# Patient Record
Sex: MALE | Race: WHITE | NOT HISPANIC OR LATINO | Employment: OTHER | URBAN - METROPOLITAN AREA
[De-identification: names, ages, dates, MRNs, and addresses within clinical notes are randomized per-mention and may not be internally consistent; named-entity substitution may affect disease eponyms.]

---

## 2017-01-01 ENCOUNTER — HOSPITAL ENCOUNTER (INPATIENT)
Facility: HOSPITAL | Age: 82
LOS: 6 days | Discharge: NURSING, REHAB OR LONG-TERM CARE | End: 2017-01-19
Attending: EMERGENCY MEDICINE | Admitting: INTERNAL MEDICINE

## 2017-01-01 ENCOUNTER — APPOINTMENT (OUTPATIENT)
Dept: MRI IMAGING | Facility: HOSPITAL | Age: 82
End: 2017-01-01

## 2017-01-01 ENCOUNTER — APPOINTMENT (OUTPATIENT)
Dept: GENERAL RADIOLOGY | Facility: HOSPITAL | Age: 82
End: 2017-01-01

## 2017-01-01 ENCOUNTER — APPOINTMENT (OUTPATIENT)
Dept: CARDIOLOGY | Facility: HOSPITAL | Age: 82
End: 2017-01-01
Attending: INTERNAL MEDICINE

## 2017-01-01 ENCOUNTER — APPOINTMENT (OUTPATIENT)
Dept: CT IMAGING | Facility: HOSPITAL | Age: 82
End: 2017-01-01

## 2017-01-01 ENCOUNTER — APPOINTMENT (OUTPATIENT)
Dept: GENERAL RADIOLOGY | Facility: HOSPITAL | Age: 82
End: 2017-01-01
Attending: INTERNAL MEDICINE

## 2017-01-01 ENCOUNTER — HOSPITAL ENCOUNTER (INPATIENT)
Facility: HOSPITAL | Age: 82
LOS: 13 days | End: 2017-02-10
Attending: EMERGENCY MEDICINE | Admitting: INTERNAL MEDICINE

## 2017-01-01 VITALS
RESPIRATION RATE: 18 BRPM | HEART RATE: 101 BPM | HEIGHT: 70 IN | OXYGEN SATURATION: 95 % | DIASTOLIC BLOOD PRESSURE: 74 MMHG | WEIGHT: 188.5 LBS | SYSTOLIC BLOOD PRESSURE: 149 MMHG | TEMPERATURE: 98.4 F | BODY MASS INDEX: 26.99 KG/M2

## 2017-01-01 VITALS
WEIGHT: 200.4 LBS | RESPIRATION RATE: 36 BRPM | DIASTOLIC BLOOD PRESSURE: 73 MMHG | HEIGHT: 69 IN | HEART RATE: 96 BPM | TEMPERATURE: 99.2 F | OXYGEN SATURATION: 46 % | SYSTOLIC BLOOD PRESSURE: 146 MMHG | BODY MASS INDEX: 29.68 KG/M2

## 2017-01-01 DIAGNOSIS — S00.81XA FACIAL ABRASION, INITIAL ENCOUNTER: ICD-10-CM

## 2017-01-01 DIAGNOSIS — S02.92XA FACIAL FRACTURE DUE TO FALL, CLOSED, INITIAL ENCOUNTER (HCC): ICD-10-CM

## 2017-01-01 DIAGNOSIS — J18.9 PNEUMONIA OF BOTH LUNGS DUE TO INFECTIOUS ORGANISM, UNSPECIFIED PART OF LUNG: Primary | ICD-10-CM

## 2017-01-01 DIAGNOSIS — R53.1 WEAKNESS GENERALIZED: ICD-10-CM

## 2017-01-01 DIAGNOSIS — M54.2 NECK PAIN: Primary | ICD-10-CM

## 2017-01-01 DIAGNOSIS — S12.100A ODONTOID FRACTURE, CLOSED, INITIAL ENCOUNTER (HCC): Primary | ICD-10-CM
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
## 2017-01-01 LAB
ABO + RH BLD: NORMAL
ABO GROUP BLD: NORMAL
ALBUMIN SERPL-MCNC: 2.3 G/DL (ref 3.5–5.2)
ALBUMIN SERPL-MCNC: 2.4 G/DL (ref 3.5–5.2)
ALBUMIN SERPL-MCNC: 2.4 G/DL (ref 3.5–5.2)
ALBUMIN SERPL-MCNC: 2.8 G/DL (ref 3.5–5.2)
ALBUMIN SERPL-MCNC: 3.3 G/DL (ref 3.5–5.2)
ALBUMIN/GLOB SERPL: 0.7 G/DL
ALBUMIN/GLOB SERPL: 0.7 G/DL
ALBUMIN/GLOB SERPL: 0.8 G/DL
ALBUMIN/GLOB SERPL: 1 G/DL
ALBUMIN/GLOB SERPL: 1.1 G/DL
ALP SERPL-CCNC: 101 U/L (ref 39–117)
ALP SERPL-CCNC: 127 U/L (ref 39–117)
ALP SERPL-CCNC: 148 U/L (ref 39–117)
ALP SERPL-CCNC: 82 U/L (ref 39–117)
ALP SERPL-CCNC: 95 U/L (ref 39–117)
ALT SERPL W P-5'-P-CCNC: 14 U/L (ref 1–41)
ALT SERPL W P-5'-P-CCNC: 17 U/L (ref 1–41)
ALT SERPL W P-5'-P-CCNC: 18 U/L (ref 1–41)
ALT SERPL W P-5'-P-CCNC: 21 U/L (ref 1–41)
ALT SERPL W P-5'-P-CCNC: 22 U/L (ref 1–41)
AMYLASE SERPL-CCNC: 19 U/L (ref 28–100)
ANION GAP SERPL CALCULATED.3IONS-SCNC: 10.6 MMOL/L
ANION GAP SERPL CALCULATED.3IONS-SCNC: 10.7 MMOL/L
ANION GAP SERPL CALCULATED.3IONS-SCNC: 10.7 MMOL/L
ANION GAP SERPL CALCULATED.3IONS-SCNC: 11.6 MMOL/L
ANION GAP SERPL CALCULATED.3IONS-SCNC: 11.9 MMOL/L
ANION GAP SERPL CALCULATED.3IONS-SCNC: 12.3 MMOL/L
ANION GAP SERPL CALCULATED.3IONS-SCNC: 12.7 MMOL/L
ANION GAP SERPL CALCULATED.3IONS-SCNC: 13 MMOL/L
ANION GAP SERPL CALCULATED.3IONS-SCNC: 13.8 MMOL/L
ANION GAP SERPL CALCULATED.3IONS-SCNC: 13.9 MMOL/L
ANION GAP SERPL CALCULATED.3IONS-SCNC: 14.2 MMOL/L
ANION GAP SERPL CALCULATED.3IONS-SCNC: 15 MMOL/L
ANION GAP SERPL CALCULATED.3IONS-SCNC: 15.2 MMOL/L
ANION GAP SERPL CALCULATED.3IONS-SCNC: 15.4 MMOL/L
ANION GAP SERPL CALCULATED.3IONS-SCNC: 16.2 MMOL/L
ANION GAP SERPL CALCULATED.3IONS-SCNC: 16.7 MMOL/L
ANION GAP SERPL CALCULATED.3IONS-SCNC: 17.7 MMOL/L
ANION GAP SERPL CALCULATED.3IONS-SCNC: 9.3 MMOL/L
APPEARANCE FLD: ABNORMAL
ARTERIAL PATENCY WRIST A: ABNORMAL
ARTERIAL PATENCY WRIST A: POSITIVE
AST SERPL-CCNC: 17 U/L (ref 1–40)
AST SERPL-CCNC: 18 U/L (ref 1–40)
AST SERPL-CCNC: 31 U/L (ref 1–40)
AST SERPL-CCNC: 33 U/L (ref 1–40)
AST SERPL-CCNC: 34 U/L (ref 1–40)
ATMOSPHERIC PRESS: 742 MMHG
ATMOSPHERIC PRESS: 744.6 MMHG
ATMOSPHERIC PRESS: 745 MMHG
ATMOSPHERIC PRESS: 749.4 MMHG
ATMOSPHERIC PRESS: 751.3 MMHG
ATMOSPHERIC PRESS: 751.4 MMHG
ATMOSPHERIC PRESS: 751.8 MMHG
ATMOSPHERIC PRESS: 754.5 MMHG
ATMOSPHERIC PRESS: 758.1 MMHG
ATMOSPHERIC PRESS: 758.4 MMHG
ATMOSPHERIC PRESS: 758.6 MMHG
B PERT DNA SPEC QL NAA+PROBE: NOT DETECTED
B PERT DNA SPEC QL NAA+PROBE: NOT DETECTED
BACTERIA SPEC AEROBE CULT: NO GROWTH
BACTERIA SPEC AEROBE CULT: NORMAL
BACTERIA SPEC RESP CULT: NO GROWTH
BACTERIA SPEC RESP CULT: NORMAL
BACTERIA UR QL AUTO: ABNORMAL /HPF
BACTERIA UR QL AUTO: NORMAL /HPF
BASE EXCESS BLDA CALC-SCNC: -1.9 MMOL/L (ref 0–2)
BASE EXCESS BLDA CALC-SCNC: -2.1 MMOL/L (ref 0–2)
BASE EXCESS BLDA CALC-SCNC: -2.6 MMOL/L (ref 0–2)
BASE EXCESS BLDA CALC-SCNC: -3.2 MMOL/L (ref 0–2)
BASE EXCESS BLDA CALC-SCNC: -3.7 MMOL/L (ref 0–2)
BASE EXCESS BLDA CALC-SCNC: -6 MMOL/L (ref 0–2)
BASE EXCESS BLDA CALC-SCNC: 0.3 MMOL/L (ref 0–2)
BASE EXCESS BLDA CALC-SCNC: 1.3 MMOL/L (ref 0–2)
BASE EXCESS BLDA CALC-SCNC: 1.6 MMOL/L (ref 0–2)
BASE EXCESS BLDA CALC-SCNC: 1.9 MMOL/L (ref 0–2)
BASE EXCESS BLDA CALC-SCNC: 7.4 MMOL/L (ref 0–2)
BASOPHILS # BLD AUTO: 0.01 10*3/MM3 (ref 0–0.2)
BASOPHILS # BLD AUTO: 0.02 10*3/MM3 (ref 0–0.2)
BASOPHILS # BLD AUTO: 0.03 10*3/MM3 (ref 0–0.2)
BASOPHILS # BLD AUTO: 0.03 10*3/MM3 (ref 0–0.2)
BASOPHILS # BLD AUTO: 0.04 10*3/MM3 (ref 0–0.2)
BASOPHILS # BLD AUTO: 0.05 10*3/MM3 (ref 0–0.2)
BASOPHILS NFR BLD AUTO: 0.1 % (ref 0–1.5)
BASOPHILS NFR BLD AUTO: 0.2 % (ref 0–1.5)
BASOPHILS NFR BLD AUTO: 0.3 % (ref 0–1.5)
BASOPHILS NFR BLD AUTO: 0.4 % (ref 0–1.5)
BDY SITE: ABNORMAL
BDY SITE: NORMAL
BH BB BLOOD EXPIRATION DATE: NORMAL
BH BB BLOOD TYPE BARCODE: 6200
BH BB DISPENSE STATUS: NORMAL
BH BB PRODUCT CODE: NORMAL
BH BB UNIT NUMBER: NORMAL
BH CV ECHO MEAS - ACS: 1.7 CM
BH CV ECHO MEAS - AO MAX PG (FULL): 9.2 MMHG
BH CV ECHO MEAS - AO MAX PG: 16.2 MMHG
BH CV ECHO MEAS - AO MEAN PG (FULL): 6 MMHG
BH CV ECHO MEAS - AO MEAN PG: 10 MMHG
BH CV ECHO MEAS - AO ROOT AREA (BSA CORRECTED): 1.5
BH CV ECHO MEAS - AO ROOT AREA: 7.5 CM^2
BH CV ECHO MEAS - AO ROOT DIAM: 3.1 CM
BH CV ECHO MEAS - AO V2 MAX: 201 CM/SEC
BH CV ECHO MEAS - AO V2 MEAN: 147 CM/SEC
BH CV ECHO MEAS - AO V2 VTI: 36.1 CM
BH CV ECHO MEAS - AVA(I,A): 1.7 CM^2
BH CV ECHO MEAS - AVA(I,D): 1.7 CM^2
BH CV ECHO MEAS - AVA(V,A): 1.9 CM^2
BH CV ECHO MEAS - AVA(V,D): 1.9 CM^2
BH CV ECHO MEAS - BSA(HAYCOCK): 2.1 M^2
BH CV ECHO MEAS - BSA: 2 M^2
BH CV ECHO MEAS - BZI_BMI: 28.5 KILOGRAMS/M^2
BH CV ECHO MEAS - BZI_METRIC_HEIGHT: 175.3 CM
BH CV ECHO MEAS - BZI_METRIC_WEIGHT: 87.5 KG
BH CV ECHO MEAS - CONTRAST EF (2CH): 67.7 ML/M^2
BH CV ECHO MEAS - CONTRAST EF 4CH: 63.4 ML/M^2
BH CV ECHO MEAS - EDV(CUBED): 125 ML
BH CV ECHO MEAS - EDV(MOD-SP2): 124 ML
BH CV ECHO MEAS - EDV(MOD-SP4): 131 ML
BH CV ECHO MEAS - EDV(TEICH): 118.2 ML
BH CV ECHO MEAS - EF(CUBED): 68.6 %
BH CV ECHO MEAS - EF(MOD-SP2): 67.7 %
BH CV ECHO MEAS - EF(MOD-SP4): 63.4 %
BH CV ECHO MEAS - EF(TEICH): 59.9 %
BH CV ECHO MEAS - ESV(CUBED): 39.3 ML
BH CV ECHO MEAS - ESV(MOD-SP2): 40 ML
BH CV ECHO MEAS - ESV(MOD-SP4): 48 ML
BH CV ECHO MEAS - ESV(TEICH): 47.4 ML
BH CV ECHO MEAS - FS: 32 %
BH CV ECHO MEAS - IVS/LVPW: 1.2
BH CV ECHO MEAS - IVSD: 1.2 CM
BH CV ECHO MEAS - LAT PEAK E' VEL: 9 CM/SEC
BH CV ECHO MEAS - LV DIASTOLIC VOL/BSA (35-75): 64.4 ML/M^2
BH CV ECHO MEAS - LV MASS(C)D: 207.1 GRAMS
BH CV ECHO MEAS - LV MASS(C)DI: 101.8 GRAMS/M^2
BH CV ECHO MEAS - LV MAX PG: 7 MMHG
BH CV ECHO MEAS - LV MEAN PG: 4 MMHG
BH CV ECHO MEAS - LV SYSTOLIC VOL/BSA (12-30): 23.6 ML/M^2
BH CV ECHO MEAS - LV V1 MAX: 132 CM/SEC
BH CV ECHO MEAS - LV V1 MEAN: 94 CM/SEC
BH CV ECHO MEAS - LV V1 VTI: 21.2 CM
BH CV ECHO MEAS - LVIDD: 5 CM
BH CV ECHO MEAS - LVIDS: 3.4 CM
BH CV ECHO MEAS - LVLD AP2: 8.8 CM
BH CV ECHO MEAS - LVLD AP4: 9.5 CM
BH CV ECHO MEAS - LVLS AP2: 6.9 CM
BH CV ECHO MEAS - LVLS AP4: 7.8 CM
BH CV ECHO MEAS - LVOT AREA (M): 2.8 CM^2
BH CV ECHO MEAS - LVOT AREA: 2.8 CM^2
BH CV ECHO MEAS - LVOT DIAM: 1.9 CM
BH CV ECHO MEAS - LVPWD: 1 CM
BH CV ECHO MEAS - MED PEAK E' VEL: 7 CM/SEC
BH CV ECHO MEAS - MR MAX PG: 28.9 MMHG
BH CV ECHO MEAS - MR MAX VEL: 269 CM/SEC
BH CV ECHO MEAS - MV A DUR: 0.12 SEC
BH CV ECHO MEAS - MV A MAX VEL: 112 CM/SEC
BH CV ECHO MEAS - MV DEC SLOPE: 441 CM/SEC^2
BH CV ECHO MEAS - MV DEC TIME: 0.34 SEC
BH CV ECHO MEAS - MV E MAX VEL: 96 CM/SEC
BH CV ECHO MEAS - MV E/A: 0.86
BH CV ECHO MEAS - MV MAX PG: 6.6 MMHG
BH CV ECHO MEAS - MV MEAN PG: 3 MMHG
BH CV ECHO MEAS - MV P1/2T MAX VEL: 97.7 CM/SEC
BH CV ECHO MEAS - MV P1/2T: 64.9 MSEC
BH CV ECHO MEAS - MV V2 MAX: 128 CM/SEC
BH CV ECHO MEAS - MV V2 MEAN: 84.4 CM/SEC
BH CV ECHO MEAS - MV V2 VTI: 24.3 CM
BH CV ECHO MEAS - MVA P1/2T LCG: 2.3 CM^2
BH CV ECHO MEAS - MVA(P1/2T): 3.4 CM^2
BH CV ECHO MEAS - MVA(VTI): 2.5 CM^2
BH CV ECHO MEAS - PA ACC TIME: 0.06 SEC
BH CV ECHO MEAS - PA MAX PG (FULL): 4.5 MMHG
BH CV ECHO MEAS - PA MAX PG: 6 MMHG
BH CV ECHO MEAS - PA PR(ACCEL): 53.8 MMHG
BH CV ECHO MEAS - PA V2 MAX: 122 CM/SEC
BH CV ECHO MEAS - PULM DIAS VEL: 23.5 CM/SEC
BH CV ECHO MEAS - PULM S/D: 1.5
BH CV ECHO MEAS - PULM SYS VEL: 34.6 CM/SEC
BH CV ECHO MEAS - PVA(V,A): 1 CM^2
BH CV ECHO MEAS - PVA(V,D): 1 CM^2
BH CV ECHO MEAS - QP/QS: 0.33
BH CV ECHO MEAS - RAP SYSTOLE: 15 MMHG
BH CV ECHO MEAS - RV MAX PG: 1.5 MMHG
BH CV ECHO MEAS - RV MEAN PG: 1 MMHG
BH CV ECHO MEAS - RV V1 MAX: 61.3 CM/SEC
BH CV ECHO MEAS - RV V1 MEAN: 40.6 CM/SEC
BH CV ECHO MEAS - RV V1 VTI: 9.8 CM
BH CV ECHO MEAS - RVOT AREA: 2 CM^2
BH CV ECHO MEAS - RVOT DIAM: 1.6 CM
BH CV ECHO MEAS - RVSP: 54.9 MMHG
BH CV ECHO MEAS - SI(AO): 133.9 ML/M^2
BH CV ECHO MEAS - SI(CUBED): 42.1 ML/M^2
BH CV ECHO MEAS - SI(LVOT): 29.5 ML/M^2
BH CV ECHO MEAS - SI(MOD-SP2): 41.3 ML/M^2
BH CV ECHO MEAS - SI(MOD-SP4): 40.8 ML/M^2
BH CV ECHO MEAS - SI(TEICH): 34.8 ML/M^2
BH CV ECHO MEAS - SV(AO): 272.5 ML
BH CV ECHO MEAS - SV(CUBED): 85.7 ML
BH CV ECHO MEAS - SV(LVOT): 60.1 ML
BH CV ECHO MEAS - SV(MOD-SP2): 84 ML
BH CV ECHO MEAS - SV(MOD-SP4): 83 ML
BH CV ECHO MEAS - SV(RVOT): 19.8 ML
BH CV ECHO MEAS - SV(TEICH): 70.8 ML
BH CV ECHO MEAS - TAPSE (>1.6): 1.4 CM2
BH CV ECHO MEAS - TR MAX VEL: 316 CM/SEC
BH CV XLRA - RV BASE: 2.9 CM
BH CV XLRA - TDI S': 8 CM/SEC
BILIRUB SERPL-MCNC: 0.3 MG/DL (ref 0.1–1.2)
BILIRUB SERPL-MCNC: 0.4 MG/DL (ref 0.1–1.2)
BILIRUB UR QL STRIP: NEGATIVE
BILIRUB UR QL STRIP: NEGATIVE
BLD GP AB SCN SERPL QL: NEGATIVE
BUN BLD-MCNC: 13 MG/DL (ref 8–23)
BUN BLD-MCNC: 14 MG/DL (ref 8–23)
BUN BLD-MCNC: 15 MG/DL (ref 8–23)
BUN BLD-MCNC: 20 MG/DL (ref 8–23)
BUN BLD-MCNC: 24 MG/DL (ref 8–23)
BUN BLD-MCNC: 26 MG/DL (ref 8–23)
BUN BLD-MCNC: 27 MG/DL (ref 8–23)
BUN BLD-MCNC: 28 MG/DL (ref 8–23)
BUN BLD-MCNC: 28 MG/DL (ref 8–23)
BUN BLD-MCNC: 31 MG/DL (ref 8–23)
BUN BLD-MCNC: 34 MG/DL (ref 8–23)
BUN BLD-MCNC: 39 MG/DL (ref 8–23)
BUN BLD-MCNC: 40 MG/DL (ref 8–23)
BUN BLD-MCNC: 40 MG/DL (ref 8–23)
BUN BLD-MCNC: 41 MG/DL (ref 8–23)
BUN BLD-MCNC: 44 MG/DL (ref 8–23)
BUN BLD-MCNC: 45 MG/DL (ref 8–23)
BUN BLD-MCNC: 46 MG/DL (ref 8–23)
BUN BLD-MCNC: 48 MG/DL (ref 8–23)
BUN BLD-MCNC: 51 MG/DL (ref 8–23)
BUN/CREAT SERPL: 13.7 (ref 7–25)
BUN/CREAT SERPL: 15.6 (ref 7–25)
BUN/CREAT SERPL: 15.8 (ref 7–25)
BUN/CREAT SERPL: 18.1 (ref 7–25)
BUN/CREAT SERPL: 18.5 (ref 7–25)
BUN/CREAT SERPL: 18.5 (ref 7–25)
BUN/CREAT SERPL: 19.9 (ref 7–25)
BUN/CREAT SERPL: 21.2 (ref 7–25)
BUN/CREAT SERPL: 21.6 (ref 7–25)
BUN/CREAT SERPL: 21.8 (ref 7–25)
BUN/CREAT SERPL: 22.8 (ref 7–25)
BUN/CREAT SERPL: 22.8 (ref 7–25)
BUN/CREAT SERPL: 28.4 (ref 7–25)
BUN/CREAT SERPL: 29.1 (ref 7–25)
BUN/CREAT SERPL: 29.7 (ref 7–25)
BUN/CREAT SERPL: 30.8 (ref 7–25)
BUN/CREAT SERPL: 32 (ref 7–25)
BUN/CREAT SERPL: 32.2 (ref 7–25)
BUN/CREAT SERPL: 33.1 (ref 7–25)
BUN/CREAT SERPL: 35.8 (ref 7–25)
C PNEUM DNA NPH QL NAA+NON-PROBE: NOT DETECTED
C PNEUM DNA NPH QL NAA+NON-PROBE: NOT DETECTED
CALCIUM SPEC-SCNC: 7.8 MG/DL (ref 8.6–10.5)
CALCIUM SPEC-SCNC: 7.9 MG/DL (ref 8.6–10.5)
CALCIUM SPEC-SCNC: 8.1 MG/DL (ref 8.6–10.5)
CALCIUM SPEC-SCNC: 8.1 MG/DL (ref 8.6–10.5)
CALCIUM SPEC-SCNC: 8.2 MG/DL (ref 8.6–10.5)
CALCIUM SPEC-SCNC: 8.3 MG/DL (ref 8.6–10.5)
CALCIUM SPEC-SCNC: 8.5 MG/DL (ref 8.6–10.5)
CALCIUM SPEC-SCNC: 8.6 MG/DL (ref 8.6–10.5)
CALCIUM SPEC-SCNC: 8.6 MG/DL (ref 8.6–10.5)
CALCIUM SPEC-SCNC: 8.8 MG/DL (ref 8.6–10.5)
CALCIUM SPEC-SCNC: 8.8 MG/DL (ref 8.6–10.5)
CALCIUM SPEC-SCNC: 8.9 MG/DL (ref 8.6–10.5)
CALCIUM SPEC-SCNC: 9 MG/DL (ref 8.6–10.5)
CHLORIDE SERPL-SCNC: 100 MMOL/L (ref 98–107)
CHLORIDE SERPL-SCNC: 101 MMOL/L (ref 98–107)
CHLORIDE SERPL-SCNC: 101 MMOL/L (ref 98–107)
CHLORIDE SERPL-SCNC: 102 MMOL/L (ref 98–107)
CHLORIDE SERPL-SCNC: 103 MMOL/L (ref 98–107)
CHLORIDE SERPL-SCNC: 105 MMOL/L (ref 98–107)
CHLORIDE SERPL-SCNC: 107 MMOL/L (ref 98–107)
CHLORIDE SERPL-SCNC: 109 MMOL/L (ref 98–107)
CHLORIDE SERPL-SCNC: 110 MMOL/L (ref 98–107)
CHLORIDE SERPL-SCNC: 112 MMOL/L (ref 98–107)
CHLORIDE SERPL-SCNC: 113 MMOL/L (ref 98–107)
CHLORIDE SERPL-SCNC: 115 MMOL/L (ref 98–107)
CHLORIDE SERPL-SCNC: 92 MMOL/L (ref 98–107)
CHLORIDE SERPL-SCNC: 94 MMOL/L (ref 98–107)
CHLORIDE SERPL-SCNC: 94 MMOL/L (ref 98–107)
CHLORIDE SERPL-SCNC: 96 MMOL/L (ref 98–107)
CLARITY UR: CLEAR
CLARITY UR: CLEAR
CO2 SERPL-SCNC: 18.3 MMOL/L (ref 22–29)
CO2 SERPL-SCNC: 19.6 MMOL/L (ref 22–29)
CO2 SERPL-SCNC: 19.8 MMOL/L (ref 22–29)
CO2 SERPL-SCNC: 20.8 MMOL/L (ref 22–29)
CO2 SERPL-SCNC: 20.8 MMOL/L (ref 22–29)
CO2 SERPL-SCNC: 21 MMOL/L (ref 22–29)
CO2 SERPL-SCNC: 21.4 MMOL/L (ref 22–29)
CO2 SERPL-SCNC: 22.3 MMOL/L (ref 22–29)
CO2 SERPL-SCNC: 22.3 MMOL/L (ref 22–29)
CO2 SERPL-SCNC: 23 MMOL/L (ref 22–29)
CO2 SERPL-SCNC: 23.4 MMOL/L (ref 22–29)
CO2 SERPL-SCNC: 24.3 MMOL/L (ref 22–29)
CO2 SERPL-SCNC: 24.7 MMOL/L (ref 22–29)
CO2 SERPL-SCNC: 25.1 MMOL/L (ref 22–29)
CO2 SERPL-SCNC: 25.1 MMOL/L (ref 22–29)
CO2 SERPL-SCNC: 25.7 MMOL/L (ref 22–29)
CO2 SERPL-SCNC: 25.7 MMOL/L (ref 22–29)
CO2 SERPL-SCNC: 28.7 MMOL/L (ref 22–29)
CO2 SERPL-SCNC: 29.2 MMOL/L (ref 22–29)
CO2 SERPL-SCNC: 31.3 MMOL/L (ref 22–29)
COLOR FLD: YELLOW
COLOR UR: YELLOW
COLOR UR: YELLOW
CREAT BLD-MCNC: 0.9 MG/DL (ref 0.76–1.27)
CREAT BLD-MCNC: 0.95 MG/DL (ref 0.76–1.27)
CREAT BLD-MCNC: 0.95 MG/DL (ref 0.76–1.27)
CREAT BLD-MCNC: 1.08 MG/DL (ref 0.76–1.27)
CREAT BLD-MCNC: 1.18 MG/DL (ref 0.76–1.27)
CREAT BLD-MCNC: 1.23 MG/DL (ref 0.76–1.27)
CREAT BLD-MCNC: 1.25 MG/DL (ref 0.76–1.27)
CREAT BLD-MCNC: 1.3 MG/DL (ref 0.76–1.27)
CREAT BLD-MCNC: 1.3 MG/DL (ref 0.76–1.27)
CREAT BLD-MCNC: 1.32 MG/DL (ref 0.76–1.27)
CREAT BLD-MCNC: 1.36 MG/DL (ref 0.76–1.27)
CREAT BLD-MCNC: 1.41 MG/DL (ref 0.76–1.27)
CREAT BLD-MCNC: 1.43 MG/DL (ref 0.76–1.27)
CREAT BLD-MCNC: 1.44 MG/DL (ref 0.76–1.27)
CREAT BLD-MCNC: 1.5 MG/DL (ref 0.76–1.27)
CREAT BLD-MCNC: 1.56 MG/DL (ref 0.76–1.27)
CREAT BLD-MCNC: 1.72 MG/DL (ref 0.76–1.27)
CREAT BLD-MCNC: 1.97 MG/DL (ref 0.76–1.27)
CYTO UR: NORMAL
D-LACTATE SERPL-SCNC: 1 MMOL/L (ref 0.5–2)
D-LACTATE SERPL-SCNC: 1.3 MMOL/L (ref 0.5–2)
D-LACTATE SERPL-SCNC: 1.7 MMOL/L (ref 0.5–2)
D-LACTATE SERPL-SCNC: 1.7 MMOL/L (ref 0.5–2)
D-LACTATE SERPL-SCNC: 1.8 MMOL/L (ref 0.5–2)
D-LACTATE SERPL-SCNC: 2.7 MMOL/L (ref 0.5–2)
DEPRECATED RDW RBC AUTO: 43.1 FL (ref 37–54)
DEPRECATED RDW RBC AUTO: 46.1 FL (ref 37–54)
DEPRECATED RDW RBC AUTO: 46.9 FL (ref 37–54)
DEPRECATED RDW RBC AUTO: 47.4 FL (ref 37–54)
DEPRECATED RDW RBC AUTO: 47.5 FL (ref 37–54)
DEPRECATED RDW RBC AUTO: 47.8 FL (ref 37–54)
DEPRECATED RDW RBC AUTO: 47.9 FL (ref 37–54)
DEPRECATED RDW RBC AUTO: 48 FL (ref 37–54)
DEPRECATED RDW RBC AUTO: 48.2 FL (ref 37–54)
DEPRECATED RDW RBC AUTO: 49.7 FL (ref 37–54)
DEPRECATED RDW RBC AUTO: 49.8 FL (ref 37–54)
DEPRECATED RDW RBC AUTO: 50.7 FL (ref 37–54)
DEPRECATED RDW RBC AUTO: 50.8 FL (ref 37–54)
DEPRECATED RDW RBC AUTO: 51.2 FL (ref 37–54)
DEPRECATED RDW RBC AUTO: 51.5 FL (ref 37–54)
DEPRECATED RDW RBC AUTO: 52 FL (ref 37–54)
DEPRECATED RDW RBC AUTO: 52 FL (ref 37–54)
DEPRECATED RDW RBC AUTO: 54.8 FL (ref 37–54)
E/E' RATIO: 13
EOSINOPHIL # BLD AUTO: 0.02 10*3/MM3 (ref 0–0.7)
EOSINOPHIL # BLD AUTO: 0.05 10*3/MM3 (ref 0–0.7)
EOSINOPHIL # BLD AUTO: 0.05 10*3/MM3 (ref 0–0.7)
EOSINOPHIL # BLD AUTO: 0.08 10*3/MM3 (ref 0–0.7)
EOSINOPHIL # BLD AUTO: 0.1 10*3/MM3 (ref 0–0.7)
EOSINOPHIL # BLD AUTO: 0.19 10*3/MM3 (ref 0–0.7)
EOSINOPHIL # BLD AUTO: 0.28 10*3/MM3 (ref 0–0.7)
EOSINOPHIL # BLD AUTO: 0.3 10*3/MM3 (ref 0–0.7)
EOSINOPHIL # BLD AUTO: 0.36 10*3/MM3 (ref 0–0.7)
EOSINOPHIL # BLD AUTO: 0.36 10*3/MM3 (ref 0–0.7)
EOSINOPHIL # BLD AUTO: 0.4 10*3/MM3 (ref 0–0.7)
EOSINOPHIL # BLD AUTO: 0.52 10*3/MM3 (ref 0–0.7)
EOSINOPHIL # BLD AUTO: 0.55 10*3/MM3 (ref 0–0.7)
EOSINOPHIL # BLD AUTO: 0.67 10*3/MM3 (ref 0–0.7)
EOSINOPHIL # BLD AUTO: 0.69 10*3/MM3 (ref 0–0.7)
EOSINOPHIL NFR BLD AUTO: 0.1 % (ref 0.3–6.2)
EOSINOPHIL NFR BLD AUTO: 0.4 % (ref 0.3–6.2)
EOSINOPHIL NFR BLD AUTO: 0.7 % (ref 0.3–6.2)
EOSINOPHIL NFR BLD AUTO: 0.8 % (ref 0.3–6.2)
EOSINOPHIL NFR BLD AUTO: 1.1 % (ref 0.3–6.2)
EOSINOPHIL NFR BLD AUTO: 1.5 % (ref 0.3–6.2)
EOSINOPHIL NFR BLD AUTO: 2.6 % (ref 0.3–6.2)
EOSINOPHIL NFR BLD AUTO: 3.7 % (ref 0.3–6.2)
EOSINOPHIL NFR BLD AUTO: 4 % (ref 0.3–6.2)
EOSINOPHIL NFR BLD AUTO: 4.2 % (ref 0.3–6.2)
EOSINOPHIL NFR BLD AUTO: 4.4 % (ref 0.3–6.2)
EOSINOPHIL NFR BLD AUTO: 4.7 % (ref 0.3–6.2)
EOSINOPHIL NFR BLD AUTO: 4.9 % (ref 0.3–6.2)
EOSINOPHIL NFR BLD AUTO: 5.9 % (ref 0.3–6.2)
EOSINOPHIL NFR BLD AUTO: 6.9 % (ref 0.3–6.2)
EOSINOPHIL NFR FLD MANUAL: 2 %
ERYTHROCYTE [DISTWIDTH] IN BLOOD BY AUTOMATED COUNT: 12.4 % (ref 11.5–14.5)
ERYTHROCYTE [DISTWIDTH] IN BLOOD BY AUTOMATED COUNT: 13 % (ref 11.5–14.5)
ERYTHROCYTE [DISTWIDTH] IN BLOOD BY AUTOMATED COUNT: 13 % (ref 11.5–14.5)
ERYTHROCYTE [DISTWIDTH] IN BLOOD BY AUTOMATED COUNT: 13.1 % (ref 11.5–14.5)
ERYTHROCYTE [DISTWIDTH] IN BLOOD BY AUTOMATED COUNT: 13.1 % (ref 11.5–14.5)
ERYTHROCYTE [DISTWIDTH] IN BLOOD BY AUTOMATED COUNT: 13.2 % (ref 11.5–14.5)
ERYTHROCYTE [DISTWIDTH] IN BLOOD BY AUTOMATED COUNT: 13.3 % (ref 11.5–14.5)
ERYTHROCYTE [DISTWIDTH] IN BLOOD BY AUTOMATED COUNT: 13.4 % (ref 11.5–14.5)
ERYTHROCYTE [DISTWIDTH] IN BLOOD BY AUTOMATED COUNT: 13.4 % (ref 11.5–14.5)
ERYTHROCYTE [DISTWIDTH] IN BLOOD BY AUTOMATED COUNT: 13.5 % (ref 11.5–14.5)
ERYTHROCYTE [DISTWIDTH] IN BLOOD BY AUTOMATED COUNT: 13.5 % (ref 11.5–14.5)
ERYTHROCYTE [DISTWIDTH] IN BLOOD BY AUTOMATED COUNT: 13.9 % (ref 11.5–14.5)
ERYTHROCYTE [DISTWIDTH] IN BLOOD BY AUTOMATED COUNT: 14 % (ref 11.5–14.5)
ERYTHROCYTE [DISTWIDTH] IN BLOOD BY AUTOMATED COUNT: 14.1 % (ref 11.5–14.5)
ERYTHROCYTE [DISTWIDTH] IN BLOOD BY AUTOMATED COUNT: 14.5 % (ref 11.5–14.5)
ERYTHROCYTE [DISTWIDTH] IN BLOOD BY AUTOMATED COUNT: 14.5 % (ref 11.5–14.5)
ERYTHROCYTE [DISTWIDTH] IN BLOOD BY AUTOMATED COUNT: 15.2 % (ref 11.5–14.5)
FLUAV H1 2009 PAND RNA NPH QL NAA+PROBE: NOT DETECTED
FLUAV H1 2009 PAND RNA NPH QL NAA+PROBE: NOT DETECTED
FLUAV H1 HA GENE NPH QL NAA+PROBE: NOT DETECTED
FLUAV H1 HA GENE NPH QL NAA+PROBE: NOT DETECTED
FLUAV H3 RNA NPH QL NAA+PROBE: NOT DETECTED
FLUAV H3 RNA NPH QL NAA+PROBE: NOT DETECTED
FLUAV SUBTYP SPEC NAA+PROBE: NOT DETECTED
FLUAV SUBTYP SPEC NAA+PROBE: NOT DETECTED
FLUBV RNA ISLT QL NAA+PROBE: NOT DETECTED
FLUBV RNA ISLT QL NAA+PROBE: NOT DETECTED
GAS FLOW AIRWAY: 15 LPM
GASTROCULT GAST QL: NEGATIVE
GFR SERPL CREATININE-BSD FRML MDRD: 33 ML/MIN/1.73
GFR SERPL CREATININE-BSD FRML MDRD: 38 ML/MIN/1.73
GFR SERPL CREATININE-BSD FRML MDRD: 43 ML/MIN/1.73
GFR SERPL CREATININE-BSD FRML MDRD: 45 ML/MIN/1.73
GFR SERPL CREATININE-BSD FRML MDRD: 47 ML/MIN/1.73
GFR SERPL CREATININE-BSD FRML MDRD: 48 ML/MIN/1.73
GFR SERPL CREATININE-BSD FRML MDRD: 50 ML/MIN/1.73
GFR SERPL CREATININE-BSD FRML MDRD: 52 ML/MIN/1.73
GFR SERPL CREATININE-BSD FRML MDRD: 53 ML/MIN/1.73
GFR SERPL CREATININE-BSD FRML MDRD: 53 ML/MIN/1.73
GFR SERPL CREATININE-BSD FRML MDRD: 56 ML/MIN/1.73
GFR SERPL CREATININE-BSD FRML MDRD: 57 ML/MIN/1.73
GFR SERPL CREATININE-BSD FRML MDRD: 59 ML/MIN/1.73
GFR SERPL CREATININE-BSD FRML MDRD: 66 ML/MIN/1.73
GFR SERPL CREATININE-BSD FRML MDRD: 76 ML/MIN/1.73
GFR SERPL CREATININE-BSD FRML MDRD: 76 ML/MIN/1.73
GFR SERPL CREATININE-BSD FRML MDRD: 81 ML/MIN/1.73
GIE STN SPEC: NORMAL
GLOBULIN UR ELPH-MCNC: 2.6 GM/DL
GLOBULIN UR ELPH-MCNC: 3 GM/DL
GLOBULIN UR ELPH-MCNC: 3.2 GM/DL
GLOBULIN UR ELPH-MCNC: 3.3 GM/DL
GLOBULIN UR ELPH-MCNC: 3.5 GM/DL
GLUCOSE BLD-MCNC: 101 MG/DL (ref 65–99)
GLUCOSE BLD-MCNC: 103 MG/DL (ref 65–99)
GLUCOSE BLD-MCNC: 106 MG/DL (ref 65–99)
GLUCOSE BLD-MCNC: 108 MG/DL (ref 65–99)
GLUCOSE BLD-MCNC: 117 MG/DL (ref 65–99)
GLUCOSE BLD-MCNC: 120 MG/DL (ref 65–99)
GLUCOSE BLD-MCNC: 120 MG/DL (ref 65–99)
GLUCOSE BLD-MCNC: 123 MG/DL (ref 65–99)
GLUCOSE BLD-MCNC: 133 MG/DL (ref 65–99)
GLUCOSE BLD-MCNC: 136 MG/DL (ref 65–99)
GLUCOSE BLD-MCNC: 140 MG/DL (ref 65–99)
GLUCOSE BLD-MCNC: 142 MG/DL (ref 65–99)
GLUCOSE BLD-MCNC: 149 MG/DL (ref 65–99)
GLUCOSE BLD-MCNC: 149 MG/DL (ref 65–99)
GLUCOSE BLD-MCNC: 159 MG/DL (ref 65–99)
GLUCOSE BLD-MCNC: 168 MG/DL (ref 65–99)
GLUCOSE BLD-MCNC: 96 MG/DL (ref 65–99)
GLUCOSE BLD-MCNC: 96 MG/DL (ref 65–99)
GLUCOSE BLD-MCNC: 98 MG/DL (ref 65–99)
GLUCOSE BLD-MCNC: 99 MG/DL (ref 65–99)
GLUCOSE BLDC GLUCOMTR-MCNC: 101 MG/DL (ref 70–130)
GLUCOSE BLDC GLUCOMTR-MCNC: 103 MG/DL (ref 70–130)
GLUCOSE BLDC GLUCOMTR-MCNC: 103 MG/DL (ref 70–130)
GLUCOSE BLDC GLUCOMTR-MCNC: 105 MG/DL (ref 70–130)
GLUCOSE BLDC GLUCOMTR-MCNC: 105 MG/DL (ref 70–130)
GLUCOSE BLDC GLUCOMTR-MCNC: 107 MG/DL (ref 70–130)
GLUCOSE BLDC GLUCOMTR-MCNC: 108 MG/DL (ref 70–130)
GLUCOSE BLDC GLUCOMTR-MCNC: 109 MG/DL (ref 70–130)
GLUCOSE BLDC GLUCOMTR-MCNC: 111 MG/DL (ref 70–130)
GLUCOSE BLDC GLUCOMTR-MCNC: 112 MG/DL (ref 70–130)
GLUCOSE BLDC GLUCOMTR-MCNC: 117 MG/DL (ref 70–130)
GLUCOSE BLDC GLUCOMTR-MCNC: 118 MG/DL (ref 70–130)
GLUCOSE BLDC GLUCOMTR-MCNC: 119 MG/DL (ref 70–130)
GLUCOSE BLDC GLUCOMTR-MCNC: 120 MG/DL (ref 70–130)
GLUCOSE BLDC GLUCOMTR-MCNC: 120 MG/DL (ref 70–130)
GLUCOSE BLDC GLUCOMTR-MCNC: 121 MG/DL (ref 70–130)
GLUCOSE BLDC GLUCOMTR-MCNC: 124 MG/DL (ref 70–130)
GLUCOSE BLDC GLUCOMTR-MCNC: 126 MG/DL (ref 70–130)
GLUCOSE BLDC GLUCOMTR-MCNC: 127 MG/DL (ref 70–130)
GLUCOSE BLDC GLUCOMTR-MCNC: 128 MG/DL (ref 70–130)
GLUCOSE BLDC GLUCOMTR-MCNC: 129 MG/DL (ref 70–130)
GLUCOSE BLDC GLUCOMTR-MCNC: 130 MG/DL (ref 70–130)
GLUCOSE BLDC GLUCOMTR-MCNC: 131 MG/DL (ref 70–130)
GLUCOSE BLDC GLUCOMTR-MCNC: 131 MG/DL (ref 70–130)
GLUCOSE BLDC GLUCOMTR-MCNC: 133 MG/DL (ref 70–130)
GLUCOSE BLDC GLUCOMTR-MCNC: 134 MG/DL (ref 70–130)
GLUCOSE BLDC GLUCOMTR-MCNC: 135 MG/DL (ref 70–130)
GLUCOSE BLDC GLUCOMTR-MCNC: 136 MG/DL (ref 70–130)
GLUCOSE BLDC GLUCOMTR-MCNC: 136 MG/DL (ref 70–130)
GLUCOSE BLDC GLUCOMTR-MCNC: 137 MG/DL (ref 70–130)
GLUCOSE BLDC GLUCOMTR-MCNC: 138 MG/DL (ref 70–130)
GLUCOSE BLDC GLUCOMTR-MCNC: 141 MG/DL (ref 70–130)
GLUCOSE BLDC GLUCOMTR-MCNC: 142 MG/DL (ref 70–130)
GLUCOSE BLDC GLUCOMTR-MCNC: 142 MG/DL (ref 70–130)
GLUCOSE BLDC GLUCOMTR-MCNC: 143 MG/DL (ref 70–130)
GLUCOSE BLDC GLUCOMTR-MCNC: 144 MG/DL (ref 70–130)
GLUCOSE BLDC GLUCOMTR-MCNC: 147 MG/DL (ref 70–130)
GLUCOSE BLDC GLUCOMTR-MCNC: 150 MG/DL (ref 70–130)
GLUCOSE BLDC GLUCOMTR-MCNC: 167 MG/DL (ref 70–130)
GLUCOSE BLDC GLUCOMTR-MCNC: 169 MG/DL (ref 70–130)
GLUCOSE BLDC GLUCOMTR-MCNC: 59 MG/DL (ref 70–130)
GLUCOSE BLDC GLUCOMTR-MCNC: 68 MG/DL (ref 70–130)
GLUCOSE BLDC GLUCOMTR-MCNC: 83 MG/DL (ref 70–130)
GLUCOSE BLDC GLUCOMTR-MCNC: 84 MG/DL (ref 70–130)
GLUCOSE BLDC GLUCOMTR-MCNC: 85 MG/DL (ref 70–130)
GLUCOSE BLDC GLUCOMTR-MCNC: 86 MG/DL (ref 70–130)
GLUCOSE BLDC GLUCOMTR-MCNC: 91 MG/DL (ref 70–130)
GLUCOSE BLDC GLUCOMTR-MCNC: 93 MG/DL (ref 70–130)
GLUCOSE BLDC GLUCOMTR-MCNC: 94 MG/DL (ref 70–130)
GLUCOSE BLDC GLUCOMTR-MCNC: 98 MG/DL (ref 70–130)
GLUCOSE BLDC GLUCOMTR-MCNC: 99 MG/DL (ref 70–130)
GLUCOSE UR STRIP-MCNC: NEGATIVE MG/DL
GLUCOSE UR STRIP-MCNC: NEGATIVE MG/DL
GRAM STN SPEC: NORMAL
HADV DNA SPEC NAA+PROBE: NOT DETECTED
HADV DNA SPEC NAA+PROBE: NOT DETECTED
HCO3 BLDA-SCNC: 19.3 MMOL/L (ref 22–28)
HCO3 BLDA-SCNC: 22.3 MMOL/L (ref 22–28)
HCO3 BLDA-SCNC: 23.6 MMOL/L (ref 22–28)
HCO3 BLDA-SCNC: 23.7 MMOL/L (ref 22–28)
HCO3 BLDA-SCNC: 23.8 MMOL/L (ref 22–28)
HCO3 BLDA-SCNC: 23.8 MMOL/L (ref 22–28)
HCO3 BLDA-SCNC: 24.3 MMOL/L (ref 22–28)
HCO3 BLDA-SCNC: 26.8 MMOL/L (ref 22–28)
HCO3 BLDA-SCNC: 27 MMOL/L (ref 22–28)
HCO3 BLDA-SCNC: 27.2 MMOL/L (ref 22–28)
HCO3 BLDA-SCNC: 33.6 MMOL/L (ref 22–28)
HCOV 229E RNA SPEC QL NAA+PROBE: NOT DETECTED
HCOV 229E RNA SPEC QL NAA+PROBE: NOT DETECTED
HCOV HKU1 RNA SPEC QL NAA+PROBE: NOT DETECTED
HCOV HKU1 RNA SPEC QL NAA+PROBE: NOT DETECTED
HCOV NL63 RNA SPEC QL NAA+PROBE: NOT DETECTED
HCOV NL63 RNA SPEC QL NAA+PROBE: NOT DETECTED
HCOV OC43 RNA SPEC QL NAA+PROBE: NOT DETECTED
HCOV OC43 RNA SPEC QL NAA+PROBE: NOT DETECTED
HCT VFR BLD AUTO: 22.9 % (ref 40.4–52.2)
HCT VFR BLD AUTO: 23 % (ref 40.4–52.2)
HCT VFR BLD AUTO: 23.2 % (ref 40.4–52.2)
HCT VFR BLD AUTO: 24.1 % (ref 40.4–52.2)
HCT VFR BLD AUTO: 24.8 % (ref 40.4–52.2)
HCT VFR BLD AUTO: 24.9 % (ref 40.4–52.2)
HCT VFR BLD AUTO: 25.6 % (ref 40.4–52.2)
HCT VFR BLD AUTO: 26.1 % (ref 40.4–52.2)
HCT VFR BLD AUTO: 26.6 % (ref 40.4–52.2)
HCT VFR BLD AUTO: 26.7 % (ref 40.4–52.2)
HCT VFR BLD AUTO: 27.3 % (ref 40.4–52.2)
HCT VFR BLD AUTO: 27.7 % (ref 40.4–52.2)
HCT VFR BLD AUTO: 27.7 % (ref 40.4–52.2)
HCT VFR BLD AUTO: 27.9 % (ref 40.4–52.2)
HCT VFR BLD AUTO: 28.2 % (ref 40.4–52.2)
HCT VFR BLD AUTO: 28.3 % (ref 40.4–52.2)
HCT VFR BLD AUTO: 28.6 % (ref 40.4–52.2)
HCT VFR BLD AUTO: 29 % (ref 40.4–52.2)
HCT VFR BLD AUTO: 30.7 % (ref 40.4–52.2)
HCT VFR BLD AUTO: 31.5 % (ref 40.4–52.2)
HCT VFR BLD AUTO: 32 % (ref 40.4–52.2)
HGB BLD-MCNC: 10.4 G/DL (ref 13.7–17.6)
HGB BLD-MCNC: 7.2 G/DL (ref 13.7–17.6)
HGB BLD-MCNC: 7.2 G/DL (ref 13.7–17.6)
HGB BLD-MCNC: 7.4 G/DL (ref 13.7–17.6)
HGB BLD-MCNC: 7.6 G/DL (ref 13.7–17.6)
HGB BLD-MCNC: 7.8 G/DL (ref 13.7–17.6)
HGB BLD-MCNC: 8 G/DL (ref 13.7–17.6)
HGB BLD-MCNC: 8.2 G/DL (ref 13.7–17.6)
HGB BLD-MCNC: 8.3 G/DL (ref 13.7–17.6)
HGB BLD-MCNC: 8.4 G/DL (ref 13.7–17.6)
HGB BLD-MCNC: 8.5 G/DL (ref 13.7–17.6)
HGB BLD-MCNC: 8.6 G/DL (ref 13.7–17.6)
HGB BLD-MCNC: 8.7 G/DL (ref 13.7–17.6)
HGB BLD-MCNC: 8.9 G/DL (ref 13.7–17.6)
HGB BLD-MCNC: 8.9 G/DL (ref 13.7–17.6)
HGB BLD-MCNC: 9 G/DL (ref 13.7–17.6)
HGB BLD-MCNC: 9.2 G/DL (ref 13.7–17.6)
HGB BLD-MCNC: 9.4 G/DL (ref 13.7–17.6)
HGB BLD-MCNC: 9.4 G/DL (ref 13.7–17.6)
HGB BLD-MCNC: 9.8 G/DL (ref 13.7–17.6)
HGB BLD-MCNC: 9.9 G/DL (ref 13.7–17.6)
HGB UR QL STRIP.AUTO: ABNORMAL
HGB UR QL STRIP.AUTO: NEGATIVE
HMPV RNA NPH QL NAA+NON-PROBE: NOT DETECTED
HMPV RNA NPH QL NAA+NON-PROBE: NOT DETECTED
HOLD SPECIMEN: NORMAL
HOROWITZ INDEX BLD+IHG-RTO: 100 %
HOROWITZ INDEX BLD+IHG-RTO: 45 %
HOROWITZ INDEX BLD+IHG-RTO: 50 %
HOROWITZ INDEX BLD+IHG-RTO: 60 %
HOROWITZ INDEX BLD+IHG-RTO: 70 %
HOROWITZ INDEX BLD+IHG-RTO: 80 %
HOROWITZ INDEX BLD+IHG-RTO: 80 %
HOROWITZ INDEX BLD+IHG-RTO: 98 %
HPIV1 RNA SPEC QL NAA+PROBE: NOT DETECTED
HPIV1 RNA SPEC QL NAA+PROBE: NOT DETECTED
HPIV2 RNA SPEC QL NAA+PROBE: NOT DETECTED
HPIV2 RNA SPEC QL NAA+PROBE: NOT DETECTED
HPIV3 RNA NPH QL NAA+PROBE: NOT DETECTED
HPIV3 RNA NPH QL NAA+PROBE: NOT DETECTED
HPIV4 P GENE NPH QL NAA+PROBE: NOT DETECTED
HPIV4 P GENE NPH QL NAA+PROBE: NOT DETECTED
HYALINE CASTS UR QL AUTO: ABNORMAL /LPF
HYALINE CASTS UR QL AUTO: NORMAL /LPF
IMM GRANULOCYTES # BLD: 0 10*3/MM3 (ref 0–0.03)
IMM GRANULOCYTES # BLD: 0.03 10*3/MM3 (ref 0–0.03)
IMM GRANULOCYTES # BLD: 0.05 10*3/MM3 (ref 0–0.03)
IMM GRANULOCYTES # BLD: 0.06 10*3/MM3 (ref 0–0.03)
IMM GRANULOCYTES # BLD: 0.09 10*3/MM3 (ref 0–0.03)
IMM GRANULOCYTES # BLD: 0.09 10*3/MM3 (ref 0–0.03)
IMM GRANULOCYTES # BLD: 0.21 10*3/MM3 (ref 0–0.03)
IMM GRANULOCYTES # BLD: 0.24 10*3/MM3 (ref 0–0.03)
IMM GRANULOCYTES # BLD: 0.29 10*3/MM3 (ref 0–0.03)
IMM GRANULOCYTES NFR BLD: 0 % (ref 0–0.5)
IMM GRANULOCYTES NFR BLD: 0.2 % (ref 0–0.5)
IMM GRANULOCYTES NFR BLD: 0.3 % (ref 0–0.5)
IMM GRANULOCYTES NFR BLD: 0.4 % (ref 0–0.5)
IMM GRANULOCYTES NFR BLD: 0.5 % (ref 0–0.5)
IMM GRANULOCYTES NFR BLD: 0.5 % (ref 0–0.5)
IMM GRANULOCYTES NFR BLD: 0.6 % (ref 0–0.5)
IMM GRANULOCYTES NFR BLD: 1 % (ref 0–0.5)
IMM GRANULOCYTES NFR BLD: 1.8 % (ref 0–0.5)
IMM GRANULOCYTES NFR BLD: 2 % (ref 0–0.5)
IMM GRANULOCYTES NFR BLD: 3 % (ref 0–0.5)
INR PPP: 1.3 (ref 0.9–1.1)
KETONES UR QL STRIP: NEGATIVE
KETONES UR QL STRIP: NEGATIVE
LAB AP CASE REPORT: NORMAL
LEFT ATRIUM VOLUME INDEX: 30 ML/M2
LEUKOCYTE ESTERASE UR QL STRIP.AUTO: NEGATIVE
LEUKOCYTE ESTERASE UR QL STRIP.AUTO: NEGATIVE
LIPASE SERPL-CCNC: 17 U/L (ref 13–60)
LYMPHOCYTES # BLD AUTO: 0.35 10*3/MM3 (ref 0.9–4.8)
LYMPHOCYTES # BLD AUTO: 0.47 10*3/MM3 (ref 0.9–4.8)
LYMPHOCYTES # BLD AUTO: 0.51 10*3/MM3 (ref 0.9–4.8)
LYMPHOCYTES # BLD AUTO: 0.54 10*3/MM3 (ref 0.9–4.8)
LYMPHOCYTES # BLD AUTO: 0.55 10*3/MM3 (ref 0.9–4.8)
LYMPHOCYTES # BLD AUTO: 0.63 10*3/MM3 (ref 0.9–4.8)
LYMPHOCYTES # BLD AUTO: 0.65 10*3/MM3 (ref 0.9–4.8)
LYMPHOCYTES # BLD AUTO: 0.7 10*3/MM3 (ref 0.9–4.8)
LYMPHOCYTES # BLD AUTO: 0.71 10*3/MM3 (ref 0.9–4.8)
LYMPHOCYTES # BLD AUTO: 0.75 10*3/MM3 (ref 0.9–4.8)
LYMPHOCYTES # BLD AUTO: 0.88 10*3/MM3 (ref 0.9–4.8)
LYMPHOCYTES # BLD AUTO: 0.93 10*3/MM3 (ref 0.9–4.8)
LYMPHOCYTES # BLD AUTO: 1 10*3/MM3 (ref 0.9–4.8)
LYMPHOCYTES # BLD AUTO: 1.21 10*3/MM3 (ref 0.9–4.8)
LYMPHOCYTES # BLD AUTO: 2.3 10*3/MM3 (ref 0.9–4.8)
LYMPHOCYTES NFR BLD AUTO: 13.3 % (ref 19.6–45.3)
LYMPHOCYTES NFR BLD AUTO: 15.2 % (ref 19.6–45.3)
LYMPHOCYTES NFR BLD AUTO: 18.3 % (ref 19.6–45.3)
LYMPHOCYTES NFR BLD AUTO: 3.7 % (ref 19.6–45.3)
LYMPHOCYTES NFR BLD AUTO: 3.9 % (ref 19.6–45.3)
LYMPHOCYTES NFR BLD AUTO: 5.4 % (ref 19.6–45.3)
LYMPHOCYTES NFR BLD AUTO: 5.7 % (ref 19.6–45.3)
LYMPHOCYTES NFR BLD AUTO: 5.8 % (ref 19.6–45.3)
LYMPHOCYTES NFR BLD AUTO: 6 % (ref 19.6–45.3)
LYMPHOCYTES NFR BLD AUTO: 6.3 % (ref 19.6–45.3)
LYMPHOCYTES NFR BLD AUTO: 6.4 % (ref 19.6–45.3)
LYMPHOCYTES NFR BLD AUTO: 6.4 % (ref 19.6–45.3)
LYMPHOCYTES NFR BLD AUTO: 6.5 % (ref 19.6–45.3)
LYMPHOCYTES NFR BLD AUTO: 7.2 % (ref 19.6–45.3)
LYMPHOCYTES NFR BLD AUTO: 8.6 % (ref 19.6–45.3)
LYMPHOCYTES NFR FLD MANUAL: 8 %
Lab: NORMAL
M PNEUMO IGG SER IA-ACNC: NOT DETECTED
M PNEUMO IGG SER IA-ACNC: NOT DETECTED
MAGNESIUM SERPL-MCNC: 1.6 MG/DL (ref 1.6–2.4)
MAGNESIUM SERPL-MCNC: 1.8 MG/DL (ref 1.6–2.4)
MAGNESIUM SERPL-MCNC: 2.1 MG/DL (ref 1.6–2.4)
MAGNESIUM SERPL-MCNC: 2.4 MG/DL (ref 1.6–2.4)
MCH RBC QN AUTO: 30.2 PG (ref 27–32.7)
MCH RBC QN AUTO: 31 PG (ref 27–32.7)
MCH RBC QN AUTO: 31 PG (ref 27–32.7)
MCH RBC QN AUTO: 31.1 PG (ref 27–32.7)
MCH RBC QN AUTO: 31.2 PG (ref 27–32.7)
MCH RBC QN AUTO: 31.2 PG (ref 27–32.7)
MCH RBC QN AUTO: 31.3 PG (ref 27–32.7)
MCH RBC QN AUTO: 31.3 PG (ref 27–32.7)
MCH RBC QN AUTO: 31.4 PG (ref 27–32.7)
MCH RBC QN AUTO: 31.4 PG (ref 27–32.7)
MCH RBC QN AUTO: 31.5 PG (ref 27–32.7)
MCH RBC QN AUTO: 31.5 PG (ref 27–32.7)
MCH RBC QN AUTO: 31.8 PG (ref 27–32.7)
MCH RBC QN AUTO: 31.9 PG (ref 27–32.7)
MCH RBC QN AUTO: 32 PG (ref 27–32.7)
MCH RBC QN AUTO: 32 PG (ref 27–32.7)
MCH RBC QN AUTO: 32.2 PG (ref 27–32.7)
MCH RBC QN AUTO: 32.3 PG (ref 27–32.7)
MCHC RBC AUTO-ENTMCNC: 30.8 G/DL (ref 32.6–36.4)
MCHC RBC AUTO-ENTMCNC: 31 G/DL (ref 32.6–36.4)
MCHC RBC AUTO-ENTMCNC: 31.1 G/DL (ref 32.6–36.4)
MCHC RBC AUTO-ENTMCNC: 31.3 G/DL (ref 32.6–36.4)
MCHC RBC AUTO-ENTMCNC: 31.4 G/DL (ref 32.6–36.4)
MCHC RBC AUTO-ENTMCNC: 31.4 G/DL (ref 32.6–36.4)
MCHC RBC AUTO-ENTMCNC: 31.5 G/DL (ref 32.6–36.4)
MCHC RBC AUTO-ENTMCNC: 31.5 G/DL (ref 32.6–36.4)
MCHC RBC AUTO-ENTMCNC: 31.8 G/DL (ref 32.6–36.4)
MCHC RBC AUTO-ENTMCNC: 31.8 G/DL (ref 32.6–36.4)
MCHC RBC AUTO-ENTMCNC: 31.9 G/DL (ref 32.6–36.4)
MCHC RBC AUTO-ENTMCNC: 32.1 G/DL (ref 32.6–36.4)
MCHC RBC AUTO-ENTMCNC: 32.1 G/DL (ref 32.6–36.4)
MCHC RBC AUTO-ENTMCNC: 32.4 G/DL (ref 32.6–36.4)
MCHC RBC AUTO-ENTMCNC: 32.5 G/DL (ref 32.6–36.4)
MCHC RBC AUTO-ENTMCNC: 32.7 G/DL (ref 32.6–36.4)
MCHC RBC AUTO-ENTMCNC: 33 G/DL (ref 32.6–36.4)
MCHC RBC AUTO-ENTMCNC: 33.2 G/DL (ref 32.6–36.4)
MCV RBC AUTO: 100 FL (ref 79.8–96.2)
MCV RBC AUTO: 100 FL (ref 79.8–96.2)
MCV RBC AUTO: 100.3 FL (ref 79.8–96.2)
MCV RBC AUTO: 100.7 FL (ref 79.8–96.2)
MCV RBC AUTO: 95.6 FL (ref 79.8–96.2)
MCV RBC AUTO: 96.5 FL (ref 79.8–96.2)
MCV RBC AUTO: 97.6 FL (ref 79.8–96.2)
MCV RBC AUTO: 97.8 FL (ref 79.8–96.2)
MCV RBC AUTO: 97.8 FL (ref 79.8–96.2)
MCV RBC AUTO: 97.9 FL (ref 79.8–96.2)
MCV RBC AUTO: 97.9 FL (ref 79.8–96.2)
MCV RBC AUTO: 98 FL (ref 79.8–96.2)
MCV RBC AUTO: 98.2 FL (ref 79.8–96.2)
MCV RBC AUTO: 98.5 FL (ref 79.8–96.2)
MCV RBC AUTO: 98.5 FL (ref 79.8–96.2)
MCV RBC AUTO: 98.7 FL (ref 79.8–96.2)
MCV RBC AUTO: 99.3 FL (ref 79.8–96.2)
MCV RBC AUTO: 99.6 FL (ref 79.8–96.2)
METHOD: ABNORMAL
MODALITY: ABNORMAL
MODALITY: NORMAL
MONOCYTES # BLD AUTO: 0.29 10*3/MM3 (ref 0.2–1.2)
MONOCYTES # BLD AUTO: 0.3 10*3/MM3 (ref 0.2–1.2)
MONOCYTES # BLD AUTO: 0.38 10*3/MM3 (ref 0.2–1.2)
MONOCYTES # BLD AUTO: 0.38 10*3/MM3 (ref 0.2–1.2)
MONOCYTES # BLD AUTO: 0.5 10*3/MM3 (ref 0.2–1.2)
MONOCYTES # BLD AUTO: 0.53 10*3/MM3 (ref 0.2–1.2)
MONOCYTES # BLD AUTO: 0.55 10*3/MM3 (ref 0.2–1.2)
MONOCYTES # BLD AUTO: 0.56 10*3/MM3 (ref 0.2–1.2)
MONOCYTES # BLD AUTO: 0.59 10*3/MM3 (ref 0.2–1.2)
MONOCYTES # BLD AUTO: 0.59 10*3/MM3 (ref 0.2–1.2)
MONOCYTES # BLD AUTO: 0.71 10*3/MM3 (ref 0.2–1.2)
MONOCYTES # BLD AUTO: 0.79 10*3/MM3 (ref 0.2–1.2)
MONOCYTES # BLD AUTO: 1.06 10*3/MM3 (ref 0.2–1.2)
MONOCYTES # BLD AUTO: 1.18 10*3/MM3 (ref 0.2–1.2)
MONOCYTES # BLD AUTO: 1.22 10*3/MM3 (ref 0.2–1.2)
MONOCYTES NFR BLD AUTO: 10.1 % (ref 5–12)
MONOCYTES NFR BLD AUTO: 11.6 % (ref 5–12)
MONOCYTES NFR BLD AUTO: 3.4 % (ref 5–12)
MONOCYTES NFR BLD AUTO: 3.7 % (ref 5–12)
MONOCYTES NFR BLD AUTO: 4.3 % (ref 5–12)
MONOCYTES NFR BLD AUTO: 4.4 % (ref 5–12)
MONOCYTES NFR BLD AUTO: 4.6 % (ref 5–12)
MONOCYTES NFR BLD AUTO: 4.7 % (ref 5–12)
MONOCYTES NFR BLD AUTO: 4.8 % (ref 5–12)
MONOCYTES NFR BLD AUTO: 5 % (ref 5–12)
MONOCYTES NFR BLD AUTO: 5.7 % (ref 5–12)
MONOCYTES NFR BLD AUTO: 6 % (ref 5–12)
MONOCYTES NFR BLD AUTO: 6.8 % (ref 5–12)
MONOCYTES NFR BLD AUTO: 7.5 % (ref 5–12)
MONOCYTES NFR BLD AUTO: 8.3 % (ref 5–12)
MONOCYTES NFR FLD: 16 %
MONOS+MACROS NFR FLD: 29 %
NEUTROPHILS # BLD AUTO: 11.67 10*3/MM3 (ref 1.9–8.1)
NEUTROPHILS # BLD AUTO: 12.4 10*3/MM3 (ref 1.9–8.1)
NEUTROPHILS # BLD AUTO: 12.45 10*3/MM3 (ref 1.9–8.1)
NEUTROPHILS # BLD AUTO: 13.51 10*3/MM3 (ref 1.9–8.1)
NEUTROPHILS # BLD AUTO: 3.81 10*3/MM3 (ref 1.9–8.1)
NEUTROPHILS # BLD AUTO: 4.36 10*3/MM3 (ref 1.9–8.1)
NEUTROPHILS # BLD AUTO: 7.04 10*3/MM3 (ref 1.9–8.1)
NEUTROPHILS # BLD AUTO: 7.22 10*3/MM3 (ref 1.9–8.1)
NEUTROPHILS # BLD AUTO: 7.41 10*3/MM3 (ref 1.9–8.1)
NEUTROPHILS # BLD AUTO: 7.5 10*3/MM3 (ref 1.9–8.1)
NEUTROPHILS # BLD AUTO: 7.74 10*3/MM3 (ref 1.9–8.1)
NEUTROPHILS # BLD AUTO: 9.38 10*3/MM3 (ref 1.9–8.1)
NEUTROPHILS # BLD AUTO: 9.39 10*3/MM3 (ref 1.9–8.1)
NEUTROPHILS # BLD AUTO: 9.48 10*3/MM3 (ref 1.9–8.1)
NEUTROPHILS # BLD AUTO: 9.8 10*3/MM3 (ref 1.9–8.1)
NEUTROPHILS NFR BLD AUTO: 69.7 % (ref 42.7–76)
NEUTROPHILS NFR BLD AUTO: 71.6 % (ref 42.7–76)
NEUTROPHILS NFR BLD AUTO: 76.7 % (ref 42.7–76)
NEUTROPHILS NFR BLD AUTO: 78 % (ref 42.7–76)
NEUTROPHILS NFR BLD AUTO: 80.6 % (ref 42.7–76)
NEUTROPHILS NFR BLD AUTO: 82.5 % (ref 42.7–76)
NEUTROPHILS NFR BLD AUTO: 83.2 % (ref 42.7–76)
NEUTROPHILS NFR BLD AUTO: 84.1 % (ref 42.7–76)
NEUTROPHILS NFR BLD AUTO: 84.1 % (ref 42.7–76)
NEUTROPHILS NFR BLD AUTO: 85.2 % (ref 42.7–76)
NEUTROPHILS NFR BLD AUTO: 85.7 % (ref 42.7–76)
NEUTROPHILS NFR BLD AUTO: 85.8 % (ref 42.7–76)
NEUTROPHILS NFR BLD AUTO: 86.1 % (ref 42.7–76)
NEUTROPHILS NFR BLD AUTO: 86.7 % (ref 42.7–76)
NEUTROPHILS NFR BLD AUTO: 89.7 % (ref 42.7–76)
NEUTROPHILS NFR FLD MANUAL: 45 %
NITRITE UR QL STRIP: NEGATIVE
NITRITE UR QL STRIP: NEGATIVE
NRBC BLD MANUAL-RTO: 0 /100 WBC (ref 0–0)
NRBC BLD MANUAL-RTO: 0 /100 WBC (ref 0–0)
NT-PROBNP SERPL-MCNC: 5084 PG/ML (ref 0–1800)
NT-PROBNP SERPL-MCNC: ABNORMAL PG/ML (ref 0–1800)
NUC CELL # FLD: 81 /MM3
O2 A-A PPRESDIFF RESPIRATORY: 0.1 MMHG
O2 A-A PPRESDIFF RESPIRATORY: 0.2 MMHG
O2 A-A PPRESDIFF RESPIRATORY: 0.3 MMHG
PATH REPORT.ADDENDUM SPEC: NORMAL
PATH REPORT.FINAL DX SPEC: NORMAL
PATH REPORT.GROSS SPEC: NORMAL
PCO2 BLDA: 35.7 MM HG (ref 35–45)
PCO2 BLDA: 36.5 MM HG (ref 35–45)
PCO2 BLDA: 41.2 MM HG (ref 35–45)
PCO2 BLDA: 42.9 MM HG (ref 35–45)
PCO2 BLDA: 43.1 MM HG (ref 35–45)
PCO2 BLDA: 45.3 MM HG (ref 35–45)
PCO2 BLDA: 45.3 MM HG (ref 35–45)
PCO2 BLDA: 46.1 MM HG (ref 35–45)
PCO2 BLDA: 47.3 MM HG (ref 35–45)
PCO2 BLDA: 53.5 MM HG (ref 35–45)
PCO2 BLDA: 55 MM HG (ref 35–45)
PEEP RESPIRATORY: 10 CM[H2O]
PEEP RESPIRATORY: 12 CM[H2O]
PEEP RESPIRATORY: 5 CM[H2O]
PH BLDA: 7.26 PH UNITS (ref 7.35–7.45)
PH BLDA: 7.31 PH UNITS (ref 7.35–7.45)
PH BLDA: 7.33 PH UNITS (ref 7.35–7.45)
PH BLDA: 7.34 PH UNITS (ref 7.35–7.45)
PH BLDA: 7.35 PH UNITS (ref 7.35–7.45)
PH BLDA: 7.35 PH UNITS (ref 7.35–7.45)
PH BLDA: 7.37 PH UNITS (ref 7.35–7.45)
PH BLDA: 7.38 PH UNITS (ref 7.35–7.45)
PH BLDA: 7.39 PH UNITS (ref 7.35–7.45)
PH BLDA: 7.39 PH UNITS (ref 7.35–7.45)
PH BLDA: 7.44 PH UNITS (ref 7.35–7.45)
PH UR STRIP.AUTO: 5.5 [PH] (ref 5–8)
PH UR STRIP.AUTO: 5.5 [PH] (ref 5–8)
PLATELET # BLD AUTO: 117 10*3/MM3 (ref 140–500)
PLATELET # BLD AUTO: 142 10*3/MM3 (ref 140–500)
PLATELET # BLD AUTO: 144 10*3/MM3 (ref 140–500)
PLATELET # BLD AUTO: 150 10*3/MM3 (ref 140–500)
PLATELET # BLD AUTO: 152 10*3/MM3 (ref 140–500)
PLATELET # BLD AUTO: 152 10*3/MM3 (ref 140–500)
PLATELET # BLD AUTO: 153 10*3/MM3 (ref 140–500)
PLATELET # BLD AUTO: 156 10*3/MM3 (ref 140–500)
PLATELET # BLD AUTO: 177 10*3/MM3 (ref 140–500)
PLATELET # BLD AUTO: 185 10*3/MM3 (ref 140–500)
PLATELET # BLD AUTO: 196 10*3/MM3 (ref 140–500)
PLATELET # BLD AUTO: 202 10*3/MM3 (ref 140–500)
PLATELET # BLD AUTO: 206 10*3/MM3 (ref 140–500)
PLATELET # BLD AUTO: 211 10*3/MM3 (ref 140–500)
PLATELET # BLD AUTO: 230 10*3/MM3 (ref 140–500)
PLATELET # BLD AUTO: 230 10*3/MM3 (ref 140–500)
PLATELET # BLD AUTO: 233 10*3/MM3 (ref 140–500)
PLATELET # BLD AUTO: 254 10*3/MM3 (ref 140–500)
PLATELET # BLD AUTO: 255 10*3/MM3 (ref 140–500)
PLATELET # BLD AUTO: 305 10*3/MM3 (ref 140–500)
PMV BLD AUTO: 10.1 FL (ref 6–12)
PMV BLD AUTO: 10.3 FL (ref 6–12)
PMV BLD AUTO: 10.4 FL (ref 6–12)
PMV BLD AUTO: 10.6 FL (ref 6–12)
PMV BLD AUTO: 9 FL (ref 6–12)
PMV BLD AUTO: 9.3 FL (ref 6–12)
PMV BLD AUTO: 9.4 FL (ref 6–12)
PMV BLD AUTO: 9.4 FL (ref 6–12)
PMV BLD AUTO: 9.5 FL (ref 6–12)
PMV BLD AUTO: 9.6 FL (ref 6–12)
PMV BLD AUTO: 9.7 FL (ref 6–12)
PMV BLD AUTO: 9.8 FL (ref 6–12)
PO2 BLDA: 115.6 MM HG (ref 80–100)
PO2 BLDA: 147.2 MM HG (ref 80–100)
PO2 BLDA: 54.1 MM HG (ref 80–100)
PO2 BLDA: 58.3 MM HG (ref 80–100)
PO2 BLDA: 60.1 MM HG (ref 80–100)
PO2 BLDA: 67.6 MM HG (ref 80–100)
PO2 BLDA: 67.9 MM HG (ref 80–100)
PO2 BLDA: 79.3 MM HG (ref 80–100)
PO2 BLDA: 83.4 MM HG (ref 80–100)
PO2 BLDA: 86.6 MM HG (ref 80–100)
PO2 BLDA: 89.1 MM HG (ref 80–100)
POTASSIUM BLD-SCNC: 3.4 MMOL/L (ref 3.5–5.2)
POTASSIUM BLD-SCNC: 3.5 MMOL/L (ref 3.5–5.2)
POTASSIUM BLD-SCNC: 3.6 MMOL/L (ref 3.5–5.2)
POTASSIUM BLD-SCNC: 3.7 MMOL/L (ref 3.5–5.2)
POTASSIUM BLD-SCNC: 3.7 MMOL/L (ref 3.5–5.2)
POTASSIUM BLD-SCNC: 3.8 MMOL/L (ref 3.5–5.2)
POTASSIUM BLD-SCNC: 3.9 MMOL/L (ref 3.5–5.2)
POTASSIUM BLD-SCNC: 4 MMOL/L (ref 3.5–5.2)
POTASSIUM BLD-SCNC: 4 MMOL/L (ref 3.5–5.2)
POTASSIUM BLD-SCNC: 4.1 MMOL/L (ref 3.5–5.2)
POTASSIUM BLD-SCNC: 4.2 MMOL/L (ref 3.5–5.2)
POTASSIUM BLD-SCNC: 4.2 MMOL/L (ref 3.5–5.2)
POTASSIUM BLD-SCNC: 4.3 MMOL/L (ref 3.5–5.2)
POTASSIUM BLD-SCNC: 4.9 MMOL/L (ref 3.5–5.2)
PROCALCITONIN SERPL-MCNC: 0.29 NG/ML (ref 0.1–0.25)
PROCALCITONIN SERPL-MCNC: 0.42 NG/ML (ref 0.1–0.25)
PROCALCITONIN SERPL-MCNC: 0.5 NG/ML (ref 0.1–0.25)
PROCALCITONIN SERPL-MCNC: 0.78 NG/ML (ref 0.1–0.25)
PROT SERPL-MCNC: 5.4 G/DL (ref 6–8.5)
PROT SERPL-MCNC: 5.4 G/DL (ref 6–8.5)
PROT SERPL-MCNC: 5.6 G/DL (ref 6–8.5)
PROT SERPL-MCNC: 5.9 G/DL (ref 6–8.5)
PROT SERPL-MCNC: 6.5 G/DL (ref 6–8.5)
PROT UR QL STRIP: ABNORMAL
PROT UR QL STRIP: ABNORMAL
PROTHROMBIN TIME: 15.7 SECONDS (ref 11.7–14.2)
PSV: 8 CMH2O
RBC # BLD AUTO: 2.31 10*6/MM3 (ref 4.6–6)
RBC # BLD AUTO: 2.32 10*6/MM3 (ref 4.6–6)
RBC # BLD AUTO: 2.37 10*6/MM3 (ref 4.6–6)
RBC # BLD AUTO: 2.42 10*6/MM3 (ref 4.6–6)
RBC # BLD AUTO: 2.49 10*6/MM3 (ref 4.6–6)
RBC # BLD AUTO: 2.54 10*6/MM3 (ref 4.6–6)
RBC # BLD AUTO: 2.67 10*6/MM3 (ref 4.6–6)
RBC # BLD AUTO: 2.71 10*6/MM3 (ref 4.6–6)
RBC # BLD AUTO: 2.72 10*6/MM3 (ref 4.6–6)
RBC # BLD AUTO: 2.75 10*6/MM3 (ref 4.6–6)
RBC # BLD AUTO: 2.78 10*6/MM3 (ref 4.6–6)
RBC # BLD AUTO: 2.85 10*6/MM3 (ref 4.6–6)
RBC # BLD AUTO: 2.86 10*6/MM3 (ref 4.6–6)
RBC # BLD AUTO: 2.87 10*6/MM3 (ref 4.6–6)
RBC # BLD AUTO: 2.89 10*6/MM3 (ref 4.6–6)
RBC # BLD AUTO: 2.92 10*6/MM3 (ref 4.6–6)
RBC # BLD AUTO: 2.96 10*6/MM3 (ref 4.6–6)
RBC # BLD AUTO: 3.07 10*6/MM3 (ref 4.6–6)
RBC # BLD AUTO: 3.14 10*6/MM3 (ref 4.6–6)
RBC # BLD AUTO: 3.25 10*6/MM3 (ref 4.6–6)
RBC # FLD AUTO: 1300 /MM3
RBC # UR: ABNORMAL /HPF
RBC # UR: NORMAL /HPF
REF LAB TEST METHOD: ABNORMAL
REF LAB TEST METHOD: NORMAL
RH BLD: POSITIVE
RHINOVIRUS RNA SPEC NAA+PROBE: NOT DETECTED
RHINOVIRUS RNA SPEC NAA+PROBE: NOT DETECTED
RSV RNA NPH QL NAA+NON-PROBE: NOT DETECTED
RSV RNA NPH QL NAA+NON-PROBE: NOT DETECTED
SAO2 % BLDCOA: 85.7 % (ref 92–99)
SAO2 % BLDCOA: 88.8 % (ref 92–99)
SAO2 % BLDCOA: 89.1 % (ref 92–99)
SAO2 % BLDCOA: 92.5 % (ref 92–99)
SAO2 % BLDCOA: 92.8 % (ref 92–99)
SAO2 % BLDCOA: 94.9 % (ref 92–99)
SAO2 % BLDCOA: 95.1 % (ref 92–99)
SAO2 % BLDCOA: 95.9 % (ref 92–99)
SAO2 % BLDCOA: 97 % (ref 92–99)
SAO2 % BLDCOA: 98 % (ref 92–99)
SAO2 % BLDCOA: 99.1 % (ref 92–99)
SET MECH RESP RATE: 18
SET MECH RESP RATE: 26
SET MECH RESP RATE: 30
SET MECH RESP RATE: 38
SODIUM BLD-SCNC: 129 MMOL/L (ref 136–145)
SODIUM BLD-SCNC: 130 MMOL/L (ref 136–145)
SODIUM BLD-SCNC: 131 MMOL/L (ref 136–145)
SODIUM BLD-SCNC: 132 MMOL/L (ref 136–145)
SODIUM BLD-SCNC: 134 MMOL/L (ref 136–145)
SODIUM BLD-SCNC: 136 MMOL/L (ref 136–145)
SODIUM BLD-SCNC: 137 MMOL/L (ref 136–145)
SODIUM BLD-SCNC: 139 MMOL/L (ref 136–145)
SODIUM BLD-SCNC: 141 MMOL/L (ref 136–145)
SODIUM BLD-SCNC: 143 MMOL/L (ref 136–145)
SODIUM BLD-SCNC: 143 MMOL/L (ref 136–145)
SODIUM BLD-SCNC: 144 MMOL/L (ref 136–145)
SODIUM BLD-SCNC: 145 MMOL/L (ref 136–145)
SODIUM BLD-SCNC: 146 MMOL/L (ref 136–145)
SODIUM BLD-SCNC: 146 MMOL/L (ref 136–145)
SODIUM BLD-SCNC: 148 MMOL/L (ref 136–145)
SODIUM BLD-SCNC: 148 MMOL/L (ref 136–145)
SODIUM BLD-SCNC: 150 MMOL/L (ref 136–145)
SP GR UR STRIP: 1.02 (ref 1–1.03)
SP GR UR STRIP: 1.02 (ref 1–1.03)
SQUAMOUS #/AREA URNS HPF: ABNORMAL /HPF
SQUAMOUS #/AREA URNS HPF: NORMAL /HPF
TOTAL RATE: 23 BREATHS/MINUTE
TOTAL RATE: 24 BREATHS/MINUTE
TOTAL RATE: 26 BREATHS/MINUTE
TOTAL RATE: 28 BREATHS/MINUTE
TOTAL RATE: 30 BREATHS/MINUTE
TOTAL RATE: 32 BREATHS/MINUTE
TOTAL RATE: 32 BREATHS/MINUTE
TOTAL RATE: 41 BREATHS/MINUTE
TROPONIN T SERPL-MCNC: 0.02 NG/ML (ref 0–0.03)
TROPONIN T SERPL-MCNC: 0.09 NG/ML (ref 0–0.03)
TROPONIN T SERPL-MCNC: <0.01 NG/ML (ref 0–0.03)
UNIT  ABO: NORMAL
UNIT  RH: NORMAL
UROBILINOGEN UR QL STRIP: ABNORMAL
UROBILINOGEN UR QL STRIP: ABNORMAL
VANCOMYCIN TROUGH SERPL-MCNC: 14.9 MCG/ML (ref 5–20)
VENTILATOR MODE: ABNORMAL
VENTILATOR MODE: AC
VENTILATOR MODE: AC
VENTILATOR MODE: NORMAL
VT ON VENT VENT: 450 ML
VT ON VENT VENT: 510 ML
VT ON VENT VENT: 530 ML
VT ON VENT VENT: 550 ML
VT ON VENT VENT: 567 ML
VT ON VENT VENT: 600 ML
VT ON VENT VENT: 601 ML
VT ON VENT VENT: 655 ML
WBC NRBC COR # BLD: 10.93 10*3/MM3 (ref 4.5–10.7)
WBC NRBC COR # BLD: 11.17 10*3/MM3 (ref 4.5–10.7)
WBC NRBC COR # BLD: 11.75 10*3/MM3 (ref 4.5–10.7)
WBC NRBC COR # BLD: 11.77 10*3/MM3 (ref 4.5–10.7)
WBC NRBC COR # BLD: 13.83 10*3/MM3 (ref 4.5–10.7)
WBC NRBC COR # BLD: 13.93 10*3/MM3 (ref 4.5–10.7)
WBC NRBC COR # BLD: 14.13 10*3/MM3 (ref 4.5–10.7)
WBC NRBC COR # BLD: 14.63 10*3/MM3 (ref 4.5–10.7)
WBC NRBC COR # BLD: 17.32 10*3/MM3 (ref 4.5–10.7)
WBC NRBC COR # BLD: 5.46 10*3/MM3 (ref 4.5–10.7)
WBC NRBC COR # BLD: 5.68 10*3/MM3 (ref 4.5–10.7)
WBC NRBC COR # BLD: 6.1 10*3/MM3 (ref 4.5–10.7)
WBC NRBC COR # BLD: 7.25 10*3/MM3 (ref 4.5–10.7)
WBC NRBC COR # BLD: 8.08 10*3/MM3 (ref 4.5–10.7)
WBC NRBC COR # BLD: 8.18 10*3/MM3 (ref 4.5–10.7)
WBC NRBC COR # BLD: 8.59 10*3/MM3 (ref 4.5–10.7)
WBC NRBC COR # BLD: 8.64 10*3/MM3 (ref 4.5–10.7)
WBC NRBC COR # BLD: 8.93 10*3/MM3 (ref 4.5–10.7)
WBC NRBC COR # BLD: 9.77 10*3/MM3 (ref 4.5–10.7)
WBC NRBC COR # BLD: 9.82 10*3/MM3 (ref 4.5–10.7)
WBC UR QL AUTO: ABNORMAL /HPF
WBC UR QL AUTO: NORMAL /HPF
WHOLE BLOOD HOLD SPECIMEN: NORMAL

## 2017-01-01 PROCEDURE — 94640 AIRWAY INHALATION TREATMENT: CPT

## 2017-01-01 PROCEDURE — 85025 COMPLETE CBC W/AUTO DIFF WBC: CPT | Performed by: NURSE PRACTITIONER

## 2017-01-01 PROCEDURE — 25010000002 FENTANYL CITRATE (PF) 100 MCG/2ML SOLUTION: Performed by: INTERNAL MEDICINE

## 2017-01-01 PROCEDURE — 36600 WITHDRAWAL OF ARTERIAL BLOOD: CPT

## 2017-01-01 PROCEDURE — 87581 M.PNEUMON DNA AMP PROBE: CPT | Performed by: INTERNAL MEDICINE

## 2017-01-01 PROCEDURE — 97165 OT EVAL LOW COMPLEX 30 MIN: CPT

## 2017-01-01 PROCEDURE — 97110 THERAPEUTIC EXERCISES: CPT

## 2017-01-01 PROCEDURE — 94003 VENT MGMT INPAT SUBQ DAY: CPT

## 2017-01-01 PROCEDURE — 0B968ZX DRAINAGE OF RIGHT LOWER LOBE BRONCHUS, VIA NATURAL OR ARTIFICIAL OPENING ENDOSCOPIC, DIAGNOSTIC: ICD-10-PCS | Performed by: INTERNAL MEDICINE

## 2017-01-01 PROCEDURE — 84145 PROCALCITONIN (PCT): CPT | Performed by: INTERNAL MEDICINE

## 2017-01-01 PROCEDURE — 84484 ASSAY OF TROPONIN QUANT: CPT | Performed by: INTERNAL MEDICINE

## 2017-01-01 PROCEDURE — 82803 BLOOD GASES ANY COMBINATION: CPT

## 2017-01-01 PROCEDURE — 87070 CULTURE OTHR SPECIMN AEROBIC: CPT | Performed by: INTERNAL MEDICINE

## 2017-01-01 PROCEDURE — P9016 RBC LEUKOCYTES REDUCED: HCPCS

## 2017-01-01 PROCEDURE — 82150 ASSAY OF AMYLASE: CPT | Performed by: INTERNAL MEDICINE

## 2017-01-01 PROCEDURE — 25010000002 LEVOFLOXACIN PER 250 MG: Performed by: INTERNAL MEDICINE

## 2017-01-01 PROCEDURE — 25010000002 HYDROMORPHONE PER 4 MG: Performed by: EMERGENCY MEDICINE

## 2017-01-01 PROCEDURE — 71010 HC CHEST PA OR AP: CPT

## 2017-01-01 PROCEDURE — P9046 ALBUMIN (HUMAN), 25%, 20 ML: HCPCS | Performed by: INTERNAL MEDICINE

## 2017-01-01 PROCEDURE — 25010000002 MORPHINE SULFATE (PF) 2 MG/ML SOLUTION: Performed by: INTERNAL MEDICINE

## 2017-01-01 PROCEDURE — 87040 BLOOD CULTURE FOR BACTERIA: CPT | Performed by: EMERGENCY MEDICINE

## 2017-01-01 PROCEDURE — 94799 UNLISTED PULMONARY SVC/PX: CPT

## 2017-01-01 PROCEDURE — 25010000002 FUROSEMIDE PER 20 MG: Performed by: INTERNAL MEDICINE

## 2017-01-01 PROCEDURE — 25010000002 LORAZEPAM PER 2 MG: Performed by: INTERNAL MEDICINE

## 2017-01-01 PROCEDURE — 88305 TISSUE EXAM BY PATHOLOGIST: CPT | Performed by: INTERNAL MEDICINE

## 2017-01-01 PROCEDURE — 85027 COMPLETE CBC AUTOMATED: CPT | Performed by: INTERNAL MEDICINE

## 2017-01-01 PROCEDURE — 88112 CYTOPATH CELL ENHANCE TECH: CPT | Performed by: INTERNAL MEDICINE

## 2017-01-01 PROCEDURE — 84484 ASSAY OF TROPONIN QUANT: CPT | Performed by: EMERGENCY MEDICINE

## 2017-01-01 PROCEDURE — 94668 MNPJ CHEST WALL SBSQ: CPT

## 2017-01-01 PROCEDURE — 99231 SBSQ HOSP IP/OBS SF/LOW 25: CPT | Performed by: NEUROLOGICAL SURGERY

## 2017-01-01 PROCEDURE — 80053 COMPREHEN METABOLIC PANEL: CPT | Performed by: INTERNAL MEDICINE

## 2017-01-01 PROCEDURE — 94002 VENT MGMT INPAT INIT DAY: CPT

## 2017-01-01 PROCEDURE — 92610 EVALUATE SWALLOWING FUNCTION: CPT

## 2017-01-01 PROCEDURE — 25010000002 PIPERACILLIN SOD-TAZOBACTAM PER 1 G: Performed by: INTERNAL MEDICINE

## 2017-01-01 PROCEDURE — 80048 BASIC METABOLIC PNL TOTAL CA: CPT | Performed by: NURSE PRACTITIONER

## 2017-01-01 PROCEDURE — 86923 COMPATIBILITY TEST ELECTRIC: CPT

## 2017-01-01 PROCEDURE — 80053 COMPREHEN METABOLIC PANEL: CPT | Performed by: EMERGENCY MEDICINE

## 2017-01-01 PROCEDURE — 93306 TTE W/DOPPLER COMPLETE: CPT

## 2017-01-01 PROCEDURE — 93010 ELECTROCARDIOGRAM REPORT: CPT | Performed by: INTERNAL MEDICINE

## 2017-01-01 PROCEDURE — 94667 MNPJ CHEST WALL 1ST: CPT

## 2017-01-01 PROCEDURE — 25010000002 LORAZEPAM PER 2 MG

## 2017-01-01 PROCEDURE — 83880 ASSAY OF NATRIURETIC PEPTIDE: CPT | Performed by: INTERNAL MEDICINE

## 2017-01-01 PROCEDURE — 84132 ASSAY OF SERUM POTASSIUM: CPT | Performed by: INTERNAL MEDICINE

## 2017-01-01 PROCEDURE — 93005 ELECTROCARDIOGRAM TRACING: CPT | Performed by: INTERNAL MEDICINE

## 2017-01-01 PROCEDURE — 87205 SMEAR GRAM STAIN: CPT | Performed by: INTERNAL MEDICINE

## 2017-01-01 PROCEDURE — 72141 MRI NECK SPINE W/O DYE: CPT

## 2017-01-01 PROCEDURE — 25010000002 VANCOMYCIN PER 500 MG: Performed by: INTERNAL MEDICINE

## 2017-01-01 PROCEDURE — 25010000002 ONDANSETRON PER 1 MG: Performed by: INTERNAL MEDICINE

## 2017-01-01 PROCEDURE — 80048 BASIC METABOLIC PNL TOTAL CA: CPT | Performed by: INTERNAL MEDICINE

## 2017-01-01 PROCEDURE — 25010000002 MAGNESIUM SULFATE IN D5W 1G/100ML (PREMIX) 10-5 MG/ML-% SOLUTION: Performed by: INTERNAL MEDICINE

## 2017-01-01 PROCEDURE — 82962 GLUCOSE BLOOD TEST: CPT

## 2017-01-01 PROCEDURE — 0 IOPAMIDOL 61 % SOLUTION: Performed by: EMERGENCY MEDICINE

## 2017-01-01 PROCEDURE — 25010000002 ENOXAPARIN PER 10 MG: Performed by: INTERNAL MEDICINE

## 2017-01-01 PROCEDURE — 25010000002 PROPOFOL 1000 MG/ML EMULSION: Performed by: INTERNAL MEDICINE

## 2017-01-01 PROCEDURE — 97161 PT EVAL LOW COMPLEX 20 MIN: CPT

## 2017-01-01 PROCEDURE — 87102 FUNGUS ISOLATION CULTURE: CPT | Performed by: INTERNAL MEDICINE

## 2017-01-01 PROCEDURE — 87486 CHLMYD PNEUM DNA AMP PROBE: CPT | Performed by: INTERNAL MEDICINE

## 2017-01-01 PROCEDURE — 25010000002 PIPERACILLIN SOD-TAZOBACTAM PER 1 G: Performed by: EMERGENCY MEDICINE

## 2017-01-01 PROCEDURE — 94660 CPAP INITIATION&MGMT: CPT

## 2017-01-01 PROCEDURE — 70450 CT HEAD/BRAIN W/O DYE: CPT

## 2017-01-01 PROCEDURE — 25010000003 POTASSIUM CHLORIDE PER 2 MEQ: Performed by: INTERNAL MEDICINE

## 2017-01-01 PROCEDURE — 86901 BLOOD TYPING SEROLOGIC RH(D): CPT

## 2017-01-01 PROCEDURE — 36430 TRANSFUSION BLD/BLD COMPNT: CPT

## 2017-01-01 PROCEDURE — 93005 ELECTROCARDIOGRAM TRACING: CPT

## 2017-01-01 PROCEDURE — 86900 BLOOD TYPING SEROLOGIC ABO: CPT

## 2017-01-01 PROCEDURE — 89051 BODY FLUID CELL COUNT: CPT | Performed by: INTERNAL MEDICINE

## 2017-01-01 PROCEDURE — 99222 1ST HOSP IP/OBS MODERATE 55: CPT | Performed by: INTERNAL MEDICINE

## 2017-01-01 PROCEDURE — 25010000002 VANCOMYCIN: Performed by: EMERGENCY MEDICINE

## 2017-01-01 PROCEDURE — 25010000002 MORPHINE SULFATE (PF) 2 MG/ML SOLUTION

## 2017-01-01 PROCEDURE — 86850 RBC ANTIBODY SCREEN: CPT

## 2017-01-01 PROCEDURE — 85610 PROTHROMBIN TIME: CPT | Performed by: EMERGENCY MEDICINE

## 2017-01-01 PROCEDURE — 85025 COMPLETE CBC W/AUTO DIFF WBC: CPT | Performed by: INTERNAL MEDICINE

## 2017-01-01 PROCEDURE — C1751 CATH, INF, PER/CENT/MIDLINE: HCPCS

## 2017-01-01 PROCEDURE — 99232 SBSQ HOSP IP/OBS MODERATE 35: CPT | Performed by: INTERNAL MEDICINE

## 2017-01-01 PROCEDURE — 83605 ASSAY OF LACTIC ACID: CPT | Performed by: INTERNAL MEDICINE

## 2017-01-01 PROCEDURE — 85014 HEMATOCRIT: CPT | Performed by: INTERNAL MEDICINE

## 2017-01-01 PROCEDURE — 25010000002 MORPHINE PER 10 MG: Performed by: INTERNAL MEDICINE

## 2017-01-01 PROCEDURE — 83735 ASSAY OF MAGNESIUM: CPT | Performed by: INTERNAL MEDICINE

## 2017-01-01 PROCEDURE — 80202 ASSAY OF VANCOMYCIN: CPT | Performed by: INTERNAL MEDICINE

## 2017-01-01 PROCEDURE — 05H533Z INSERTION OF INFUSION DEVICE INTO RIGHT SUBCLAVIAN VEIN, PERCUTANEOUS APPROACH: ICD-10-PCS | Performed by: INTERNAL MEDICINE

## 2017-01-01 PROCEDURE — 87633 RESP VIRUS 12-25 TARGETS: CPT | Performed by: INTERNAL MEDICINE

## 2017-01-01 PROCEDURE — 25010000002 HYDROMORPHONE PER 4 MG: Performed by: INTERNAL MEDICINE

## 2017-01-01 PROCEDURE — 99221 1ST HOSP IP/OBS SF/LOW 40: CPT | Performed by: PHYSICIAN ASSISTANT

## 2017-01-01 PROCEDURE — 84145 PROCALCITONIN (PCT): CPT | Performed by: EMERGENCY MEDICINE

## 2017-01-01 PROCEDURE — 81001 URINALYSIS AUTO W/SCOPE: CPT | Performed by: EMERGENCY MEDICINE

## 2017-01-01 PROCEDURE — 99285 EMERGENCY DEPT VISIT HI MDM: CPT

## 2017-01-01 PROCEDURE — 85025 COMPLETE CBC W/AUTO DIFF WBC: CPT | Performed by: EMERGENCY MEDICINE

## 2017-01-01 PROCEDURE — 83690 ASSAY OF LIPASE: CPT | Performed by: INTERNAL MEDICINE

## 2017-01-01 PROCEDURE — 25010000002 MORPHINE PER 10 MG: Performed by: EMERGENCY MEDICINE

## 2017-01-01 PROCEDURE — 85018 HEMOGLOBIN: CPT | Performed by: INTERNAL MEDICINE

## 2017-01-01 PROCEDURE — 83880 ASSAY OF NATRIURETIC PEPTIDE: CPT | Performed by: EMERGENCY MEDICINE

## 2017-01-01 PROCEDURE — 25010000002 ALBUMIN HUMAN 25% PER 50 ML: Performed by: INTERNAL MEDICINE

## 2017-01-01 PROCEDURE — 90471 IMMUNIZATION ADMIN: CPT | Performed by: EMERGENCY MEDICINE

## 2017-01-01 PROCEDURE — 83605 ASSAY OF LACTIC ACID: CPT | Performed by: EMERGENCY MEDICINE

## 2017-01-01 PROCEDURE — 0BH18EZ INSERTION OF ENDOTRACHEAL AIRWAY INTO TRACHEA, VIA NATURAL OR ARTIFICIAL OPENING ENDOSCOPIC: ICD-10-PCS | Performed by: INTERNAL MEDICINE

## 2017-01-01 PROCEDURE — 5A1955Z RESPIRATORY VENTILATION, GREATER THAN 96 CONSECUTIVE HOURS: ICD-10-PCS | Performed by: INTERNAL MEDICINE

## 2017-01-01 PROCEDURE — 87798 DETECT AGENT NOS DNA AMP: CPT | Performed by: INTERNAL MEDICINE

## 2017-01-01 PROCEDURE — 87206 SMEAR FLUORESCENT/ACID STAI: CPT | Performed by: INTERNAL MEDICINE

## 2017-01-01 PROCEDURE — 63710000001 INSULIN ASPART PER 5 UNITS: Performed by: INTERNAL MEDICINE

## 2017-01-01 PROCEDURE — 25010000002 TDAP 5-2.5-18.5 LF-MCG/0.5 SUSPENSION: Performed by: EMERGENCY MEDICINE

## 2017-01-01 PROCEDURE — 92611 MOTION FLUOROSCOPY/SWALLOW: CPT

## 2017-01-01 PROCEDURE — 90715 TDAP VACCINE 7 YRS/> IM: CPT | Performed by: EMERGENCY MEDICINE

## 2017-01-01 PROCEDURE — 72125 CT NECK SPINE W/O DYE: CPT

## 2017-01-01 PROCEDURE — 82270 OCCULT BLOOD FECES: CPT | Performed by: INTERNAL MEDICINE

## 2017-01-01 PROCEDURE — 87071 CULTURE AEROBIC QUANT OTHER: CPT | Performed by: INTERNAL MEDICINE

## 2017-01-01 PROCEDURE — 70486 CT MAXILLOFACIAL W/O DYE: CPT

## 2017-01-01 PROCEDURE — 88312 SPECIAL STAINS GROUP 1: CPT | Performed by: INTERNAL MEDICINE

## 2017-01-01 PROCEDURE — 71250 CT THORAX DX C-: CPT

## 2017-01-01 PROCEDURE — 87116 MYCOBACTERIA CULTURE: CPT | Performed by: INTERNAL MEDICINE

## 2017-01-01 PROCEDURE — 99231 SBSQ HOSP IP/OBS SF/LOW 25: CPT | Performed by: NURSE PRACTITIONER

## 2017-01-01 PROCEDURE — 93306 TTE W/DOPPLER COMPLETE: CPT | Performed by: INTERNAL MEDICINE

## 2017-01-01 PROCEDURE — 25010000002 DIGOXIN PER 500 MCG: Performed by: INTERNAL MEDICINE

## 2017-01-01 PROCEDURE — 81001 URINALYSIS AUTO W/SCOPE: CPT | Performed by: INTERNAL MEDICINE

## 2017-01-01 PROCEDURE — 87086 URINE CULTURE/COLONY COUNT: CPT | Performed by: INTERNAL MEDICINE

## 2017-01-01 PROCEDURE — 74230 X-RAY XM SWLNG FUNCJ C+: CPT

## 2017-01-01 PROCEDURE — 25010000002 ONDANSETRON PER 1 MG: Performed by: EMERGENCY MEDICINE

## 2017-01-01 PROCEDURE — 73560 X-RAY EXAM OF KNEE 1 OR 2: CPT

## 2017-01-01 RX ORDER — LORAZEPAM 2 MG/ML
2 CONCENTRATE ORAL
Status: DISCONTINUED | OUTPATIENT
Start: 2017-01-01 | End: 2017-01-01

## 2017-01-01 RX ORDER — CLOPIDOGREL BISULFATE 75 MG/1
75 TABLET ORAL DAILY
Status: DISCONTINUED | OUTPATIENT
Start: 2017-01-01 | End: 2017-01-01 | Stop reason: HOSPADM

## 2017-01-01 RX ORDER — MORPHINE SULFATE 10 MG/ML
6 INJECTION INTRAMUSCULAR; INTRAVENOUS; SUBCUTANEOUS
Status: DISCONTINUED | OUTPATIENT
Start: 2017-01-01 | End: 2017-01-01 | Stop reason: HOSPADM

## 2017-01-01 RX ORDER — FUROSEMIDE 10 MG/ML
40 INJECTION INTRAMUSCULAR; INTRAVENOUS EVERY 12 HOURS
Status: DISCONTINUED | OUTPATIENT
Start: 2017-01-01 | End: 2017-01-01

## 2017-01-01 RX ORDER — SULFASALAZINE 500 MG/1
1000 TABLET ORAL EVERY 8 HOURS SCHEDULED
Status: DISCONTINUED | OUTPATIENT
Start: 2017-01-01 | End: 2017-01-01 | Stop reason: HOSPADM

## 2017-01-01 RX ORDER — LORAZEPAM 2 MG/ML
2 CONCENTRATE ORAL
Status: DISCONTINUED | OUTPATIENT
Start: 2017-01-01 | End: 2017-01-01 | Stop reason: HOSPADM

## 2017-01-01 RX ORDER — MORPHINE SULFATE 100 MG/5ML
10 SOLUTION ORAL
Status: DISCONTINUED | OUTPATIENT
Start: 2017-01-01 | End: 2017-01-01

## 2017-01-01 RX ORDER — FOLIC ACID 1 MG/1
1 TABLET ORAL DAILY
Status: DISCONTINUED | OUTPATIENT
Start: 2017-01-01 | End: 2017-01-01

## 2017-01-01 RX ORDER — ACETAMINOPHEN 325 MG/1
650 TABLET ORAL EVERY 4 HOURS PRN
Status: DISCONTINUED | OUTPATIENT
Start: 2017-01-01 | End: 2017-01-01

## 2017-01-01 RX ORDER — FENTANYL 12 UG/H
1 PATCH TRANSDERMAL
Status: DISCONTINUED | OUTPATIENT
Start: 2017-01-01 | End: 2017-01-01 | Stop reason: HOSPADM

## 2017-01-01 RX ORDER — CLONAZEPAM 0.5 MG/1
0.5 TABLET ORAL NIGHTLY PRN
Status: DISCONTINUED | OUTPATIENT
Start: 2017-01-01 | End: 2017-01-01 | Stop reason: HOSPADM

## 2017-01-01 RX ORDER — OXYCODONE AND ACETAMINOPHEN 7.5; 325 MG/1; MG/1
1 TABLET ORAL EVERY 4 HOURS PRN
Status: DISCONTINUED | OUTPATIENT
Start: 2017-01-01 | End: 2017-01-01

## 2017-01-01 RX ORDER — LEVOFLOXACIN 5 MG/ML
750 INJECTION, SOLUTION INTRAVENOUS EVERY 24 HOURS
Status: DISCONTINUED | OUTPATIENT
Start: 2017-01-01 | End: 2017-01-01

## 2017-01-01 RX ORDER — LORAZEPAM 2 MG/ML
INJECTION INTRAMUSCULAR
Status: COMPLETED
Start: 2017-01-01 | End: 2017-01-01

## 2017-01-01 RX ORDER — NITROGLYCERIN 0.4 MG/1
0.4 TABLET SUBLINGUAL
Status: DISCONTINUED | OUTPATIENT
Start: 2017-01-01 | End: 2017-01-01

## 2017-01-01 RX ORDER — MORPHINE SULFATE 10 MG/ML
6 INJECTION INTRAMUSCULAR; INTRAVENOUS; SUBCUTANEOUS
Status: DISCONTINUED | OUTPATIENT
Start: 2017-01-01 | End: 2017-01-01

## 2017-01-01 RX ORDER — SODIUM CHLORIDE 9 MG/ML
9 INJECTION, SOLUTION INTRAVENOUS CONTINUOUS
Status: DISCONTINUED | OUTPATIENT
Start: 2017-01-01 | End: 2017-01-01

## 2017-01-01 RX ORDER — NITROGLYCERIN 0.4 MG/1
0.4 TABLET SUBLINGUAL
Status: DISCONTINUED | OUTPATIENT
Start: 2017-01-01 | End: 2017-01-01 | Stop reason: HOSPADM

## 2017-01-01 RX ORDER — POTASSIUM CHLORIDE 750 MG/1
40 CAPSULE, EXTENDED RELEASE ORAL AS NEEDED
Status: DISCONTINUED | OUTPATIENT
Start: 2017-01-01 | End: 2017-01-01

## 2017-01-01 RX ORDER — LORAZEPAM 2 MG/ML
1 CONCENTRATE ORAL
Status: DISCONTINUED | OUTPATIENT
Start: 2017-01-01 | End: 2017-01-01 | Stop reason: HOSPADM

## 2017-01-01 RX ORDER — ONDANSETRON 4 MG/1
4 TABLET, ORALLY DISINTEGRATING ORAL
Qty: 60 TABLET | Refills: 0 | Status: SHIPPED | OUTPATIENT
Start: 2017-01-01

## 2017-01-01 RX ORDER — LORAZEPAM 2 MG/ML
2 INJECTION INTRAMUSCULAR
Status: DISCONTINUED | OUTPATIENT
Start: 2017-01-01 | End: 2017-01-01

## 2017-01-01 RX ORDER — MORPHINE SULFATE 100 MG/5ML
20 SOLUTION ORAL
Status: DISCONTINUED | OUTPATIENT
Start: 2017-01-01 | End: 2017-01-01

## 2017-01-01 RX ORDER — BISACODYL 10 MG
10 SUPPOSITORY, RECTAL RECTAL DAILY PRN
Status: DISCONTINUED | OUTPATIENT
Start: 2017-01-01 | End: 2017-01-01 | Stop reason: HOSPADM

## 2017-01-01 RX ORDER — LORAZEPAM 2 MG/ML
0.5 INJECTION INTRAMUSCULAR
Status: DISCONTINUED | OUTPATIENT
Start: 2017-01-01 | End: 2017-01-01

## 2017-01-01 RX ORDER — ACETAMINOPHEN 500 MG
TABLET ORAL
Status: COMPLETED
Start: 2017-01-01 | End: 2017-01-01

## 2017-01-01 RX ORDER — LIDOCAINE HYDROCHLORIDE AND EPINEPHRINE 10; 10 MG/ML; UG/ML
10 INJECTION, SOLUTION INFILTRATION; PERINEURAL ONCE
Status: DISCONTINUED | OUTPATIENT
Start: 2017-01-01 | End: 2017-01-01

## 2017-01-01 RX ORDER — ONDANSETRON 4 MG/1
4 TABLET, ORALLY DISINTEGRATING ORAL
Status: DISCONTINUED | OUTPATIENT
Start: 2017-01-01 | End: 2017-01-01

## 2017-01-01 RX ORDER — PANTOPRAZOLE SODIUM 40 MG/10ML
40 INJECTION, POWDER, LYOPHILIZED, FOR SOLUTION INTRAVENOUS
Status: DISCONTINUED | OUTPATIENT
Start: 2017-01-01 | End: 2017-01-01 | Stop reason: CLARIF

## 2017-01-01 RX ORDER — VANCOMYCIN HYDROCHLORIDE 1 G/200ML
1000 INJECTION, SOLUTION INTRAVENOUS EVERY 12 HOURS
Status: DISCONTINUED | OUTPATIENT
Start: 2017-01-01 | End: 2017-01-01 | Stop reason: ALTCHOICE

## 2017-01-01 RX ORDER — OXYCODONE AND ACETAMINOPHEN 7.5; 325 MG/1; MG/1
1 TABLET ORAL EVERY 4 HOURS PRN
Qty: 60 TABLET | Refills: 0 | Status: CANCELLED | OUTPATIENT
Start: 2017-01-01 | End: 2017-01-01

## 2017-01-01 RX ORDER — ALBUMIN (HUMAN) 12.5 G/50ML
25 SOLUTION INTRAVENOUS EVERY 6 HOURS
Status: DISCONTINUED | OUTPATIENT
Start: 2017-01-01 | End: 2017-01-01

## 2017-01-01 RX ORDER — LORAZEPAM 2 MG/ML
0.5 INJECTION INTRAMUSCULAR
Status: DISCONTINUED | OUTPATIENT
Start: 2017-01-01 | End: 2017-01-01 | Stop reason: HOSPADM

## 2017-01-01 RX ORDER — LORAZEPAM 2 MG/ML
1 CONCENTRATE ORAL
Status: DISCONTINUED | OUTPATIENT
Start: 2017-01-01 | End: 2017-01-01

## 2017-01-01 RX ORDER — LORAZEPAM 0.5 MG/1
0.5 TABLET ORAL
Status: DISCONTINUED | OUTPATIENT
Start: 2017-01-01 | End: 2017-01-01

## 2017-01-01 RX ORDER — LORAZEPAM 2 MG/ML
1 INJECTION INTRAMUSCULAR
Status: DISCONTINUED | OUTPATIENT
Start: 2017-01-01 | End: 2017-01-01 | Stop reason: HOSPADM

## 2017-01-01 RX ORDER — LORAZEPAM 2 MG/ML
2 INJECTION INTRAMUSCULAR
Status: DISCONTINUED | OUTPATIENT
Start: 2017-01-01 | End: 2017-01-01 | Stop reason: HOSPADM

## 2017-01-01 RX ORDER — ISOSORBIDE MONONITRATE 30 MG/1
30 TABLET, EXTENDED RELEASE ORAL
Status: DISCONTINUED | OUTPATIENT
Start: 2017-01-01 | End: 2017-01-01

## 2017-01-01 RX ORDER — LORAZEPAM 2 MG/ML
2 INJECTION INTRAMUSCULAR ONCE
Status: COMPLETED | OUTPATIENT
Start: 2017-01-01 | End: 2017-01-01

## 2017-01-01 RX ORDER — ONDANSETRON 2 MG/ML
4 INJECTION INTRAMUSCULAR; INTRAVENOUS EVERY 4 HOURS PRN
Status: DISCONTINUED | OUTPATIENT
Start: 2017-01-01 | End: 2017-01-01 | Stop reason: HOSPADM

## 2017-01-01 RX ORDER — LIDOCAINE HYDROCHLORIDE AND EPINEPHRINE 10; 10 MG/ML; UG/ML
INJECTION, SOLUTION INFILTRATION; PERINEURAL
Status: DISPENSED
Start: 2017-01-01 | End: 2017-01-01

## 2017-01-01 RX ORDER — LORAZEPAM 1 MG/1
1 TABLET ORAL
Status: DISCONTINUED | OUTPATIENT
Start: 2017-01-01 | End: 2017-01-01 | Stop reason: HOSPADM

## 2017-01-01 RX ORDER — FUROSEMIDE 40 MG/1
40 TABLET ORAL 2 TIMES DAILY
Status: DISCONTINUED | OUTPATIENT
Start: 2017-01-01 | End: 2017-01-01

## 2017-01-01 RX ORDER — LORAZEPAM 2 MG/ML
1 INJECTION INTRAMUSCULAR
Status: DISCONTINUED | OUTPATIENT
Start: 2017-01-01 | End: 2017-01-01

## 2017-01-01 RX ORDER — LORAZEPAM 1 MG/1
2 TABLET ORAL
Status: DISCONTINUED | OUTPATIENT
Start: 2017-01-01 | End: 2017-01-01 | Stop reason: HOSPADM

## 2017-01-01 RX ORDER — ACETAMINOPHEN 160 MG/5ML
650 SOLUTION ORAL EVERY 4 HOURS PRN
Status: DISCONTINUED | OUTPATIENT
Start: 2017-01-01 | End: 2017-01-01 | Stop reason: HOSPADM

## 2017-01-01 RX ORDER — IPRATROPIUM BROMIDE AND ALBUTEROL SULFATE 2.5; .5 MG/3ML; MG/3ML
3 SOLUTION RESPIRATORY (INHALATION) EVERY 4 HOURS PRN
Status: DISCONTINUED | OUTPATIENT
Start: 2017-01-01 | End: 2017-01-01

## 2017-01-01 RX ORDER — IPRATROPIUM BROMIDE AND ALBUTEROL SULFATE 2.5; .5 MG/3ML; MG/3ML
SOLUTION RESPIRATORY (INHALATION)
Status: COMPLETED
Start: 2017-01-01 | End: 2017-01-01

## 2017-01-01 RX ORDER — LORAZEPAM 2 MG/ML
0.5 CONCENTRATE ORAL
Status: DISCONTINUED | OUTPATIENT
Start: 2017-01-01 | End: 2017-01-01 | Stop reason: HOSPADM

## 2017-01-01 RX ORDER — SODIUM CHLORIDE 0.9 % (FLUSH) 0.9 %
10 SYRINGE (ML) INJECTION AS NEEDED
Status: DISCONTINUED | OUTPATIENT
Start: 2017-01-01 | End: 2017-01-01

## 2017-01-01 RX ORDER — SODIUM CHLORIDE 0.9 % (FLUSH) 0.9 %
10 SYRINGE (ML) INJECTION EVERY 12 HOURS SCHEDULED
Status: DISCONTINUED | OUTPATIENT
Start: 2017-01-01 | End: 2017-01-01

## 2017-01-01 RX ORDER — ATORVASTATIN CALCIUM 10 MG/1
10 TABLET, FILM COATED ORAL NIGHTLY
Status: DISCONTINUED | OUTPATIENT
Start: 2017-01-01 | End: 2017-01-01 | Stop reason: HOSPADM

## 2017-01-01 RX ORDER — GABAPENTIN 300 MG/1
300 CAPSULE ORAL EVERY 8 HOURS SCHEDULED
Status: DISCONTINUED | OUTPATIENT
Start: 2017-01-01 | End: 2017-01-01

## 2017-01-01 RX ORDER — POTASSIUM CHLORIDE 750 MG/1
10 CAPSULE, EXTENDED RELEASE ORAL ONCE
Status: COMPLETED | OUTPATIENT
Start: 2017-01-01 | End: 2017-01-01

## 2017-01-01 RX ORDER — AMLODIPINE BESYLATE 5 MG/1
5 TABLET ORAL
Status: DISCONTINUED | OUTPATIENT
Start: 2017-01-01 | End: 2017-01-01

## 2017-01-01 RX ORDER — SODIUM CHLORIDE 0.9 % (FLUSH) 0.9 %
10 SYRINGE (ML) INJECTION AS NEEDED
Status: DISCONTINUED | OUTPATIENT
Start: 2017-01-01 | End: 2017-01-01 | Stop reason: HOSPADM

## 2017-01-01 RX ORDER — ACETAMINOPHEN 325 MG/1
650 TABLET ORAL EVERY 4 HOURS PRN
Qty: 60 TABLET | Refills: 0 | Status: SHIPPED | OUTPATIENT
Start: 2017-01-01

## 2017-01-01 RX ORDER — LORAZEPAM 1 MG/1
2 TABLET ORAL
Status: DISCONTINUED | OUTPATIENT
Start: 2017-01-01 | End: 2017-01-01

## 2017-01-01 RX ORDER — PANTOPRAZOLE SODIUM 40 MG/1
40 TABLET, DELAYED RELEASE ORAL 2 TIMES DAILY PRN
Status: DISCONTINUED | OUTPATIENT
Start: 2017-01-01 | End: 2017-01-01 | Stop reason: HOSPADM

## 2017-01-01 RX ORDER — PANTOPRAZOLE SODIUM 40 MG/1
40 TABLET, DELAYED RELEASE ORAL 2 TIMES DAILY
Qty: 60 TABLET | Refills: 0 | Status: SHIPPED | OUTPATIENT
Start: 2017-01-01

## 2017-01-01 RX ORDER — SODIUM CHLORIDE 9 MG/ML
100 INJECTION, SOLUTION INTRAVENOUS CONTINUOUS
Status: DISCONTINUED | OUTPATIENT
Start: 2017-01-01 | End: 2017-01-01

## 2017-01-01 RX ORDER — LORAZEPAM 1 MG/1
1 TABLET ORAL
Status: DISCONTINUED | OUTPATIENT
Start: 2017-01-01 | End: 2017-01-01

## 2017-01-01 RX ORDER — DEXTROSE MONOHYDRATE 25 G/50ML
25 INJECTION, SOLUTION INTRAVENOUS
Status: DISCONTINUED | OUTPATIENT
Start: 2017-01-01 | End: 2017-01-01

## 2017-01-01 RX ORDER — NICOTINE POLACRILEX 4 MG
15 LOZENGE BUCCAL
Status: DISCONTINUED | OUTPATIENT
Start: 2017-01-01 | End: 2017-01-01

## 2017-01-01 RX ORDER — GABAPENTIN 300 MG/1
300 CAPSULE ORAL EVERY 8 HOURS SCHEDULED
Status: DISCONTINUED | OUTPATIENT
Start: 2017-01-01 | End: 2017-01-01 | Stop reason: HOSPADM

## 2017-01-01 RX ORDER — FUROSEMIDE 10 MG/ML
20 INJECTION INTRAMUSCULAR; INTRAVENOUS EVERY 8 HOURS
Status: DISCONTINUED | OUTPATIENT
Start: 2017-01-01 | End: 2017-01-01

## 2017-01-01 RX ORDER — CLONAZEPAM 0.5 MG/1
0.5 TABLET ORAL NIGHTLY PRN
Qty: 60 TABLET | Refills: 0 | Status: SHIPPED | OUTPATIENT
Start: 2017-01-01

## 2017-01-01 RX ORDER — ECHINACEA PURPUREA EXTRACT 125 MG
2 TABLET ORAL
Status: DISCONTINUED | OUTPATIENT
Start: 2017-01-01 | End: 2017-01-01

## 2017-01-01 RX ORDER — FUROSEMIDE 10 MG/ML
40 INJECTION INTRAMUSCULAR; INTRAVENOUS ONCE
Status: COMPLETED | OUTPATIENT
Start: 2017-01-01 | End: 2017-01-01

## 2017-01-01 RX ORDER — ACETAMINOPHEN 500 MG
1000 TABLET ORAL ONCE
Status: COMPLETED | OUTPATIENT
Start: 2017-01-01 | End: 2017-01-01

## 2017-01-01 RX ORDER — ONDANSETRON 2 MG/ML
4 INJECTION INTRAMUSCULAR; INTRAVENOUS EVERY 4 HOURS PRN
Status: DISCONTINUED | OUTPATIENT
Start: 2017-01-01 | End: 2017-01-01

## 2017-01-01 RX ORDER — SULFASALAZINE 500 MG/1
500 TABLET ORAL DAILY
Status: DISCONTINUED | OUTPATIENT
Start: 2017-01-01 | End: 2017-01-01 | Stop reason: CLARIF

## 2017-01-01 RX ORDER — DEXMEDETOMIDINE HYDROCHLORIDE 4 UG/ML
.2-1.5 INJECTION, SOLUTION INTRAVENOUS CONTINUOUS
Status: DISCONTINUED | OUTPATIENT
Start: 2017-01-01 | End: 2017-01-01

## 2017-01-01 RX ORDER — SENNA AND DOCUSATE SODIUM 50; 8.6 MG/1; MG/1
2 TABLET, FILM COATED ORAL NIGHTLY
Status: DISCONTINUED | OUTPATIENT
Start: 2017-01-01 | End: 2017-01-01

## 2017-01-01 RX ORDER — POTASSIUM CHLORIDE 29.8 MG/ML
20 INJECTION INTRAVENOUS
Status: DISCONTINUED | OUTPATIENT
Start: 2017-01-01 | End: 2017-01-01

## 2017-01-01 RX ORDER — ALBUTEROL SULFATE 90 UG/1
6 AEROSOL, METERED RESPIRATORY (INHALATION)
Status: DISCONTINUED | OUTPATIENT
Start: 2017-01-01 | End: 2017-01-01

## 2017-01-01 RX ORDER — PANTOPRAZOLE SODIUM 40 MG/1
40 TABLET, DELAYED RELEASE ORAL
Status: DISCONTINUED | OUTPATIENT
Start: 2017-01-01 | End: 2017-01-01

## 2017-01-01 RX ORDER — NAPROXEN 250 MG/1
250 TABLET ORAL
Status: DISCONTINUED | OUTPATIENT
Start: 2017-01-01 | End: 2017-01-01

## 2017-01-01 RX ORDER — LANSOPRAZOLE
30 KIT EVERY MORNING
Status: DISCONTINUED | OUTPATIENT
Start: 2017-01-01 | End: 2017-01-01

## 2017-01-01 RX ORDER — HYDROMORPHONE HYDROCHLORIDE 1 MG/ML
0.5 INJECTION, SOLUTION INTRAMUSCULAR; INTRAVENOUS; SUBCUTANEOUS EVERY 4 HOURS PRN
Status: DISCONTINUED | OUTPATIENT
Start: 2017-01-01 | End: 2017-01-01 | Stop reason: HOSPADM

## 2017-01-01 RX ORDER — GINSENG 100 MG
CAPSULE ORAL EVERY 8 HOURS SCHEDULED
Status: DISCONTINUED | OUTPATIENT
Start: 2017-01-01 | End: 2017-01-01 | Stop reason: HOSPADM

## 2017-01-01 RX ORDER — ACETAMINOPHEN 325 MG/1
650 TABLET ORAL EVERY 4 HOURS PRN
Status: DISCONTINUED | OUTPATIENT
Start: 2017-01-01 | End: 2017-01-01 | Stop reason: HOSPADM

## 2017-01-01 RX ORDER — GLYCOPYRROLATE 0.2 MG/ML
0.4 INJECTION INTRAMUSCULAR; INTRAVENOUS EVERY 4 HOURS
Status: DISCONTINUED | OUTPATIENT
Start: 2017-01-01 | End: 2017-01-01 | Stop reason: HOSPADM

## 2017-01-01 RX ORDER — MORPHINE SULFATE 100 MG/5ML
10 SOLUTION ORAL
Status: DISCONTINUED | OUTPATIENT
Start: 2017-01-01 | End: 2017-01-01 | Stop reason: HOSPADM

## 2017-01-01 RX ORDER — ATORVASTATIN CALCIUM 10 MG/1
10 TABLET, FILM COATED ORAL DAILY
Status: DISCONTINUED | OUTPATIENT
Start: 2017-01-01 | End: 2017-01-01

## 2017-01-01 RX ORDER — FENTANYL CITRATE 50 UG/ML
25 INJECTION, SOLUTION INTRAMUSCULAR; INTRAVENOUS
Status: DISCONTINUED | OUTPATIENT
Start: 2017-01-01 | End: 2017-01-01

## 2017-01-01 RX ORDER — OXYCODONE HYDROCHLORIDE AND ACETAMINOPHEN 5; 325 MG/1; MG/1
1 TABLET ORAL EVERY 4 HOURS PRN
Status: DISCONTINUED | OUTPATIENT
Start: 2017-01-01 | End: 2017-01-01 | Stop reason: HOSPADM

## 2017-01-01 RX ORDER — OXYCODONE HYDROCHLORIDE AND ACETAMINOPHEN 5; 325 MG/1; MG/1
1 TABLET ORAL EVERY 6 HOURS PRN
Status: DISCONTINUED | OUTPATIENT
Start: 2017-01-01 | End: 2017-01-01

## 2017-01-01 RX ORDER — LOSARTAN POTASSIUM 50 MG/1
100 TABLET ORAL
Status: DISCONTINUED | OUTPATIENT
Start: 2017-01-01 | End: 2017-01-01 | Stop reason: HOSPADM

## 2017-01-01 RX ORDER — ISOSORBIDE MONONITRATE 30 MG/1
30 TABLET, EXTENDED RELEASE ORAL
Status: DISCONTINUED | OUTPATIENT
Start: 2017-01-01 | End: 2017-01-01 | Stop reason: HOSPADM

## 2017-01-01 RX ORDER — ACETYLCYSTEINE 200 MG/ML
2 SOLUTION ORAL; RESPIRATORY (INHALATION) EVERY 12 HOURS SCHEDULED
Status: DISCONTINUED | OUTPATIENT
Start: 2017-01-01 | End: 2017-01-01

## 2017-01-01 RX ORDER — DIGOXIN 0.25 MG/ML
125 INJECTION INTRAMUSCULAR; INTRAVENOUS ONCE
Status: COMPLETED | OUTPATIENT
Start: 2017-01-01 | End: 2017-01-01

## 2017-01-01 RX ORDER — POTASSIUM CHLORIDE 1.5 G/1.77G
40 POWDER, FOR SOLUTION ORAL AS NEEDED
Status: DISCONTINUED | OUTPATIENT
Start: 2017-01-01 | End: 2017-01-01

## 2017-01-01 RX ORDER — LORAZEPAM 0.5 MG/1
0.5 TABLET ORAL
Status: DISCONTINUED | OUTPATIENT
Start: 2017-01-01 | End: 2017-01-01 | Stop reason: HOSPADM

## 2017-01-01 RX ORDER — FINASTERIDE 5 MG/1
5 TABLET, FILM COATED ORAL DAILY
Status: DISCONTINUED | OUTPATIENT
Start: 2017-01-01 | End: 2017-01-01

## 2017-01-01 RX ORDER — LORAZEPAM 2 MG/ML
0.5 CONCENTRATE ORAL
Status: DISCONTINUED | OUTPATIENT
Start: 2017-01-01 | End: 2017-01-01

## 2017-01-01 RX ORDER — IPRATROPIUM BROMIDE AND ALBUTEROL SULFATE 2.5; .5 MG/3ML; MG/3ML
3 SOLUTION RESPIRATORY (INHALATION)
Status: DISCONTINUED | OUTPATIENT
Start: 2017-01-01 | End: 2017-01-01

## 2017-01-01 RX ORDER — OXYCODONE HYDROCHLORIDE AND ACETAMINOPHEN 5; 325 MG/1; MG/1
1 TABLET ORAL EVERY 6 HOURS PRN
Qty: 60 TABLET | Refills: 0 | Status: SHIPPED | OUTPATIENT
Start: 2017-01-01 | End: 2017-01-01

## 2017-01-01 RX ORDER — FENTANYL 12 UG/H
1 PATCH TRANSDERMAL
Qty: 3 PATCH | Refills: 0 | Status: SHIPPED | OUTPATIENT
Start: 2017-01-01 | End: 2017-01-01

## 2017-01-01 RX ORDER — ACETAMINOPHEN 650 MG/1
650 SUPPOSITORY RECTAL EVERY 4 HOURS PRN
Status: DISCONTINUED | OUTPATIENT
Start: 2017-01-01 | End: 2017-01-01

## 2017-01-01 RX ORDER — CLOPIDOGREL BISULFATE 75 MG/1
75 TABLET ORAL DAILY
Status: DISCONTINUED | OUTPATIENT
Start: 2017-01-01 | End: 2017-01-01

## 2017-01-01 RX ORDER — DOCUSATE SODIUM 100 MG/1
100 CAPSULE, LIQUID FILLED ORAL 2 TIMES DAILY
Status: DISCONTINUED | OUTPATIENT
Start: 2017-01-01 | End: 2017-01-01 | Stop reason: HOSPADM

## 2017-01-01 RX ORDER — ALBUTEROL SULFATE 90 UG/1
6 AEROSOL, METERED RESPIRATORY (INHALATION) EVERY 4 HOURS PRN
Status: DISCONTINUED | OUTPATIENT
Start: 2017-01-01 | End: 2017-01-01

## 2017-01-01 RX ORDER — MORPHINE SULFATE 2 MG/ML
INJECTION, SOLUTION INTRAMUSCULAR; INTRAVENOUS
Status: COMPLETED
Start: 2017-01-01 | End: 2017-01-01

## 2017-01-01 RX ORDER — DIPHENOXYLATE HYDROCHLORIDE AND ATROPINE SULFATE 2.5; .025 MG/1; MG/1
1 TABLET ORAL
Status: DISCONTINUED | OUTPATIENT
Start: 2017-01-01 | End: 2017-01-01 | Stop reason: HOSPADM

## 2017-01-01 RX ORDER — LEVOFLOXACIN 5 MG/ML
750 INJECTION, SOLUTION INTRAVENOUS
Status: DISCONTINUED | OUTPATIENT
Start: 2017-01-01 | End: 2017-01-01 | Stop reason: ALTCHOICE

## 2017-01-01 RX ORDER — ACETAMINOPHEN 650 MG/1
650 SUPPOSITORY RECTAL EVERY 4 HOURS PRN
Status: DISCONTINUED | OUTPATIENT
Start: 2017-01-01 | End: 2017-01-01 | Stop reason: HOSPADM

## 2017-01-01 RX ORDER — FUROSEMIDE 10 MG/ML
20 INJECTION INTRAMUSCULAR; INTRAVENOUS ONCE
Status: COMPLETED | OUTPATIENT
Start: 2017-01-01 | End: 2017-01-01

## 2017-01-01 RX ORDER — PREDNISOLONE ACETATE 10 MG/ML
2 SUSPENSION/ DROPS OPHTHALMIC EVERY 6 HOURS SCHEDULED
Status: DISCONTINUED | OUTPATIENT
Start: 2017-01-01 | End: 2017-01-01

## 2017-01-01 RX ORDER — ACETYLCYSTEINE 200 MG/ML
2 SOLUTION ORAL; RESPIRATORY (INHALATION) EVERY 12 HOURS SCHEDULED
Status: COMPLETED | OUTPATIENT
Start: 2017-01-01 | End: 2017-01-01

## 2017-01-01 RX ORDER — FINASTERIDE 5 MG/1
5 TABLET, FILM COATED ORAL DAILY
Status: DISCONTINUED | OUTPATIENT
Start: 2017-01-01 | End: 2017-01-01 | Stop reason: HOSPADM

## 2017-01-01 RX ORDER — SODIUM CHLORIDE 0.9 % (FLUSH) 0.9 %
1-10 SYRINGE (ML) INJECTION AS NEEDED
Status: DISCONTINUED | OUTPATIENT
Start: 2017-01-01 | End: 2017-01-01

## 2017-01-01 RX ORDER — FENTANYL 12 UG/H
1 PATCH TRANSDERMAL
Status: DISCONTINUED | OUTPATIENT
Start: 2017-01-01 | End: 2017-01-01

## 2017-01-01 RX ORDER — CLONAZEPAM 0.5 MG/1
0.5 TABLET ORAL NIGHTLY PRN
Status: DISCONTINUED | OUTPATIENT
Start: 2017-01-01 | End: 2017-01-01

## 2017-01-01 RX ORDER — AMLODIPINE BESYLATE 5 MG/1
5 TABLET ORAL
Status: DISCONTINUED | OUTPATIENT
Start: 2017-01-01 | End: 2017-01-01 | Stop reason: HOSPADM

## 2017-01-01 RX ORDER — ONDANSETRON 4 MG/1
4 TABLET, ORALLY DISINTEGRATING ORAL
Status: DISCONTINUED | OUTPATIENT
Start: 2017-01-01 | End: 2017-01-01 | Stop reason: HOSPADM

## 2017-01-01 RX ORDER — POTASSIUM CHLORIDE 1.5 G/1.77G
40 POWDER, FOR SOLUTION ORAL ONCE
Status: COMPLETED | OUTPATIENT
Start: 2017-01-01 | End: 2017-01-01

## 2017-01-01 RX ORDER — LEVOFLOXACIN 5 MG/ML
500 INJECTION, SOLUTION INTRAVENOUS EVERY 24 HOURS
Status: DISCONTINUED | OUTPATIENT
Start: 2017-01-01 | End: 2017-01-01

## 2017-01-01 RX ORDER — POTASSIUM CHLORIDE 29.8 MG/ML
20 INJECTION INTRAVENOUS ONCE
Status: COMPLETED | OUTPATIENT
Start: 2017-01-01 | End: 2017-01-01

## 2017-01-01 RX ORDER — FENTANYL CITRATE 50 UG/ML
50 INJECTION, SOLUTION INTRAMUSCULAR; INTRAVENOUS
Status: DISCONTINUED | OUTPATIENT
Start: 2017-01-01 | End: 2017-01-01

## 2017-01-01 RX ORDER — MONTELUKAST SODIUM 10 MG/1
10 TABLET ORAL NIGHTLY
Status: DISCONTINUED | OUTPATIENT
Start: 2017-01-01 | End: 2017-01-01 | Stop reason: HOSPADM

## 2017-01-01 RX ORDER — MORPHINE SULFATE 100 MG/5ML
20 SOLUTION ORAL
Status: DISCONTINUED | OUTPATIENT
Start: 2017-01-01 | End: 2017-01-01 | Stop reason: HOSPADM

## 2017-01-01 RX ORDER — ONDANSETRON 2 MG/ML
4 INJECTION INTRAMUSCULAR; INTRAVENOUS ONCE
Status: COMPLETED | OUTPATIENT
Start: 2017-01-01 | End: 2017-01-01

## 2017-01-01 RX ORDER — ECHINACEA PURPUREA EXTRACT 125 MG
2 TABLET ORAL AS NEEDED
Status: DISCONTINUED | OUTPATIENT
Start: 2017-01-01 | End: 2017-01-01 | Stop reason: HOSPADM

## 2017-01-01 RX ORDER — ECHINACEA PURPUREA EXTRACT 125 MG
1 TABLET ORAL AS NEEDED
Status: DISCONTINUED | OUTPATIENT
Start: 2017-01-01 | End: 2017-01-01

## 2017-01-01 RX ADMIN — FENTANYL CITRATE 50 MCG: 50 INJECTION INTRAMUSCULAR; INTRAVENOUS at 21:04

## 2017-01-01 RX ADMIN — PANTOPRAZOLE SODIUM 40 MG: 40 INJECTION, POWDER, FOR SOLUTION INTRAVENOUS at 12:08

## 2017-01-01 RX ADMIN — SULFASALAZINE 1000 MG: 500 TABLET ORAL at 06:45

## 2017-01-01 RX ADMIN — VANCOMYCIN HYDROCHLORIDE 1000 MG: 1 INJECTION, SOLUTION INTRAVENOUS at 13:51

## 2017-01-01 RX ADMIN — SERTRALINE 50 MG: 50 TABLET, FILM COATED ORAL at 08:27

## 2017-01-01 RX ADMIN — DEXMEDETOMIDINE HYDROCHLORIDE 1.2 MCG/KG/HR: 4 INJECTION, SOLUTION INTRAVENOUS at 21:19

## 2017-01-01 RX ADMIN — METOPROLOL TARTRATE 25 MG: 25 TABLET ORAL at 11:24

## 2017-01-01 RX ADMIN — SULFASALAZINE 1000 MG: 500 TABLET ORAL at 20:14

## 2017-01-01 RX ADMIN — FENTANYL CITRATE 50 MCG: 50 INJECTION INTRAMUSCULAR; INTRAVENOUS at 21:17

## 2017-01-01 RX ADMIN — OXYCODONE HYDROCHLORIDE AND ACETAMINOPHEN 1 TABLET: 7.5; 325 TABLET ORAL at 13:46

## 2017-01-01 RX ADMIN — LOSARTAN POTASSIUM 100 MG: 50 TABLET, FILM COATED ORAL at 10:25

## 2017-01-01 RX ADMIN — GABAPENTIN 300 MG: 300 CAPSULE ORAL at 09:56

## 2017-01-01 RX ADMIN — OXYCODONE HYDROCHLORIDE AND ACETAMINOPHEN 1 TABLET: 7.5; 325 TABLET ORAL at 00:18

## 2017-01-01 RX ADMIN — METOPROLOL TARTRATE 2.5 MG: 5 INJECTION INTRAVENOUS at 20:32

## 2017-01-01 RX ADMIN — LORAZEPAM 2 MG: 2 INJECTION INTRAMUSCULAR; INTRAVENOUS at 18:21

## 2017-01-01 RX ADMIN — FENTANYL CITRATE 50 MCG: 50 INJECTION INTRAMUSCULAR; INTRAVENOUS at 23:07

## 2017-01-01 RX ADMIN — ENOXAPARIN SODIUM 40 MG: 40 INJECTION SUBCUTANEOUS at 08:34

## 2017-01-01 RX ADMIN — ACETYLCYSTEINE 2 ML: 200 SOLUTION ORAL; RESPIRATORY (INHALATION) at 07:26

## 2017-01-01 RX ADMIN — LEVOFLOXACIN 750 MG: 750 INJECTION, SOLUTION INTRAVENOUS at 13:51

## 2017-01-01 RX ADMIN — POTASSIUM CHLORIDE 40 MEQ: 1.5 POWDER, FOR SOLUTION ORAL at 06:15

## 2017-01-01 RX ADMIN — ALBUTEROL SULFATE 6 PUFF: 90 AEROSOL, METERED RESPIRATORY (INHALATION) at 15:49

## 2017-01-01 RX ADMIN — CLONAZEPAM 0.5 MG: 0.5 TABLET ORAL at 02:11

## 2017-01-01 RX ADMIN — POTASSIUM CHLORIDE 10 MEQ: 750 CAPSULE, EXTENDED RELEASE ORAL at 05:08

## 2017-01-01 RX ADMIN — PANTOPRAZOLE SODIUM 40 MG: 40 INJECTION, POWDER, FOR SOLUTION INTRAVENOUS at 05:52

## 2017-01-01 RX ADMIN — FENTANYL CITRATE 50 MCG: 50 INJECTION INTRAMUSCULAR; INTRAVENOUS at 22:58

## 2017-01-01 RX ADMIN — FENTANYL CITRATE 25 MCG: 50 INJECTION INTRAMUSCULAR; INTRAVENOUS at 02:22

## 2017-01-01 RX ADMIN — BACITRACIN 1 APPLICATION: 500 OINTMENT TOPICAL at 06:50

## 2017-01-01 RX ADMIN — ENOXAPARIN SODIUM 40 MG: 40 INJECTION SUBCUTANEOUS at 08:18

## 2017-01-01 RX ADMIN — OXYCODONE HYDROCHLORIDE AND ACETAMINOPHEN 1 TABLET: 5; 325 TABLET ORAL at 19:46

## 2017-01-01 RX ADMIN — LORAZEPAM 2 MG: 2 INJECTION INTRAMUSCULAR; INTRAVENOUS at 16:30

## 2017-01-01 RX ADMIN — TAZOBACTAM SODIUM AND PIPERACILLIN SODIUM 3.38 G: 375; 3 INJECTION, SOLUTION INTRAVENOUS at 03:07

## 2017-01-01 RX ADMIN — DEXMEDETOMIDINE HYDROCHLORIDE 1.3 MCG/KG/HR: 4 INJECTION, SOLUTION INTRAVENOUS at 07:04

## 2017-01-01 RX ADMIN — TAZOBACTAM SODIUM AND PIPERACILLIN SODIUM 3.38 G: 375; 3 INJECTION, SOLUTION INTRAVENOUS at 10:58

## 2017-01-01 RX ADMIN — FENTANYL CITRATE 50 MCG: 50 INJECTION INTRAMUSCULAR; INTRAVENOUS at 15:38

## 2017-01-01 RX ADMIN — CLOPIDOGREL 75 MG: 75 TABLET, FILM COATED ORAL at 08:27

## 2017-01-01 RX ADMIN — ENOXAPARIN SODIUM 90 MG: 100 INJECTION SUBCUTANEOUS at 11:24

## 2017-01-01 RX ADMIN — INSULIN ASPART 3 UNITS: 100 INJECTION, SOLUTION INTRAVENOUS; SUBCUTANEOUS at 20:33

## 2017-01-01 RX ADMIN — PANTOPRAZOLE SODIUM 40 MG: 40 INJECTION, POWDER, FOR SOLUTION INTRAVENOUS at 06:21

## 2017-01-01 RX ADMIN — SULFASALAZINE 1000 MG: 500 TABLET ORAL at 14:03

## 2017-01-01 RX ADMIN — DOCUSATE SODIUM 100 MG: 100 CAPSULE, LIQUID FILLED ORAL at 08:18

## 2017-01-01 RX ADMIN — ALBUTEROL SULFATE 6 PUFF: 90 AEROSOL, METERED RESPIRATORY (INHALATION) at 07:49

## 2017-01-01 RX ADMIN — FENTANYL CITRATE 50 MCG: 50 INJECTION INTRAMUSCULAR; INTRAVENOUS at 16:45

## 2017-01-01 RX ADMIN — LORAZEPAM 2 MG: 2 INJECTION INTRAMUSCULAR; INTRAVENOUS at 21:51

## 2017-01-01 RX ADMIN — DEXMEDETOMIDINE HYDROCHLORIDE 1.2 MCG/KG/HR: 4 INJECTION, SOLUTION INTRAVENOUS at 22:23

## 2017-01-01 RX ADMIN — VANCOMYCIN HYDROCHLORIDE 1000 MG: 1 INJECTION, SOLUTION INTRAVENOUS at 11:55

## 2017-01-01 RX ADMIN — BACITRACIN 1 APPLICATION: 500 OINTMENT TOPICAL at 21:33

## 2017-01-01 RX ADMIN — BACITRACIN: 500 OINTMENT TOPICAL at 22:40

## 2017-01-01 RX ADMIN — DEXMEDETOMIDINE HYDROCHLORIDE 1.2 MCG/KG/HR: 4 INJECTION, SOLUTION INTRAVENOUS at 09:00

## 2017-01-01 RX ADMIN — GABAPENTIN 300 MG: 300 CAPSULE ORAL at 06:50

## 2017-01-01 RX ADMIN — SULFASALAZINE 1000 MG: 500 TABLET ORAL at 13:42

## 2017-01-01 RX ADMIN — ALBUTEROL SULFATE 6 PUFF: 90 AEROSOL, METERED RESPIRATORY (INHALATION) at 19:09

## 2017-01-01 RX ADMIN — TAZOBACTAM SODIUM AND PIPERACILLIN SODIUM 3.38 G: 375; 3 INJECTION, SOLUTION INTRAVENOUS at 05:01

## 2017-01-01 RX ADMIN — IPRATROPIUM BROMIDE AND ALBUTEROL SULFATE 3 ML: .5; 3 SOLUTION RESPIRATORY (INHALATION) at 08:36

## 2017-01-01 RX ADMIN — ALBUTEROL SULFATE 6 PUFF: 90 AEROSOL, METERED RESPIRATORY (INHALATION) at 10:43

## 2017-01-01 RX ADMIN — OXYCODONE HYDROCHLORIDE AND ACETAMINOPHEN 1 TABLET: 7.5; 325 TABLET ORAL at 22:08

## 2017-01-01 RX ADMIN — VANCOMYCIN HYDROCHLORIDE 1000 MG: 1 INJECTION, SOLUTION INTRAVENOUS at 01:50

## 2017-01-01 RX ADMIN — GABAPENTIN 300 MG: 300 CAPSULE ORAL at 08:19

## 2017-01-01 RX ADMIN — OXYCODONE HYDROCHLORIDE AND ACETAMINOPHEN 1 TABLET: 7.5; 325 TABLET ORAL at 11:07

## 2017-01-01 RX ADMIN — Medication 10 ML: at 21:05

## 2017-01-01 RX ADMIN — TAZOBACTAM SODIUM AND PIPERACILLIN SODIUM 3.38 G: 375; 3 INJECTION, SOLUTION INTRAVENOUS at 05:06

## 2017-01-01 RX ADMIN — SODIUM CHLORIDE 9 ML/HR: 9 INJECTION, SOLUTION INTRAVENOUS at 11:16

## 2017-01-01 RX ADMIN — DEXMEDETOMIDINE HYDROCHLORIDE 1.2 MCG/KG/HR: 4 INJECTION, SOLUTION INTRAVENOUS at 13:04

## 2017-01-01 RX ADMIN — LOSARTAN POTASSIUM 100 MG: 50 TABLET, FILM COATED ORAL at 08:18

## 2017-01-01 RX ADMIN — ENOXAPARIN SODIUM 90 MG: 100 INJECTION SUBCUTANEOUS at 11:31

## 2017-01-01 RX ADMIN — GABAPENTIN 300 MG: 300 CAPSULE ORAL at 22:15

## 2017-01-01 RX ADMIN — METOPROLOL TARTRATE 25 MG: 25 TABLET ORAL at 01:28

## 2017-01-01 RX ADMIN — FENTANYL CITRATE 50 MCG: 50 INJECTION INTRAMUSCULAR; INTRAVENOUS at 19:46

## 2017-01-01 RX ADMIN — MORPHINE SULFATE 6 MG: 2 INJECTION, SOLUTION INTRAMUSCULAR; INTRAVENOUS at 20:29

## 2017-01-01 RX ADMIN — METOPROLOL TARTRATE 2.5 MG: 5 INJECTION INTRAVENOUS at 06:21

## 2017-01-01 RX ADMIN — ISOSORBIDE MONONITRATE 30 MG: 30 TABLET, EXTENDED RELEASE ORAL at 08:18

## 2017-01-01 RX ADMIN — MONTELUKAST 10 MG: 10 TABLET, FILM COATED ORAL at 22:15

## 2017-01-01 RX ADMIN — ATORVASTATIN CALCIUM 10 MG: 10 TABLET, FILM COATED ORAL at 20:03

## 2017-01-01 RX ADMIN — DOCUSATE SODIUM 100 MG: 100 CAPSULE, LIQUID FILLED ORAL at 18:18

## 2017-01-01 RX ADMIN — GLYCOPYRROLATE 0.4 MG: 0.2 INJECTION, SOLUTION INTRAMUSCULAR; INTRAVENOUS at 04:18

## 2017-01-01 RX ADMIN — LANSOPRAZOLE 30 MG: KIT at 08:59

## 2017-01-01 RX ADMIN — FENTANYL CITRATE 25 MCG: 50 INJECTION INTRAMUSCULAR; INTRAVENOUS at 18:40

## 2017-01-01 RX ADMIN — FENTANYL CITRATE 50 MCG: 50 INJECTION INTRAMUSCULAR; INTRAVENOUS at 11:46

## 2017-01-01 RX ADMIN — FENTANYL CITRATE 50 MCG: 50 INJECTION INTRAMUSCULAR; INTRAVENOUS at 03:06

## 2017-01-01 RX ADMIN — FENTANYL 1 PATCH: 12 PATCH TRANSDERMAL at 09:23

## 2017-01-01 RX ADMIN — DEXMEDETOMIDINE HYDROCHLORIDE 1.3 MCG/KG/HR: 4 INJECTION, SOLUTION INTRAVENOUS at 05:13

## 2017-01-01 RX ADMIN — FUROSEMIDE 40 MG: 10 INJECTION, SOLUTION INTRAMUSCULAR; INTRAVENOUS at 22:23

## 2017-01-01 RX ADMIN — ISOSORBIDE MONONITRATE 30 MG: 30 TABLET, EXTENDED RELEASE ORAL at 08:12

## 2017-01-01 RX ADMIN — ENOXAPARIN SODIUM 30 MG: 30 INJECTION SUBCUTANEOUS at 12:46

## 2017-01-01 RX ADMIN — ALBUTEROL SULFATE 6 PUFF: 90 AEROSOL, METERED RESPIRATORY (INHALATION) at 07:27

## 2017-01-01 RX ADMIN — TAZOBACTAM SODIUM AND PIPERACILLIN SODIUM 3.38 G: 375; 3 INJECTION, SOLUTION INTRAVENOUS at 10:45

## 2017-01-01 RX ADMIN — Medication 10 ML: at 20:03

## 2017-01-01 RX ADMIN — DOCUSATE SODIUM -SENNOSIDES 2 TABLET: 50; 8.6 TABLET, COATED ORAL at 21:01

## 2017-01-01 RX ADMIN — ALBUTEROL SULFATE 6 PUFF: 90 AEROSOL, METERED RESPIRATORY (INHALATION) at 06:39

## 2017-01-01 RX ADMIN — FENTANYL CITRATE 50 MCG: 50 INJECTION INTRAMUSCULAR; INTRAVENOUS at 13:53

## 2017-01-01 RX ADMIN — PANTOPRAZOLE SODIUM 40 MG: 40 INJECTION, POWDER, FOR SOLUTION INTRAVENOUS at 05:00

## 2017-01-01 RX ADMIN — LORAZEPAM 2 MG: 2 INJECTION INTRAMUSCULAR; INTRAVENOUS at 02:26

## 2017-01-01 RX ADMIN — FENTANYL CITRATE 50 MCG: 50 INJECTION INTRAMUSCULAR; INTRAVENOUS at 00:14

## 2017-01-01 RX ADMIN — FUROSEMIDE 40 MG: 10 INJECTION, SOLUTION INTRAMUSCULAR; INTRAVENOUS at 00:34

## 2017-01-01 RX ADMIN — LORAZEPAM 2 MG: 2 INJECTION INTRAMUSCULAR at 15:56

## 2017-01-01 RX ADMIN — PROPOFOL 10 MCG/KG/MIN: 10 INJECTION, EMULSION INTRAVENOUS at 06:51

## 2017-01-01 RX ADMIN — TAZOBACTAM SODIUM AND PIPERACILLIN SODIUM 3.38 G: 375; 3 INJECTION, SOLUTION INTRAVENOUS at 02:16

## 2017-01-01 RX ADMIN — FENTANYL CITRATE 50 MCG: 50 INJECTION INTRAMUSCULAR; INTRAVENOUS at 13:14

## 2017-01-01 RX ADMIN — BACITRACIN: 500 OINTMENT TOPICAL at 15:47

## 2017-01-01 RX ADMIN — Medication 10 ML: at 08:59

## 2017-01-01 RX ADMIN — METOPROLOL TARTRATE 25 MG: 25 TABLET ORAL at 17:32

## 2017-01-01 RX ADMIN — METOPROLOL TARTRATE 2.5 MG: 5 INJECTION INTRAVENOUS at 08:55

## 2017-01-01 RX ADMIN — METOPROLOL TARTRATE 25 MG: 25 TABLET ORAL at 09:27

## 2017-01-01 RX ADMIN — ALBUTEROL SULFATE 6 PUFF: 90 AEROSOL, METERED RESPIRATORY (INHALATION) at 16:45

## 2017-01-01 RX ADMIN — FENTANYL CITRATE 50 MCG: 50 INJECTION INTRAMUSCULAR; INTRAVENOUS at 17:54

## 2017-01-01 RX ADMIN — FENTANYL CITRATE 25 MCG: 50 INJECTION INTRAMUSCULAR; INTRAVENOUS at 10:40

## 2017-01-01 RX ADMIN — METOPROLOL TARTRATE 12.5 MG: 25 TABLET ORAL at 16:51

## 2017-01-01 RX ADMIN — ENOXAPARIN SODIUM 40 MG: 40 INJECTION SUBCUTANEOUS at 09:55

## 2017-01-01 RX ADMIN — AMLODIPINE BESYLATE 5 MG: 5 TABLET ORAL at 08:25

## 2017-01-01 RX ADMIN — IPRATROPIUM BROMIDE AND ALBUTEROL SULFATE 3 ML: .5; 3 SOLUTION RESPIRATORY (INHALATION) at 03:38

## 2017-01-01 RX ADMIN — FENTANYL CITRATE 25 MCG: 50 INJECTION INTRAMUSCULAR; INTRAVENOUS at 04:48

## 2017-01-01 RX ADMIN — DEXMEDETOMIDINE HYDROCHLORIDE 0.8 MCG/KG/HR: 4 INJECTION, SOLUTION INTRAVENOUS at 18:25

## 2017-01-01 RX ADMIN — LORAZEPAM 2 MG: 2 INJECTION INTRAMUSCULAR; INTRAVENOUS at 20:29

## 2017-01-01 RX ADMIN — METOPROLOL TARTRATE 25 MG: 25 TABLET ORAL at 09:41

## 2017-01-01 RX ADMIN — LEVOFLOXACIN 500 MG: 5 INJECTION, SOLUTION INTRAVENOUS at 10:21

## 2017-01-01 RX ADMIN — LEVOFLOXACIN 500 MG: 5 INJECTION, SOLUTION INTRAVENOUS at 10:05

## 2017-01-01 RX ADMIN — ENOXAPARIN SODIUM 40 MG: 40 INJECTION SUBCUTANEOUS at 08:19

## 2017-01-01 RX ADMIN — DOCUSATE SODIUM 100 MG: 100 CAPSULE, LIQUID FILLED ORAL at 09:57

## 2017-01-01 RX ADMIN — POTASSIUM CHLORIDE 40 MEQ: 1.5 POWDER, FOR SOLUTION ORAL at 11:25

## 2017-01-01 RX ADMIN — ACETYLCYSTEINE 2 ML: 200 SOLUTION ORAL; RESPIRATORY (INHALATION) at 23:22

## 2017-01-01 RX ADMIN — PREDNISOLONE ACETATE 2 DROP: 10 SUSPENSION/ DROPS OPHTHALMIC at 06:45

## 2017-01-01 RX ADMIN — Medication 10 ML: at 08:52

## 2017-01-01 RX ADMIN — METOPROLOL TARTRATE 2.5 MG: 5 INJECTION INTRAVENOUS at 16:18

## 2017-01-01 RX ADMIN — ONDANSETRON 4 MG: 2 INJECTION INTRAMUSCULAR; INTRAVENOUS at 15:16

## 2017-01-01 RX ADMIN — ALBUTEROL SULFATE 6 PUFF: 90 AEROSOL, METERED RESPIRATORY (INHALATION) at 19:38

## 2017-01-01 RX ADMIN — LORAZEPAM 2 MG: 2 INJECTION INTRAMUSCULAR; INTRAVENOUS at 21:16

## 2017-01-01 RX ADMIN — OXYCODONE HYDROCHLORIDE AND ACETAMINOPHEN 1 TABLET: 7.5; 325 TABLET ORAL at 18:00

## 2017-01-01 RX ADMIN — ACETAMINOPHEN 650 MG: 325 TABLET ORAL at 15:28

## 2017-01-01 RX ADMIN — GLYCOPYRROLATE 0.2 MG: 0.2 INJECTION, SOLUTION INTRAMUSCULAR; INTRAVENOUS at 19:32

## 2017-01-01 RX ADMIN — SULFASALAZINE 1000 MG: 500 TABLET ORAL at 21:33

## 2017-01-01 RX ADMIN — FENTANYL CITRATE 50 MCG: 50 INJECTION INTRAMUSCULAR; INTRAVENOUS at 04:45

## 2017-01-01 RX ADMIN — FENTANYL CITRATE 25 MCG: 50 INJECTION INTRAMUSCULAR; INTRAVENOUS at 08:25

## 2017-01-01 RX ADMIN — IPRATROPIUM BROMIDE AND ALBUTEROL SULFATE 3 ML: .5; 3 SOLUTION RESPIRATORY (INHALATION) at 10:53

## 2017-01-01 RX ADMIN — ENOXAPARIN SODIUM 40 MG: 40 INJECTION SUBCUTANEOUS at 12:00

## 2017-01-01 RX ADMIN — DEXMEDETOMIDINE HYDROCHLORIDE 1.2 MCG/KG/HR: 4 INJECTION, SOLUTION INTRAVENOUS at 04:55

## 2017-01-01 RX ADMIN — METOPROLOL TARTRATE 2.5 MG: 5 INJECTION INTRAVENOUS at 21:00

## 2017-01-01 RX ADMIN — TAZOBACTAM SODIUM AND PIPERACILLIN SODIUM 3.38 G: 375; 3 INJECTION, SOLUTION INTRAVENOUS at 02:02

## 2017-01-01 RX ADMIN — FENTANYL CITRATE 50 MCG: 50 INJECTION INTRAMUSCULAR; INTRAVENOUS at 13:52

## 2017-01-01 RX ADMIN — METOPROLOL TARTRATE 2.5 MG: 5 INJECTION INTRAVENOUS at 09:24

## 2017-01-01 RX ADMIN — ATORVASTATIN CALCIUM 10 MG: 10 TABLET, FILM COATED ORAL at 21:59

## 2017-01-01 RX ADMIN — FENTANYL CITRATE 50 MCG: 50 INJECTION INTRAMUSCULAR; INTRAVENOUS at 06:31

## 2017-01-01 RX ADMIN — POTASSIUM CHLORIDE 40 MEQ: 1.5 POWDER, FOR SOLUTION ORAL at 11:46

## 2017-01-01 RX ADMIN — TAZOBACTAM SODIUM AND PIPERACILLIN SODIUM 3.38 G: 375; 3 INJECTION, SOLUTION INTRAVENOUS at 13:51

## 2017-01-01 RX ADMIN — LOSARTAN POTASSIUM 100 MG: 50 TABLET, FILM COATED ORAL at 08:12

## 2017-01-01 RX ADMIN — GABAPENTIN 300 MG: 300 CAPSULE ORAL at 13:42

## 2017-01-01 RX ADMIN — ENOXAPARIN SODIUM 40 MG: 40 INJECTION SUBCUTANEOUS at 10:58

## 2017-01-01 RX ADMIN — DEXMEDETOMIDINE HYDROCHLORIDE 1.5 MCG/KG/HR: 4 INJECTION, SOLUTION INTRAVENOUS at 12:15

## 2017-01-01 RX ADMIN — METOPROLOL TARTRATE 25 MG: 25 TABLET ORAL at 17:22

## 2017-01-01 RX ADMIN — FENTANYL CITRATE 50 MCG: 50 INJECTION INTRAMUSCULAR; INTRAVENOUS at 08:27

## 2017-01-01 RX ADMIN — METOPROLOL TARTRATE 2.5 MG: 5 INJECTION INTRAVENOUS at 10:03

## 2017-01-01 RX ADMIN — ALBUTEROL SULFATE 6 PUFF: 90 AEROSOL, METERED RESPIRATORY (INHALATION) at 15:00

## 2017-01-01 RX ADMIN — MORPHINE SULFATE 6 MG: 2 INJECTION, SOLUTION INTRAMUSCULAR; INTRAVENOUS at 22:24

## 2017-01-01 RX ADMIN — PREDNISOLONE ACETATE 2 DROP: 10 SUSPENSION/ DROPS OPHTHALMIC at 11:07

## 2017-01-01 RX ADMIN — SODIUM CHLORIDE 100 ML/HR: 9 INJECTION, SOLUTION INTRAVENOUS at 13:05

## 2017-01-01 RX ADMIN — METOPROLOL TARTRATE 25 MG: 25 TABLET ORAL at 10:31

## 2017-01-01 RX ADMIN — AMLODIPINE BESYLATE 5 MG: 5 TABLET ORAL at 08:19

## 2017-01-01 RX ADMIN — CLOPIDOGREL 75 MG: 75 TABLET, FILM COATED ORAL at 18:16

## 2017-01-01 RX ADMIN — GABAPENTIN 300 MG: 300 CAPSULE ORAL at 21:59

## 2017-01-01 RX ADMIN — LORAZEPAM 2 MG: 2 INJECTION INTRAMUSCULAR; INTRAVENOUS at 22:59

## 2017-01-01 RX ADMIN — METOPROLOL TARTRATE 25 MG: 25 TABLET ORAL at 08:27

## 2017-01-01 RX ADMIN — FENTANYL 1 PATCH: 12 PATCH TRANSDERMAL at 09:41

## 2017-01-01 RX ADMIN — SULFASALAZINE 1000 MG: 500 TABLET ORAL at 13:05

## 2017-01-01 RX ADMIN — FUROSEMIDE 40 MG: 10 INJECTION, SOLUTION INTRAMUSCULAR; INTRAVENOUS at 10:34

## 2017-01-01 RX ADMIN — SODIUM CHLORIDE 50 ML/HR: 9 INJECTION, SOLUTION INTRAVENOUS at 06:56

## 2017-01-01 RX ADMIN — ALBUTEROL SULFATE 6 PUFF: 90 AEROSOL, METERED RESPIRATORY (INHALATION) at 06:58

## 2017-01-01 RX ADMIN — ACETYLCYSTEINE 2 ML: 200 SOLUTION ORAL; RESPIRATORY (INHALATION) at 06:42

## 2017-01-01 RX ADMIN — ALBUMIN HUMAN 25 G: 0.25 SOLUTION INTRAVENOUS at 15:52

## 2017-01-01 RX ADMIN — LORAZEPAM 2 MG: 2 INJECTION INTRAMUSCULAR; INTRAVENOUS at 03:25

## 2017-01-01 RX ADMIN — METOPROLOL TARTRATE 5 MG: 5 INJECTION INTRAVENOUS at 11:59

## 2017-01-01 RX ADMIN — PROPOFOL 30 MCG/KG/MIN: 10 INJECTION, EMULSION INTRAVENOUS at 06:06

## 2017-01-01 RX ADMIN — DEXMEDETOMIDINE HYDROCHLORIDE 1.3 MCG/KG/HR: 4 INJECTION, SOLUTION INTRAVENOUS at 00:53

## 2017-01-01 RX ADMIN — LEVOFLOXACIN 750 MG: 750 INJECTION, SOLUTION INTRAVENOUS at 10:45

## 2017-01-01 RX ADMIN — OXYCODONE HYDROCHLORIDE AND ACETAMINOPHEN 1 TABLET: 7.5; 325 TABLET ORAL at 18:20

## 2017-01-01 RX ADMIN — SERTRALINE 50 MG: 50 TABLET, FILM COATED ORAL at 09:57

## 2017-01-01 RX ADMIN — PROPOFOL 10 MCG/KG/MIN: 10 INJECTION, EMULSION INTRAVENOUS at 23:08

## 2017-01-01 RX ADMIN — METOPROLOL TARTRATE 5 MG: 5 INJECTION INTRAVENOUS at 23:30

## 2017-01-01 RX ADMIN — FENTANYL CITRATE 50 MCG: 50 INJECTION INTRAMUSCULAR; INTRAVENOUS at 01:18

## 2017-01-01 RX ADMIN — ONDANSETRON 4 MG: 4 TABLET, ORALLY DISINTEGRATING ORAL at 09:34

## 2017-01-01 RX ADMIN — ENOXAPARIN SODIUM 30 MG: 30 INJECTION SUBCUTANEOUS at 12:08

## 2017-01-01 RX ADMIN — TAZOBACTAM SODIUM AND PIPERACILLIN SODIUM 3.38 G: 375; 3 INJECTION, SOLUTION INTRAVENOUS at 17:58

## 2017-01-01 RX ADMIN — LEVOFLOXACIN 750 MG: 750 INJECTION, SOLUTION INTRAVENOUS at 11:59

## 2017-01-01 RX ADMIN — GABAPENTIN 300 MG: 300 CAPSULE ORAL at 13:05

## 2017-01-01 RX ADMIN — Medication 10 ML: at 21:03

## 2017-01-01 RX ADMIN — AMLODIPINE BESYLATE 5 MG: 5 TABLET ORAL at 08:12

## 2017-01-01 RX ADMIN — CLONAZEPAM 0.5 MG: 0.5 TABLET ORAL at 23:01

## 2017-01-01 RX ADMIN — FENTANYL CITRATE 50 MCG: 50 INJECTION INTRAMUSCULAR; INTRAVENOUS at 15:03

## 2017-01-01 RX ADMIN — DEXMEDETOMIDINE HYDROCHLORIDE 0.8 MCG/KG/HR: 4 INJECTION, SOLUTION INTRAVENOUS at 13:12

## 2017-01-01 RX ADMIN — Medication 10 ML: at 01:37

## 2017-01-01 RX ADMIN — IPRATROPIUM BROMIDE AND ALBUTEROL SULFATE 3 ML: .5; 3 SOLUTION RESPIRATORY (INHALATION) at 16:35

## 2017-01-01 RX ADMIN — SULFASALAZINE 1000 MG: 500 TABLET ORAL at 05:40

## 2017-01-01 RX ADMIN — FENTANYL CITRATE 50 MCG: 50 INJECTION INTRAMUSCULAR; INTRAVENOUS at 03:31

## 2017-01-01 RX ADMIN — METOPROLOL TARTRATE 12.5 MG: 25 TABLET ORAL at 18:19

## 2017-01-01 RX ADMIN — TAZOBACTAM SODIUM AND PIPERACILLIN SODIUM 3.38 G: 375; 3 INJECTION, SOLUTION INTRAVENOUS at 18:12

## 2017-01-01 RX ADMIN — FENTANYL CITRATE 50 MCG: 50 INJECTION INTRAMUSCULAR; INTRAVENOUS at 06:11

## 2017-01-01 RX ADMIN — ONDANSETRON 4 MG: 2 INJECTION INTRAMUSCULAR; INTRAVENOUS at 22:08

## 2017-01-01 RX ADMIN — PANTOPRAZOLE SODIUM 40 MG: 40 TABLET, DELAYED RELEASE ORAL at 03:17

## 2017-01-01 RX ADMIN — BACITRACIN: 500 OINTMENT TOPICAL at 13:05

## 2017-01-01 RX ADMIN — MORPHINE SULFATE 4 MG: 4 INJECTION, SOLUTION INTRAMUSCULAR; INTRAVENOUS at 02:26

## 2017-01-01 RX ADMIN — FENTANYL 1 PATCH: 12 PATCH TRANSDERMAL at 08:49

## 2017-01-01 RX ADMIN — GABAPENTIN 300 MG: 300 CAPSULE ORAL at 14:03

## 2017-01-01 RX ADMIN — MORPHINE SULFATE 4 MG: 4 INJECTION, SOLUTION INTRAMUSCULAR; INTRAVENOUS at 15:14

## 2017-01-01 RX ADMIN — SULFASALAZINE 1000 MG: 500 TABLET ORAL at 15:01

## 2017-01-01 RX ADMIN — OXYCODONE HYDROCHLORIDE AND ACETAMINOPHEN 1 TABLET: 7.5; 325 TABLET ORAL at 08:18

## 2017-01-01 RX ADMIN — ALBUTEROL SULFATE 6 PUFF: 90 AEROSOL, METERED RESPIRATORY (INHALATION) at 13:00

## 2017-01-01 RX ADMIN — DEXMEDETOMIDINE HYDROCHLORIDE 1.2 MCG/KG/HR: 4 INJECTION, SOLUTION INTRAVENOUS at 17:17

## 2017-01-01 RX ADMIN — FENTANYL CITRATE 50 MCG: 50 INJECTION INTRAMUSCULAR; INTRAVENOUS at 10:53

## 2017-01-01 RX ADMIN — TAZOBACTAM SODIUM AND PIPERACILLIN SODIUM 3.38 G: 375; 3 INJECTION, SOLUTION INTRAVENOUS at 19:22

## 2017-01-01 RX ADMIN — ALBUMIN HUMAN 25 G: 0.25 SOLUTION INTRAVENOUS at 04:23

## 2017-01-01 RX ADMIN — SULFASALAZINE 1000 MG: 500 TABLET ORAL at 06:51

## 2017-01-01 RX ADMIN — FENTANYL CITRATE 50 MCG: 50 INJECTION INTRAMUSCULAR; INTRAVENOUS at 04:57

## 2017-01-01 RX ADMIN — SODIUM CHLORIDE 100 ML/HR: 9 INJECTION, SOLUTION INTRAVENOUS at 18:19

## 2017-01-01 RX ADMIN — TAZOBACTAM SODIUM AND PIPERACILLIN SODIUM 3.38 G: 375; 3 INJECTION, SOLUTION INTRAVENOUS at 19:03

## 2017-01-01 RX ADMIN — METOPROLOL TARTRATE 25 MG: 25 TABLET ORAL at 03:07

## 2017-01-01 RX ADMIN — ALBUMIN HUMAN 25 G: 0.25 SOLUTION INTRAVENOUS at 12:02

## 2017-01-01 RX ADMIN — Medication 10 ML: at 20:22

## 2017-01-01 RX ADMIN — HYDROMORPHONE HYDROCHLORIDE 1 MG: 1 INJECTION, SOLUTION INTRAMUSCULAR; INTRAVENOUS; SUBCUTANEOUS at 17:20

## 2017-01-01 RX ADMIN — ONDANSETRON 4 MG: 2 INJECTION INTRAMUSCULAR; INTRAVENOUS at 02:31

## 2017-01-01 RX ADMIN — ALBUTEROL SULFATE 6 PUFF: 90 AEROSOL, METERED RESPIRATORY (INHALATION) at 07:51

## 2017-01-01 RX ADMIN — METOPROLOL TARTRATE 2.5 MG: 5 INJECTION INTRAVENOUS at 08:27

## 2017-01-01 RX ADMIN — ACETAMINOPHEN 1000 MG: 500 TABLET ORAL at 23:50

## 2017-01-01 RX ADMIN — PREDNISOLONE ACETATE 2 DROP: 10 SUSPENSION/ DROPS OPHTHALMIC at 18:21

## 2017-01-01 RX ADMIN — TAZOBACTAM SODIUM AND PIPERACILLIN SODIUM 3.38 G: 375; 3 INJECTION, SOLUTION INTRAVENOUS at 11:11

## 2017-01-01 RX ADMIN — LEVOFLOXACIN 500 MG: 5 INJECTION, SOLUTION INTRAVENOUS at 08:17

## 2017-01-01 RX ADMIN — ISOSORBIDE MONONITRATE 30 MG: 30 TABLET, EXTENDED RELEASE ORAL at 08:27

## 2017-01-01 RX ADMIN — ALBUTEROL SULFATE 6 PUFF: 90 AEROSOL, METERED RESPIRATORY (INHALATION) at 12:40

## 2017-01-01 RX ADMIN — ALBUTEROL SULFATE 6 PUFF: 90 AEROSOL, METERED RESPIRATORY (INHALATION) at 23:22

## 2017-01-01 RX ADMIN — BISACODYL 10 MG: 10 SUPPOSITORY RECTAL at 08:18

## 2017-01-01 RX ADMIN — FENTANYL CITRATE 25 MCG: 50 INJECTION INTRAMUSCULAR; INTRAVENOUS at 05:52

## 2017-01-01 RX ADMIN — SODIUM CHLORIDE 250 ML: 0.9 INJECTION, SOLUTION INTRAVENOUS at 10:23

## 2017-01-01 RX ADMIN — FENTANYL CITRATE 50 MCG: 50 INJECTION INTRAMUSCULAR; INTRAVENOUS at 08:41

## 2017-01-01 RX ADMIN — OXYCODONE HYDROCHLORIDE AND ACETAMINOPHEN 1 TABLET: 7.5; 325 TABLET ORAL at 05:41

## 2017-01-01 RX ADMIN — FENTANYL CITRATE 50 MCG: 50 INJECTION INTRAMUSCULAR; INTRAVENOUS at 07:36

## 2017-01-01 RX ADMIN — FENTANYL CITRATE 50 MCG: 50 INJECTION INTRAMUSCULAR; INTRAVENOUS at 17:45

## 2017-01-01 RX ADMIN — SERTRALINE 50 MG: 50 TABLET, FILM COATED ORAL at 08:19

## 2017-01-01 RX ADMIN — DEXMEDETOMIDINE HYDROCHLORIDE 0.2 MCG/KG/HR: 4 INJECTION, SOLUTION INTRAVENOUS at 02:25

## 2017-01-01 RX ADMIN — FENTANYL CITRATE 50 MCG: 50 INJECTION INTRAMUSCULAR; INTRAVENOUS at 07:54

## 2017-01-01 RX ADMIN — LEVOFLOXACIN 500 MG: 5 INJECTION, SOLUTION INTRAVENOUS at 12:02

## 2017-01-01 RX ADMIN — GABAPENTIN 300 MG: 300 CAPSULE ORAL at 20:03

## 2017-01-01 RX ADMIN — PROPOFOL 10 MCG/KG/MIN: 10 INJECTION, EMULSION INTRAVENOUS at 22:53

## 2017-01-01 RX ADMIN — FUROSEMIDE 40 MG: 10 INJECTION, SOLUTION INTRAMUSCULAR; INTRAVENOUS at 23:53

## 2017-01-01 RX ADMIN — DEXMEDETOMIDINE HYDROCHLORIDE 1.3 MCG/KG/HR: 4 INJECTION, SOLUTION INTRAVENOUS at 04:26

## 2017-01-01 RX ADMIN — DEXMEDETOMIDINE HYDROCHLORIDE 1.2 MCG/KG/HR: 4 INJECTION, SOLUTION INTRAVENOUS at 12:01

## 2017-01-01 RX ADMIN — VANCOMYCIN HYDROCHLORIDE 1000 MG: 1 INJECTION, SOLUTION INTRAVENOUS at 23:08

## 2017-01-01 RX ADMIN — FENTANYL CITRATE 50 MCG: 50 INJECTION INTRAMUSCULAR; INTRAVENOUS at 15:56

## 2017-01-01 RX ADMIN — METOPROLOL TARTRATE 12.5 MG: 25 TABLET ORAL at 09:56

## 2017-01-01 RX ADMIN — CLOPIDOGREL 75 MG: 75 TABLET, FILM COATED ORAL at 08:34

## 2017-01-01 RX ADMIN — ALBUTEROL SULFATE 6 PUFF: 90 AEROSOL, METERED RESPIRATORY (INHALATION) at 21:05

## 2017-01-01 RX ADMIN — LOSARTAN POTASSIUM 100 MG: 50 TABLET, FILM COATED ORAL at 08:27

## 2017-01-01 RX ADMIN — Medication 10 ML: at 11:06

## 2017-01-01 RX ADMIN — ALBUTEROL SULFATE 6 PUFF: 90 AEROSOL, METERED RESPIRATORY (INHALATION) at 11:11

## 2017-01-01 RX ADMIN — ALBUTEROL SULFATE 6 PUFF: 90 AEROSOL, METERED RESPIRATORY (INHALATION) at 12:17

## 2017-01-01 RX ADMIN — METOPROLOL TARTRATE 5 MG: 5 INJECTION INTRAVENOUS at 15:47

## 2017-01-01 RX ADMIN — ALBUTEROL SULFATE 6 PUFF: 90 AEROSOL, METERED RESPIRATORY (INHALATION) at 07:46

## 2017-01-01 RX ADMIN — DEXMEDETOMIDINE HYDROCHLORIDE 0.2 MCG/KG/HR: 4 INJECTION, SOLUTION INTRAVENOUS at 13:55

## 2017-01-01 RX ADMIN — ONDANSETRON 4 MG: 2 INJECTION INTRAMUSCULAR; INTRAVENOUS at 22:02

## 2017-01-01 RX ADMIN — ACETAMINOPHEN 650 MG: 325 TABLET ORAL at 21:33

## 2017-01-01 RX ADMIN — ISOSORBIDE MONONITRATE 30 MG: 30 TABLET, EXTENDED RELEASE ORAL at 09:56

## 2017-01-01 RX ADMIN — METOPROLOL TARTRATE 12.5 MG: 25 TABLET ORAL at 18:14

## 2017-01-01 RX ADMIN — METOPROLOL TARTRATE 25 MG: 25 TABLET ORAL at 17:01

## 2017-01-01 RX ADMIN — BACITRACIN: 500 OINTMENT TOPICAL at 08:18

## 2017-01-01 RX ADMIN — FENTANYL CITRATE 50 MCG: 50 INJECTION INTRAMUSCULAR; INTRAVENOUS at 20:28

## 2017-01-01 RX ADMIN — DOCUSATE SODIUM 100 MG: 100 CAPSULE, LIQUID FILLED ORAL at 08:27

## 2017-01-01 RX ADMIN — SERTRALINE 50 MG: 50 TABLET, FILM COATED ORAL at 08:34

## 2017-01-01 RX ADMIN — DEXMEDETOMIDINE HYDROCHLORIDE 1.2 MCG/KG/HR: 4 INJECTION, SOLUTION INTRAVENOUS at 16:32

## 2017-01-01 RX ADMIN — ALBUTEROL SULFATE 6 PUFF: 90 AEROSOL, METERED RESPIRATORY (INHALATION) at 15:54

## 2017-01-01 RX ADMIN — METOPROLOL TARTRATE 2.5 MG: 5 INJECTION INTRAVENOUS at 08:51

## 2017-01-01 RX ADMIN — MORPHINE SULFATE 4 MG: 4 INJECTION, SOLUTION INTRAMUSCULAR; INTRAVENOUS at 00:10

## 2017-01-01 RX ADMIN — AMLODIPINE BESYLATE 5 MG: 5 TABLET ORAL at 08:27

## 2017-01-01 RX ADMIN — SERTRALINE 50 MG: 50 TABLET, FILM COATED ORAL at 08:12

## 2017-01-01 RX ADMIN — METOPROLOL TARTRATE 12.5 MG: 25 TABLET ORAL at 08:12

## 2017-01-01 RX ADMIN — MORPHINE SULFATE 4 MG: 4 INJECTION, SOLUTION INTRAMUSCULAR; INTRAVENOUS at 03:16

## 2017-01-01 RX ADMIN — FUROSEMIDE 40 MG: 40 TABLET ORAL at 08:49

## 2017-01-01 RX ADMIN — CLONAZEPAM 0.5 MG: 0.5 TABLET ORAL at 22:22

## 2017-01-01 RX ADMIN — MORPHINE SULFATE 6 MG: 2 INJECTION, SOLUTION INTRAMUSCULAR; INTRAVENOUS at 21:17

## 2017-01-01 RX ADMIN — CLOPIDOGREL 75 MG: 75 TABLET, FILM COATED ORAL at 08:19

## 2017-01-01 RX ADMIN — FENTANYL CITRATE 25 MCG: 50 INJECTION INTRAMUSCULAR; INTRAVENOUS at 13:02

## 2017-01-01 RX ADMIN — FENTANYL CITRATE 25 MCG: 50 INJECTION INTRAMUSCULAR; INTRAVENOUS at 14:41

## 2017-01-01 RX ADMIN — ENOXAPARIN SODIUM 40 MG: 40 INJECTION SUBCUTANEOUS at 11:24

## 2017-01-01 RX ADMIN — SULFASALAZINE 1000 MG: 500 TABLET ORAL at 09:56

## 2017-01-01 RX ADMIN — Medication 1000 MG: at 23:50

## 2017-01-01 RX ADMIN — TAZOBACTAM SODIUM AND PIPERACILLIN SODIUM 3.38 G: 375; 3 INJECTION, SOLUTION INTRAVENOUS at 18:19

## 2017-01-01 RX ADMIN — HYDROMORPHONE HYDROCHLORIDE 0.5 MG: 1 INJECTION, SOLUTION INTRAMUSCULAR; INTRAVENOUS; SUBCUTANEOUS at 08:11

## 2017-01-01 RX ADMIN — FENTANYL 1 PATCH: 12 PATCH TRANSDERMAL at 10:04

## 2017-01-01 RX ADMIN — IPRATROPIUM BROMIDE AND ALBUTEROL SULFATE 3 ML: .5; 3 SOLUTION RESPIRATORY (INHALATION) at 19:28

## 2017-01-01 RX ADMIN — DEXMEDETOMIDINE HYDROCHLORIDE 1.4 MCG/KG/HR: 4 INJECTION, SOLUTION INTRAVENOUS at 02:21

## 2017-01-01 RX ADMIN — FENTANYL CITRATE 50 MCG: 50 INJECTION INTRAMUSCULAR; INTRAVENOUS at 08:17

## 2017-01-01 RX ADMIN — TAZOBACTAM SODIUM AND PIPERACILLIN SODIUM 3.38 G: 375; 3 INJECTION, SOLUTION INTRAVENOUS at 10:35

## 2017-01-01 RX ADMIN — METOPROLOL TARTRATE 25 MG: 25 TABLET ORAL at 09:37

## 2017-01-01 RX ADMIN — TAZOBACTAM SODIUM AND PIPERACILLIN SODIUM 3.38 G: 375; 3 INJECTION, SOLUTION INTRAVENOUS at 21:25

## 2017-01-01 RX ADMIN — IPRATROPIUM BROMIDE AND ALBUTEROL SULFATE 3 ML: .5; 3 SOLUTION RESPIRATORY (INHALATION) at 12:12

## 2017-01-01 RX ADMIN — FENTANYL CITRATE 50 MCG: 50 INJECTION INTRAMUSCULAR; INTRAVENOUS at 23:47

## 2017-01-01 RX ADMIN — FENTANYL CITRATE 50 MCG: 50 INJECTION INTRAMUSCULAR; INTRAVENOUS at 12:55

## 2017-01-01 RX ADMIN — FENTANYL CITRATE 25 MCG: 50 INJECTION INTRAMUSCULAR; INTRAVENOUS at 11:31

## 2017-01-01 RX ADMIN — TAZOBACTAM SODIUM AND PIPERACILLIN SODIUM 3.38 G: 375; 3 INJECTION, SOLUTION INTRAVENOUS at 12:00

## 2017-01-01 RX ADMIN — FENTANYL CITRATE 50 MCG: 50 INJECTION INTRAMUSCULAR; INTRAVENOUS at 02:21

## 2017-01-01 RX ADMIN — BACITRACIN: 500 OINTMENT TOPICAL at 06:51

## 2017-01-01 RX ADMIN — MORPHINE SULFATE 6 MG: 2 INJECTION, SOLUTION INTRAMUSCULAR; INTRAVENOUS at 04:05

## 2017-01-01 RX ADMIN — METOPROLOL TARTRATE 12.5 MG: 25 TABLET ORAL at 08:18

## 2017-01-01 RX ADMIN — ALBUTEROL SULFATE 6 PUFF: 90 AEROSOL, METERED RESPIRATORY (INHALATION) at 11:49

## 2017-01-01 RX ADMIN — Medication 10 ML: at 08:10

## 2017-01-01 RX ADMIN — SODIUM CHLORIDE 100 ML/HR: 9 INJECTION, SOLUTION INTRAVENOUS at 09:20

## 2017-01-01 RX ADMIN — FENTANYL CITRATE 50 MCG: 50 INJECTION INTRAMUSCULAR; INTRAVENOUS at 22:25

## 2017-01-01 RX ADMIN — GLYCOPYRROLATE 0.4 MG: 0.2 INJECTION, SOLUTION INTRAMUSCULAR; INTRAVENOUS at 21:51

## 2017-01-01 RX ADMIN — LEVOFLOXACIN 500 MG: 5 INJECTION, SOLUTION INTRAVENOUS at 12:00

## 2017-01-01 RX ADMIN — ATORVASTATIN CALCIUM 10 MG: 10 TABLET, FILM COATED ORAL at 21:33

## 2017-01-01 RX ADMIN — TAZOBACTAM SODIUM AND PIPERACILLIN SODIUM 3.38 G: 375; 3 INJECTION, SOLUTION INTRAVENOUS at 18:01

## 2017-01-01 RX ADMIN — ISOSORBIDE MONONITRATE 30 MG: 30 TABLET, EXTENDED RELEASE ORAL at 08:34

## 2017-01-01 RX ADMIN — ACETAMINOPHEN 650 MG: 325 TABLET ORAL at 16:05

## 2017-01-01 RX ADMIN — IPRATROPIUM BROMIDE AND ALBUTEROL SULFATE 3 ML: .5; 3 SOLUTION RESPIRATORY (INHALATION) at 23:55

## 2017-01-01 RX ADMIN — FENTANYL CITRATE 50 MCG: 50 INJECTION INTRAMUSCULAR; INTRAVENOUS at 05:41

## 2017-01-01 RX ADMIN — DEXMEDETOMIDINE HYDROCHLORIDE 1.2 MCG/KG/HR: 4 INJECTION, SOLUTION INTRAVENOUS at 09:41

## 2017-01-01 RX ADMIN — FENTANYL CITRATE 50 MCG: 50 INJECTION INTRAMUSCULAR; INTRAVENOUS at 10:33

## 2017-01-01 RX ADMIN — DIGOXIN 125 MCG: 0.25 INJECTION INTRAMUSCULAR; INTRAVENOUS at 04:04

## 2017-01-01 RX ADMIN — FUROSEMIDE 40 MG: 10 INJECTION, SOLUTION INTRAMUSCULAR; INTRAVENOUS at 10:31

## 2017-01-01 RX ADMIN — LORAZEPAM 2 MG: 2 INJECTION INTRAMUSCULAR; INTRAVENOUS at 19:30

## 2017-01-01 RX ADMIN — TAZOBACTAM SODIUM AND PIPERACILLIN SODIUM 3.38 G: 375; 3 INJECTION, SOLUTION INTRAVENOUS at 02:03

## 2017-01-01 RX ADMIN — TAZOBACTAM SODIUM AND PIPERACILLIN SODIUM 3.38 G: 375; 3 INJECTION, SOLUTION INTRAVENOUS at 04:27

## 2017-01-01 RX ADMIN — METOPROLOL TARTRATE 25 MG: 25 TABLET ORAL at 02:02

## 2017-01-01 RX ADMIN — OXYCODONE HYDROCHLORIDE AND ACETAMINOPHEN 1 TABLET: 5; 325 TABLET ORAL at 22:02

## 2017-01-01 RX ADMIN — FENTANYL CITRATE 50 MCG: 50 INJECTION INTRAMUSCULAR; INTRAVENOUS at 02:15

## 2017-01-01 RX ADMIN — METOPROLOL TARTRATE 25 MG: 25 TABLET ORAL at 18:18

## 2017-01-01 RX ADMIN — FENTANYL CITRATE 50 MCG: 50 INJECTION INTRAMUSCULAR; INTRAVENOUS at 11:22

## 2017-01-01 RX ADMIN — VANCOMYCIN HYDROCHLORIDE 1000 MG: 1 INJECTION, SOLUTION INTRAVENOUS at 23:53

## 2017-01-01 RX ADMIN — BACITRACIN: 500 OINTMENT TOPICAL at 09:57

## 2017-01-01 RX ADMIN — DEXMEDETOMIDINE HYDROCHLORIDE 1.2 MCG/KG/HR: 4 INJECTION, SOLUTION INTRAVENOUS at 13:14

## 2017-01-01 RX ADMIN — ISOSORBIDE MONONITRATE 30 MG: 30 TABLET, EXTENDED RELEASE ORAL at 08:24

## 2017-01-01 RX ADMIN — FINASTERIDE 5 MG: 5 TABLET, FILM COATED ORAL at 08:35

## 2017-01-01 RX ADMIN — PREDNISOLONE ACETATE 2 DROP: 10 SUSPENSION/ DROPS OPHTHALMIC at 13:05

## 2017-01-01 RX ADMIN — FENTANYL CITRATE 50 MCG: 50 INJECTION INTRAMUSCULAR; INTRAVENOUS at 20:40

## 2017-01-01 RX ADMIN — Medication 10 ML: at 08:54

## 2017-01-01 RX ADMIN — DEXMEDETOMIDINE HYDROCHLORIDE 1.2 MCG/KG/HR: 4 INJECTION, SOLUTION INTRAVENOUS at 20:37

## 2017-01-01 RX ADMIN — OXYCODONE HYDROCHLORIDE AND ACETAMINOPHEN 1 TABLET: 5; 325 TABLET ORAL at 11:06

## 2017-01-01 RX ADMIN — FENTANYL CITRATE 25 MCG: 50 INJECTION INTRAMUSCULAR; INTRAVENOUS at 00:48

## 2017-01-01 RX ADMIN — LANSOPRAZOLE 30 MG: KIT at 07:01

## 2017-01-01 RX ADMIN — POTASSIUM CHLORIDE 20 MEQ: 29.8 INJECTION, SOLUTION INTRAVENOUS at 10:30

## 2017-01-01 RX ADMIN — Medication 10 ML: at 20:38

## 2017-01-01 RX ADMIN — LOSARTAN POTASSIUM 100 MG: 50 TABLET, FILM COATED ORAL at 08:34

## 2017-01-01 RX ADMIN — IPRATROPIUM BROMIDE AND ALBUTEROL SULFATE: .5; 3 SOLUTION RESPIRATORY (INHALATION) at 07:00

## 2017-01-01 RX ADMIN — ENOXAPARIN SODIUM 40 MG: 40 INJECTION SUBCUTANEOUS at 10:07

## 2017-01-01 RX ADMIN — MORPHINE SULFATE 6 MG: 2 INJECTION, SOLUTION INTRAMUSCULAR; INTRAVENOUS at 18:16

## 2017-01-01 RX ADMIN — FENTANYL CITRATE 50 MCG: 50 INJECTION INTRAMUSCULAR; INTRAVENOUS at 13:57

## 2017-01-01 RX ADMIN — ENOXAPARIN SODIUM 40 MG: 40 INJECTION SUBCUTANEOUS at 10:26

## 2017-01-01 RX ADMIN — SODIUM CHLORIDE 100 ML/HR: 9 INJECTION, SOLUTION INTRAVENOUS at 06:08

## 2017-01-01 RX ADMIN — Medication 10 ML: at 10:51

## 2017-01-01 RX ADMIN — DEXMEDETOMIDINE HYDROCHLORIDE 1 MCG/KG/HR: 4 INJECTION, SOLUTION INTRAVENOUS at 03:07

## 2017-01-01 RX ADMIN — ALBUTEROL SULFATE 6 PUFF: 90 AEROSOL, METERED RESPIRATORY (INHALATION) at 18:51

## 2017-01-01 RX ADMIN — CLONAZEPAM 0.5 MG: 0.5 TABLET ORAL at 22:02

## 2017-01-01 RX ADMIN — FINASTERIDE 5 MG: 5 TABLET, FILM COATED ORAL at 09:57

## 2017-01-01 RX ADMIN — FINASTERIDE 5 MG: 5 TABLET, FILM COATED ORAL at 08:12

## 2017-01-01 RX ADMIN — METOPROLOL TARTRATE 25 MG: 25 TABLET ORAL at 09:01

## 2017-01-01 RX ADMIN — METOPROLOL TARTRATE 12.5 MG: 25 TABLET ORAL at 08:35

## 2017-01-01 RX ADMIN — ALBUTEROL SULFATE 6 PUFF: 90 AEROSOL, METERED RESPIRATORY (INHALATION) at 15:38

## 2017-01-01 RX ADMIN — TAZOBACTAM SODIUM AND PIPERACILLIN SODIUM 3.38 G: 375; 3 INJECTION, SOLUTION INTRAVENOUS at 18:04

## 2017-01-01 RX ADMIN — GLYCOPYRROLATE 0.4 MG: 0.2 INJECTION, SOLUTION INTRAMUSCULAR; INTRAVENOUS at 01:39

## 2017-01-01 RX ADMIN — METOPROLOL TARTRATE 2.5 MG: 5 INJECTION INTRAVENOUS at 08:42

## 2017-01-01 RX ADMIN — FENTANYL CITRATE 25 MCG: 50 INJECTION INTRAMUSCULAR; INTRAVENOUS at 02:48

## 2017-01-01 RX ADMIN — FENTANYL CITRATE 50 MCG: 50 INJECTION INTRAMUSCULAR; INTRAVENOUS at 19:03

## 2017-01-01 RX ADMIN — FENTANYL CITRATE 50 MCG: 50 INJECTION INTRAMUSCULAR; INTRAVENOUS at 02:10

## 2017-01-01 RX ADMIN — FENTANYL CITRATE 50 MCG: 50 INJECTION INTRAMUSCULAR; INTRAVENOUS at 19:33

## 2017-01-01 RX ADMIN — ONDANSETRON 4 MG: 2 INJECTION INTRAMUSCULAR; INTRAVENOUS at 13:25

## 2017-01-01 RX ADMIN — SULFASALAZINE 1000 MG: 500 TABLET ORAL at 14:05

## 2017-01-01 RX ADMIN — GABAPENTIN 300 MG: 300 CAPSULE ORAL at 20:13

## 2017-01-01 RX ADMIN — DOCUSATE SODIUM 100 MG: 100 CAPSULE, LIQUID FILLED ORAL at 08:25

## 2017-01-01 RX ADMIN — OXYCODONE HYDROCHLORIDE AND ACETAMINOPHEN 1 TABLET: 5; 325 TABLET ORAL at 21:15

## 2017-01-01 RX ADMIN — ALBUTEROL SULFATE 6 PUFF: 90 AEROSOL, METERED RESPIRATORY (INHALATION) at 08:12

## 2017-01-01 RX ADMIN — SULFASALAZINE 1000 MG: 500 TABLET ORAL at 22:15

## 2017-01-01 RX ADMIN — FINASTERIDE 5 MG: 5 TABLET, FILM COATED ORAL at 08:25

## 2017-01-01 RX ADMIN — SODIUM CHLORIDE 9 ML/HR: 9 INJECTION, SOLUTION INTRAVENOUS at 06:31

## 2017-01-01 RX ADMIN — ALBUMIN HUMAN 25 G: 0.25 SOLUTION INTRAVENOUS at 23:39

## 2017-01-01 RX ADMIN — LEVOFLOXACIN 500 MG: 5 INJECTION, SOLUTION INTRAVENOUS at 10:49

## 2017-01-01 RX ADMIN — FENTANYL CITRATE 50 MCG: 50 INJECTION INTRAMUSCULAR; INTRAVENOUS at 07:47

## 2017-01-01 RX ADMIN — ALBUTEROL SULFATE 6 PUFF: 90 AEROSOL, METERED RESPIRATORY (INHALATION) at 11:57

## 2017-01-01 RX ADMIN — DOCUSATE SODIUM 100 MG: 100 CAPSULE, LIQUID FILLED ORAL at 08:19

## 2017-01-01 RX ADMIN — ONDANSETRON 4 MG: 2 INJECTION INTRAMUSCULAR; INTRAVENOUS at 10:39

## 2017-01-01 RX ADMIN — FENTANYL CITRATE 25 MCG: 50 INJECTION INTRAMUSCULAR; INTRAVENOUS at 09:14

## 2017-01-01 RX ADMIN — FENTANYL CITRATE 50 MCG: 50 INJECTION INTRAMUSCULAR; INTRAVENOUS at 13:49

## 2017-01-01 RX ADMIN — LORAZEPAM 2 MG: 2 INJECTION INTRAMUSCULAR; INTRAVENOUS at 00:10

## 2017-01-01 RX ADMIN — ACETAMINOPHEN 650 MG: 325 TABLET ORAL at 08:24

## 2017-01-01 RX ADMIN — ALBUTEROL SULFATE 6 PUFF: 90 AEROSOL, METERED RESPIRATORY (INHALATION) at 20:28

## 2017-01-01 RX ADMIN — Medication 10 ML: at 23:09

## 2017-01-01 RX ADMIN — GLYCOPYRROLATE 0.4 MG: 0.2 INJECTION, SOLUTION INTRAMUSCULAR; INTRAVENOUS at 16:50

## 2017-01-01 RX ADMIN — ENOXAPARIN SODIUM 40 MG: 40 INJECTION SUBCUTANEOUS at 08:25

## 2017-01-01 RX ADMIN — METOPROLOL TARTRATE 25 MG: 25 TABLET ORAL at 08:24

## 2017-01-01 RX ADMIN — GABAPENTIN 300 MG: 300 CAPSULE ORAL at 06:45

## 2017-01-01 RX ADMIN — FENTANYL CITRATE 50 MCG: 50 INJECTION INTRAMUSCULAR; INTRAVENOUS at 00:24

## 2017-01-01 RX ADMIN — METOPROLOL TARTRATE 2.5 MG: 5 INJECTION INTRAVENOUS at 04:57

## 2017-01-01 RX ADMIN — LANSOPRAZOLE 30 MG: KIT at 08:17

## 2017-01-01 RX ADMIN — DOCUSATE SODIUM -SENNOSIDES 2 TABLET: 50; 8.6 TABLET, COATED ORAL at 20:02

## 2017-01-01 RX ADMIN — METOPROLOL TARTRATE 2.5 MG: 5 INJECTION INTRAVENOUS at 21:23

## 2017-01-01 RX ADMIN — METOPROLOL TARTRATE 2.5 MG: 5 INJECTION INTRAVENOUS at 05:52

## 2017-01-01 RX ADMIN — DEXMEDETOMIDINE HYDROCHLORIDE 1.3 MCG/KG/HR: 4 INJECTION, SOLUTION INTRAVENOUS at 04:51

## 2017-01-01 RX ADMIN — METOPROLOL TARTRATE 12.5 MG: 25 TABLET ORAL at 22:15

## 2017-01-01 RX ADMIN — LORAZEPAM 2 MG: 2 INJECTION INTRAMUSCULAR; INTRAVENOUS at 04:39

## 2017-01-01 RX ADMIN — ONDANSETRON 4 MG: 2 INJECTION INTRAMUSCULAR; INTRAVENOUS at 02:28

## 2017-01-01 RX ADMIN — OXYCODONE HYDROCHLORIDE AND ACETAMINOPHEN 1 TABLET: 5; 325 TABLET ORAL at 04:47

## 2017-01-01 RX ADMIN — Medication 10 ML: at 09:22

## 2017-01-01 RX ADMIN — FENTANYL CITRATE 25 MCG: 50 INJECTION INTRAMUSCULAR; INTRAVENOUS at 19:34

## 2017-01-01 RX ADMIN — MORPHINE SULFATE 4 MG: 4 INJECTION, SOLUTION INTRAMUSCULAR; INTRAVENOUS at 00:50

## 2017-01-01 RX ADMIN — LANSOPRAZOLE 30 MG: KIT at 09:28

## 2017-01-01 RX ADMIN — METOPROLOL TARTRATE 25 MG: 25 TABLET ORAL at 09:02

## 2017-01-01 RX ADMIN — ACETAMINOPHEN 650 MG: 325 TABLET ORAL at 00:39

## 2017-01-01 RX ADMIN — DOCUSATE SODIUM 100 MG: 100 CAPSULE, LIQUID FILLED ORAL at 18:20

## 2017-01-01 RX ADMIN — MORPHINE SULFATE 6 MG: 2 INJECTION, SOLUTION INTRAMUSCULAR; INTRAVENOUS at 19:31

## 2017-01-01 RX ADMIN — FENTANYL CITRATE 50 MCG: 50 INJECTION INTRAMUSCULAR; INTRAVENOUS at 12:00

## 2017-01-01 RX ADMIN — FENTANYL CITRATE 50 MCG: 50 INJECTION INTRAMUSCULAR; INTRAVENOUS at 20:34

## 2017-01-01 RX ADMIN — TAZOBACTAM SODIUM AND PIPERACILLIN SODIUM 3.38 G: 375; 3 INJECTION, SOLUTION INTRAVENOUS at 09:38

## 2017-01-01 RX ADMIN — TAZOBACTAM SODIUM AND PIPERACILLIN SODIUM 3.38 G: 375; 3 INJECTION, SOLUTION INTRAVENOUS at 11:25

## 2017-01-01 RX ADMIN — ALBUTEROL SULFATE 6 PUFF: 90 AEROSOL, METERED RESPIRATORY (INHALATION) at 06:42

## 2017-01-01 RX ADMIN — FENTANYL CITRATE 25 MCG: 50 INJECTION INTRAMUSCULAR; INTRAVENOUS at 22:48

## 2017-01-01 RX ADMIN — SULFASALAZINE 1000 MG: 500 TABLET ORAL at 08:18

## 2017-01-01 RX ADMIN — METOPROLOL TARTRATE 25 MG: 25 TABLET ORAL at 17:02

## 2017-01-01 RX ADMIN — LORAZEPAM 2 MG: 2 INJECTION INTRAMUSCULAR at 16:11

## 2017-01-01 RX ADMIN — LANSOPRAZOLE 30 MG: KIT at 08:04

## 2017-01-01 RX ADMIN — AMLODIPINE BESYLATE 5 MG: 5 TABLET ORAL at 09:56

## 2017-01-01 RX ADMIN — METOPROLOL TARTRATE 2.5 MG: 5 INJECTION INTRAVENOUS at 21:34

## 2017-01-01 RX ADMIN — ACETAMINOPHEN 650 MG: 325 TABLET ORAL at 13:25

## 2017-01-01 RX ADMIN — MONTELUKAST 10 MG: 10 TABLET, FILM COATED ORAL at 20:13

## 2017-01-01 RX ADMIN — LORAZEPAM 2 MG: 2 INJECTION INTRAMUSCULAR; INTRAVENOUS at 04:04

## 2017-01-01 RX ADMIN — AMLODIPINE BESYLATE 5 MG: 5 TABLET ORAL at 08:34

## 2017-01-01 RX ADMIN — FENTANYL CITRATE 50 MCG: 50 INJECTION INTRAMUSCULAR; INTRAVENOUS at 22:02

## 2017-01-01 RX ADMIN — FENTANYL CITRATE 50 MCG: 50 INJECTION INTRAMUSCULAR; INTRAVENOUS at 18:59

## 2017-01-01 RX ADMIN — ALBUMIN HUMAN 25 G: 0.25 SOLUTION INTRAVENOUS at 11:11

## 2017-01-01 RX ADMIN — DEXMEDETOMIDINE HYDROCHLORIDE 1.2 MCG/KG/HR: 4 INJECTION, SOLUTION INTRAVENOUS at 13:19

## 2017-01-01 RX ADMIN — POTASSIUM CHLORIDE 40 MEQ: 1.5 POWDER, FOR SOLUTION ORAL at 11:48

## 2017-01-01 RX ADMIN — METOPROLOL TARTRATE 25 MG: 25 TABLET ORAL at 17:06

## 2017-01-01 RX ADMIN — DEXMEDETOMIDINE HYDROCHLORIDE 1.2 MCG/KG/HR: 4 INJECTION, SOLUTION INTRAVENOUS at 15:54

## 2017-01-01 RX ADMIN — DEXTROSE MONOHYDRATE 12.5 G: 25 INJECTION, SOLUTION INTRAVENOUS at 17:03

## 2017-01-01 RX ADMIN — OXYCODONE HYDROCHLORIDE AND ACETAMINOPHEN 1 TABLET: 5; 325 TABLET ORAL at 14:35

## 2017-01-01 RX ADMIN — CLOPIDOGREL 75 MG: 75 TABLET, FILM COATED ORAL at 08:28

## 2017-01-01 RX ADMIN — FENTANYL CITRATE 50 MCG: 50 INJECTION INTRAMUSCULAR; INTRAVENOUS at 05:56

## 2017-01-01 RX ADMIN — BACITRACIN 1 APPLICATION: 500 OINTMENT TOPICAL at 22:08

## 2017-01-01 RX ADMIN — CLOPIDOGREL 75 MG: 75 TABLET, FILM COATED ORAL at 08:24

## 2017-01-01 RX ADMIN — FENTANYL CITRATE 25 MCG: 50 INJECTION INTRAMUSCULAR; INTRAVENOUS at 22:54

## 2017-01-01 RX ADMIN — ALBUTEROL SULFATE 6 PUFF: 90 AEROSOL, METERED RESPIRATORY (INHALATION) at 19:50

## 2017-01-01 RX ADMIN — INSULIN ASPART 2 UNITS: 100 INJECTION, SOLUTION INTRAVENOUS; SUBCUTANEOUS at 16:54

## 2017-01-01 RX ADMIN — MORPHINE SULFATE 4 MG: 4 INJECTION, SOLUTION INTRAMUSCULAR; INTRAVENOUS at 16:30

## 2017-01-01 RX ADMIN — CLONAZEPAM 0.5 MG: 0.5 TABLET ORAL at 21:24

## 2017-01-01 RX ADMIN — PROPOFOL 30 MCG/KG/MIN: 10 INJECTION, EMULSION INTRAVENOUS at 04:12

## 2017-01-01 RX ADMIN — METOPROLOL TARTRATE 2.5 MG: 5 INJECTION INTRAVENOUS at 21:25

## 2017-01-01 RX ADMIN — FENTANYL 1 PATCH: 12 PATCH TRANSDERMAL at 10:26

## 2017-01-01 RX ADMIN — FENTANYL CITRATE 50 MCG: 50 INJECTION INTRAMUSCULAR; INTRAVENOUS at 08:08

## 2017-01-01 RX ADMIN — PREDNISOLONE ACETATE 2 DROP: 10 SUSPENSION/ DROPS OPHTHALMIC at 20:03

## 2017-01-01 RX ADMIN — FENTANYL CITRATE 25 MCG: 50 INJECTION INTRAMUSCULAR; INTRAVENOUS at 14:20

## 2017-01-01 RX ADMIN — TETANUS TOXOID, REDUCED DIPHTHERIA TOXOID AND ACELLULAR PERTUSSIS VACCINE, ADSORBED 0.5 ML: 5; 2.5; 8; 8; 2.5 SUSPENSION INTRAMUSCULAR at 15:51

## 2017-01-01 RX ADMIN — FENTANYL CITRATE 50 MCG: 50 INJECTION INTRAMUSCULAR; INTRAVENOUS at 19:50

## 2017-01-01 RX ADMIN — MORPHINE SULFATE 6 MG: 2 INJECTION, SOLUTION INTRAMUSCULAR; INTRAVENOUS at 04:40

## 2017-01-01 RX ADMIN — FENTANYL CITRATE 50 MCG: 50 INJECTION INTRAMUSCULAR; INTRAVENOUS at 09:52

## 2017-01-01 RX ADMIN — Medication 10 ML: at 21:00

## 2017-01-01 RX ADMIN — ENOXAPARIN SODIUM 90 MG: 100 INJECTION SUBCUTANEOUS at 12:46

## 2017-01-01 RX ADMIN — CLOPIDOGREL 75 MG: 75 TABLET, FILM COATED ORAL at 08:12

## 2017-01-01 RX ADMIN — METOPROLOL TARTRATE 2.5 MG: 5 INJECTION INTRAVENOUS at 15:59

## 2017-01-01 RX ADMIN — BACITRACIN: 500 OINTMENT TOPICAL at 22:08

## 2017-01-01 RX ADMIN — BACITRACIN: 500 OINTMENT TOPICAL at 15:02

## 2017-01-01 RX ADMIN — METOPROLOL TARTRATE 25 MG: 25 TABLET ORAL at 17:03

## 2017-01-01 RX ADMIN — DEXMEDETOMIDINE HYDROCHLORIDE 1.2 MCG/KG/HR: 4 INJECTION, SOLUTION INTRAVENOUS at 06:10

## 2017-01-01 RX ADMIN — PREDNISOLONE ACETATE 2 DROP: 10 SUSPENSION/ DROPS OPHTHALMIC at 05:40

## 2017-01-01 RX ADMIN — LORAZEPAM 2 MG: 2 INJECTION INTRAMUSCULAR; INTRAVENOUS at 01:39

## 2017-01-01 RX ADMIN — PANTOPRAZOLE SODIUM 40 MG: 40 INJECTION, POWDER, FOR SOLUTION INTRAVENOUS at 06:29

## 2017-01-01 RX ADMIN — PANTOPRAZOLE SODIUM 40 MG: 40 TABLET, DELAYED RELEASE ORAL at 14:04

## 2017-01-01 RX ADMIN — ALBUTEROL SULFATE 6 PUFF: 90 AEROSOL, METERED RESPIRATORY (INHALATION) at 07:25

## 2017-01-01 RX ADMIN — PROPOFOL 20 MCG/KG/MIN: 10 INJECTION, EMULSION INTRAVENOUS at 11:15

## 2017-01-01 RX ADMIN — FENTANYL CITRATE 25 MCG: 50 INJECTION INTRAMUSCULAR; INTRAVENOUS at 20:33

## 2017-01-01 RX ADMIN — LORAZEPAM 2 MG: 2 INJECTION INTRAMUSCULAR; INTRAVENOUS at 22:25

## 2017-01-01 RX ADMIN — ACETAMINOPHEN 650 MG: 325 TABLET ORAL at 14:37

## 2017-01-01 RX ADMIN — FENTANYL CITRATE 25 MCG: 50 INJECTION INTRAMUSCULAR; INTRAVENOUS at 06:10

## 2017-01-01 RX ADMIN — FENTANYL 1 PATCH: 12 PATCH TRANSDERMAL at 11:06

## 2017-01-01 RX ADMIN — DEXMEDETOMIDINE HYDROCHLORIDE 1.2 MCG/KG/HR: 4 INJECTION, SOLUTION INTRAVENOUS at 09:03

## 2017-01-01 RX ADMIN — LANSOPRAZOLE 30 MG: KIT at 08:09

## 2017-01-01 RX ADMIN — FINASTERIDE 5 MG: 5 TABLET, FILM COATED ORAL at 08:18

## 2017-01-01 RX ADMIN — FENTANYL CITRATE 50 MCG: 50 INJECTION INTRAMUSCULAR; INTRAVENOUS at 19:20

## 2017-01-01 RX ADMIN — GABAPENTIN 300 MG: 300 CAPSULE ORAL at 15:01

## 2017-01-01 RX ADMIN — POTASSIUM CHLORIDE 40 MEQ: 1.5 POWDER, FOR SOLUTION ORAL at 08:09

## 2017-01-01 RX ADMIN — DEXMEDETOMIDINE HYDROCHLORIDE 1.3 MCG/KG/HR: 4 INJECTION, SOLUTION INTRAVENOUS at 19:32

## 2017-01-01 RX ADMIN — DEXMEDETOMIDINE HYDROCHLORIDE 1.2 MCG/KG/HR: 4 INJECTION, SOLUTION INTRAVENOUS at 21:15

## 2017-01-01 RX ADMIN — FENTANYL CITRATE 25 MCG: 50 INJECTION INTRAMUSCULAR; INTRAVENOUS at 12:40

## 2017-01-01 RX ADMIN — METOPROLOL TARTRATE 25 MG: 25 TABLET ORAL at 02:00

## 2017-01-01 RX ADMIN — OXYCODONE HYDROCHLORIDE AND ACETAMINOPHEN 1 TABLET: 5; 325 TABLET ORAL at 15:38

## 2017-01-01 RX ADMIN — DEXMEDETOMIDINE HYDROCHLORIDE 1.2 MCG/KG/HR: 4 INJECTION, SOLUTION INTRAVENOUS at 17:20

## 2017-01-01 RX ADMIN — FUROSEMIDE 20 MG: 10 INJECTION, SOLUTION INTRAMUSCULAR; INTRAVENOUS at 11:10

## 2017-01-01 RX ADMIN — ONDANSETRON 4 MG: 2 INJECTION INTRAMUSCULAR; INTRAVENOUS at 21:40

## 2017-01-01 RX ADMIN — SODIUM CHLORIDE 50 ML/HR: 9 INJECTION, SOLUTION INTRAVENOUS at 13:30

## 2017-01-01 RX ADMIN — GABAPENTIN 300 MG: 300 CAPSULE ORAL at 21:33

## 2017-01-01 RX ADMIN — IPRATROPIUM BROMIDE AND ALBUTEROL SULFATE 3 ML: .5; 3 SOLUTION RESPIRATORY (INHALATION) at 15:56

## 2017-01-01 RX ADMIN — INSULIN ASPART 3 UNITS: 100 INJECTION, SOLUTION INTRAVENOUS; SUBCUTANEOUS at 03:39

## 2017-01-01 RX ADMIN — LORAZEPAM 2 MG: 2 INJECTION INTRAMUSCULAR; INTRAVENOUS at 16:11

## 2017-01-01 RX ADMIN — DOCUSATE SODIUM 100 MG: 100 CAPSULE, LIQUID FILLED ORAL at 08:35

## 2017-01-01 RX ADMIN — FENTANYL CITRATE 50 MCG: 50 INJECTION INTRAMUSCULAR; INTRAVENOUS at 04:48

## 2017-01-01 RX ADMIN — FENTANYL CITRATE 50 MCG: 50 INJECTION INTRAMUSCULAR; INTRAVENOUS at 03:21

## 2017-01-01 RX ADMIN — SULFASALAZINE 1000 MG: 500 TABLET ORAL at 06:50

## 2017-01-01 RX ADMIN — MONTELUKAST 10 MG: 10 TABLET, FILM COATED ORAL at 20:03

## 2017-01-01 RX ADMIN — ENOXAPARIN SODIUM 90 MG: 100 INJECTION SUBCUTANEOUS at 11:08

## 2017-01-01 RX ADMIN — MORPHINE SULFATE 6 MG: 2 INJECTION, SOLUTION INTRAMUSCULAR; INTRAVENOUS at 23:36

## 2017-01-01 RX ADMIN — FENTANYL CITRATE 50 MCG: 50 INJECTION INTRAMUSCULAR; INTRAVENOUS at 02:42

## 2017-01-01 RX ADMIN — ONDANSETRON 4 MG: 2 INJECTION INTRAMUSCULAR; INTRAVENOUS at 14:09

## 2017-01-01 RX ADMIN — MAGNESIUM SULFATE HEPTAHYDRATE 2 G: 1 INJECTION, SOLUTION INTRAVENOUS at 15:04

## 2017-01-01 RX ADMIN — MORPHINE SULFATE 6 MG: 2 INJECTION, SOLUTION INTRAMUSCULAR; INTRAVENOUS at 21:51

## 2017-01-01 RX ADMIN — LORAZEPAM 2 MG: 2 INJECTION INTRAMUSCULAR; INTRAVENOUS at 15:56

## 2017-01-01 RX ADMIN — FENTANYL CITRATE 50 MCG: 50 INJECTION INTRAMUSCULAR; INTRAVENOUS at 02:11

## 2017-01-01 RX ADMIN — Medication 0.04 MCG/KG/MIN: at 10:13

## 2017-01-01 RX ADMIN — ALBUTEROL SULFATE 6 PUFF: 90 AEROSOL, METERED RESPIRATORY (INHALATION) at 19:31

## 2017-01-01 RX ADMIN — TAZOBACTAM SODIUM AND PIPERACILLIN SODIUM 3.38 G: 375; 3 INJECTION, SOLUTION INTRAVENOUS at 11:12

## 2017-01-01 RX ADMIN — FENTANYL CITRATE 25 MCG: 50 INJECTION INTRAMUSCULAR; INTRAVENOUS at 16:34

## 2017-01-01 RX ADMIN — ALBUTEROL SULFATE 6 PUFF: 90 AEROSOL, METERED RESPIRATORY (INHALATION) at 11:43

## 2017-01-01 RX ADMIN — GABAPENTIN 300 MG: 300 CAPSULE ORAL at 06:51

## 2017-01-01 RX ADMIN — PROPOFOL 30 MCG/KG/MIN: 10 INJECTION, EMULSION INTRAVENOUS at 01:00

## 2017-01-01 RX ADMIN — FENTANYL CITRATE 50 MCG: 50 INJECTION INTRAMUSCULAR; INTRAVENOUS at 00:45

## 2017-01-01 RX ADMIN — TAZOBACTAM SODIUM AND PIPERACILLIN SODIUM 3.38 G: 375; 3 INJECTION, SOLUTION INTRAVENOUS at 03:47

## 2017-01-01 RX ADMIN — SULFASALAZINE 1000 MG: 500 TABLET ORAL at 21:59

## 2017-01-01 RX ADMIN — TAZOBACTAM SODIUM AND PIPERACILLIN SODIUM 3.38 G: 375; 3 INJECTION, SOLUTION INTRAVENOUS at 02:07

## 2017-01-01 RX ADMIN — DEXMEDETOMIDINE HYDROCHLORIDE 1.2 MCG/KG/HR: 4 INJECTION, SOLUTION INTRAVENOUS at 02:45

## 2017-01-01 RX ADMIN — DEXMEDETOMIDINE HYDROCHLORIDE 1.2 MCG/KG/HR: 4 INJECTION, SOLUTION INTRAVENOUS at 13:28

## 2017-01-01 RX ADMIN — OXYCODONE HYDROCHLORIDE AND ACETAMINOPHEN 1 TABLET: 5; 325 TABLET ORAL at 04:56

## 2017-01-01 RX ADMIN — LOSARTAN POTASSIUM 100 MG: 50 TABLET, FILM COATED ORAL at 08:24

## 2017-01-01 RX ADMIN — LEVOFLOXACIN 500 MG: 5 INJECTION, SOLUTION INTRAVENOUS at 10:31

## 2017-01-01 RX ADMIN — ALBUTEROL SULFATE 6 PUFF: 90 AEROSOL, METERED RESPIRATORY (INHALATION) at 11:17

## 2017-01-01 RX ADMIN — ALBUTEROL SULFATE 6 PUFF: 90 AEROSOL, METERED RESPIRATORY (INHALATION) at 15:15

## 2017-01-01 RX ADMIN — Medication 10 ML: at 08:04

## 2017-01-01 RX ADMIN — METOPROLOL TARTRATE 2.5 MG: 5 INJECTION INTRAVENOUS at 14:36

## 2017-01-01 RX ADMIN — ACETYLCYSTEINE 2 ML: 200 SOLUTION ORAL; RESPIRATORY (INHALATION) at 19:38

## 2017-01-01 RX ADMIN — FENTANYL CITRATE 25 MCG: 50 INJECTION INTRAMUSCULAR; INTRAVENOUS at 03:39

## 2017-01-01 RX ADMIN — METOPROLOL TARTRATE 2.5 MG: 5 INJECTION INTRAVENOUS at 15:22

## 2017-01-01 RX ADMIN — FENTANYL CITRATE 50 MCG: 50 INJECTION INTRAMUSCULAR; INTRAVENOUS at 06:44

## 2017-01-01 RX ADMIN — IPRATROPIUM BROMIDE AND ALBUTEROL SULFATE 3 ML: .5; 3 SOLUTION RESPIRATORY (INHALATION) at 19:45

## 2017-01-01 RX ADMIN — FINASTERIDE 5 MG: 5 TABLET, FILM COATED ORAL at 08:27

## 2017-01-01 RX ADMIN — CLOPIDOGREL 75 MG: 75 TABLET, FILM COATED ORAL at 09:56

## 2017-01-01 RX ADMIN — METOPROLOL TARTRATE 5 MG: 5 INJECTION INTRAVENOUS at 05:35

## 2017-01-01 RX ADMIN — MORPHINE SULFATE 4 MG: 4 INJECTION, SOLUTION INTRAMUSCULAR; INTRAVENOUS at 01:39

## 2017-01-01 RX ADMIN — SULFASALAZINE 1000 MG: 500 TABLET ORAL at 20:03

## 2017-01-01 RX ADMIN — ALBUTEROL SULFATE 6 PUFF: 90 AEROSOL, METERED RESPIRATORY (INHALATION) at 16:00

## 2017-01-01 RX ADMIN — FUROSEMIDE 40 MG: 10 INJECTION, SOLUTION INTRAMUSCULAR; INTRAVENOUS at 10:03

## 2017-01-01 RX ADMIN — IPRATROPIUM BROMIDE AND ALBUTEROL SULFATE 3 ML: .5; 3 SOLUTION RESPIRATORY (INHALATION) at 23:32

## 2017-01-01 RX ADMIN — FENTANYL CITRATE 50 MCG: 50 INJECTION INTRAMUSCULAR; INTRAVENOUS at 11:58

## 2017-01-01 RX ADMIN — DEXMEDETOMIDINE HYDROCHLORIDE 1.3 MCG/KG/HR: 4 INJECTION, SOLUTION INTRAVENOUS at 00:18

## 2017-01-01 RX ADMIN — DEXMEDETOMIDINE HYDROCHLORIDE 0.8 MCG/KG/HR: 4 INJECTION, SOLUTION INTRAVENOUS at 08:20

## 2017-01-01 RX ADMIN — FENTANYL CITRATE 25 MCG: 50 INJECTION INTRAMUSCULAR; INTRAVENOUS at 05:16

## 2017-01-01 RX ADMIN — OXYCODONE HYDROCHLORIDE AND ACETAMINOPHEN 1 TABLET: 5; 325 TABLET ORAL at 09:41

## 2017-01-01 RX ADMIN — Medication 10 ML: at 13:51

## 2017-01-01 RX ADMIN — OXYCODONE HYDROCHLORIDE AND ACETAMINOPHEN 1 TABLET: 7.5; 325 TABLET ORAL at 03:06

## 2017-01-01 RX ADMIN — FENTANYL CITRATE 50 MCG: 50 INJECTION INTRAMUSCULAR; INTRAVENOUS at 17:21

## 2017-01-01 RX ADMIN — PANTOPRAZOLE SODIUM 40 MG: 40 INJECTION, POWDER, FOR SOLUTION INTRAVENOUS at 06:10

## 2017-01-01 RX ADMIN — DEXMEDETOMIDINE HYDROCHLORIDE 1.2 MCG/KG/HR: 4 INJECTION, SOLUTION INTRAVENOUS at 09:09

## 2017-01-01 RX ADMIN — VANCOMYCIN HYDROCHLORIDE 1750 MG: 1 INJECTION, POWDER, LYOPHILIZED, FOR SOLUTION INTRAVENOUS at 23:36

## 2017-01-01 RX ADMIN — LORAZEPAM 2 MG: 2 INJECTION INTRAMUSCULAR; INTRAVENOUS at 23:35

## 2017-01-01 RX ADMIN — OXYCODONE HYDROCHLORIDE AND ACETAMINOPHEN 1 TABLET: 7.5; 325 TABLET ORAL at 02:15

## 2017-01-01 RX ADMIN — OXYCODONE HYDROCHLORIDE AND ACETAMINOPHEN 1 TABLET: 5; 325 TABLET ORAL at 03:41

## 2017-01-01 RX ADMIN — TAZOBACTAM SODIUM AND PIPERACILLIN SODIUM 3.38 G: 375; 3 INJECTION, SOLUTION INTRAVENOUS at 11:56

## 2017-01-01 RX ADMIN — HYDROMORPHONE HYDROCHLORIDE 0.5 MG: 1 INJECTION, SOLUTION INTRAMUSCULAR; INTRAVENOUS; SUBCUTANEOUS at 14:11

## 2017-01-01 RX ADMIN — FENTANYL CITRATE 50 MCG: 50 INJECTION INTRAMUSCULAR; INTRAVENOUS at 13:51

## 2017-01-01 RX ADMIN — DEXMEDETOMIDINE HYDROCHLORIDE 1.5 MCG/KG/HR: 4 INJECTION, SOLUTION INTRAVENOUS at 09:52

## 2017-01-01 RX ADMIN — GABAPENTIN 300 MG: 300 CAPSULE ORAL at 14:05

## 2017-01-01 RX ADMIN — TAZOBACTAM SODIUM AND PIPERACILLIN SODIUM 4.5 G: 500; 4 INJECTION, SOLUTION INTRAVENOUS at 22:39

## 2017-01-01 RX ADMIN — SODIUM CHLORIDE 9 ML/HR: 9 INJECTION, SOLUTION INTRAVENOUS at 05:09

## 2017-01-01 RX ADMIN — LORAZEPAM 2 MG: 2 INJECTION INTRAMUSCULAR; INTRAVENOUS at 00:50

## 2017-01-01 RX ADMIN — ALBUTEROL SULFATE 6 PUFF: 90 AEROSOL, METERED RESPIRATORY (INHALATION) at 15:01

## 2017-01-01 RX ADMIN — ATORVASTATIN CALCIUM 10 MG: 10 TABLET, FILM COATED ORAL at 20:14

## 2017-01-01 RX ADMIN — ENOXAPARIN SODIUM 40 MG: 40 INJECTION SUBCUTANEOUS at 08:27

## 2017-01-01 RX ADMIN — METOPROLOL TARTRATE 25 MG: 25 TABLET ORAL at 17:54

## 2017-01-01 RX ADMIN — METOPROLOL TARTRATE 2.5 MG: 5 INJECTION INTRAVENOUS at 03:39

## 2017-01-01 RX ADMIN — FENTANYL CITRATE 50 MCG: 50 INJECTION INTRAMUSCULAR; INTRAVENOUS at 07:42

## 2017-01-01 RX ADMIN — CLONAZEPAM 0.5 MG: 0.5 TABLET ORAL at 23:05

## 2017-01-01 RX ADMIN — DEXMEDETOMIDINE HYDROCHLORIDE 1.5 MCG/KG/HR: 4 INJECTION, SOLUTION INTRAVENOUS at 15:33

## 2017-01-01 RX ADMIN — SERTRALINE 50 MG: 50 TABLET, FILM COATED ORAL at 08:24

## 2017-01-01 RX ADMIN — FENTANYL CITRATE 50 MCG: 50 INJECTION INTRAMUSCULAR; INTRAVENOUS at 16:19

## 2017-01-01 RX ADMIN — OXYCODONE HYDROCHLORIDE AND ACETAMINOPHEN 1 TABLET: 5; 325 TABLET ORAL at 06:11

## 2017-01-01 RX ADMIN — DEXMEDETOMIDINE HYDROCHLORIDE 1.2 MCG/KG/HR: 4 INJECTION, SOLUTION INTRAVENOUS at 22:25

## 2017-01-01 RX ADMIN — MORPHINE SULFATE 6 MG: 2 INJECTION, SOLUTION INTRAMUSCULAR; INTRAVENOUS at 22:59

## 2017-01-01 RX ADMIN — GABAPENTIN 300 MG: 300 CAPSULE ORAL at 05:40

## 2017-01-01 RX ADMIN — Medication 10 ML: at 08:58

## 2017-01-01 RX ADMIN — ALBUTEROL SULFATE 6 PUFF: 90 AEROSOL, METERED RESPIRATORY (INHALATION) at 15:06

## 2017-01-01 RX ADMIN — DEXMEDETOMIDINE HYDROCHLORIDE 1.2 MCG/KG/HR: 4 INJECTION, SOLUTION INTRAVENOUS at 17:30

## 2017-01-01 RX ADMIN — DEXMEDETOMIDINE HYDROCHLORIDE 1.2 MCG/KG/HR: 4 INJECTION, SOLUTION INTRAVENOUS at 00:38

## 2017-01-01 RX ADMIN — VANCOMYCIN HYDROCHLORIDE 1000 MG: 1 INJECTION, SOLUTION INTRAVENOUS at 10:45

## 2017-01-01 RX ADMIN — TAZOBACTAM SODIUM AND PIPERACILLIN SODIUM 3.38 G: 375; 3 INJECTION, SOLUTION INTRAVENOUS at 18:13

## 2017-01-01 RX ADMIN — METOPROLOL TARTRATE 25 MG: 25 TABLET ORAL at 02:11

## 2017-01-13 PROBLEM — S12.110A ODONTOID FRACTURE (HCC): Status: ACTIVE | Noted: 2017-01-01

## 2017-01-13 NOTE — ED NOTES
Pt given dilaudid pain now at a 2, MRI will pick patient up and then transport to [facility]. Pt is resting with Cervical Collar in place at this time.      Alivia Velázquez RN  01/13/17 9587

## 2017-01-13 NOTE — PLAN OF CARE
Problem: Orthopaedic Fracture (Adult)  Goal: Signs and Symptoms of Listed Potential Problems Will be Absent or Manageable (Orthopaedic Fracture)  Outcome: Ongoing (interventions implemented as appropriate)    Problem: Patient Care Overview (Adult)  Goal: Plan of Care Review  Outcome: Ongoing (interventions implemented as appropriate)    01/13/17 1649   Coping/Psychosocial Response Interventions   Plan Of Care Reviewed With patient   Patient Care Overview   Progress no change   Outcome Evaluation   Outcome Summary/Follow up Plan new admission        Goal: Adult Individualization and Mutuality  Outcome: Ongoing (interventions implemented as appropriate)  Goal: Discharge Needs Assessment  Outcome: Ongoing (interventions implemented as appropriate)    Problem: Fall Risk (Adult)  Goal: Identify Related Risk Factors and Signs and Symptoms  Outcome: Ongoing (interventions implemented as appropriate)  Goal: Absence of Falls  Outcome: Ongoing (interventions implemented as appropriate)

## 2017-01-13 NOTE — ED PROVIDER NOTES
EMERGENCY DEPARTMENT ENCOUNTER    CHIEF COMPLAINT  Chief Complaint: fall  History given by: pt  History limited by: nothing  Room Number: 18/18  PMD: Flash Jim MD      HPI:  Pt is a 81 y.o. male who presents to the ED after a fall in the parking lot of [facility] just PTA. Pt states he lost his balance & fell face first, but denies LOC. Per EMS pt was found on the ground of the [facility] parking lot & was found by a bystander who saw his feet sticking out b/t two parked cars. EMS placed pt in a c-collar. Pt reports R knee pain & abrasions to his face & R knee. Pt denies hip pain, abd pain, chest pain, & SOA. Pt had been off of it for 9 days due to a scheduled cataract surgery & oral surgery, but restarted it this morning. Pt thinks he has had a tetanus shot w/in the last 5 years.    Duration: just PTA  Onset: sudden  Intensity/Severity: moderate  Associated Symptoms: R knee pain, bleeding from L nare, & abrasions to his face & R knee  Aggravating Factors: none reported  Alleviating Factors: none reported  Previous Episodes: none reported  Treatment before arrival: EMS placed pt in a c-collar.    PAST MEDICAL HISTORY  Active Ambulatory Problems     Diagnosis Date Noted   • Chronic coronary artery disease 04/02/2016   • Anemia 04/02/2016   • Hyperlipidemia 04/02/2016   • HTN (hypertension) 04/02/2016   • Trigeminal neuralgia 04/02/2016   • Benign prostatic hyperplasia 04/02/2016   • Ulcerative colitis without complications 04/02/2016   • Health care maintenance 04/02/2016   • Hypertension 08/29/2016   • Coronary artery disease 08/29/2016   • ANDERS (acute kidney injury) 08/29/2016   • Muscle weakness-general 08/29/2016   • Pulmonary nodule, right 08/30/2016   • Pulmonary fibrosis 08/30/2016   • Weakness 09/16/2016   • Asterixis 09/19/2016   • CVA (cerebral infarction) 09/19/2016   • Irritable bowel syndrome with both constipation and diarrhea 11/02/2016     Resolved Ambulatory Problems     Diagnosis Date Noted   •  Near syncope 08/28/2016   • Bradycardia 08/29/2016     Past Medical History   Diagnosis Date   • Anxiety disorder    • Arthritis    • Depression    • Hard of hearing    • Pneumonia    • Pulmonary nodule    • S/P tooth extraction 01/09/2017   • Ulcerative colitis    • Use of cane as ambulatory aid        PAST SURGICAL HISTORY  Past Surgical History   Procedure Laterality Date   • Coronary artery bypass graft     • Carpal tunnel release     • Rotator cuff repair     • Cardiac surgery     • Cataract extraction     • Cataract extraction w/  intraocular lens implant Bilateral      L eye in November 2016, R eye in Jan 2017       FAMILY HISTORY  Family History   Problem Relation Age of Onset   • Heart disease Mother    • Heart disease Father    • Stroke Father    • Heart disease Sister    • Cancer Brother      malignant neoplasm       SOCIAL HISTORY  Social History     Social History   • Marital status:      Spouse name: N/A   • Number of children: N/A   • Years of education: N/A     Occupational History   • Not on file.     Social History Main Topics   • Smoking status: Former Smoker     Packs/day: 3.00     Years: 20.00   • Smokeless tobacco: Not on file      Comment: quit 30 40 years ago   • Alcohol use No   • Drug use: No   • Sexual activity: Defer     Other Topics Concern   • Not on file     Social History Narrative   • No narrative on file       ALLERGIES  Carbamazepine and Lactose intolerance (gi)    REVIEW OF SYSTEMS  Review of Systems   Constitutional: Negative for activity change, appetite change and fever.   HENT: Negative for congestion and sore throat.    Eyes: Negative.    Respiratory: Negative for cough and shortness of breath.    Cardiovascular: Negative for chest pain and leg swelling.   Gastrointestinal: Negative for abdominal pain, diarrhea and vomiting.   Endocrine: Negative.    Genitourinary: Negative for decreased urine volume and dysuria.   Musculoskeletal: Negative for neck pain.        R knee  pain   Skin: Negative for rash and wound.        Abrasions to face & R knee   Allergic/Immunologic: Negative.    Neurological: Negative for weakness, numbness and headaches.   Hematological: Negative.    Psychiatric/Behavioral: Negative.    All other systems reviewed and are negative.      PHYSICAL EXAM  ED Triage Vitals   Temp Heart Rate Resp BP SpO2   -- 01/13/17 1235 01/13/17 1235 01/13/17 1235 01/13/17 1235    94 14 193/79 97 %      Temp src Heart Rate Source Patient Position BP Location FiO2 (%)   01/13/17 1235 01/13/17 1235 01/13/17 1235 -- --   Tympanic Monitor Sitting         Physical Exam   Constitutional: He is oriented to person, place, and time and well-developed, well-nourished, and in no distress.   HENT:   Head: Normocephalic and atraumatic.   Eyes: EOM are normal. Pupils are equal, round, and reactive to light.   Neck: Normal range of motion. Neck supple.   C-collar on.   Cardiovascular: Normal rate, regular rhythm and normal heart sounds.    Pulmonary/Chest: Effort normal and breath sounds normal. No respiratory distress.   Abdominal: Soft. There is no tenderness. There is no rebound and no guarding.   Musculoskeletal: Normal range of motion. He exhibits no edema.        Right knee: Tenderness found.   Neurological: He is alert and oriented to person, place, and time. He has normal sensation and normal strength.   Skin: Skin is warm and dry. Abrasion (R knee, forehead, nose, & chin) noted.   Psychiatric: Mood and affect normal.   Nursing note and vitals reviewed.      LAB RESULTS  Lab Results (last 24 hours)     ** No results found for the last 24 hours. **          I ordered the above labs and reviewed the results    RADIOLOGY  XR Knee 1 or 2 View Right   Final Result   1. No acute process except for possible minimal suprapatellar effusion.       This report was finalized on 1/13/2017 1:17 PM by Dr. Andres Grimes MD.          CT Head Without Contrast    (Results Pending)   CT Facial Bones  Without Contrast    (Results Pending)   CT Cervical Spine Without Contrast    (Results Pending)   MRI Cervical Spine Without Contrast    (Results Pending)      Reviewed CT head, which was negative. Independently viewed by me. Interpreted by radiologist. Discussed with Dr. Cruz.    Reviewed CT c-spine, which showed a type II odontoid fracture. Independently viewed by me. Interpreted by radiologist. Discussed with Dr. Cruz.      Reviewed CT facial bones, which showed multiple facial fractures including the L frontal sinus, nasal bone, floor of the L orbit, R orbital wall, bilateral maxillary sinus, & fracture of the pterygoid plates bilaterally, but no displacement of the fractures.    Independently viewed by me. Interpreted by radiologist. Discussed with Dr. Cruz.   I ordered the above noted radiological studies. Interpreted by radiologist. Discussed with radiologist (Dr. Cruz). Reviewed by me in PACS.       PROCEDURES  Procedures      PROGRESS AND CONSULTS  ED Course     1247 Ordered CT facial bones, CT head, CT C-spine, & XR R knee for further evaluation.    1510 Ordered Zofran for nausea & morphine for pain.    1525 Pt rechecked & is resting w/ wife at bedside. Informed pt imaging results show an odontoid fracture & multiple facial fractures.    1532 Discussed pt's case w/ Dr. Myah Najera for Dr. Castanon (neuro) who agrees to see pt in the ED.    1543 Ordered Tdap booster.    1544 Discussed pt's case w/ Dr. Triana (oculoplastics) who agrees to consult pt.     1611 Discussed pt's case w/ Dr. Bolanos (family medicine) who agrees to admit pt to a monitored bed.    MEDICAL DECISION MAKING  Results were reviewed/discussed with the patient and they were also made aware of online access. Pt also made aware that some labs, such as cultures, will not be resulted during ER visit and follow up with PMD is necessary.     MDM  Number of Diagnoses or Management Options  Facial abrasion, initial encounter:   Facial fracture  due to fall, closed, initial encounter:   Odontoid fracture, closed, initial encounter:   Diagnosis management comments: Ace and felt a parking lot earlier today.  He landed on his face.  He denied loss of consciousness.  CT scan of his head was negative.  CT the cervical spine showed a type II odontoid fracture.  Patient arrived in a collar and was left in the collar while in the ER.  CT of the facial bones showed multiple facial fractures, none of which were displaced.  Case was discussed with Myah Walls from neurosurgery.  Case was also discussed with Dr. Triana of oculoplastics.  Case was discussed with  and he agreed to admit the patient.  Patient was treated with IV morphine, IV Zofran and IV Dilaudid.  He was hypertensive while in the ER.  X-ray of his right knee was negative for fracture.       Amount and/or Complexity of Data Reviewed  Clinical lab tests: ordered and reviewed  Tests in the radiology section of CPT®: ordered and reviewed (CT facial bones, CT head, CT C-spine, & XR R knee)  Discuss the patient with other providers: yes (Dr. Myah Najera for Dr. Castanon (neuro), Dr. Triana (oculoplastics), Dr. Bolanos (family medicine))  Independent visualization of images, tracings, or specimens: yes           DIAGNOSIS  Final diagnoses:   Odontoid fracture, closed, initial encounter   Facial fracture (multiple fractures) due to fall, closed, initial encounter   Facial abrasion, initial encounter       DISPOSITION  ADMISSION    Discussed treatment plan and reason for admission with pt/family and admitting physician.  Pt/family voiced understanding of the plan for admission for further testing/treatment as needed.       Latest Documented Vital Signs:  As of 4:49 PM  BP- (!) 190/78 HR- 57 Temp- 97.7 °F (36.5 °C) (Tympanic) O2 sat- 95%    --  Documentation assistance provided by laure Gaviria for Dr. Reaves.  Information recorded by the laure was done at my direction and has been verified  and validated by me.     Alessandra Gaviria  01/13/17 1252       Alessandra Gaviria  01/13/17 1428       Alessandra Gaviria  01/13/17 1616       Red Reaves MD  01/13/17 4495

## 2017-01-13 NOTE — CONSULTS
OPHTHALMOLOGY CONSULT NOTE    Patient Identification:  Name: Noah Isaac  Age: 81 y.o.  Sex: male  :  1935  MRN: [id number]                                               Requesting Physician: per order  Reason for consult: Multiple facial fractures s/p fall    History of Present Illness:  81 y.o. male who was reportedly found down in a parking lot. Patient's wife is providing the majority of the history as the patient is somnolent since receiving pain medication. She reports that he was found down after losing his balance and was brought to the [facility] ER where he was found to have multiple facial abrasions, fractures, a C-spine fracture, and knee injury. Our service was consulted to evaluate the multiple facial fractures. Patient reports some diffuse facial pain. Denies diplopia. Denies change in vision. Reports he had recent bilateral cataract surgery, the left eye as recently as Tuesday. Denies trismus or malocclusion. Recently had a tooth removed by his Dentist.     Problem List:  Active Problems:    Odontoid fracture      Past Medical History:  Past Medical History   Diagnosis Date   • Anemia    • Anxiety disorder    • Arthritis    • Asterixis    • Coronary artery disease    • Coronary artery disease    • Depression    • Hard of hearing    • Hyperlipidemia    • Hypertension    • Pneumonia    • Pulmonary nodule    • S/P tooth extraction 2017   • Ulcerative colitis    • Use of cane as ambulatory aid        Past Surgical History:  Past Surgical History   Procedure Laterality Date   • Coronary artery bypass graft     • Carpal tunnel release     • Rotator cuff repair     • Cardiac surgery     • Cataract extraction     • Cataract extraction w/  intraocular lens implant Bilateral      L eye in 2016, R eye in 2017        ROS:  Pertinent items are noted in HPI  In addition, has knee pain. Neck is in C-collar for Odontoid fracture    Home Meds:    (Not in a hospital admission)    Current  Meds:     Current Facility-Administered Medications:   •  lidocaine-EPINEPHrine (XYLOCAINE W/EPI) 1 %-1:884961 injection 10 mL, 10 mL, Injection, Once, Rosendo Milton MD  •  sodium chloride 0.9 % flush 10 mL, 10 mL, Intravenous, PRN, Red Reaves MD    Current Outpatient Prescriptions:   •  amLODIPine (NORVASC) 5 MG tablet, TAKE ONE TABLET BY MOUTH DAILY, Disp: 90 tablet, Rfl: 2  •  Calcium Carb-Cholecalciferol 600-800 MG-UNIT tablet, Take 1 tablet by mouth daily., Disp: , Rfl:   •  clonazePAM (KlonoPIN) 0.5 MG tablet, Take 1 tablet by mouth At Night As Needed for anxiety. (Patient taking differently: Take 0.5 mg by mouth 2 (Two) Times a Day As Needed for anxiety.), Disp: 90 tablet, Rfl: 2  •  clopidogrel (PLAVIX) 75 MG tablet, Take 1 tablet by mouth daily., Disp: 30 tablet, Rfl: 3  •  dicyclomine (BENTYL) 10 MG capsule, TAKE ONE CAPSULE BY MOUTH FOUR TIMES DAILY AS NEEDED, Disp: 60 capsule, Rfl: 2  •  finasteride (PROSCAR) 5 MG tablet, Take 1 tablet by mouth daily., Disp: 90 tablet, Rfl: 1  •  folic acid (FOLVITE) 1 MG tablet, Take 1 tablet by mouth  daily, Disp: 90 tablet, Rfl: 1  •  furosemide (LASIX) 20 MG tablet, Take 20 mg by mouth every morning., Disp: , Rfl:   •  gabapentin (NEURONTIN) 300 MG capsule, Take 1 capsule by mouth every 8 (eight) hours., Disp: 90 capsule, Rfl: 10  •  hydrOXYzine (ATARAX) 25 MG tablet, TAKE 1 TABLET BY MOUTH TWO TIMES DAILY, Disp: 180 tablet, Rfl: 1  •  ILEVRO 0.3 % suspension, , Disp: , Rfl:   •  isosorbide mononitrate (IMDUR) 30 MG 24 hr tablet, Take 1 tablet by mouth  daily, Disp: 90 tablet, Rfl: 1  •  losartan (COZAAR) 100 MG tablet, Take 1 tablet by mouth  daily, Disp: 90 tablet, Rfl: 1  •  metoprolol tartrate (LOPRESSOR) 25 MG tablet, Take 0.5 tablets by mouth 2 (two) times a day., Disp: 15 tablet, Rfl: 10  •  montelukast (SINGULAIR) 10 MG tablet, Take 1 tablet by mouth every night. (Patient taking differently: Take 10 mg by mouth daily as needed (seasonal  allergies).), Disp: 90 tablet, Rfl: 1  •  nitroglycerin (NITROSTAT) 0.4 MG SL tablet, Place 0.4 mg under the tongue. Dissolve one tablet under tongue as needed for chest pain, Disp: , Rfl:   •  ondansetron (ZOFRAN) 4 MG tablet, TAKE ONE TABLET BY MOUTH EVERY EIGHT HOURS AS NEEDED, Disp: 30 tablet, Rfl: 0  •  potassium chloride (K-DUR) 10 MEQ CR tablet, Take 1 tablet by mouth two  times daily, Disp: 180 tablet, Rfl: 1  •  prednisoLONE acetate (PRED FORTE) 1 % ophthalmic suspension, INSTILL 1 DROP INTO LEFT EYE 3 TIMES A DAY FOR 3 WK,TWICE A DAY X1 WK,AND ONCE DAILY X1WK, Disp: , Rfl: 6  •  sertraline (ZOLOFT) 50 MG tablet, TAKE 1 TABLET BY MOUTH DAILY., Disp: 90 tablet, Rfl: 1  •  simvastatin (ZOCOR) 20 MG tablet, Take 1 tablet by mouth  daily, Disp: 90 tablet, Rfl: 1  •  sodium chloride (OCEAN) 0.65 % nasal spray, 2 sprays into each nostril every 2 (two) hours as needed for congestion., Disp: 1 each, Rfl: 12  •  sulfaSALAzine (AZULFIDINE) 500 MG tablet, Take 2 tablets by mouth 3  times a day, Disp: 540 tablet, Rfl: 1  •  traMADol (ULTRAM) 50 MG tablet, 50 mg every 8 (eight) hours as needed., Disp: , Rfl:   •  VIGAMOX 0.5 % ophthalmic solution, USE 1 DROP INTO LEFT EYE 3 TIMES A DAY 3 DAYS PRIOR TO SURGERY AND FOR 3 DAYS AFTER SURGERY, Disp: , Rfl: 6  •  betamethasone dipropionate (DIPROLENE) 0.05 % cream, , Disp: , Rfl:   •  fluocinolone (SYNALAR) 0.01 % external solution, APPLY TO THE SCALP SPARINGLY ONCE A DAY, Disp: , Rfl: 2  •  FLUZONE HIGH-DOSE 0.5 ML suspension prefilled syringe injection, TO BE ADMINISTERED BY PHARMACIST FOR IMMUNIZATION, Disp: , Rfl: 0  •  hydrocortisone-pramoxine (ANALPRAM-HC) 2.5-1 % rectal cream, APPLY PER RECTUM THREE TIMES DAILY AS NEEDED, Disp: 28.4 g, Rfl: 2  •  triamcinolone (KENALOG) 0.1 % cream, , Disp: , Rfl:     Allergies:  Allergies   Allergen Reactions   • Carbamazepine Other (See Comments)     Extreme nervousness  Hyponatremia    • Lactose Intolerance (Gi)        Social  History:   Social History   Substance Use Topics   • Smoking status: Former Smoker     Packs/day: 3.00     Years: 20.00   • Smokeless tobacco: Not on file      Comment: quit 30 40 years ago   • Alcohol use No        Family History:  NC    Objective:  General Appearance: Somnolent after receiving pain medication. In NAD. C-collar, sleeping in bed.      Exam:    Patient visual acuity in tact to count fingers bilaterally. Too drowsy to cooperate with snellen. Denies vision changes  EOM full OU without pain nor diplopia  IOP symmetric to palpation OU  Pupils: R 2.5-2, L 3.5-3. L pupil mildly vertically elongated, looks post-surgical and reacts normally. Same in light and dark  Moderate bilateral periorbital ecchymosis and edema. No eyelid lacerations. No palpable orbital rim step-off  Brow abrasion, Nasal dorsum abrasion and small 5mm laceration of superficial tissue, Lip abrasion  No evidence of septal hematoma, good air passage through bilateral nares. No palpable step-off of nasal bones  No trismus  Absence of molars, patient denies malocclusion of anterior dentition and believes bite is normal, Broken right inferior premolar  No mobility to maxilla  Conj: W/Q OU  Cornea Clear OU  AC deep and symmetric OU without hyphema  Iris wnl, OS pupil slightly post-surgical in appearance as above  Lens:  PCIOL OU       Imaging:    CT max/face reviewed and agree with the read. Multiple facial fractures with minimal displacement. Notably, small fractures of pterygoid plates, Small left medial wall fracture, Right orbital roof fracture into frontal sinus, Left anterior orbital roof fracture into frontal sinus. Nasal bone fractures of minimal displacement. Multiple left maxillary sinus fractures          Assessment/Recommendations:  Noah Isaac is a 81 y.o. with multiple facial fractures, facial abrasions and a nasal dorsum laceration following an unwitnessed fall.     Facial fractures are non-operative. No  malocclusion/trismus or maxilla mobility. No diplopia/EOM restriction. Discussed findings with patient and his family and they understand that we will observe the fractures. There is not evidence of injury to the globes following recent cataract surgery. Would recommend complete eye exam upon discharge, or sooner if he develops any subjective vision changes. I recommended to his family, that they encourage him to check his vision under monocular conditions over the next week and ensure that he doesn't note any changes. Otherwise, will f/u with us as an outpatient in 1 week to perform follow-up of fractures and complete eye exam if not already done by his primary ophthalmologist.    Nasal dorsum laceration repaired in the ED. The patient and his wife elected to proceed with closure of the laceration. The area was cleaned with betadine. 1cc of 1%lidocaine with epi was injected into the nasal dorsum. 3, 5-0 vicryl sutures were used to close the laceration without complication. The patient tolerated the procedure well.     I recommend the following:  -Bacitracin ointment to all facial abrasions TID for 1 week  -Soft mechanical diet  -Avoid nose blowing  -As above, if visual changes, consult ophthalmology for more urgent eye exam, otherwise f/u 1 week     Patient needs to follow up as outpatient in 1 week, call for appointment [phone] for appointment with Oculoplastics (Annie Jones/Manan/Alexsander/Karine)    Thank you for the consult. Please call with any questions. [phone] (Cell)    Rosendo Milton MD  1/13/2017 6:12 PM

## 2017-01-13 NOTE — IP AVS SNAPSHOT
AFTER VISIT SUMMARY             Noah Isaac           About your hospitalization     You were admitted on:  January 13, 2017 You last received care in the:  [address]       Procedures & Surgeries         Medications    If you or your caregiver advised us that you are currently taking a medication and that medication is marked below as “Resume”, this simply indicates that we have reviewed those medications to make sure our new therapy recommendations do not interfere.  If you have concerns about medications other than those new ones which we are prescribing today, please consult the physician who prescribed them (or your primary physician).  Our review of your home medications is not meant to indicate that we are directing their use.             Your Medications      START taking these medications     acetaminophen 325 MG tablet   Take 2 tablets by mouth Every 4 (Four) Hours As Needed for mild pain (1-3).   Last time this was given:  1/19/2017  8:24 AM   Commonly known as:  TYLENOL           fentaNYL 12 MCG/HR   Place 1 patch on the skin Every 72 (Seventy-Two) Hours for 10 days.   Last time this was given:  1/17/2017 10:26 AM   Commonly known as:  DURAGESIC   Next Dose Due:  Change on 1/20/2017   Notes to Patient:  Patch on left deltoid            ondansetron ODT 4 MG disintegrating tablet   Take 1 tablet by mouth Daily With Breakfast.   Last time this was given:  1/19/2017  9:34 AM   Commonly known as:  ZOFRAN-ODT   Next Dose Due:  1/20/17           oxyCODONE-acetaminophen 5-325 MG per tablet   Take 1 tablet by mouth Every 6 (Six) Hours As Needed for moderate pain (4-6) or severe pain (7-10) for up to 8 days.   Commonly known as:  PERCOCET           pantoprazole 40 MG EC tablet   Take 1 tablet by mouth 2 (Two) Times a Day.   Last time this was given:  1/19/2017  3:17 AM   Commonly known as:  PROTONIX   Next Dose Due:  01/19/2017 1800             CHANGE how you take these  medications     clonazePAM 0.5 MG tablet   Take 1 tablet by mouth At Night As Needed for anxiety.   According to our records, you may have been taking this medication differently.   Commonly known as:  KlonoPIN   What changed:  when to take this           metoprolol tartrate 25 MG tablet   Take 1 tablet by mouth 2 (Two) Times a Day.   Last time this was given:  1/19/2017  8:24 AM   Commonly known as:  LOPRESSOR   What changed:  how much to take   Next Dose Due:  01/16/2017 1800           montelukast 10 MG tablet   Take 1 tablet by mouth every night.   Last time this was given:  1/15/2017  8:13 PM   According to our records, you may have been taking this medication differently.   Commonly known as:  JESIKA   What changed:    - when to take this  - reasons to take this   Next Dose Due:  01/19/2017               CONTINUE taking these medications     amLODIPine 5 MG tablet   TAKE ONE TABLET BY MOUTH DAILY   Last time this was given:  1/19/2017  8:25 AM   Commonly known as:  NORVASC   Next Dose Due:  1/20/17           Calcium Carb-Cholecalciferol 600-800 MG-UNIT tablet   Take 1 tablet by mouth daily.   Next Dose Due:  1/20/17           clopidogrel 75 MG tablet   Take 1 tablet by mouth daily.   Last time this was given:  1/19/2017  8:24 AM   Commonly known as:  PLAVIX   Next Dose Due:  1/20/17           finasteride 5 MG tablet   Take 1 tablet by mouth daily.   Last time this was given:  1/19/2017  8:25 AM   Commonly known as:  PROSCAR   Next Dose Due:  1/20/17           fluocinolone 0.01 % external solution   APPLY TO THE SCALP SPARINGLY ONCE A DAY   Commonly known as:  SYNALAR           folic acid 1 MG tablet   Take 1 tablet by mouth  daily   Commonly known as:  FOLVITE   Next Dose Due:  1/20/17           gabapentin 300 MG capsule   Take 1 capsule by mouth every 8 (eight) hours.   Last time this was given:  1/19/2017  6:50 AM   Commonly known as:  NEURONTIN   Next Dose Due:  Every 8 hours, last dose taken at 0700  01/19/2017           isosorbide mononitrate 30 MG 24 hr tablet   Take 1 tablet by mouth  daily   Last time this was given:  1/19/2017  8:24 AM   Commonly known as:  IMDUR   Next Dose Due:  1/20/17           losartan 100 MG tablet   Take 1 tablet by mouth  daily   Last time this was given:  1/19/2017  8:24 AM   Commonly known as:  COZAAR   Next Dose Due:  1/20/17           nitroglycerin 0.4 MG SL tablet   Place 0.4 mg under the tongue. Dissolve one tablet under tongue as needed for chest pain   Commonly known as:  NITROSTAT           potassium chloride 10 MEQ CR tablet   Take 1 tablet by mouth two  times daily   Commonly known as:  K-DUR   Next Dose Due:  01/20/2017             sertraline 50 MG tablet   TAKE 1 TABLET BY MOUTH DAILY.   Last time this was given:  1/19/2017  8:24 AM   Commonly known as:  ZOLOFT   Next Dose Due:  1/20/17           simvastatin 20 MG tablet   Take 1 tablet by mouth  daily   Commonly known as:  ZOCOR   Next Dose Due:  1/20/17           sodium chloride 0.65 % nasal spray   2 sprays into each nostril every 2 (two) hours as needed for congestion.   Commonly known as:  OCEAN           sulfaSALAzine 500 MG tablet   Take 2 tablets by mouth 3  times a day   Last time this was given:  1/19/2017  6:50 AM   Commonly known as:  AZULFIDINE   Next Dose Due:  01/19/2017 1400               STOP taking these medications     betamethasone dipropionate 0.05 % cream   Commonly known as:  DIPROLENE           dicyclomine 10 MG capsule   Commonly known as:  BENTYL           FLUZONE HIGH-DOSE 0.5 ML suspension prefilled syringe injection   Generic drug:  influenza vac split high-dose           furosemide 20 MG tablet   Commonly known as:  LASIX           hydrocortisone-pramoxine 2.5-1 % rectal cream   Commonly known as:  ANALPRAM-HC           hydrOXYzine 25 MG tablet   Commonly known as:  ATARAX           ILEVRO 0.3 % suspension   Generic drug:  Nepafenac           ondansetron 4 MG tablet   Commonly known as:   ZOFRAN           prednisoLONE acetate 1 % ophthalmic suspension   Commonly known as:  PRED FORTE           traMADol 50 MG tablet   Commonly known as:  ULTRAM           triamcinolone 0.1 % cream   Commonly known as:  KENALOG           VIGAMOX 0.5 % ophthalmic solution   Generic drug:  moxifloxacin                Where to Get Your Medications      These medications were sent to [facility] [id number] [facility] - [place], [place] - [address] [facility] - [phone]  - [phone]   [address] [facility] [address]     Phone:  [phone]     acetaminophen 325 MG tablet    metoprolol tartrate 25 MG tablet    ondansetron ODT 4 MG disintegrating tablet    pantoprazole 40 MG EC tablet         You can get these medications from any pharmacy     Bring a paper prescription for each of these medications     clonazePAM 0.5 MG tablet    fentaNYL 12 MCG/HR    oxyCODONE-acetaminophen 5-325 MG per tablet                  Your Medications      Your Medication List           Morning Noon Evening Bedtime As Needed    acetaminophen 325 MG tablet   Take 2 tablets by mouth Every 4 (Four) Hours As Needed for mild pain (1-3).   Commonly known as:  TYLENOL                                   amLODIPine 5 MG tablet   TAKE ONE TABLET BY MOUTH DAILY   Commonly known as:  NORVASC                                   Calcium Carb-Cholecalciferol 600-800 MG-UNIT tablet   Take 1 tablet by mouth daily.                                   clonazePAM 0.5 MG tablet   Take 1 tablet by mouth At Night As Needed for anxiety.   Commonly known as:  KlonoPIN                               At night       clopidogrel 75 MG tablet   Take 1 tablet by mouth daily.   Commonly known as:  PLAVIX                                   fentaNYL 12 MCG/HR   Place 1 patch on the skin Every 72 (Seventy-Two) Hours for 10 days.   Commonly known as:  DURAGESIC   Notes to Patient:  Patch on left deltoid                                 finasteride 5 MG tablet   Take 1 tablet by mouth  daily.   Commonly known as:  PROSCAR                                   fluocinolone 0.01 % external solution   APPLY TO THE SCALP SPARINGLY ONCE A DAY   Commonly known as:  SYNALAR                                   folic acid 1 MG tablet   Take 1 tablet by mouth  daily   Commonly known as:  FOLVITE                                   gabapentin 300 MG capsule   Take 1 capsule by mouth every 8 (eight) hours.   Commonly known as:  NEURONTIN                                         isosorbide mononitrate 30 MG 24 hr tablet   Take 1 tablet by mouth  daily   Commonly known as:  IMDUR                                   losartan 100 MG tablet   Take 1 tablet by mouth  daily   Commonly known as:  COZAAR                                   metoprolol tartrate 25 MG tablet   Take 1 tablet by mouth 2 (Two) Times a Day.   Commonly known as:  LOPRESSOR                                   montelukast 10 MG tablet   Take 1 tablet by mouth every night.   Commonly known as:  SINGULAIR                                   nitroglycerin 0.4 MG SL tablet   Place 0.4 mg under the tongue. Dissolve one tablet under tongue as needed for chest pain   Commonly known as:  NITROSTAT                            Chest pain        ondansetron ODT 4 MG disintegrating tablet   Take 1 tablet by mouth Daily With Breakfast.   Commonly known as:  ZOFRAN-ODT                                   oxyCODONE-acetaminophen 5-325 MG per tablet   Take 1 tablet by mouth Every 6 (Six) Hours As Needed for moderate pain (4-6) or severe pain (7-10) for up to 8 days.   Commonly known as:  PERCOCET                                   pantoprazole 40 MG EC tablet   Take 1 tablet by mouth 2 (Two) Times a Day.   Commonly known as:  PROTONIX                                      potassium chloride 10 MEQ CR tablet   Take 1 tablet by mouth two  times daily   Commonly known as:  K-DUR                                      sertraline 50 MG tablet   TAKE 1 TABLET BY MOUTH DAILY.   Commonly  known as:  ZOLOFT                                   simvastatin 20 MG tablet   Take 1 tablet by mouth  daily   Commonly known as:  ZOCOR                                   sodium chloride 0.65 % nasal spray   2 sprays into each nostril every 2 (two) hours as needed for congestion.   Commonly known as:  OCEAN                                   sulfaSALAzine 500 MG tablet   Take 2 tablets by mouth 3  times a day   Commonly known as:  AZULFIDINE                1400                               Instructions for After Discharge        Discharge References/Attachments     CERVICAL SPINE FRACTURE, STABLE (ENGLISH)    VERTEBRAL FRACTURE (ENGLISH)    FENTANYL SKIN PATCH (ENGLISH)    ONDANSETRON ORAL DISSOLVING TABLET (ENGLISH)    PANTOPRAZOLE TABLETS (ENGLISH)    ACETAMINOPHEN; OXYCODONE CAPSULES (ENGLISH)       Follow-ups for After Discharge        Follow-up Information     Follow up with Abdon Castanon MD Follow up on 2/8/2017.    Specialty:  Neurosurgery    Why:  1:30 pm with new cervical Xrays - instruct patient to go to hospital 60-90 minutes before appointment and get xrays and then come to office appointment after.     Contact information:    [address] [place] [id number] [phone]          Follow up with Rosendo Milton MD Follow up in 1 week(s).    Specialties:  Ophthalmology, Oculoplastics Ophthalmology    Contact information:    [address] [place] [id number]  [phone]          Follow up with [facility] .    Specialty:  Skilled Nursing Facility    Contact information:    [facility] [facility] [address] [phone]      Scheduled Appointments     Feb 08, 2017  1:30 PM EST   Follow Up with Keren Stokes [facility] NEUROSURGERY (--)    [address] [address] [phone]           Arrive 15 minutes prior to appointment.            Feb 10, 2017  2:00 PM EST   Office Visit with Flash Jim MD    [facility] INTERNAL MEDICINE (--)    [address]  [facility] [address] [phone]           Arrive 15 minutes prior to appointment.            Feb 15, 2017  2:00 PM EST   Follow Up with Tomasa Smyth MD   [facility] GASTROENTEROLOGY (--)    [address]  [facility] [id number] [phone]           Arrive 15 minutes prior to appointment.              Clean PET Signup     [facility] Clean PET allows you to send messages to your doctor, view your test results, renew your prescriptions, schedule appointments, and more. To sign up, go to [employer] and click on the Sign Up Now link in the New User? box. Enter your Clean PET Activation Code exactly as it appears below along with the last four digits of your Social Security Number and your Date of Birth () to complete the sign-up process. If you do not sign up before the expiration date, you must request a new code.    Clean PET Activation Code: [email or web address]  Expires: 2017 12:24 PM    If you have questions, you can email [email or web address] or call [phone] to talk to our Clean PET staff. Remember, Clean PET is NOT to be used for urgent needs. For medical emergencies, dial 911.           Summary of Your Hospitalization        Reason for Hospitalization     Your primary diagnosis was:  Neck Fracture    Your diagnoses also included:  Multiple Facial Bone Fractures, Generalized Muscle Weakness, High Cholesterol Or Triglycerides, High Blood Pressure, Anemia, Low Sodium Levels, Dehydration, Prashant (Acute Kidney Injury)      Care Providers     Provider Service Role Specialty    Loc Bolanos MD Internal Medicine Attending Provider Internal Medicine    Abdon Castanon MD -- Surgeon  Neurosurgery       Your Allergies  Date Reviewed: 2017    Allergen Reactions    Carbamazepine Other (See Comments)    Extreme nervousness  Hyponatremia          Lactose Intolerance  (Gi) Not Noted      Patient Belongings Returned     Document Return of Belongings Flowsheet     Were the patient bedside belongings sent home?   --   Belongings Retrieved from Security & Sent Home   --    Belongings Sent to Safe   --   Medications Retrieved from Pharmacy & Sent Home   --              More Information      Cervical Spine Fracture, Stable  A cervical spine fracture is a break or crack in one of the bones of the neck. A fracture is stable if the chances of it causing you problems while it is healing are very small.  CAUSES   · Vehicle accidents.  · Injuries from sports such as diving, football, biking, wrestling, or skiing.  · Occasionally, severe osteoporosis or other bone diseases, such as cancers that spread to bone or metabolic abnormalities.  SYMPTOMS   · Severe neck pain after an accident or fall.  · Pain down your shoulders or arms.  · Bruising or swelling on the back of your neck.  · Numbness, tingling, muscle spasm, or weakness.  DIAGNOSIS   Cervical spine fracture is diagnosed with the help of X-ray exams of your neck. Often a CT scan or MRI is used to confirm the diagnosis and help determine how your injury should be treated. Generally, an examination of your neck, arms, and legs, and the history of your injury prompts the health care provider to order these tests.   TREATMENT   A stable fracture needs to be treated with a brace or cervical collar. A cervical collar is a two-piece collar designed to keep your neck from moving during the healing process.  HOME CARE INSTRUCTIONS  · Limit physical activity to prevent worsening of the fracture.  · Apply ice to areas of pain 3-4 times a day for 2 days.  ¨ Put ice in a bag.  ¨ Place a towel between your skin and the bag.  ¨ Leave the ice on for 15-20 minutes, 3-4 times a day.  · You may have been given a cervical collar to wear.  ¨ Do not remove the collar unless instructed by your health care provider.  ¨ If you have long hair, keep it outside of  the collar.  ¨ Ask your health care provider before making any adjustments to your collar. Minor adjustments may be required over time to improve comfort and reduce pressure on your chin or on the back of your head.  ¨ Keep your collar clean by wiping it with mild soap and water and drying it completely. The pads can be hand washed with soap and water and air dried completely.  ¨ If you are allowed to remove the collar for cleaning or bathing, follow your health care provider's instructions on how to do so safely.  ¨ If you are allowed to remove the collar for cleaning and bathing, wash and dry the skin of your neck. Check your skin for irritation or sores. If you see any, tell your health care provider.  · Only take over-the-counter or prescription medicines for pain, discomfort, or fever as directed by your health care provider.    · Keep all follow-up appointments as directed by your health care provider. Not keeping an appointment could result in a chronic or permanent injury, pain, and disability. Additionally, X-rays or an MRI may be repeated 1-3 weeks after your initial appointment. This is to:  ¨ Make sure any other breaks or cracks were not missed.    ¨ Help identify stretched or torn ligaments.    · Get your test results if you did not get them when you were first evaluated. The results will determine whether you need other tests or treatment. It is your responsibility to get the results.  SEEK MEDICAL CARE IF:  You have irritation or sores on your skin from the cervical collar.  SEEK IMMEDIATE MEDICAL CARE IF:   · You have increasing pain in your neck.    · You develop difficulties swallowing or breathing.  · You develop swelling in your neck.    · You have numbness, weakness, burning pain, or movement problems in the arms or legs.    · You are unable to control your bowel or bladder (incontinence).    · You have problems with coordination or difficulty walking.  MAKE SURE YOU:   · Understand these  instructions.  · Will watch your condition.  · Will get help right away if you are not doing well or get worse.     This information is not intended to replace advice given to you by your health care provider. Make sure you discuss any questions you have with your health care provider.     Document Released: 11/04/2005 Document Revised: 12/23/2014 Document Reviewed: 07/14/2014  "[employer] Interactive Patient Education ©2016 "[employer] Inc.          Vertebral Fracture  A vertebral fracture is a break in one of the bones that make up the spine (vertebrae). The vertebrae are stacked on top of each other to form the spinal column. They support the body and protect the spinal cord. The vertebral column has an upper part (cervical spine), a middle part (thoracic spine), and a lower part (lumbar spine). Most vertebral fractures occur in the thoracic spine or lumbar spine.  There are three main types of vertebral fractures:  · Flexion fracture. This happens when vertebrae collapse. Vertebrae can collapse:    In the front (compression fracture). This type of fracture is common in people who have a condition that causes their bones to be weak and brittle (osteoporosis). The fracture can make a person lose height.    In the front and back (axial burst fracture).  · Extension fracture. This happens when an external force pulls apart the vertebrae.  · Rotation fracture. This happens when the spine bends extremely in one direction. This type can cause a piece of a vertebra to break off (transverse process fracture) or move out of its normal position (fracture dislocation). This type of fracture has a high risk for spinal cord injury.  Vertebral fractures can range from mild to very severe. The most severe types are those that cause the broken bones to move out of place (unstable) and those that injure or press on the spinal cord.  CAUSES  This condition is usually caused by a forceful injury. This type of injury commonly results  from:  · Car accidents.  · Falling or jumping from a great height.  · Collisions in contact sports.  · Violent acts, such as an assault or a gunshot wound.  RISK FACTORS  This injury is more likely to happen to people who:  · Have osteoporosis.  · Participate in contact sports.  · Are in situations that could result in falls or other violent injuries.  SYMPTOMS  Symptoms of this injury depend on the location and the type of fracture. The most common symptom is back pain that gets worse with movement. You may also have trouble standing or walking. If a fracture has damaged your spinal cord or is pressing on it, you may also have:  · Numbness.  · Tingling.  · Weakness.  · Loss of movement.  · Loss of bowel or bladder control.  DIAGNOSIS  This injury may be diagnosed based on symptoms, medical history, and a physical exam. You may also have imaging tests to confirm the diagnosis. These may include:  · Spine X-ray.  · CT scan.  · MRI.  TREATMENT  Treatment for this injury depends on the type of fracture. If your fracture is stable and does not affect your spinal cord, it may heal with nonsurgical treatment, such as:  · Taking pain medicine.  · Wearing a cast or a brace.  · Doing physical therapy exercises.  If your vertebral fracture is unstable or it affects your spinal cord, you may need surgical treatment, such as:  · Laminectomy. This procedure involves removing the part of a vertebra that is pushing on the spinal cord (spinal decompression surgery). Bone fragments may also be removed.  · Spinal fusion. This procedure is used to stabilize an unstable fracture. Vertebrae may be joined together with a piece of bone from another part of your body (graft) and held in place with rods, plates, or screws.  · Vertebroplasty. In this procedure, bone cement is used to rebuild collapsed vertebrae.  HOME CARE INSTRUCTIONS  General Instructions  · Take medicines only as directed by your health care provider.  · Do not drive or  operate heavy machinery while taking pain medicine.  · If directed, apply ice to the injured area:    Put ice in a plastic bag.    Place a towel between your skin and the bag.    Leave the ice on for 30 minutes every two hours at first. Then apply the ice as needed.  · Wear your neck brace or back brace as directed by your health care provider.  · Do not drink alcohol. Alcohol can interfere with your treatment.  · Keep all follow-up visits as directed by your health care provider. This is important. It can help to prevent permanent injury, disability, and long-lasting (chronic) pain.  Activity  · Stay in bed (on bed rest) only as directed by your health care provider. Being on bed rest for too long can make your condition worse.  · Return to your normal activities as directed by your health care provider. Ask what activities are safe for you.  · Do exercises to improve motion and strength in your back (physical therapy), as recommended by your health care provider.    · Exercise regularly as directed by your health care provider.  SEEK MEDICAL CARE IF:  · You have a fever.  · You develop a cough that makes your pain worse.  · Your pain medicine is not helping.  · Your pain does not get better over time.  · You cannot return to your normal activities as planned or expected.  SEEK IMMEDIATE MEDICAL CARE IF:  · Your pain is very bad and it suddenly gets worse.  · You are unable to move any body part (paralysis) that is below the level of your injury.  · You have numbness, tingling, or weakness in any body part that is below the level of your injury.  · You cannot control your bladder or bowels.     This information is not intended to replace advice given to you by your health care provider. Make sure you discuss any questions you have with your health care provider.     Document Released: 01/25/2006 Document Revised: 05/03/2016 Document Reviewed: 12/23/2015  [employer]vier Interactive Patient Education ©2016 [employer]  Inc[employer]          fentanyl skin patch  What is this medicine?  FENTANYL (FEN ta nil) is a pain reliever. It is used to treat persistent, moderate to severe chronic pain. It is used only by people who have been taking an opioid or narcotic pain medicine for more than one week.  This medicine may be used for other purposes; ask your health care provider or pharmacist if you have questions.  What should I tell my health care provider before I take this medicine?  They need to know if you have any of these conditions:  -brain tumor  -Crohn's disease, inflammatory bowel disease, or ulcerative colitis  -drug abuse or addiction  -head injury  -heart disease  -if you frequently drink alcohol containing drinks  -kidney disease or problems going to the bathroom  -liver disease  -lung disease, asthma, or breathing problems  -mental problems  -skin problems  -taken isocarboxazid, phenelzine, tranylcypromine, or selegiline in the past 2 weeks  -an allergic or unusual reaction to fentanyl, meperidine, other medicines, foods, dyes, or preservatives  -pregnant or trying to get pregnant  -breast-feeding  How should I use this medicine?  Apply the patch to your skin. Do not cut or damage the patch. A cut or damaged patch can be very dangerous because you may get too much medicine. Select a clean, dry area of skin above your waist on your front or back. The upper back is a good spot to put the patch on children or people who are confused because it will be hard for them to remove the patch. Do not apply the patch to oily, broken, burned, cut, or irritated skin. Use only water to clean the area. Do not use soap or alcohol to clean the skin because this can increase the effects of the medicine. If the area is hairy, clip the hair with scissors, but do not shave.  Take the patch out of its wrapper, and take off the protective strip over the sticky part. Do not use a patch if the packaging or backing is damaged. Do not touch the sticky part  with your fingers. Press the sticky surface to the skin using the palm of your hand. Press the patch to the skin for 30 seconds. Wash your hands at once with soap and water.  Keep patches far away from children. Do not let children see you apply the patch and do not apply it where children can see it. Do not call the patch a sticker, tattoo, or bandage, as this could encourage the child to mimic your actions. Used patches still contain medicine. Children or pets can have serious side effects or die from putting used patches in their mouth or on their bodies.  Take off the old patch before putting on a new patch. Apply each new patch to a different area of skin. If a patch comes off or causes irritation, remove it and apply a new patch to different site. If the edges of the patch start to loosen, first apply first aid tape to the edges of the patch. If problems with the patch not sticking continue, cover the patch with a see-through adhesive dressing (like Bioclusive or Tegaderm). Never cover the patch with any other bandage or tape.  To get rid of used patches, fold the patch in half with the sticky sides together. Then, flush it down the toilet. Do not discard the patch in the garbage. Pets and children can be harmed if they find used or lost patches. Replace the patch every 3 days or as directed by your doctor or health care professional. Follow the directions on the prescription label. Do not take more medicine than you are told to take.  A special MedGuide will be given to you by the pharmacist with each prescription and refill. Be sure to read this information carefully each time.  Talk to your pediatrician regarding the use of this medicine in children. While this drug may be prescribed for children as young as 2 years old for selected conditions, precautions do apply.  If someone accidentally uses a fentanyl patch and is not awake and alert, immediately call 911 for help. If the person is awake and alert,  call a doctor, health care professional, or the Poison Control Center.  Overdosage: If you think you have taken too much of this medicine contact a poison control center or emergency room at once.  NOTE: This medicine is only for you. Do not share this medicine with others.  What if I miss a dose?  If you forget to replace your patch, take off the old patch and put on a new patch as soon as you can. Do not apply an extra patch to your skin. Do not wear more than one patch at the same time unless told to do so by your doctor or health care professional.  What may interact with this medicine?  -alcohol  -antihistamines  -clarithromycin  -diltiazem  -erythromycin  -grapefruit juice  -herbal products that contain Avila's wort  -itraconazole  -ketoconazole  -medicines for depression, anxiety, or psychotic disturbances  -medicines for sleep  -muscle relaxants  -naltrexone  -narcotic medicines (opiates) for pain  -nelfinavir  -nicardipine  -phenobarbital, phenytoin, or fosphenytoin  -rifampin  -ritonavir  -troleandomycin  -verapamil  This list may not describe all possible interactions. Give your health care provider a list of all the medicines, herbs, non-prescription drugs, or dietary supplements you use. Also tell them if you smoke, drink alcohol, or use illegal drugs. Some items may interact with your medicine.  What should I watch for while using this medicine?  Other pain medicine may be needed the first day you use the patch because the patch can take some time to start working. Tell your doctor or health care professional if your pain does not go away, if it gets worse, or if you have new or a different type of pain. You may develop tolerance to the medicine. Tolerance means that you will need a higher dose of the medicine for pain relief. Tolerance is normal and is expected if you take the medicine for a long time.  Have a family member watch you for side effects during the first day you wear a patch or if your  dose changes.  Do not suddenly stop taking your medicine because you may develop a severe reaction. Your body becomes used to the medicine. This does NOT mean you are addicted. Addiction is a behavior related to getting and using a drug for a non-medical reason. If you have pain, you have a medical reason to take pain medicine. Your doctor will tell you how much medicine to take. If your doctor wants you to stop the medicine, the dose will be slowly lowered over time to avoid any side effects.  You may get drowsy or dizzy when you first start taking the medicine or change doses. Do not drive, use machinery, or do anything that may be dangerous until you know how the medicine affects you. Stand or sit up slowly.  There are different types of narcotic medicines (opiates) for pain. If you take more than one type at the same time, you may have more side effects. Give your health care provider a list of all medicines you use. Your doctor will tell you how much medicine to take. Do not take more medicine than directed. Call emergency for help if you have problems breathing.  The medicine will cause constipation. Try to have a bowel movement at least every 2 to 3 days. If you do not have a bowel movement for 3 days, call your doctor or health care professional.  Your mouth may get dry. Drinking water, chewing sugarless gum, or sucking on hard candy may help. See your dentist every 6 months.  Heat can increase the amount of medicine released from the patch. Do not get the patch hot by using heating pads, heated water beds, electric blankets, and heat lamps. You can bathe or swim while using the patch. But, do not use a sauna or hot tub. Tell you doctor or health care professional if you get a fever.  If gel leaks from the patch, wash your skin well with water only. Do not use soap or cleansers containing alcohol. Remove the patch from your skin and discard as instructed above. Medicine from a partly attached or leaking  patch can transfer to a child or pet when they are being held. Exposure of children or pets to the patch can cause serious side effects and even death.  If you are going to have a magnetic resonance imaging (MRI) procedure, tell your MRI technician if you have this patch on your body. It must be removed before a MRI.  What side effects may I notice from receiving this medicine?  Side effects that you should report to your doctor or health care professional as soon as possible:  -allergic reactions like skin rash, itching or hives, swelling of the face, lips, or tongue  -breathing problems  -changes in vision  -confusion  -feeling faint, lightheaded  -fever, flu-like symptoms  -hallucination  -high or low blood pressure  -irregular heartbeat  -problems with balance, talking, walking  -trouble passing urine or change in the amount of urine  -unusual bleeding or bruising  -unusually weak or tired  Side effects that usually do not require medical attention (report to your doctor or health care professional if they continue or are bothersome):  -constipation  -dry mouth  -itching where the patch was applied  -loss of appetite  -nausea, vomiting  -sweating  This list may not describe all possible side effects. Call your doctor for medical advice about side effects. You may report side effects to FDA at 3-765-FDA-2659.  Where should I keep my medicine?  Keep out of the reach of children. This medicine can be abused. Keep your medicine in a safe place to protect it from theft. Do not share this medicine with anyone. Selling or giving away this medicine is dangerous and against the law.  Store below 77 degrees F (25 degrees C). Do not store the patches out of their wrappers.  This medicine may cause accidental overdose and death if it is taken by other adults, children, or pets. Flush any unused medicine down the toilet as instructed above to reduce the chance of harm. Do not use the medicine after the expiration  date.  NOTE: This sheet is a summary. It may not cover all possible information. If you have questions about this medicine, talk to your doctor, pharmacist, or health care provider.     © 2016, [employer]. (2014-07-16 17:25:22)          Ondansetron oral dissolving tablet  What is this medicine?  ONDANSETRON (on DAN se ronald) is used to treat nausea and vomiting caused by chemotherapy. It is also used to prevent or treat nausea and vomiting after surgery.  This medicine may be used for other purposes; ask your health care provider or pharmacist if you have questions.  What should I tell my health care provider before I take this medicine?  They need to know if you have any of these conditions:  -heart disease  -history of irregular heartbeat  -liver disease  -low levels of magnesium or potassium in the blood  -an unusual or allergic reaction to ondansetron, granisetron, other medicines, foods, dyes, or preservatives  -pregnant or trying to get pregnant  -breast-feeding  How should I use this medicine?  These tablets are made to dissolve in the mouth. Do not try to push the tablet through the foil backing. With dry hands, peel away the foil backing and gently remove the tablet. Place the tablet in the mouth and allow it to dissolve, then swallow. While you may take these tablets with water, it is not necessary to do so.  Talk to your pediatrician regarding the use of this medicine in children. Special care may be needed.  Overdosage: If you think you have taken too much of this medicine contact a poison control center or emergency room at once.  NOTE: This medicine is only for you. Do not share this medicine with others.  What if I miss a dose?  If you miss a dose, take it as soon as you can. If it is almost time for your next dose, take only that dose. Do not take double or extra doses.  What may interact with this medicine?  Do not take this medicine with any of the following  medications:  -apomorphine  -certain medicines for fungal infections like fluconazole, itraconazole, ketoconazole, posaconazole, voriconazole  -cisapride  -dofetilide  -dronedarone  -pimozide  -thioridazine  -ziprasidone  This medicine may also interact with the following medications:  -carbamazepine  -certain medicines for depression, anxiety, or psychotic disturbances  -fentanyl  -linezolid  -MAOIs like Carbex, Eldepryl, Marplan, Nardil, and Parnate  -methylene blue (injected into a vein)  -other medicines that prolong the QT interval (cause an abnormal heart rhythm)  -phenytoin  -rifampicin  -tramadol  This list may not describe all possible interactions. Give your health care provider a list of all the medicines, herbs, non-prescription drugs, or dietary supplements you use. Also tell them if you smoke, drink alcohol, or use illegal drugs. Some items may interact with your medicine.  What should I watch for while using this medicine?  Check with your doctor or health care professional as soon as you can if you have any sign of an allergic reaction.  What side effects may I notice from receiving this medicine?  Side effects that you should report to your doctor or health care professional as soon as possible:  -allergic reactions like skin rash, itching or hives, swelling of the face, lips, or tongue  -breathing problems  -confusion  -dizziness  -fast or irregular heartbeat  -feeling faint or lightheaded, falls  -fever and chills  -loss of balance or coordination  -seizures  -sweating  -swelling of the hands and feet  -tightness in the chest  -tremors  -unusually weak or tired  Side effects that usually do not require medical attention (report to your doctor or health care professional if they continue or are bothersome):  -constipation or diarrhea  -headache  This list may not describe all possible side effects. Call your doctor for medical advice about side effects. You may report side effects to FDA at  1-800-FDA-1088.  Where should I keep my medicine?  Keep out of the reach of children.  Store between 2 and 30 degrees C (36 and 86 degrees F). Throw away any unused medicine after the expiration date.  NOTE: This sheet is a summary. It may not cover all possible information. If you have questions about this medicine, talk to your doctor, pharmacist, or health care provider.     © 2016, [employer]. (2014-09-24 16:21:52)          Pantoprazole tablets  What is this medicine?  PANTOPRAZOLE (pan TOE pra zole) prevents the production of acid in the stomach. It is used to treat gastroesophageal reflux disease (GERD), inflammation of the esophagus, and Zollinger-Dave syndrome.  This medicine may be used for other purposes; ask your health care provider or pharmacist if you have questions.  What should I tell my health care provider before I take this medicine?  They need to know if you have any of these conditions:  -liver disease  -low levels of magnesium in the blood  -an unusual or allergic reaction to omeprazole, lansoprazole, pantoprazole, rabeprazole, other medicines, foods, dyes, or preservatives  -pregnant or trying to get pregnant  -breast-feeding  How should I use this medicine?  Take this medicine by mouth. Swallow the tablets whole with a drink of water. Follow the directions on the prescription label. Do not crush, break, or chew. Take your medicine at regular intervals. Do not take your medicine more often than directed.  Talk to your pediatrician regarding the use of this medicine in children. While this drug may be prescribed for children as young as 5 years for selected conditions, precautions do apply.  Overdosage: If you think you have taken too much of this medicine contact a poison control center or emergency room at once.  NOTE: This medicine is only for you. Do not share this medicine with others.  What if I miss a dose?  If you miss a dose, take it as soon as you can. If it is almost  time for your next dose, take only that dose. Do not take double or extra doses.  What may interact with this medicine?  Do not take this medicine with any of the following medications:  -atazanavir  -nelfinavir  This medicine may also interact with the following medications:  -ampicillin  -delavirdine  -erlotinib  -iron salts  -medicines for fungal infections like ketoconazole, itraconazole and voriconazole  -methotrexate  -mycophenolate mofetil  -warfarin  This list may not describe all possible interactions. Give your health care provider a list of all the medicines, herbs, non-prescription drugs, or dietary supplements you use. Also tell them if you smoke, drink alcohol, or use illegal drugs. Some items may interact with your medicine.  What should I watch for while using this medicine?  It can take several days before your stomach pain gets better. Check with your doctor or health care professional if your condition does not start to get better, or if it gets worse.  You may need blood work done while you are taking this medicine.  What side effects may I notice from receiving this medicine?  Side effects that you should report to your doctor or health care professional as soon as possible:  -allergic reactions like skin rash, itching or hives, swelling of the face, lips, or tongue  -bone, muscle or joint pain  -breathing problems  -chest pain or chest tightness  -dark yellow or brown urine  -dizziness  -fast, irregular heartbeat  -feeling faint or lightheaded  -fever or sore throat  -muscle spasm  -palpitations  -redness, blistering, peeling or loosening of the skin, including inside the mouth  -seizures  -tremors  -unusual bleeding or bruising  -unusually weak or tired  -yellowing of the eyes or skin  Side effects that usually do not require medical attention (Report these to your doctor or health care professional if they continue or are bothersome.):  -constipation  -diarrhea  -dry  mouth  -headache  -nausea  This list may not describe all possible side effects. Call your doctor for medical advice about side effects. You may report side effects to FDA at 0-973-FDA-4299.  Where should I keep my medicine?  Keep out of the reach of children.  Store at room temperature between 15 and 30 degrees C (59 and 86 degrees F). Protect from light and moisture. Throw away any unused medicine after the expiration date.  NOTE: This sheet is a summary. It may not cover all possible information. If you have questions about this medicine, talk to your doctor, pharmacist, or health care provider.     © 2016, [employer]. (2016-02-05 14:45:56)          Acetaminophen; Oxycodone capsules  What is this medicine?  ACETAMINOPHEN; OXYCODONE (a set a FARHAD sav fen; ox i KOE done) is a pain reliever. It is used to treat moderate to severe pain.  This medicine may be used for other purposes; ask your health care provider or pharmacist if you have questions.  What should I tell my health care provider before I take this medicine?  They need to know if you have any of these conditions:  -brain tumor  -Crohn's disease, inflammatory bowel disease, or ulcerative colitis  -drug abuse or addiction  -head injury  -heart or circulation problems  -if you often drink alcohol  -kidney disease or problems going to the bathroom  -liver disease  -lung disease, asthma, or breathing problems  -an unusual or allergic reaction to salicylates, acetaminophen, oxycodone, other opioid analgesics, other medicines, foods, dyes, or preservatives  -pregnant or trying to get pregnant  -breast-feeding  How should I use this medicine?  Take this medicine by mouth with a full glass of water. Follow the directions on the prescription label. If the medicine upsets your stomach, take it with food or milk. Take your medicine at regular intervals. Do not take your medicine more often than directed.  Talk to your pediatrician regarding the use of this  medicine in children. Special care may be needed.  Patients over 65 years old may have a stronger reaction and need a smaller dose.  Overdosage: If you think you have taken too much of this medicine contact a poison control center or emergency room at once.  NOTE: This medicine is only for you. Do not share this medicine with others.  What if I miss a dose?  If you miss a dose, take it as soon as you can. If it is almost time for your next dose, take only that dose. Do not take double or extra doses.  What may interact with this medicine?  -alcohol  -antihistamines  -barbiturates like amobarbital, butalbital, butabarbital, methohexital, pentobarbital, phenobarbital, thiopental, and secobarbital  -benztropine  -drugs for bladder problems like solifenacin, trospium, oxybutynin, tolterodine, hyoscyamine, and methscopolamine  -drugs for breathing problems like ipratropium and tiotropium  -drugs for certain stomach or intestine problems like propantheline, homatropine methylbromide, glycopyrrolate, atropine, belladonna, and dicyclomine  -general anesthetics like etomidate, ketamine, nitrous oxide, propofol, desflurane, enflurane, halothane, isoflurane, and sevoflurane  -medicines for depression, anxiety, or psychotic disturbances  -medicines for sleep  -muscle relaxants  -naltrexone  -narcotic medicines (opiates) for pain  -phenothiazines like perphenazine, thioridazine, chlorpromazine, mesoridazine, fluphenazine, prochlorperazine, promazine, and trifluoperazine  -scopolamine  -tramadol  -trihexyphenidyl  This list may not describe all possible interactions. Give your health care provider a list of all the medicines, herbs, non-prescription drugs, or dietary supplements you use. Also tell them if you smoke, drink alcohol, or use illegal drugs. Some items may interact with your medicine.  What should I watch for while using this medicine?  Tell your doctor or health care professional if your pain does not go away, if it  gets worse, or if you have new or a different type of pain. You may develop tolerance to the medicine. Tolerance means that you will need a higher dose of the medication for pain relief. Tolerance is normal and is expected if you take this medicine for a long time.  Do not suddenly stop taking your medicine because you may develop a severe reaction. Your body becomes used to the medicine. This does NOT mean you are addicted. Addiction is a behavior related to getting and using a drug for a nonmedical reason. If you have pain, you have a medical reason to take pain medicine. Your doctor will tell you how much medicine to take. If your doctor wants you to stop the medicine, the dose will be slowly lowered over time to avoid any side effects.  You may get drowsy or dizzy. Do not drive, use machinery, or do anything that needs mental alertness until you know how this medicine affects you. Do not stand or sit up quickly, especially if you are an older patient. This reduces the risk of dizzy or fainting spells. Alcohol may interfere with the effect of this medicine. Avoid alcoholic drinks.  There are different types of narcotic medicines (opiates) for pain. If you take more than one type at the same time, you may have more side effects. Give your health care provider a list of all medicines you use. Your doctor will tell you how much medicine to take. Do not take more medicine than directed. Call emergency for help if you have problems breathing.  The medicine will cause constipation. Try to have a bowel movement at least every 2 to 3 days. If you do not have a bowel movement for 3 days, call your doctor or health care professional.  Do not take Tylenol (acetaminophen) or medicines that have acetaminophen with this medicine. Too much acetaminophen can be very dangerous. Many non-prescription medicines contain acetaminophen. Always read the labels carefully to avoid taking more acetaminophen.  What side effects may I  notice from receiving this medicine?  Side effects that you should report to your doctor or health care professional as soon as possible:  -allergic reactions like skin rash, itching or hives, swelling of the face, lips, or tongue  -breathing difficulties, wheezing  -confusion  -light headedness or fainting spells  -severe stomach pain  -unusually weak or tired  -yellowing of the skin or the whites of the eyes  Side effects that usually do not require medical attention (report to your doctor or health care professional if they continue or are bothersome):  -dizziness  -drowsiness  -nausea  -vomiting  This list may not describe all possible side effects. Call your doctor for medical advice about side effects. You may report side effects to FDA at 8-496-FDA-9100.  Where should I keep my medicine?  Keep out of the reach of children. This medicine can be abused. Keep your medicine in a safe place to protect it from theft. Do not share this medicine with anyone. Selling or giving away this medicine is dangerous and against the law.  This medicine may cause accidental overdose and death if it taken by other adults, children, or pets. Mix any unused medicine with a substance like cat litter or coffee grounds. Then throw the medicine away in a sealed container like a sealed bag or a coffee can with a lid. Do not use the medicine after the expiration date.  Store at room temperature between 15 and 30 degrees C (59 and 86 degrees F). Keep container tightly closed. Protect from light.  NOTE: This sheet is a summary. It may not cover all possible information. If you have questions about this medicine, talk to your doctor, pharmacist, or health care provider.     © 2016, [employer]. (2015-11-18 15:03:56)            SYMPTOMS OF A STROKE    Call 911 or have someone take you to the Emergency Department if you have any of the following:    · Sudden numbness or weakness of your face, arm or leg especially on one side of  the body  · Sudden confusion, diffiiculty speaking or trouble understanding   · Changes in your vision or loss of sight in one eye  · Sudden severe headache with no known cause  · sudden dizziness, trouble walking, loss of balance or coordination    It is important to seek emergency care right away if you have further stroke symptoms. If you get emergency help quickly, the powerful clot-dissolving medicines can reduce the disabilities caused by a stroke.     For more information:    American Stroke Association  4-585-2-STROKE  www.strokeassociation.org           IF YOU SMOKE OR USE TOBACCO PLEASE READ THE FOLLOWING:    Why is smoking bad for me?  Smoking increases the risk of heart disease, lung disease, vascular disease, stroke, and cancer.     If you smoke, STOP!    If you would like more information on quitting smoking, please visit the [employer] website: [email or web address]   This link will provide additional resources including the QUIT line and the Beat the Pack support groups.     For more information:    American Cancer Society  ([phone] 142-1564    American Heart Association  1-333.336.2023               YOU ARE THE MOST IMPORTANT FACTOR IN YOUR RECOVERY.     Follow all instructions carefully.     I have reviewed my discharge instructions with my nurse, including the following information, if applicable:     Information about my illness and diagnosis   Follow up appointments (including lab draws)   Wound Care   Equipment Needs   Medications (new and continuing) along with side effects   Preventative information such as vaccines and smoking cessations   Diet   Pain   I know when to contact my Doctor's office or seek emergency care      I want my nurse to describe the side effects of my medications: YES NO   If the answer is no, I understand the side effects of my medications: YES NO   My nurse described the side effects of my medications in a way that I could  understand: YES NO   I have taken my personal belongings and my own medications with me at discharge: YES NO            I have received this information and my questions have been answered. I have discussed any concerns I see with this plan with the nurse or physician. I understand these instructions.    Signature of Patient or Responsible Person: _____________________________________    Date: _________________  Time: __________________    Signature of Healthcare Provider: _______________________________________  Date: _________________  Time: __________________

## 2017-01-13 NOTE — CONSULTS
Neurosurgery (on-call MD unless specified)  Consult performed by: DESMOND CATHERINE  Consult ordered by: JUAN SHIELDS  Reason for consult: s/p fall with odontoid fxr  Assessment/Recommendations: Mr. Isaac is an 81 yr old male with PMH small R frontal stroke in Sept 2016, CAD/previous CABG, HTN, Ulcerative colitis. Pt on plavix, had been off for about a week for recent cataract surgery and dental surgery (all done within past week).  Resumed plavix today. Pt was out and tripped, fell forward on face.  No LOC.  C/o neck pain, R shoulder pain, facial/mouth pain. Has multiple facial lacs and fractures. CT head negative. CT of surgical spine does show type II odontoid fracture.  In hard collar. No radicular pain or deficits.     Will check MRI.  Change to Aspen collar for comfort. Mostly likely nothing surgical to do and will recommend 3mos of bracing with cervical collar.  Will make final recommendation after reviewing the MRI.           Patient Care Team:  Flash Jim MD as PCP - General  Flash Jim MD as PCP - Family Medicine    Chief complaint:s/p fall, type 2 odontoid fracture    Subjective     HPI Comments: CT of head and C spine reviewed. See above for discussion. Results also discussed with ER provider.     Fall   The accident occurred 6 to 12 hours ago. The fall occurred while standing. The point of impact was the face and head. The pain is present in the head and face. The pain is at a severity of 5/10. The pain is moderate. Associated symptoms include headaches. Pertinent negatives include no abdominal pain, bowel incontinence, fever, hearing loss, hematuria, loss of consciousness, nausea, numbness, tingling, visual change or vomiting. The treatment provided mild relief.   Facial Injury    Associated symptoms include headaches. Pertinent negatives include no numbness or vomiting.       Review of Systems   Constitutional: Negative for fever.   Gastrointestinal: Negative for abdominal pain,  bowel incontinence, nausea and vomiting.   Genitourinary: Negative for hematuria.   Neurological: Positive for headaches. Negative for tingling, loss of consciousness and numbness.   All other systems reviewed and are negative.       Past Medical History   Diagnosis Date   • Anemia    • Anxiety disorder    • Arthritis    • Asterixis    • Coronary artery disease    • Coronary artery disease    • Depression    • Hard of hearing    • Hyperlipidemia    • Hypertension    • Pneumonia    • Pulmonary nodule    • S/P tooth extraction 01/09/2017   • Ulcerative colitis    • Use of cane as ambulatory aid    ,   Past Surgical History   Procedure Laterality Date   • Coronary artery bypass graft     • Carpal tunnel release     • Rotator cuff repair     • Cardiac surgery     • Cataract extraction     • Cataract extraction w/  intraocular lens implant Bilateral      L eye in November 2016, R eye in Jan 2017   ,   Family History   Problem Relation Age of Onset   • Heart disease Mother    • Heart disease Father    • Stroke Father    • Heart disease Sister    • Cancer Brother      malignant neoplasm   ,   Social History   Substance Use Topics   • Smoking status: Former Smoker     Packs/day: 3.00     Years: 20.00   • Smokeless tobacco: None      Comment: quit 30 40 years ago   • Alcohol use No   ,   (Not in a hospital admission), Scheduled Meds:   , Continuous Infusions:   , PRN Meds:  •  sodium chloride and Allergies:  Carbamazepine and Lactose intolerance (gi)    Objective      Vital Signs  Temp:  [97.7 °F (36.5 °C)] 97.7 °F (36.5 °C)  Heart Rate:  [51-94] 57  Resp:  [14-18] 18  BP: (190-218)/(78-88) 190/78    Physical Exam   Constitutional: He is oriented to person, place, and time. He appears well-developed and well-nourished.   HENT:   Head: Normocephalic.   Right Ear: External ear normal.   Left Ear: External ear normal.   Has multiple facial lacs and wounds/bruising as well as bilateral periorbital edema and bruising.    Eyes:  Conjunctivae and EOM are normal. Pupils are equal, round, and reactive to light. Right eye exhibits no discharge. Left eye exhibits no discharge.   Neck: Normal range of motion. Neck supple. No tracheal deviation present.   Cardiovascular: Normal rate and regular rhythm.    Pulmonary/Chest: Effort normal and breath sounds normal. No stridor. No respiratory distress.   Abdominal: Soft. Bowel sounds are normal.   Musculoskeletal: Normal range of motion. He exhibits no edema, tenderness or deformity.   Neurological: He is alert and oriented to person, place, and time. He has normal strength and normal reflexes. He displays no atrophy, no tremor and normal reflexes. No cranial nerve deficit or sensory deficit. He exhibits normal muscle tone. He displays a negative Romberg sign. He displays no seizure activity. Coordination and gait normal. GCS eye subscore is 4. GCS verbal subscore is 5. GCS motor subscore is 6.   No long tract signs   Skin: Skin is warm and dry.   Psychiatric: He has a normal mood and affect. His behavior is normal. Judgment and thought content normal.   Nursing note and vitals reviewed.      Results Review:    I reviewed the patient's new clinical results.  I reviewed the patient's new imaging results and agree with the interpretation.        Assessment/Plan     Active Problems:    Odontoid fracture      Assessment:  (S/p fall  Facial fractures  Type 2 odontoid fracture  HTN  CAD  Hx of R frontal CVA (small) in Sept 2016, on plavix  ).     Plan:   (Change to Aspen collar for comfort, check MRI.  ).       I discussed the patients findings and my recommendations with patient, family, nursing staff and consulting provider    Elsi Najera PA-C  01/13/17  4:22 PM    Time: 30min

## 2017-01-13 NOTE — ED NOTES
Plastic Surgery at bedside at this time, suturing nose and forehead      Alivia Velázquez RN  01/13/17 1659

## 2017-01-14 PROBLEM — E87.1 HYPONATREMIA: Status: ACTIVE | Noted: 2017-01-01

## 2017-01-14 PROBLEM — S02.92XA MULTIPLE FACIAL BONE FRACTURES (HCC): Status: ACTIVE | Noted: 2017-01-01

## 2017-01-14 NOTE — PLAN OF CARE
Problem: Orthopaedic Fracture (Adult)  Goal: Signs and Symptoms of Listed Potential Problems Will be Absent or Manageable (Orthopaedic Fracture)  Outcome: Ongoing (interventions implemented as appropriate)    Problem: Patient Care Overview (Adult)  Goal: Plan of Care Review  Outcome: Ongoing (interventions implemented as appropriate)    01/14/17 0451   Coping/Psychosocial Response Interventions   Plan Of Care Reviewed With patient   Patient Care Overview   Progress no change   Outcome Evaluation   Outcome Summary/Follow up Plan Pt admitted for fall. Vitals stable. Pt in aspen C-collar per MD order. Home meds restarted. Wife at bedside.       Goal: Adult Individualization and Mutuality  Outcome: Ongoing (interventions implemented as appropriate)  Goal: Discharge Needs Assessment  Outcome: Ongoing (interventions implemented as appropriate)    Problem: Fall Risk (Adult)  Goal: Identify Related Risk Factors and Signs and Symptoms  Outcome: Outcome(s) achieved Date Met:  01/14/17  Goal: Absence of Falls  Outcome: Ongoing (interventions implemented as appropriate)

## 2017-01-14 NOTE — H&P
HISTORY AND PHYSICAL   [facility], [facility]        Patient Identification:    Name: Noah Isaac  Age: 81 y.o.  Sex: male  :  1935  MRN: [id number]                       Primary Care Physician: Flash Jim MD    Chief Complaint:      Chief Complaint   Patient presents with   • Fall     Fall, facial injuries to forehead/bridge of nose/chin, and bleeding from L nare.  R knee pain. Pt found down in parking lot of [facility] by bystander walking by who saw feet sticking out between two parked cars.  Pt denies LOC, states he lost his balance in parking lot.  Pt usually takes a blood thinner, but has been off of it for about nine days due to a scheduled cataract surgery and a oral surgery that he had within the past three days.   • Facial Injury   • Knee Injury         History of Present Illness:     Patient is an 82 y/o male with h/o CAD, s/p CABG, HTN, Hyperlipidemia, , s/p CVA, UC, [place]. Apparently he tripped and fell in between 2 cars at the parking lot, he fell and hit his face, EMS brought him to ER. In ER he was found to have odontoid fracture, multiple facial fractures and nose laceration. During the visit in ER his BP was very elevated. Patient was admited for further management.    Past Medical History:  Past Medical History   Diagnosis Date   • Anemia    • Anxiety disorder    • Arthritis    • Asterixis    • Coronary artery disease    • Coronary artery disease    • Depression    • Hard of hearing    • Hyperlipidemia    • Hypertension    • Pneumonia    • Pulmonary nodule    • S/P tooth extraction 2017   • Ulcerative colitis    • Use of cane as ambulatory aid      Past Surgical History:  Past Surgical History   Procedure Laterality Date   • Coronary artery bypass graft     • Carpal tunnel release     • Rotator cuff repair     • Cardiac surgery     • Cataract extraction     • Cataract extraction w/  intraocular lens implant Bilateral      L eye in 2016, R eye in   2017      Home Meds:  Prescriptions Prior to Admission   Medication Sig Dispense Refill Last Dose   • amLODIPine (NORVASC) 5 MG tablet TAKE ONE TABLET BY MOUTH DAILY 90 tablet 2    • Calcium Carb-Cholecalciferol 600-800 MG-UNIT tablet Take 1 tablet by mouth daily.   9/16/2016   • clonazePAM (KlonoPIN) 0.5 MG tablet Take 1 tablet by mouth At Night As Needed for anxiety. (Patient taking differently: Take 0.5 mg by mouth 2 (Two) Times a Day As Needed for anxiety.) 90 tablet 2    • clopidogrel (PLAVIX) 75 MG tablet Take 1 tablet by mouth daily. 30 tablet 3 1/13/2017 at Unknown time   • dicyclomine (BENTYL) 10 MG capsule TAKE ONE CAPSULE BY MOUTH FOUR TIMES DAILY AS NEEDED 60 capsule 2    • finasteride (PROSCAR) 5 MG tablet Take 1 tablet by mouth daily. 90 tablet 1 Taking   • folic acid (FOLVITE) 1 MG tablet Take 1 tablet by mouth  daily 90 tablet 1    • furosemide (LASIX) 20 MG tablet Take 20 mg by mouth every morning.   Taking   • gabapentin (NEURONTIN) 300 MG capsule Take 1 capsule by mouth every 8 (eight) hours. 90 capsule 10 Taking   • hydrOXYzine (ATARAX) 25 MG tablet TAKE 1 TABLET BY MOUTH TWO TIMES DAILY 180 tablet 1    • ILEVRO 0.3 % suspension       • isosorbide mononitrate (IMDUR) 30 MG 24 hr tablet Take 1 tablet by mouth  daily 90 tablet 1    • losartan (COZAAR) 100 MG tablet Take 1 tablet by mouth  daily 90 tablet 1    • metoprolol tartrate (LOPRESSOR) 25 MG tablet Take 0.5 tablets by mouth 2 (two) times a day. 15 tablet 10 Taking   • montelukast (SINGULAIR) 10 MG tablet Take 1 tablet by mouth every night. (Patient taking differently: Take 10 mg by mouth daily as needed (seasonal allergies).) 90 tablet 1 Taking   • nitroglycerin (NITROSTAT) 0.4 MG SL tablet Place 0.4 mg under the tongue. Dissolve one tablet under tongue as needed for chest pain   Taking   • ondansetron (ZOFRAN) 4 MG tablet TAKE ONE TABLET BY MOUTH EVERY EIGHT HOURS AS NEEDED 30 tablet 0 Taking   • potassium chloride (K-DUR) 10 MEQ CR tablet  Take 1 tablet by mouth two  times daily 180 tablet 1    • prednisoLONE acetate (PRED FORTE) 1 % ophthalmic suspension INSTILL 1 DROP INTO LEFT EYE 3 TIMES A DAY FOR 3 WK,TWICE A DAY X1 WK,AND ONCE DAILY X1WK  6    • sertraline (ZOLOFT) 50 MG tablet TAKE 1 TABLET BY MOUTH DAILY. 90 tablet 1    • simvastatin (ZOCOR) 20 MG tablet Take 1 tablet by mouth  daily 90 tablet 1    • sodium chloride (OCEAN) 0.65 % nasal spray 2 sprays into each nostril every 2 (two) hours as needed for congestion. 1 each 12 Taking   • sulfaSALAzine (AZULFIDINE) 500 MG tablet Take 2 tablets by mouth 3  times a day 540 tablet 1    • traMADol (ULTRAM) 50 MG tablet 50 mg every 8 (eight) hours as needed.   Taking   • VIGAMOX 0.5 % ophthalmic solution USE 1 DROP INTO LEFT EYE 3 TIMES A DAY 3 DAYS PRIOR TO SURGERY AND FOR 3 DAYS AFTER SURGERY  6    • betamethasone dipropionate (DIPROLENE) 0.05 % cream       • fluocinolone (SYNALAR) 0.01 % external solution APPLY TO THE SCALP SPARINGLY ONCE A DAY  2    • FLUZONE HIGH-DOSE 0.5 ML suspension prefilled syringe injection TO BE ADMINISTERED BY PHARMACIST FOR IMMUNIZATION  0 Not Taking   • hydrocortisone-pramoxine (ANALPRAM-HC) 2.5-1 % rectal cream APPLY PER RECTUM THREE TIMES DAILY AS NEEDED 28.4 g 2    • triamcinolone (KENALOG) 0.1 % cream    Not Taking       Allergies:  Allergies   Allergen Reactions   • Carbamazepine Other (See Comments)     Extreme nervousness  Hyponatremia    • Lactose Intolerance (Gi)      Immunizations:  Immunization History   Administered Date(s) Administered   • Pneumococcal Conjugate 13-Valent 07/02/2016   • Tdap 11/08/2013, 01/13/2017     Social History:   Social History     Social History Narrative   • No narrative on file     Social History   Substance Use Topics   • Smoking status: Former Smoker     Packs/day: 3.00     Years: 20.00   • Smokeless tobacco: Not on file      Comment: quit 30 40 years ago   • Alcohol use No     Family History:  Family History   Problem Relation  "Age of Onset   • Heart disease Mother    • Heart disease Father    • Stroke Father    • Heart disease Sister    • Cancer Brother      malignant neoplasm        Review of Systems  See history of present illness and past medical history.    Constitutional: Negative for activity change, appetite change and fever.   HENT: Negative for congestion and sore throat.   Eyes: Negative.   Respiratory: Negative for cough and shortness of breath.   Cardiovascular: Negative for chest pain and leg swelling.   Gastrointestinal: Negative for abdominal pain, diarrhea and vomiting.   Endocrine: Negative.   Genitourinary: Negative for decreased urine volume and dysuria.   Musculoskeletal: Negative for neck pain.   R knee pain   Skin: Negative for rash and wound.   Abrasions to face & R knee   Allergic/Immunologic: Negative.   Neurological: Negative for weakness, numbness and headaches.   Hematological: Negative.   Psychiatric/Behavioral: Negative.   All other systems reviewed and are negative..    Objective:    Exam:    tMax 24 hrs: Temp (24hrs), Av.1 °F (36.7 °C), Min:97.7 °F (36.5 °C), Max:98.3 °F (36.8 °C)    Vitals Ranges:   Temp:  [97.7 °F (36.5 °C)-98.3 °F (36.8 °C)] 98.3 °F (36.8 °C)  Heart Rate:  [51-94] 72  Resp:  [14-18] 16  BP: (111-218)/(62-88) 111/62    Visit Vitals   • /62 (BP Location: Right arm, Patient Position: Lying)   • Pulse 72   • Temp 98.3 °F (36.8 °C) (Oral)   • Resp 16   • Ht 70\" (177.8 cm)   • Wt 187 lb 8 oz (85 kg)   • SpO2 95%   • BMI 26.15 kg/m2       General: Alert, cooperative, ppears stated age  Constitutional: He is oriented to person, place, and time. In no acute distress.    HEENT:    Head: Several areas and ecchymosis in face, also abrasions in nose, forehead and chin., sutures done in nose.  Eyes: EOM are normal. Pupils are equal, round, and reactive to light. Ecchymosis and edema in left eyelid  Oropharynx: Mucosa and tongue normal  Neck: Supple, symmetrical, trachea midline, no " adenopathy;              thyroid:  no enlargement/tenderness/nodules;              no carotid bruit or JVD. C-collar in place  Cardiovascular: Normal rate, regular rhythm and intact distal pulses.              Exam reveals no gallop and no friction rub. No murmur heard  Chest wall: No tenderness or deformity  Pulmonary: Clear to auscultation bilaterally, respirations unlabored.               No rhonchi, wheezing or rales.   Abdominal: Soft. Soft, non-tender, bowel sounds active all four quadrants,     no masses, no hepatomegaly, no splenomegaly.   Extremities: Right knee with tenderness to palpation as well as abrasion.  Pulses: 2 + symmetric all extremities  Neurological: He is alert and oriented to person, place, and time.                 CNII-XII intact, normal strength, sensation intact throughout  Skin: Skin color, texture, turgor normal, no rashes or lesions      Data Review:               Imaging Results (all)     Procedure Component Value Units Date/Time    XR Knee 1 or 2 View Right [[id number]] Collected:  01/13/17 1316     Updated:  01/13/17 1320    Narrative:       Right knee 2 views 01/13/2017     HISTORY: Fell, right knee pain.     There is vascular calcification in the right knee.     No fractures or dislocations are seen. There may be very minimal  suprapatellar effusion. Surgical staples are seen in the medial aspect  of the right knee.       Impression:       1. No acute process except for possible minimal suprapatellar effusion.     This report was finalized on 1/13/2017 1:17 PM by Dr. Andres Grimes MD.       CT Head Without Contrast [[id number]] Collected:  01/13/17 1721     Updated:  01/13/17 1721    Narrative:       EMERGENCY NONCONTRAST HEAD CT, FACIAL CT AND CERVICAL SPINE CT  01/13/2017     CLINICAL HISTORY: Patient fell in parking lot of [facility],  loss  his balance fell face first, denies loss of consciousness     HEAD CT TECHNIQUE: Spiral CT images were obtained from the base of  the  skull to the vertex without intravenous contrast and images were  reformatted and submitted in 3 mm thick axial CT section with brain  algorithm and 2 mm thick axial CT section with high-resolution bone  algorithm and additional 2 mm thick sagittal and coronal reconstructions  were performed at brain algorithm and due to questionable finding along  the anterior tip of the left temporal lobe patient was brought back for  additional spiral CT images through the head..     This correlated to an prior noncontrast head CT from [facility] 09/16/2016 and prior MRI of the head 09/17/2016.     FINDINGS: There is some patchy nodular low-density in periventricular  extending to subcortical white matter of cerebral hemispheres consistent  with mild-to-moderate small vessel disease. The remainder of the brain  parenchyma is normal in attenuation, the lateral and third ventricles  are minimally prominent size likely due to to central volume loss. There  is a thin sliver of hyperdensity along the anterior tip of the left  temporal lobe on the initial set axial images, it is not reproduced on  the repeat axial images nor on the facial CT images and felt to be  artifact. Overall, no extra-axial fluid collections are identified and  there is no evidence of acute intracranial hemorrhage. There is a scalp  hematoma over the anterior inferior frontal bone extending paranasal and  periorbital soft tissues and there is multiple acute fractures including  a linear nondisplaced vertical fracture of the anterior inferior medial  left frontal bone extending into anterior table of the inferior medial  left frontal sinus, fractures of the anterosuperior tip in the anterior  aspect nasal septum fractures of the superior nasal bones extending  nasal bridge, fractures of the left orbital floor and anterior and  posterior lateral wall left maxillary sinus there are fractures of the  pterygoid plates.       Impression:       1.  There is moderate small vessel disease in the cerebral white matter.     2. No acute intracranial hemorrhage is seen     3. There are multiple acute fractures with a linear nondisplaced  fracture in the anterior inferior medial left frontal bone extending  into the anterior table of the inferior medial left frontal sinus as  well as fracture of the anterosuperior and anterior aspect nasal septum,  fractures of the superior nasal bones and nasal bridge fractures of the  left orbital floor and anterior posterior lateral wall left maxillary  sinus likely fracture of the anterior wall of the right maxillary sinus  fractures of the pterygoid plates. There is blood subtotally opacifying  left frontal sinus, anterior ethmoid sinuses bilaterally left maxillary  sinus blood partially opacifying posterior right maxillary sinus and see  facial CT report below. The  remainder of the head CT is unremarkable.     FACIAL CT TECHNIQUE: Spiral CT images were obtained through the facial  bones in the axial imaging plane. Images were reformatted and are  submitted in 2 mm thick axial CT section with soft tissue and  high-resolution bone algorithm and additional 2 mm thick sagittal and  coronal reconstructions were performed..     FINDINGS: Patient has a moderate size scalp hematoma over the anterior  inferior frontal bone extending into the paranasal tissues and  periorbital tissues from today's head and facial trauma. There is a  linear acute fracture involving the far anterior inferior medial left  frontal bone that extends into the anterior wall of the inferior medial  left frontal sinus. Fracture extends through the medial septa between  the  left and right frontal sinuses and fracture plane also involve the  superior nasal bones extending into the nasal bridge and there is  fracture of the far anterior superior aspect of the nasal septum. There  is  fracture plane extending into the anterior superior medial wall of  the left  orbit and there is fracture plane involving the inferior medial  wall of the left orbit and there is mild with comminuted fracture that  is nondisplaced involving the left inferior orbital wall and the  fracture extending into the anterior wall of the left maxillary sinus  with minimal comminution in the anterior inferior wall left maxillary  sinus, there is fracture plane involving posterior lateral wall left  maxillary sinus with several bubbles of air extending into the left  infratemporal fossa just lateral to the left maxillary sinus. There may  be a linear nondisplaced fracture of the lateral left pterygoid plate.  Hairline nondisplaced fracture of the medial aspect of the right orbital  roof and there is a hairline nondisplaced fracture horizontally oriented  through the midportion of the anterior wall of the right maxillary  sinus. There is fluid and blood subtotally opacifying the left frontal  sinus partially opacifying the anterior ethmoid sinus and subtotally  opacifying the left maxillary sinus partially opacifying the posterior  third of the right maxillary sinus.     IMPRESSION:     1. This patient has scalp hematoma over the anterior inferior midline of  the frontal bone extending into the paranasal tissues and there are  hairline nondisplaced fractures in the anterior inferior medial aspect  of left frontal bone extending into the anterior table of the inferior  medial left frontal sinus, fracture involving the superior nasal bones  extending nasal bridge, there is a nondisplaced fracture of the far  anterior superior portion of the nasal septum. There is fracture plane  extending into the anterior superior medial wall of the left orbit as  well as involving the inferior medial wall of left orbit as well as some  minimally comminuted nondisplaced fracture of the left orbital floor  with several bubbles of air in the extraconal fat of the inferior left  orbit. There are multiple fracture planes  involving the anterior wall  left maxillary sinus with some comminution in the inferior aspect of  anterior wall left maxillary sinus and there are fracture planes  involving the posterior lateral wall left maxillary sinus with several  bubbles of air in the lateral to left maxillary sinus in the april  maxillary fat and may be a subtle hairline nondisplaced fracture of the  left  lateral pterygoid plate. There is a questionable hairline  nondisplaced fracture hairline fracture of the medial aspect of the  right orbital roof bordering the inferior wall of the right frontal  sinus. There is probable hairline nondisplaced fracture horizontally  oriented  through the midportion of the right maxillary sinus. There is  questionable hairline nondisplaced fractures of the medial lateral  pterygoid plates bilaterally. There is blood and fluid subtotally  opacifying left frontal sinus and anterior ethmoid sinuses bilaterally  the right frontal recess partially opacifying the posterior right  maxillary sinus and subtotally opacifying the left maxillary sinus.  Given the pterygoid plate fractures and facial fractures described this  is probably a variant of a LeFort fracture.     On sagittal images there is evidence of nondisplaced type II odontoid  fracture through the base of odontoid this will be discussed on the  cervical spine CT report below     CERVICAL SPINE CT TECHNIQUE: Spiral CT images were obtained from the  skull base to the T4 thoracic level and images were reformatted and are  submitted in 2 mm thick axial CT section with soft tissue and bone  algorithm and additional 2 mm thick sagittal and coronal reconstructions  were performed.     FINDINGS: There is arthritic change at the atlantodental interval with  marginal spurring off the anterior ring of C1 and the odontoid. There is  an oblique nondisplaced fracture through the base of the odontoid  indicating a type II unstable base of odontoid fracture     There is  mild right and  moderate to severe left facet overgrowth at  C2-3, there is mild left bony foraminal narrowing posterior disc margin  is normal and there is no canal or right foraminal narrowing at C2-3     At C3-4, there is moderate to severe bilateral facet overgrowth. 2 mm  degenerative anterolisthesis of C3 on C4 mild posterior endplate  spurring eccentric left posterior laterally mildly narrowing the left  side of the canal, bilateral uncovertebral joint hypertrophy and there  is moderate left and mild right bony foraminal narrowing at C3-4     At C4-5, there is mild/moderate right and moderate severe left facet  overgrowth. 1 to 2 mm anterolisthesis of C4 on C5 there is mild  posterior endplate spurring mild canal and left foraminal narrowing  moderate right bony foraminal narrowing at C4-5.     At C5-6, there is moderate left and minimal right facet overgrowth C5-6  disc space narrowing degenerative endplate change diffuse posterior disc  osteophyte complex and bilateral uncovertebral joint hypertrophy, there  is mild canal and there is moderate bilateral bony foraminal narrowing  at C5-6.     At C6-7, there is disc space narrowing degenerative endplate change  diffuse posterior disc osteophyte complex mild facet overgrowth and  uncovertebral joint hypertrophy and there is mild canal and left  foraminal narrowing.     At C7-T1 there is disc space narrowing, degenerative endplate changes  posterior endplate spurring and facet overgrowth contributing to mild  canal and bilateral foraminal narrowing     IMPRESSION:      1. There is an oblique nondisplaced fracture through the base of the  odontoid process indicating an acute type II nondisplaced type II   odontoid fracture which is an unstable fracture, neurosurgical  consultation is warranted   2. There is cervical spondylosis as described above. Extensive results  from  the study were discussed in great detail with Dr. Elder Reaves  from the emergency room by  telephone on 01/13/2017 at 3:00 PM/       CT Facial Bones Without Contrast [[id number]] Collected:  01/13/17 1721     Updated:  01/13/17 1721    Narrative:       EMERGENCY NONCONTRAST HEAD CT, FACIAL CT AND CERVICAL SPINE CT  01/13/2017     CLINICAL HISTORY: Patient fell in parking lot of [facility],  loss  his balance fell face first, denies loss of consciousness     HEAD CT TECHNIQUE: Spiral CT images were obtained from the base of the  skull to the vertex without intravenous contrast and images were  reformatted and submitted in 3 mm thick axial CT section with brain  algorithm and 2 mm thick axial CT section with high-resolution bone  algorithm and additional 2 mm thick sagittal and coronal reconstructions  were performed at brain algorithm and due to questionable finding along  the anterior tip of the left temporal lobe patient was brought back for  additional spiral CT images through the head..     This correlated to an prior noncontrast head CT from [facility] 09/16/2016 and prior MRI of the head 09/17/2016.     FINDINGS: There is some patchy nodular low-density in periventricular  extending to subcortical white matter of cerebral hemispheres consistent  with mild-to-moderate small vessel disease. The remainder of the brain  parenchyma is normal in attenuation, the lateral and third ventricles  are minimally prominent size likely due to to central volume loss. There  is a thin sliver of hyperdensity along the anterior tip of the left  temporal lobe on the initial set axial images, it is not reproduced on  the repeat axial images nor on the facial CT images and felt to be  artifact. Overall, no extra-axial fluid collections are identified and  there is no evidence of acute intracranial hemorrhage. There is a scalp  hematoma over the anterior inferior frontal bone extending paranasal and  periorbital soft tissues and there is multiple acute fractures including  a linear nondisplaced vertical  fracture of the anterior inferior medial  left frontal bone extending into anterior table of the inferior medial  left frontal sinus, fractures of the anterosuperior tip in the anterior  aspect nasal septum fractures of the superior nasal bones extending  nasal bridge, fractures of the left orbital floor and anterior and  posterior lateral wall left maxillary sinus there are fractures of the  pterygoid plates.       Impression:       1. There is moderate small vessel disease in the cerebral white matter.     2. No acute intracranial hemorrhage is seen     3. There are multiple acute fractures with a linear nondisplaced  fracture in the anterior inferior medial left frontal bone extending  into the anterior table of the inferior medial left frontal sinus as  well as fracture of the anterosuperior and anterior aspect nasal septum,  fractures of the superior nasal bones and nasal bridge fractures of the  left orbital floor and anterior posterior lateral wall left maxillary  sinus likely fracture of the anterior wall of the right maxillary sinus  fractures of the pterygoid plates. There is blood subtotally opacifying  left frontal sinus, anterior ethmoid sinuses bilaterally left maxillary  sinus blood partially opacifying posterior right maxillary sinus and see  facial CT report below. The  remainder of the head CT is unremarkable.     FACIAL CT TECHNIQUE: Spiral CT images were obtained through the facial  bones in the axial imaging plane. Images were reformatted and are  submitted in 2 mm thick axial CT section with soft tissue and  high-resolution bone algorithm and additional 2 mm thick sagittal and  coronal reconstructions were performed..     FINDINGS: Patient has a moderate size scalp hematoma over the anterior  inferior frontal bone extending into the paranasal tissues and  periorbital tissues from today's head and facial trauma. There is a  linear acute fracture involving the far anterior inferior medial  left  frontal bone that extends into the anterior wall of the inferior medial  left frontal sinus. Fracture extends through the medial septa between  the  left and right frontal sinuses and fracture plane also involve the  superior nasal bones extending into the nasal bridge and there is  fracture of the far anterior superior aspect of the nasal septum. There  is  fracture plane extending into the anterior superior medial wall of  the left orbit and there is fracture plane involving the inferior medial  wall of the left orbit and there is mild with comminuted fracture that  is nondisplaced involving the left inferior orbital wall and the  fracture extending into the anterior wall of the left maxillary sinus  with minimal comminution in the anterior inferior wall left maxillary  sinus, there is fracture plane involving posterior lateral wall left  maxillary sinus with several bubbles of air extending into the left  infratemporal fossa just lateral to the left maxillary sinus. There may  be a linear nondisplaced fracture of the lateral left pterygoid plate.  Hairline nondisplaced fracture of the medial aspect of the right orbital  roof and there is a hairline nondisplaced fracture horizontally oriented  through the midportion of the anterior wall of the right maxillary  sinus. There is fluid and blood subtotally opacifying the left frontal  sinus partially opacifying the anterior ethmoid sinus and subtotally  opacifying the left maxillary sinus partially opacifying the posterior  third of the right maxillary sinus.     IMPRESSION:     1. This patient has scalp hematoma over the anterior inferior midline of  the frontal bone extending into the paranasal tissues and there are  hairline nondisplaced fractures in the anterior inferior medial aspect  of left frontal bone extending into the anterior table of the inferior  medial left frontal sinus, fracture involving the superior nasal bones  extending nasal bridge, there  is a nondisplaced fracture of the far  anterior superior portion of the nasal septum. There is fracture plane  extending into the anterior superior medial wall of the left orbit as  well as involving the inferior medial wall of left orbit as well as some  minimally comminuted nondisplaced fracture of the left orbital floor  with several bubbles of air in the extraconal fat of the inferior left  orbit. There are multiple fracture planes involving the anterior wall  left maxillary sinus with some comminution in the inferior aspect of  anterior wall left maxillary sinus and there are fracture planes  involving the posterior lateral wall left maxillary sinus with several  bubbles of air in the lateral to left maxillary sinus in the april  maxillary fat and may be a subtle hairline nondisplaced fracture of the  left  lateral pterygoid plate. There is a questionable hairline  nondisplaced fracture hairline fracture of the medial aspect of the  right orbital roof bordering the inferior wall of the right frontal  sinus. There is probable hairline nondisplaced fracture horizontally  oriented  through the midportion of the right maxillary sinus. There is  questionable hairline nondisplaced fractures of the medial lateral  pterygoid plates bilaterally. There is blood and fluid subtotally  opacifying left frontal sinus and anterior ethmoid sinuses bilaterally  the right frontal recess partially opacifying the posterior right  maxillary sinus and subtotally opacifying the left maxillary sinus.  Given the pterygoid plate fractures and facial fractures described this  is probably a variant of a LeFort fracture.     On sagittal images there is evidence of nondisplaced type II odontoid  fracture through the base of odontoid this will be discussed on the  cervical spine CT report below     CERVICAL SPINE CT TECHNIQUE: Spiral CT images were obtained from the  skull base to the T4 thoracic level and images were reformatted and  are  submitted in 2 mm thick axial CT section with soft tissue and bone  algorithm and additional 2 mm thick sagittal and coronal reconstructions  were performed.     FINDINGS: There is arthritic change at the atlantodental interval with  marginal spurring off the anterior ring of C1 and the odontoid. There is  an oblique nondisplaced fracture through the base of the odontoid  indicating a type II unstable base of odontoid fracture     There is mild right and  moderate to severe left facet overgrowth at  C2-3, there is mild left bony foraminal narrowing posterior disc margin  is normal and there is no canal or right foraminal narrowing at C2-3     At C3-4, there is moderate to severe bilateral facet overgrowth. 2 mm  degenerative anterolisthesis of C3 on C4 mild posterior endplate  spurring eccentric left posterior laterally mildly narrowing the left  side of the canal, bilateral uncovertebral joint hypertrophy and there  is moderate left and mild right bony foraminal narrowing at C3-4     At C4-5, there is mild/moderate right and moderate severe left facet  overgrowth. 1 to 2 mm anterolisthesis of C4 on C5 there is mild  posterior endplate spurring mild canal and left foraminal narrowing  moderate right bony foraminal narrowing at C4-5.     At C5-6, there is moderate left and minimal right facet overgrowth C5-6  disc space narrowing degenerative endplate change diffuse posterior disc  osteophyte complex and bilateral uncovertebral joint hypertrophy, there  is mild canal and there is moderate bilateral bony foraminal narrowing  at C5-6.     At C6-7, there is disc space narrowing degenerative endplate change  diffuse posterior disc osteophyte complex mild facet overgrowth and  uncovertebral joint hypertrophy and there is mild canal and left  foraminal narrowing.     At C7-T1 there is disc space narrowing, degenerative endplate changes  posterior endplate spurring and facet overgrowth contributing to mild  canal and  bilateral foraminal narrowing     IMPRESSION:      1. There is an oblique nondisplaced fracture through the base of the  odontoid process indicating an acute type II nondisplaced type II   odontoid fracture which is an unstable fracture, neurosurgical  consultation is warranted   2. There is cervical spondylosis as described above. Extensive results  from  the study were discussed in great detail with Dr. Elder Reaves  from the emergency room by telephone on 01/13/2017 at 3:00 PM/       CT Cervical Spine Without Contrast [[id number]] Collected:  01/13/17 1721     Updated:  01/13/17 1721    Narrative:       EMERGENCY NONCONTRAST HEAD CT, FACIAL CT AND CERVICAL SPINE CT  01/13/2017     CLINICAL HISTORY: Patient fell in parking lot of [employer],  loss  his balance fell face first, denies loss of consciousness     HEAD CT TECHNIQUE: Spiral CT images were obtained from the base of the  skull to the vertex without intravenous contrast and images were  reformatted and submitted in 3 mm thick axial CT section with brain  algorithm and 2 mm thick axial CT section with high-resolution bone  algorithm and additional 2 mm thick sagittal and coronal reconstructions  were performed at brain algorithm and due to questionable finding along  the anterior tip of the left temporal lobe patient was brought back for  additional spiral CT images through the head..     This correlated to an prior noncontrast head CT from [facility] 09/16/2016 and prior MRI of the head 09/17/2016.     FINDINGS: There is some patchy nodular low-density in periventricular  extending to subcortical white matter of cerebral hemispheres consistent  with mild-to-moderate small vessel disease. The remainder of the brain  parenchyma is normal in attenuation, the lateral and third ventricles  are minimally prominent size likely due to to central volume loss. There  is a thin sliver of hyperdensity along the anterior tip of the left  temporal  lobe on the initial set axial images, it is not reproduced on  the repeat axial images nor on the facial CT images and felt to be  artifact. Overall, no extra-axial fluid collections are identified and  there is no evidence of acute intracranial hemorrhage. There is a scalp  hematoma over the anterior inferior frontal bone extending paranasal and  periorbital soft tissues and there is multiple acute fractures including  a linear nondisplaced vertical fracture of the anterior inferior medial  left frontal bone extending into anterior table of the inferior medial  left frontal sinus, fractures of the anterosuperior tip in the anterior  aspect nasal septum fractures of the superior nasal bones extending  nasal bridge, fractures of the left orbital floor and anterior and  posterior lateral wall left maxillary sinus there are fractures of the  pterygoid plates.       Impression:       1. There is moderate small vessel disease in the cerebral white matter.     2. No acute intracranial hemorrhage is seen     3. There are multiple acute fractures with a linear nondisplaced  fracture in the anterior inferior medial left frontal bone extending  into the anterior table of the inferior medial left frontal sinus as  well as fracture of the anterosuperior and anterior aspect nasal septum,  fractures of the superior nasal bones and nasal bridge fractures of the  left orbital floor and anterior posterior lateral wall left maxillary  sinus likely fracture of the anterior wall of the right maxillary sinus  fractures of the pterygoid plates. There is blood subtotally opacifying  left frontal sinus, anterior ethmoid sinuses bilaterally left maxillary  sinus blood partially opacifying posterior right maxillary sinus and see  facial CT report below. The  remainder of the head CT is unremarkable.     FACIAL CT TECHNIQUE: Spiral CT images were obtained through the facial  bones in the axial imaging plane. Images were reformatted and  are  submitted in 2 mm thick axial CT section with soft tissue and  high-resolution bone algorithm and additional 2 mm thick sagittal and  coronal reconstructions were performed..     FINDINGS: Patient has a moderate size scalp hematoma over the anterior  inferior frontal bone extending into the paranasal tissues and  periorbital tissues from today's head and facial trauma. There is a  linear acute fracture involving the far anterior inferior medial left  frontal bone that extends into the anterior wall of the inferior medial  left frontal sinus. Fracture extends through the medial septa between  the  left and right frontal sinuses and fracture plane also involve the  superior nasal bones extending into the nasal bridge and there is  fracture of the far anterior superior aspect of the nasal septum. There  is  fracture plane extending into the anterior superior medial wall of  the left orbit and there is fracture plane involving the inferior medial  wall of the left orbit and there is mild with comminuted fracture that  is nondisplaced involving the left inferior orbital wall and the  fracture extending into the anterior wall of the left maxillary sinus  with minimal comminution in the anterior inferior wall left maxillary  sinus, there is fracture plane involving posterior lateral wall left  maxillary sinus with several bubbles of air extending into the left  infratemporal fossa just lateral to the left maxillary sinus. There may  be a linear nondisplaced fracture of the lateral left pterygoid plate.  Hairline nondisplaced fracture of the medial aspect of the right orbital  roof and there is a hairline nondisplaced fracture horizontally oriented  through the midportion of the anterior wall of the right maxillary  sinus. There is fluid and blood subtotally opacifying the left frontal  sinus partially opacifying the anterior ethmoid sinus and subtotally  opacifying the left maxillary sinus partially opacifying the  posterior  third of the right maxillary sinus.     IMPRESSION:     1. This patient has scalp hematoma over the anterior inferior midline of  the frontal bone extending into the paranasal tissues and there are  hairline nondisplaced fractures in the anterior inferior medial aspect  of left frontal bone extending into the anterior table of the inferior  medial left frontal sinus, fracture involving the superior nasal bones  extending nasal bridge, there is a nondisplaced fracture of the far  anterior superior portion of the nasal septum. There is fracture plane  extending into the anterior superior medial wall of the left orbit as  well as involving the inferior medial wall of left orbit as well as some  minimally comminuted nondisplaced fracture of the left orbital floor  with several bubbles of air in the extraconal fat of the inferior left  orbit. There are multiple fracture planes involving the anterior wall  left maxillary sinus with some comminution in the inferior aspect of  anterior wall left maxillary sinus and there are fracture planes  involving the posterior lateral wall left maxillary sinus with several  bubbles of air in the lateral to left maxillary sinus in the april  maxillary fat and may be a subtle hairline nondisplaced fracture of the  left  lateral pterygoid plate. There is a questionable hairline  nondisplaced fracture hairline fracture of the medial aspect of the  right orbital roof bordering the inferior wall of the right frontal  sinus. There is probable hairline nondisplaced fracture horizontally  oriented  through the midportion of the right maxillary sinus. There is  questionable hairline nondisplaced fractures of the medial lateral  pterygoid plates bilaterally. There is blood and fluid subtotally  opacifying left frontal sinus and anterior ethmoid sinuses bilaterally  the right frontal recess partially opacifying the posterior right  maxillary sinus and subtotally opacifying the left  maxillary sinus.  Given the pterygoid plate fractures and facial fractures described this  is probably a variant of a LeFort fracture.     On sagittal images there is evidence of nondisplaced type II odontoid  fracture through the base of odontoid this will be discussed on the  cervical spine CT report below     CERVICAL SPINE CT TECHNIQUE: Spiral CT images were obtained from the  skull base to the T4 thoracic level and images were reformatted and are  submitted in 2 mm thick axial CT section with soft tissue and bone  algorithm and additional 2 mm thick sagittal and coronal reconstructions  were performed.     FINDINGS: There is arthritic change at the atlantodental interval with  marginal spurring off the anterior ring of C1 and the odontoid. There is  an oblique nondisplaced fracture through the base of the odontoid  indicating a type II unstable base of odontoid fracture     There is mild right and  moderate to severe left facet overgrowth at  C2-3, there is mild left bony foraminal narrowing posterior disc margin  is normal and there is no canal or right foraminal narrowing at C2-3     At C3-4, there is moderate to severe bilateral facet overgrowth. 2 mm  degenerative anterolisthesis of C3 on C4 mild posterior endplate  spurring eccentric left posterior laterally mildly narrowing the left  side of the canal, bilateral uncovertebral joint hypertrophy and there  is moderate left and mild right bony foraminal narrowing at C3-4     At C4-5, there is mild/moderate right and moderate severe left facet  overgrowth. 1 to 2 mm anterolisthesis of C4 on C5 there is mild  posterior endplate spurring mild canal and left foraminal narrowing  moderate right bony foraminal narrowing at C4-5.     At C5-6, there is moderate left and minimal right facet overgrowth C5-6  disc space narrowing degenerative endplate change diffuse posterior disc  osteophyte complex and bilateral uncovertebral joint hypertrophy, there  is mild canal  and there is moderate bilateral bony foraminal narrowing  at C5-6.     At C6-7, there is disc space narrowing degenerative endplate change  diffuse posterior disc osteophyte complex mild facet overgrowth and  uncovertebral joint hypertrophy and there is mild canal and left  foraminal narrowing.     At C7-T1 there is disc space narrowing, degenerative endplate changes  posterior endplate spurring and facet overgrowth contributing to mild  canal and bilateral foraminal narrowing     IMPRESSION:      1. There is an oblique nondisplaced fracture through the base of the  odontoid process indicating an acute type II nondisplaced type II   odontoid fracture which is an unstable fracture, neurosurgical  consultation is warranted   2. There is cervical spondylosis as described above. Extensive results  from  the study were discussed in great detail with Dr. Elder Reaves  from the emergency room by telephone on 01/13/2017 at 3:00 PM/       MRI Cervical Spine Without Contrast [[id number]]      Updated:  01/13/17 1922          Assessment:    Principal Problem:    Odontoid fracture  Active Problems:    Muscle weakness-general    Multiple facial bone fractures    Hyperlipidemia    HTN (hypertension)    Hypertension    Coronary artery disease      Patient Active Problem List   Diagnosis Code   • Chronic coronary artery disease I25.10   • Anemia D64.9   • Hyperlipidemia E78.5   • HTN (hypertension) I10   • Trigeminal neuralgia G50.0   • Benign prostatic hyperplasia N40.0   • Ulcerative colitis without complications K51.90   • Health care maintenance Z00.00   • Hypertension I10   • Coronary artery disease I25.10   • ANDERS (acute kidney injury) N17.9   • Muscle weakness-general M62.81   • Pulmonary nodule, right R91.1   • Pulmonary fibrosis J84.10   • Weakness R53.1   • Asterixis R27.8   • CVA (cerebral infarction) I63.9   • Irritable bowel syndrome with both constipation and diarrhea K58.2   • Odontoid fracture S12.100A   • Multiple  facial bone fractures S02.92XA       Plan:    Inpatient admission  Pain and nausea control  IV fluids, patient looks dehydrated  BP medicines  Opthalmology and HILLARY consults appreciated  Monitor and correct electrolytes  Monitor mental status  Home medications  DVT/stress ulcer prophylaxis  PT/OT/ST consult  Labs in am        Loc Bolanos MD  1/13/2016    Much of this encounter note is an electronic transcription/translation of spoken language to printed text. The electronic translation of spoken language may permit erroneous, or at times, nonsensical words or phrases to be inadvertently transcribed; Although I have reviewed the note for such errors, some may still exist

## 2017-01-14 NOTE — PLAN OF CARE
Problem: Inpatient Physical Therapy  Goal: Bed Mobility Goal LTG- PT  Outcome: Ongoing (interventions implemented as appropriate)    01/14/17 1542   Bed Mobility PT LTG   Bed Mobility PT LTG, Date Established 01/14/17   Bed Mobility PT LTG, Time to Achieve 1 wk   Bed Mobility PT LTG, Activity Type all bed mobility   Bed Mobility PT LTG, Flatgap Level supervision required   Bed Mobility PT Goal LTG, Assist Device bed rails       Goal: Transfer Training Goal 1 LTG- PT  Outcome: Ongoing (interventions implemented as appropriate)    01/14/17 1542   Transfer Training PT LTG   Transfer Training PT LTG, Date Established 01/14/17   Transfer Training PT LTG, Time to Achieve 1 wk   Transfer Training PT LTG, Activity Type sit to stand/stand to sit   Transfer Training PT LTG, Flatgap Level contact guard assist   Transfer Training PT LTG, Assist Device walker, rolling       Goal: Gait Training Goal LTG- PT  Outcome: Ongoing (interventions implemented as appropriate)    01/14/17 1542   Gait Training PT LTG   Gait Training Goal PT LTG, Date Established 01/14/17   Gait Training Goal PT LTG, Time to Achieve 1 wk   Gait Training Goal PT LTG, Flatgap Level moderate assist (50% patient effort)   Gait Training Goal PT LTG, Assist Device walker, rolling   Gait Training Goal PT LTG, Distance to Achieve 50

## 2017-01-14 NOTE — PLAN OF CARE
Problem: Orthopaedic Fracture (Adult)  Goal: Signs and Symptoms of Listed Potential Problems Will be Absent or Manageable (Orthopaedic Fracture)  Outcome: Ongoing (interventions implemented as appropriate)    Problem: Patient Care Overview (Adult)  Goal: Plan of Care Review  Outcome: Ongoing (interventions implemented as appropriate)    01/14/17 0832   Patient Care Overview   Progress no change   Outcome Evaluation   Outcome Summary/Follow up Plan pt. in aspen collar, pain control an issue, MD ordered change in meds.        Goal: Adult Individualization and Mutuality  Outcome: Ongoing (interventions implemented as appropriate)  Goal: Discharge Needs Assessment  Outcome: Ongoing (interventions implemented as appropriate)    Problem: Fall Risk (Adult)  Goal: Absence of Falls  Outcome: Ongoing (interventions implemented as appropriate)

## 2017-01-14 NOTE — PROGRESS NOTES
"   LOS: 1 day   Patient Care Team:  Flash Jim MD as PCP - General  Flash Jim MD as PCP - Family Medicine    Chief Complaint: Odontoid fracture after a fall    Subjective     HPI Comments: We were asked to see this patient for a traumatic type II odontoid fracture after a fall.  I reviewed the MRI which shows not much displacement.  He has some pre-existing moderate degree of spinal stenosis.  I think this is a fracture that should heal with immobilization with a hard cervical collar for 3 months.  Induced complain of some neck pain.  Is recovering from a stroke.  But he moves all 4 extremities.  His balance is quite poor.    Fall   Pertinent negatives include no fever.   Facial Injury          Subjective:  Symptoms:  Stable.    Diet:  Adequate intake.    Activity level: Impaired due to weakness.    Pain:  He complains of pain that is mild.        History taken from: patient chart family RN    Objective     Vital Signs  Temp:  [97.6 °F (36.4 °C)-98.3 °F (36.8 °C)] 97.6 °F (36.4 °C)  Heart Rate:  [68-79] 68  Resp:  [16] 16  BP: (111-167)/(62-79) 113/67    Objective:  General Appearance:  Comfortable.    Vital signs: (most recent): Blood pressure 113/67, pulse 68, temperature 97.6 °F (36.4 °C), temperature source Oral, resp. rate 16, height 70\" (177.8 cm), weight 187 lb 8 oz (85 kg), SpO2 90 %.  Vital signs are normal.  No fever.    Output: Producing urine and producing stool.    HEENT: Normal HEENT exam.    Lungs:  Normal effort.    Heart: Normal rate.    Chest: Symmetric chest wall expansion.   Abdomen: Abdomen is soft.  Bowel sounds are normal.   There is no mass.   Extremities: Decreased range of motion.    Pulses: Distal pulses are intact.    Neurological: Patient is alert.  Patient has normal reflexes, normal muscle tone and normal coordination.    Pupils:  Pupils are equal, round, and reactive to light.    Skin:  Warm.              Results Review:     I reviewed the patient's new clinical results.  I " reviewed the patient's new imaging results and agree with the interpretation.  I reviewed the patient's other test results and agree with the interpretation    Medication Review:   Current Facility-Administered Medications:   •  amLODIPine (NORVASC) tablet 5 mg, 5 mg, Oral, Q24H, Loc Bolanos MD, 5 mg at 01/14/17 0812  •  atorvastatin (LIPITOR) tablet 10 mg, 10 mg, Oral, Nightly, Loc Bolanos MD  •  bisacodyl (DULCOLAX) suppository 10 mg, 10 mg, Rectal, Daily PRN, Loc Bolanos MD  •  clonazePAM (KlonoPIN) tablet 0.5 mg, 0.5 mg, Oral, Nightly PRN, Loc Bolanos MD  •  clopidogrel (PLAVIX) tablet 75 mg, 75 mg, Oral, Daily, Loc Bolanos MD, 75 mg at 01/14/17 0812  •  docusate sodium (COLACE) capsule 100 mg, 100 mg, Oral, BID, Loc Bolanos MD, 100 mg at 01/14/17 0819  •  enoxaparin (LOVENOX) syringe 40 mg, 40 mg, Subcutaneous, Q24H, Loc Bolanos MD, 40 mg at 01/14/17 0819  •  fentaNYL (DURAGESIC) 12 MCG/HR 1 patch, 1 patch, Transdermal, Q72H, Loc Bolanos MD, 1 patch at 01/14/17 1106  •  finasteride (PROSCAR) tablet 5 mg, 5 mg, Oral, Daily, Loc Bolanos MD, 5 mg at 01/14/17 0812  •  gabapentin (NEURONTIN) capsule 300 mg, 300 mg, Oral, Q8H, Loc Bolanos MD, 300 mg at 01/14/17 1342  •  HYDROmorphone (DILAUDID) injection 0.5 mg, 0.5 mg, Intravenous, Q4H PRN, Loc Bolanos MD, 0.5 mg at 01/14/17 0811  •  isosorbide mononitrate (IMDUR) 24 hr tablet 30 mg, 30 mg, Oral, Q24H, Loc Bolanos MD, 30 mg at 01/14/17 0812  •  losartan (COZAAR) tablet 100 mg, 100 mg, Oral, Q24H, Loc Bolanos MD, 100 mg at 01/14/17 0812  •  metoprolol tartrate (LOPRESSOR) tablet 12.5 mg, 12.5 mg, Oral, BID, Loc Bolanos MD, 12.5 mg at 01/14/17 1651  •  montelukast (SINGULAIR) tablet 10 mg, 10 mg, Oral, Nightly, Loc Bolanos MD, 10 mg at 01/13/17 2215  •  nitroglycerin (NITROSTAT) SL tablet 0.4 mg, 0.4 mg, Sublingual, Q5 Min PRN, Loc Bolanos MD  •  ondansetron (ZOFRAN) injection 4 mg, 4 mg,  Intravenous, Q4H PRN, Loc Bolanos MD  •  oxyCODONE-acetaminophen (PERCOCET) 7.5-325 MG per tablet 1 tablet, 1 tablet, Oral, Q4H PRN, Loc Bolanos MD, 1 tablet at 01/14/17 1346  •  prednisoLONE acetate (PRED FORTE) 1 % ophthalmic suspension 2 drop, 2 drop, Both Eyes, Q6H, Loc Bolanos MD, 2 drop at 01/14/17 1107  •  sertraline (ZOLOFT) tablet 50 mg, 50 mg, Oral, Daily, Loc Bolanos MD, 50 mg at 01/14/17 0812  •  sodium chloride (OCEAN) nasal spray 2 spray, 2 spray, Each Nare, PRN, Loc Bolanos MD  •  sodium chloride 0.9 % flush 10 mL, 10 mL, Intravenous, PRN, Red Reaves MD  •  sodium chloride 0.9 % infusion, 100 mL/hr, Intravenous, Continuous, Loc Bolanos MD, Last Rate: 100 mL/hr at 01/14/17 0920, 100 mL/hr at 01/14/17 0920  •  sulfaSALAzine (AZULFIDINE) tablet 1,000 mg, 1,000 mg, Oral, Q8H, Loc Bolanos MD, 1,000 mg at 01/14/17 1342   WBC   Date Value Ref Range Status   01/14/2017 8.08 4.50 - 10.70 10*3/mm3 Final     RBC   Date Value Ref Range Status   01/14/2017 3.25 (L) 4.60 - 6.00 10*6/mm3 Final     HEMOGLOBIN   Date Value Ref Range Status   01/14/2017 10.4 (L) 13.7 - 17.6 g/dL Final     HEMATOCRIT   Date Value Ref Range Status   01/14/2017 32.0 (L) 40.4 - 52.2 % Final     MCV   Date Value Ref Range Status   01/14/2017 98.5 (H) 79.8 - 96.2 fL Final     MCH   Date Value Ref Range Status   01/14/2017 32.0 27.0 - 32.7 pg Final     MCHC   Date Value Ref Range Status   01/14/2017 32.5 (L) 32.6 - 36.4 g/dL Final     RDW   Date Value Ref Range Status   01/14/2017 13.2 11.5 - 14.5 % Final     RDW-SD   Date Value Ref Range Status   01/14/2017 47.5 37.0 - 54.0 fl Final     MPV   Date Value Ref Range Status   01/14/2017 9.7 6.0 - 12.0 fL Final     PLATELETS   Date Value Ref Range Status   01/14/2017 156 140 - 500 10*3/mm3 Final     Lab Results   Component Value Date    GLUCOSE 101 (H) 01/14/2017    BUN 24 (H) 01/14/2017    CREATININE 1.30 (H) 01/14/2017    EGFRIFNONA 53 (L) 01/14/2017     EGFRIFAFRI  09/21/2016      Comment:      <15 Indicative of kidney failure.    BCR 18.5 01/14/2017    CO2 24.3 01/14/2017    CALCIUM 9.0 01/14/2017    PROTENTOTREF 7.2 08/26/2016    ALBUMIN 4.20 09/16/2016    LABIL2 1.3 09/16/2016    AST 16 09/16/2016    ALT 10 09/16/2016     Assessment/Plan     Principal Problem:    Odontoid fracture  Active Problems:    Anemia    Hyperlipidemia    HTN (hypertension)    Hypertension    Coronary artery disease    Muscle weakness-general    Multiple facial bone fractures    Hyponatremia      Assessment:    Condition: In stable condition.   (Traumatic type II odontoid fracture.  No neurological deficits.  Minimal displacement.  Should heal with collar immobilization for 3 months.).     Plan:   ( Continue hard collar usage.  We'll arrange for follow-up in my office.).       Abdon Castanon MD  01/14/17  5:47 PM    Time:

## 2017-01-14 NOTE — PROGRESS NOTES
Acute Care - Physical Therapy Initial Evaluation  [facility]     Patient Name: Noah Isaac  : 1935  MRN: [id number]  Today's Date: 2017      Date of Referral to PT: 17         Admit Date: 2017     Visit Dx:    ICD-10-CM ICD-9-CM   1. Odontoid fracture, closed, initial encounter S12.100A 805.02   2. Facial fracture due to fall, closed, initial encounter S02.92XA 802.8    W19.XXXA E888.9   3. Facial abrasion, initial encounter S00.81XA 910.0   4. Weakness generalized R53.1 780.79     Patient Active Problem List   Diagnosis   • Chronic coronary artery disease   • Anemia   • Hyperlipidemia   • HTN (hypertension)   • Trigeminal neuralgia   • Benign prostatic hyperplasia   • Ulcerative colitis without complications   • Health care maintenance   • Hypertension   • Coronary artery disease   • ANDERS (acute kidney injury)   • Muscle weakness-general   • Pulmonary nodule, right   • Pulmonary fibrosis   • Weakness   • Asterixis   • CVA (cerebral infarction)   • Irritable bowel syndrome with both constipation and diarrhea   • Odontoid fracture   • Multiple facial bone fractures   • Hyponatremia     Past Medical History   Diagnosis Date   • Anemia    • Anxiety disorder    • Arthritis    • Asterixis    • Coronary artery disease    • Coronary artery disease    • Depression    • Hard of hearing    • Hyperlipidemia    • Hypertension    • Pneumonia    • Pulmonary nodule    • S/P tooth extraction 2017   • Ulcerative colitis    • Use of cane as ambulatory aid      Past Surgical History   Procedure Laterality Date   • Coronary artery bypass graft     • Carpal tunnel release     • Rotator cuff repair     • Cardiac surgery     • Cataract extraction     • Cataract extraction w/  intraocular lens implant Bilateral      L eye in 2016, R eye in 2017          PT ASSESSMENT (last 72 hours)      PT Evaluation       17 1420 17 1251    Rehab Evaluation    Document Type evaluation  -AL      Subjective Information agree to therapy;complains of;weakness;pain  -AL     Patient Effort, Rehab Treatment good  -AL     General Information    Prior Level of Function independent:;gait;transfer;bed mobility;all household mobility;ADL's  -AL     Equipment Currently Used at Home walker, rolling;cane, straight  -AL walker, rolling  -    Plans/Goals Discussed With patient  -AL     Risks Reviewed patient:  -AL     Benefits Reviewed patient:  -AL     Living Environment    Lives With  spouse  -SS    Living Arrangements  house  -SS    Home Accessibility  stairs to enter home  -SS    Number of Stairs to Enter Home  4  -SS    Stair Railings at Home  outside, present at both sides  -    Type of Financial/Environmental Concern  none  -SS    Transportation Available  family or friend will provide  -SS    Clinical Impression    Date of Referral to PT 01/14/17  -AL     PT Diagnosis weakness  -AL     Prognosis good  -AL     Functional Level At Time Of Evaluation minimal assistance  -AL     Rehab Potential good, to achieve stated therapy goals  -AL     Pain Assessment    Pain Assessment 0-10  -AL     Pain Score 4  -AL     Pain Type Acute pain  -AL     Pain Location Neck  -AL     Cognitive Assessment/Intervention    Current Cognitive/Communication Assessment functional  -AL     Orientation Status oriented x 4  -AL     Follows Commands/Answers Questions 100% of the time  -AL     Personal Safety WNL/WFL  -AL     Personal Safety Interventions fall prevention program maintained;gait belt  -AL     Short/Long Term Memory intact short term memory;intact long term memory  -AL     ROM (Range of Motion)    General ROM no range of motion deficits identified  -AL     MMT (Manual Muscle Testing)    General MMT Assessment --   generalized weakness  -AL     Bed Mobility, Assessment/Treatment    Bed Mob, Supine to Sit, [place] contact guard assist;verbal cues required  -AL     Bed Mob, Sit to Supine, [place] contact guard  assist;verbal cues required  -AL     Transfer Assessment/Treatment    Transfers, Sit-Stand [place] minimum assist (75% patient effort);verbal cues required;nonverbal cues required (demo/gesture)  -AL     Transfers, Stand-Sit [place] minimum assist (75% patient effort);verbal cues required;nonverbal cues required (demo/gesture)  -AL     Transfer, Comment Patient was leaning to right with standing; LOB towards the right 3 times.  -AL     Gait Assessment/Treatment    Gait, [place] Level moderate assist (50% patient effort);verbal cues required;nonverbal cues required (demo/gesture)   3 steps to HOB  -AL     Gait, Comment --   Patient was very unsteady with standing, did not attempt.  -AL     Positioning and Restraints    Pre-Treatment Position in bed  -AL     Post Treatment Position bed  -AL     In Bed notified nsg;supine;call light within reach   pillow under legs  -AL       User Key  (r) = Recorded By, (t) = Taken By, (c) = Cosigned By    Initials Name Provider Type    PAUL Ronquillo, PT Physical Therapist    JUAN Chaidez RN Registered Nurse          Physical Therapy Education     Title: PT OT SLP Therapies (Active)     Topic: Physical Therapy (Active)     Point: Mobility training (Done)    Learning Progress Summary    Learner Readiness Method Response Comment Documented by Status   Patient Acceptance E VU,NR  AL 01/14/17 1539 Done               Point: Body mechanics (Done)    Learning Progress Summary    Learner Readiness Method Response Comment Documented by Status   Patient Acceptance E VU,NR  AL 01/14/17 1539 Done               Point: Precautions (Done)    Learning Progress Summary    Learner Readiness Method Response Comment Documented by Status   Patient Acceptance E ZEINA,NR  AL 01/14/17 1539 Done                      User Key     Initials Effective Dates Name Provider Type [facility] 12/01/15 -  Cailin Ronquillo, PT Physical Therapist PT                PT Recommendation and  Plan  Anticipated Discharge Disposition: inpatient rehabilitation facility  PT Frequency: daily  Plan of Care Review  Plan Of Care Reviewed With: patient  Progress: no change  Outcome Summary/Follow up Plan: Patient is appropriate for skilled PT secondary to history of falls, decreased balance and strength, and impaired gait. Patient had several LOB to the right lateral with standing. Recommend Rehab after hospital DC to decrease stress of family and improve function of patient before returning home. Will continue to see patient while in this facility.          IP PT Goals       01/14/17 1542          Bed Mobility PT LTG    Bed Mobility PT LTG, Date Established 01/14/17  -AL      Bed Mobility PT LTG, Time to Achieve 1 wk  -AL      Bed Mobility PT LTG, Activity Type all bed mobility  -AL      Bed Mobility PT LTG, [place] Level supervision required  -AL      Bed Mobility PT Goal  LTG, Assist Device bed rails  -AL      Transfer Training PT LTG    Transfer Training PT LTG, Date Established 01/14/17  -AL      Transfer Training PT LTG, Time to Achieve 1 wk  -AL      Transfer Training PT LTG, Activity Type sit to stand/stand to sit  -AL      Transfer Training PT LTG, [place] Level contact guard assist  -AL      Transfer Training PT LTG, Assist Device walker, rolling  -AL      Gait Training PT LTG    Gait Training Goal PT LTG, Date Established 01/14/17  -AL      Gait Training Goal PT LTG, Time to Achieve 1 wk  -AL      Gait Training Goal PT LTG, [place] Level moderate assist (50% patient effort)  -AL      Gait Training Goal PT LTG, Assist Device walker, rolling  -AL      Gait Training Goal PT LTG, Distance to Achieve 50  -AL        User Key  (r) = Recorded By, (t) = Taken By, (c) = Cosigned By    Initials Name Provider Type    PAUL Ronquillo, PT Physical Therapist                Outcome Measures       01/14/17 1500          How much help from another person do you currently need...    Turning from your back  to your side while in flat bed without using bedrails? 3  -AL      Moving from lying on back to sitting on the side of a flat bed without bedrails? 3  -AL      Moving to and from a bed to a chair (including a wheelchair)? 2  -AL      Standing up from a chair using your arms (e.g., wheelchair, bedside chair)? 3  -AL      Climbing 3-5 steps with a railing? 1  -AL      To walk in hospital room? 2  -AL      AM-PAC 6 Clicks Score 14  -AL      Functional Assessment    Outcome Measure Options AM-PAC 6 Clicks Basic Mobility (PT)  -AL        User Key  (r) = Recorded By, (t) = Taken By, (c) = Cosigned By    Initials Name Provider Type    PAUL Ronquillo, PT Physical Therapist           Time Calculation:         PT Charges       01/14/17 1545          Time Calculation    Start Time 1420  -AL      Stop Time 1442  -AL      Time Calculation (min) 22 min  -AL      PT Received On 01/14/17  -AL      PT - Next Appointment 01/15/17  -AL      PT Goal Re-Cert Due Date 01/21/17  -AL        User Key  (r) = Recorded By, (t) = Taken By, (c) = Cosigned By    Initials Name Provider Type    PAUL Ronquillo, PT Physical Therapist          Therapy Charges for Today     Code Description Service Date Service Provider Modifiers Qty    58685276892 HC PT EVAL LOW COMPLEXITY 2 1/14/2017 Cailin Ronquillo, PT GP 1    28722811393 HC PT THER PROC EA 15 MIN 1/14/2017 Cailin Ronquillo, PT GP 1          PT G-Codes  Outcome Measure Options: AM-PAC 6 Clicks Basic Mobility (PT)      Cailin Ronquillo, PT  1/14/2017

## 2017-01-14 NOTE — PLAN OF CARE
Problem: Patient Care Overview (Adult)  Goal: Plan of Care Review  Outcome: Ongoing (interventions implemented as appropriate)    01/14/17 4251   Outcome Evaluation   Outcome Summary/Follow up Plan Patient is appropriate for skilled PT secondary to history of falls, decreased balance and strength, and impaired gait. Patient had several LOB to the right lateral with standing. Recommend Rehab after hospital D/C to decrease stress of family and improve function of patient before returning home. Will continue to see patient while in this facility.

## 2017-01-14 NOTE — PROGRESS NOTES
Discharge Planning Assessment  [facility]     Patient Name: Noah Isaac  MRN: [id number]  Today's Date: 1/14/2017    Admit Date: 1/13/2017          Discharge Needs Assessment       01/14/17 1727    Living Environment    Lives With spouse    Living Arrangements house    Quality Of Family Relationships supportive;helpful;involved    Discharge Needs Assessment    Concerns To Be Addressed basic needs concerns    Readmission Within The Last 30 Days no previous admission in last 30 days    Outpatient/Agency/Support Group Needs homecare agency (specify level of care)    Community Agency Name(S) [facility]    Equipment Currently Used at Home walker, rolling;cane, straight    Transportation Available family or friend will provide;car    Current Discharge Risk physical impairment    Discharge Disposition still a patient            Discharge Plan       01/14/17 1732    Case Management/Social Work Plan    Plan [facility] if meets criteria- [facility] if appropriate for [facility]    Patient/Family In Agreement With Plan yes    Additional Comments Met with pt and his daughter Claire at bedside- verified info on facesheet. Pt lives in a  home with a basement with his wife. Pt uses a cane and a walker for mobility. He just started driving again 2 weeks ago following eye surgery and a recent stroke. Pt was recently released from [facility]- he would use again if needed. Pt will likely need rehab at discharge- he and daughter prefer [facility] or [facility] if pt is more appropriate for DEBORAH. Vmm left on main rehab number with referral ([phone]) and email sent to Winnie with [facility]. CCP will continue to follow and assist as needed.......................SOTO Dias, CCP        Discharge Placement     Facility/Agency Request Status Selected? Address Phone Number Fax Number    [facility] Pending - Request Sent     [address] [facility] [address] [phone]        Sean Chandra RN 1/14/2017 17:32     Email to evaluate on Monday Sean Chandra RN                  [facility] Pending - No Request Sent     [address] [place] [address] [phone]         Sean Chandra RN 1/14/2017 17:41    Referral left on main number ([phone]) Sean Chandra RN                             Demographic Summary       01/14/17 5907    Referral Information    Admission Type inpatient    Arrived From still a patient    Reason For Consult discharge planning    Record Reviewed medical record    Contact Information    Permission Granted to Share Information With family/designee    Comments daughter Claire Black            Functional Status     None            Psychosocial     None            Abuse/Neglect     None            Legal     None            Substance Abuse     None            Patient Forms     None          Sean Chandra RN

## 2017-01-14 NOTE — PROGRESS NOTES
"DAILY PROGRESS NOTE  [facility], [facility]      Patient Identification:  Name: Noah Isaac  Age: 81 y.o.  Sex: male  :  1935  MRN: [id number]         Primary Care Physician: Flash Jim MD    Subjective:  Interval History:  Patient is feeling OK this am. Could not sleep well last night due to pain. Urine is concentrated.\  His VSS are better. His BP is better.    ROS:     No nausea, vomiting, diarrhea or constipation.    Objective:    Scheduled Meds:  amLODIPine 5 mg Oral Q24H   atorvastatin 10 mg Oral Nightly   clopidogrel 75 mg Oral Daily   finasteride 5 mg Oral Daily   gabapentin 300 mg Oral Q8H   isosorbide mononitrate 30 mg Oral Q24H   losartan 100 mg Oral Q24H   metoprolol tartrate 12.5 mg Oral BID   montelukast 10 mg Oral Nightly   prednisoLONE acetate 2 drop Both Eyes Q6H   sertraline 50 mg Oral Daily   sulfaSALAzine 1,000 mg Oral Q8H     Continuous Infusions:     Intake/Output:  No intake or output data in the 24 hours ending 17 0802      Exam:    tMax 24 hrs: Temp (24hrs), Av °F (36.7 °C), Min:97.7 °F (36.5 °C), Max:98.3 °F (36.8 °C)    Vitals Ranges:   Temp:  [97.7 °F (36.5 °C)-98.3 °F (36.8 °C)] 97.8 °F (36.6 °C)  Heart Rate:  [51-94] 69  Resp:  [14-18] 16  BP: (111-218)/(62-88) 131/69    Visit Vitals   • /69 (BP Location: Right arm, Patient Position: Lying)   • Pulse 69   • Temp 97.8 °F (36.6 °C) (Oral)   • Resp 16   • Ht 70\" (177.8 cm)   • Wt 187 lb 8 oz (85 kg)   • SpO2 92%   • BMI 26.15 kg/m2       General: Alert, cooperative, no distress, appears stated age  Face: Several areas and ecchymosis in face, also abrasions in nose, forehead and chin., sutures done in nose. These are the same as yesterday  Neck: Supple, symmetrical, trachea midline, no adenopathy;              thyroid:  no enlargement/tenderness/nodules;              no carotid bruit or JVD  Cardiovascular: Normal rate, regular rhythm and intact distal pulses.      "         Exam reveals no gallop and no friction rub. No murmur heard  Pulmonary: Clear to auscultation bilaterally, respirations unlabored.               No rhonchi, wheezing or rales.   Abdominal: Soft. Soft, non-tender, bowel sounds active all four quadrants,     no masses, no hepatomegaly, no splenomegaly.   Extremities: Normal, atraumatic, no cyanosis or edema, abrasion in right knee is fading  Neurological: He is alert and oriented to person, place, and time.                 CNII-XII intact, normal strength, sensation intact throughout  Skin: Skin color, texture, turgor normal, no rashes or lesions      Data Review:      Results from last 7 days  Lab Units 01/14/17  0618   WBC 10*3/mm3 8.08   HEMOGLOBIN g/dL 10.4*   HEMATOCRIT % 32.0*   PLATELETS 10*3/mm3 156         Results from last 7 days  Lab Units 01/14/17  0618   SODIUM mmol/L 129*   POTASSIUM mmol/L 4.9   CHLORIDE mmol/L 92*   TOTAL CO2 mmol/L 24.3   BUN mg/dL 24*   CREATININE mg/dL 1.30*   CALCIUM mg/dL 9.0   GLUCOSE mg/dL 101*             0  Lab Value Date/Time   TROPONINT <0.010 09/16/2016 1818   TROPONINT <0.010 08/29/2016 1958   TROPONINT <0.010 08/28/2016 2009       Intake/Output     None           Imaging Results (last 7 days)     Procedure Component Value Units Date/Time    XR Knee 1 or 2 View Right [[id number]] Collected:  01/13/17 1316     Updated:  01/13/17 1320    Narrative:       Right knee 2 views 01/13/2017     HISTORY: Fell, right knee pain.     There is vascular calcification in the right knee.     No fractures or dislocations are seen. There may be very minimal  suprapatellar effusion. Surgical staples are seen in the medial aspect  of the right knee.       Impression:       1. No acute process except for possible minimal suprapatellar effusion.     This report was finalized on 1/13/2017 1:17 PM by Dr. Andres Grimes MD.       CT Head Without Contrast [[id number]] Collected:  01/13/17 1721     Updated:  01/13/17 1721    Narrative:        EMERGENCY NONCONTRAST HEAD CT, FACIAL CT AND CERVICAL SPINE CT  01/13/2017     CLINICAL HISTORY: [facility],  loss  his balance fell face first, denies loss of consciousness     HEAD CT TECHNIQUE: Spiral CT images were obtained from the base of the  skull to the vertex without intravenous contrast and images were  reformatted and submitted in 3 mm thick axial CT section with brain  algorithm and 2 mm thick axial CT section with high-resolution bone  algorithm and additional 2 mm thick sagittal and coronal reconstructions  were performed at brain algorithm and due to questionable finding along  the anterior tip of the left temporal lobe patient was brought back for  additional spiral CT images through the head..     This correlated to an prior noncontrast head CT from [facility] 09/16/2016 and prior MRI of the head 09/17/2016.     FINDINGS: There is some patchy nodular low-density in periventricular  extending to subcortical white matter of cerebral hemispheres consistent  with mild-to-moderate small vessel disease. The remainder of the brain  parenchyma is normal in attenuation, the lateral and third ventricles  are minimally prominent size likely due to to central volume loss. There  is a thin sliver of hyperdensity along the anterior tip of the left  temporal lobe on the initial set axial images, it is not reproduced on  the repeat axial images nor on the facial CT images and felt to be  artifact. Overall, no extra-axial fluid collections are identified and  there is no evidence of acute intracranial hemorrhage. There is a scalp  hematoma over the anterior inferior frontal bone extending paranasal and  periorbital soft tissues and there is multiple acute fractures including  a linear nondisplaced vertical fracture of the anterior inferior medial  left frontal bone extending into anterior table of the inferior medial  left frontal sinus, fractures of the  anterosuperior tip in the anterior  aspect nasal septum fractures of the superior nasal bones extending  nasal bridge, fractures of the left orbital floor and anterior and  posterior lateral wall left maxillary sinus there are fractures of the  pterygoid plates.       Impression:       1. There is moderate small vessel disease in the cerebral white matter.     2. No acute intracranial hemorrhage is seen     3. There are multiple acute fractures with a linear nondisplaced  fracture in the anterior inferior medial left frontal bone extending  into the anterior table of the inferior medial left frontal sinus as  well as fracture of the anterosuperior and anterior aspect nasal septum,  fractures of the superior nasal bones and nasal bridge fractures of the  left orbital floor and anterior posterior lateral wall left maxillary  sinus likely fracture of the anterior wall of the right maxillary sinus  fractures of the pterygoid plates. There is blood subtotally opacifying  left frontal sinus, anterior ethmoid sinuses bilaterally left maxillary  sinus blood partially opacifying posterior right maxillary sinus and see  facial CT report below. The  remainder of the head CT is unremarkable.     FACIAL CT TECHNIQUE: Spiral CT images were obtained through the facial  bones in the axial imaging plane. Images were reformatted and are  submitted in 2 mm thick axial CT section with soft tissue and  high-resolution bone algorithm and additional 2 mm thick sagittal and  coronal reconstructions were performed..     FINDINGS: Patient has a moderate size scalp hematoma over the anterior  inferior frontal bone extending into the paranasal tissues and  periorbital tissues from today's head and facial trauma. There is a  linear acute fracture involving the far anterior inferior medial left  frontal bone that extends into the anterior wall of the inferior medial  left frontal sinus. Fracture extends through the medial septa between  the   left and right frontal sinuses and fracture plane also involve the  superior nasal bones extending into the nasal bridge and there is  fracture of the far anterior superior aspect of the nasal septum. There  is  fracture plane extending into the anterior superior medial wall of  the left orbit and there is fracture plane involving the inferior medial  wall of the left orbit and there is mild with comminuted fracture that  is nondisplaced involving the left inferior orbital wall and the  fracture extending into the anterior wall of the left maxillary sinus  with minimal comminution in the anterior inferior wall left maxillary  sinus, there is fracture plane involving posterior lateral wall left  maxillary sinus with several bubbles of air extending into the left  infratemporal fossa just lateral to the left maxillary sinus. There may  be a linear nondisplaced fracture of the lateral left pterygoid plate.  Hairline nondisplaced fracture of the medial aspect of the right orbital  roof and there is a hairline nondisplaced fracture horizontally oriented  through the midportion of the anterior wall of the right maxillary  sinus. There is fluid and blood subtotally opacifying the left frontal  sinus partially opacifying the anterior ethmoid sinus and subtotally  opacifying the left maxillary sinus partially opacifying the posterior  third of the right maxillary sinus.     IMPRESSION:     1. This patient has scalp hematoma over the anterior inferior midline of  the frontal bone extending into the paranasal tissues and there are  hairline nondisplaced fractures in the anterior inferior medial aspect  of left frontal bone extending into the anterior table of the inferior  medial left frontal sinus, fracture involving the superior nasal bones  extending nasal bridge, there is a nondisplaced fracture of the far  anterior superior portion of the nasal septum. There is fracture plane  extending into the anterior superior medial  wall of the left orbit as  well as involving the inferior medial wall of left orbit as well as some  minimally comminuted nondisplaced fracture of the left orbital floor  with several bubbles of air in the extraconal fat of the inferior left  orbit. There are multiple fracture planes involving the anterior wall  left maxillary sinus with some comminution in the inferior aspect of  anterior wall left maxillary sinus and there are fracture planes  involving the posterior lateral wall left maxillary sinus with several  bubbles of air in the lateral to left maxillary sinus in the april  maxillary fat and may be a subtle hairline nondisplaced fracture of the  left  lateral pterygoid plate. There is a questionable hairline  nondisplaced fracture hairline fracture of the medial aspect of the  right orbital roof bordering the inferior wall of the right frontal  sinus. There is probable hairline nondisplaced fracture horizontally  oriented  through the midportion of the right maxillary sinus. There is  questionable hairline nondisplaced fractures of the medial lateral  pterygoid plates bilaterally. There is blood and fluid subtotally  opacifying left frontal sinus and anterior ethmoid sinuses bilaterally  the right frontal recess partially opacifying the posterior right  maxillary sinus and subtotally opacifying the left maxillary sinus.  Given the pterygoid plate fractures and facial fractures described this  is probably a variant of a LeFort fracture.     On sagittal images there is evidence of nondisplaced type II odontoid  fracture through the base of odontoid this will be discussed on the  cervical spine CT report below     CERVICAL SPINE CT TECHNIQUE: Spiral CT images were obtained from the  skull base to the T4 thoracic level and images were reformatted and are  submitted in 2 mm thick axial CT section with soft tissue and bone  algorithm and additional 2 mm thick sagittal and coronal reconstructions  were performed.      FINDINGS: There is arthritic change at the atlantodental interval with  marginal spurring off the anterior ring of C1 and the odontoid. There is  an oblique nondisplaced fracture through the base of the odontoid  indicating a type II unstable base of odontoid fracture     There is mild right and  moderate to severe left facet overgrowth at  C2-3, there is mild left bony foraminal narrowing posterior disc margin  is normal and there is no canal or right foraminal narrowing at C2-3     At C3-4, there is moderate to severe bilateral facet overgrowth. 2 mm  degenerative anterolisthesis of C3 on C4 mild posterior endplate  spurring eccentric left posterior laterally mildly narrowing the left  side of the canal, bilateral uncovertebral joint hypertrophy and there  is moderate left and mild right bony foraminal narrowing at C3-4     At C4-5, there is mild/moderate right and moderate severe left facet  overgrowth. 1 to 2 mm anterolisthesis of C4 on C5 there is mild  posterior endplate spurring mild canal and left foraminal narrowing  moderate right bony foraminal narrowing at C4-5.     At C5-6, there is moderate left and minimal right facet overgrowth C5-6  disc space narrowing degenerative endplate change diffuse posterior disc  osteophyte complex and bilateral uncovertebral joint hypertrophy, there  is mild canal and there is moderate bilateral bony foraminal narrowing  at C5-6.     At C6-7, there is disc space narrowing degenerative endplate change  diffuse posterior disc osteophyte complex mild facet overgrowth and  uncovertebral joint hypertrophy and there is mild canal and left  foraminal narrowing.     At C7-T1 there is disc space narrowing, degenerative endplate changes  posterior endplate spurring and facet overgrowth contributing to mild  canal and bilateral foraminal narrowing     IMPRESSION:      1. There is an oblique nondisplaced fracture through the base of the  odontoid process indicating an acute type II  nondisplaced type II   odontoid fracture which is an unstable fracture, neurosurgical  consultation is warranted   2. There is cervical spondylosis as described above. Extensive results  from  the study were discussed in great detail with Dr. Elder Reaves  from the emergency room by telephone on 01/13/2017 at 3:00 PM/       CT Facial Bones Without Contrast [[id number]] Collected:  01/13/17 1721     Updated:  01/13/17 1721    Narrative:       EMERGENCY NONCONTRAST HEAD CT, FACIAL CT AND CERVICAL SPINE CT  01/13/2017     CLINICAL HISTORY: Patient fell in parking lot of [employer],  loss  his balance fell face first, denies loss of consciousness     HEAD CT TECHNIQUE: Spiral CT images were obtained from the base of the  skull to the vertex without intravenous contrast and images were  reformatted and submitted in 3 mm thick axial CT section with brain  algorithm and 2 mm thick axial CT section with high-resolution bone  algorithm and additional 2 mm thick sagittal and coronal reconstructions  were performed at brain algorithm and due to questionable finding along  the anterior tip of the left temporal lobe patient was brought back for  additional spiral CT images through the head..     This correlated to an prior noncontrast head CT from [facility] 09/16/2016 and prior MRI of the head 09/17/2016.     FINDINGS: There is some patchy nodular low-density in periventricular  extending to subcortical white matter of cerebral hemispheres consistent  with mild-to-moderate small vessel disease. The remainder of the brain  parenchyma is normal in attenuation, the lateral and third ventricles  are minimally prominent size likely due to to central volume loss. There  is a thin sliver of hyperdensity along the anterior tip of the left  temporal lobe on the initial set axial images, it is not reproduced on  the repeat axial images nor on the facial CT images and felt to be  artifact. Overall, no extra-axial fluid  collections are identified and  there is no evidence of acute intracranial hemorrhage. There is a scalp  hematoma over the anterior inferior frontal bone extending paranasal and  periorbital soft tissues and there is multiple acute fractures including  a linear nondisplaced vertical fracture of the anterior inferior medial  left frontal bone extending into anterior table of the inferior medial  left frontal sinus, fractures of the anterosuperior tip in the anterior  aspect nasal septum fractures of the superior nasal bones extending  nasal bridge, fractures of the left orbital floor and anterior and  posterior lateral wall left maxillary sinus there are fractures of the  pterygoid plates.       Impression:       1. There is moderate small vessel disease in the cerebral white matter.     2. No acute intracranial hemorrhage is seen     3. There are multiple acute fractures with a linear nondisplaced  fracture in the anterior inferior medial left frontal bone extending  into the anterior table of the inferior medial left frontal sinus as  well as fracture of the anterosuperior and anterior aspect nasal septum,  fractures of the superior nasal bones and nasal bridge fractures of the  left orbital floor and anterior posterior lateral wall left maxillary  sinus likely fracture of the anterior wall of the right maxillary sinus  fractures of the pterygoid plates. There is blood subtotally opacifying  left frontal sinus, anterior ethmoid sinuses bilaterally left maxillary  sinus blood partially opacifying posterior right maxillary sinus and see  facial CT report below. The  remainder of the head CT is unremarkable.     FACIAL CT TECHNIQUE: Spiral CT images were obtained through the facial  bones in the axial imaging plane. Images were reformatted and are  submitted in 2 mm thick axial CT section with soft tissue and  high-resolution bone algorithm and additional 2 mm thick sagittal and  coronal reconstructions were  performed..     FINDINGS: Patient has a moderate size scalp hematoma over the anterior  inferior frontal bone extending into the paranasal tissues and  periorbital tissues from today's head and facial trauma. There is a  linear acute fracture involving the far anterior inferior medial left  frontal bone that extends into the anterior wall of the inferior medial  left frontal sinus. Fracture extends through the medial septa between  the  left and right frontal sinuses and fracture plane also involve the  superior nasal bones extending into the nasal bridge and there is  fracture of the far anterior superior aspect of the nasal septum. There  is  fracture plane extending into the anterior superior medial wall of  the left orbit and there is fracture plane involving the inferior medial  wall of the left orbit and there is mild with comminuted fracture that  is nondisplaced involving the left inferior orbital wall and the  fracture extending into the anterior wall of the left maxillary sinus  with minimal comminution in the anterior inferior wall left maxillary  sinus, there is fracture plane involving posterior lateral wall left  maxillary sinus with several bubbles of air extending into the left  infratemporal fossa just lateral to the left maxillary sinus. There may  be a linear nondisplaced fracture of the lateral left pterygoid plate.  Hairline nondisplaced fracture of the medial aspect of the right orbital  roof and there is a hairline nondisplaced fracture horizontally oriented  through the midportion of the anterior wall of the right maxillary  sinus. There is fluid and blood subtotally opacifying the left frontal  sinus partially opacifying the anterior ethmoid sinus and subtotally  opacifying the left maxillary sinus partially opacifying the posterior  third of the right maxillary sinus.     IMPRESSION:     1. This patient has scalp hematoma over the anterior inferior midline of  the frontal bone extending  into the paranasal tissues and there are  hairline nondisplaced fractures in the anterior inferior medial aspect  of left frontal bone extending into the anterior table of the inferior  medial left frontal sinus, fracture involving the superior nasal bones  extending nasal bridge, there is a nondisplaced fracture of the far  anterior superior portion of the nasal septum. There is fracture plane  extending into the anterior superior medial wall of the left orbit as  well as involving the inferior medial wall of left orbit as well as some  minimally comminuted nondisplaced fracture of the left orbital floor  with several bubbles of air in the extraconal fat of the inferior left  orbit. There are multiple fracture planes involving the anterior wall  left maxillary sinus with some comminution in the inferior aspect of  anterior wall left maxillary sinus and there are fracture planes  involving the posterior lateral wall left maxillary sinus with several  bubbles of air in the lateral to left maxillary sinus in the april  maxillary fat and may be a subtle hairline nondisplaced fracture of the  left  lateral pterygoid plate. There is a questionable hairline  nondisplaced fracture hairline fracture of the medial aspect of the  right orbital roof bordering the inferior wall of the right frontal  sinus. There is probable hairline nondisplaced fracture horizontally  oriented  through the midportion of the right maxillary sinus. There is  questionable hairline nondisplaced fractures of the medial lateral  pterygoid plates bilaterally. There is blood and fluid subtotally  opacifying left frontal sinus and anterior ethmoid sinuses bilaterally  the right frontal recess partially opacifying the posterior right  maxillary sinus and subtotally opacifying the left maxillary sinus.  Given the pterygoid plate fractures and facial fractures described this  is probably a variant of a LeFort fracture.     On sagittal images there is  evidence of nondisplaced type II odontoid  fracture through the base of odontoid this will be discussed on the  cervical spine CT report below     CERVICAL SPINE CT TECHNIQUE: Spiral CT images were obtained from the  skull base to the T4 thoracic level and images were reformatted and are  submitted in 2 mm thick axial CT section with soft tissue and bone  algorithm and additional 2 mm thick sagittal and coronal reconstructions  were performed.     FINDINGS: There is arthritic change at the atlantodental interval with  marginal spurring off the anterior ring of C1 and the odontoid. There is  an oblique nondisplaced fracture through the base of the odontoid  indicating a type II unstable base of odontoid fracture     There is mild right and  moderate to severe left facet overgrowth at  C2-3, there is mild left bony foraminal narrowing posterior disc margin  is normal and there is no canal or right foraminal narrowing at C2-3     At C3-4, there is moderate to severe bilateral facet overgrowth. 2 mm  degenerative anterolisthesis of C3 on C4 mild posterior endplate  spurring eccentric left posterior laterally mildly narrowing the left  side of the canal, bilateral uncovertebral joint hypertrophy and there  is moderate left and mild right bony foraminal narrowing at C3-4     At C4-5, there is mild/moderate right and moderate severe left facet  overgrowth. 1 to 2 mm anterolisthesis of C4 on C5 there is mild  posterior endplate spurring mild canal and left foraminal narrowing  moderate right bony foraminal narrowing at C4-5.     At C5-6, there is moderate left and minimal right facet overgrowth C5-6  disc space narrowing degenerative endplate change diffuse posterior disc  osteophyte complex and bilateral uncovertebral joint hypertrophy, there  is mild canal and there is moderate bilateral bony foraminal narrowing  at C5-6.     At C6-7, there is disc space narrowing degenerative endplate change  diffuse posterior disc  osteophyte complex mild facet overgrowth and  uncovertebral joint hypertrophy and there is mild canal and left  foraminal narrowing.     At C7-T1 there is disc space narrowing, degenerative endplate changes  posterior endplate spurring and facet overgrowth contributing to mild  canal and bilateral foraminal narrowing     IMPRESSION:      1. There is an oblique nondisplaced fracture through the base of the  odontoid process indicating an acute type II nondisplaced type II   odontoid fracture which is an unstable fracture, neurosurgical  consultation is warranted   2. There is cervical spondylosis as described above. Extensive results  from  the study were discussed in great detail with Dr. Elder Reaves  from the emergency room by telephone on 01/13/2017 at 3:00 PM/       CT Cervical Spine Without Contrast [[id number]] Collected:  01/13/17 1721     Updated:  01/13/17 1721    Narrative:       EMERGENCY NONCONTRAST HEAD CT, FACIAL CT AND CERVICAL SPINE CT  01/13/2017     CLINICAL HISTORY: Patient fell in parking lot of [facility],  loss  his balance fell face first, denies loss of consciousness     HEAD CT TECHNIQUE: Spiral CT images were obtained from the base of the  skull to the vertex without intravenous contrast and images were  reformatted and submitted in 3 mm thick axial CT section with brain  algorithm and 2 mm thick axial CT section with high-resolution bone  algorithm and additional 2 mm thick sagittal and coronal reconstructions  were performed at brain algorithm and due to questionable finding along  the anterior tip of the left temporal lobe patient was brought back for  additional spiral CT images through the head..     This correlated to an prior noncontrast head CT from [facility] 09/16/2016 and prior MRI of the head 09/17/2016.     FINDINGS: There is some patchy nodular low-density in periventricular  extending to subcortical white matter of cerebral hemispheres consistent  with  mild-to-moderate small vessel disease. The remainder of the brain  parenchyma is normal in attenuation, the lateral and third ventricles  are minimally prominent size likely due to to central volume loss. There  is a thin sliver of hyperdensity along the anterior tip of the left  temporal lobe on the initial set axial images, it is not reproduced on  the repeat axial images nor on the facial CT images and felt to be  artifact. Overall, no extra-axial fluid collections are identified and  there is no evidence of acute intracranial hemorrhage. There is a scalp  hematoma over the anterior inferior frontal bone extending paranasal and  periorbital soft tissues and there is multiple acute fractures including  a linear nondisplaced vertical fracture of the anterior inferior medial  left frontal bone extending into anterior table of the inferior medial  left frontal sinus, fractures of the anterosuperior tip in the anterior  aspect nasal septum fractures of the superior nasal bones extending  nasal bridge, fractures of the left orbital floor and anterior and  posterior lateral wall left maxillary sinus there are fractures of the  pterygoid plates.       Impression:       1. There is moderate small vessel disease in the cerebral white matter.     2. No acute intracranial hemorrhage is seen     3. There are multiple acute fractures with a linear nondisplaced  fracture in the anterior inferior medial left frontal bone extending  into the anterior table of the inferior medial left frontal sinus as  well as fracture of the anterosuperior and anterior aspect nasal septum,  fractures of the superior nasal bones and nasal bridge fractures of the  left orbital floor and anterior posterior lateral wall left maxillary  sinus likely fracture of the anterior wall of the right maxillary sinus  fractures of the pterygoid plates. There is blood subtotally opacifying  left frontal sinus, anterior ethmoid sinuses bilaterally left  maxillary  sinus blood partially opacifying posterior right maxillary sinus and see  facial CT report below. The  remainder of the head CT is unremarkable.     FACIAL CT TECHNIQUE: Spiral CT images were obtained through the facial  bones in the axial imaging plane. Images were reformatted and are  submitted in 2 mm thick axial CT section with soft tissue and  high-resolution bone algorithm and additional 2 mm thick sagittal and  coronal reconstructions were performed..     FINDINGS: Patient has a moderate size scalp hematoma over the anterior  inferior frontal bone extending into the paranasal tissues and  periorbital tissues from today's head and facial trauma. There is a  linear acute fracture involving the far anterior inferior medial left  frontal bone that extends into the anterior wall of the inferior medial  left frontal sinus. Fracture extends through the medial septa between  the  left and right frontal sinuses and fracture plane also involve the  superior nasal bones extending into the nasal bridge and there is  fracture of the far anterior superior aspect of the nasal septum. There  is  fracture plane extending into the anterior superior medial wall of  the left orbit and there is fracture plane involving the inferior medial  wall of the left orbit and there is mild with comminuted fracture that  is nondisplaced involving the left inferior orbital wall and the  fracture extending into the anterior wall of the left maxillary sinus  with minimal comminution in the anterior inferior wall left maxillary  sinus, there is fracture plane involving posterior lateral wall left  maxillary sinus with several bubbles of air extending into the left  infratemporal fossa just lateral to the left maxillary sinus. There may  be a linear nondisplaced fracture of the lateral left pterygoid plate.  Hairline nondisplaced fracture of the medial aspect of the right orbital  roof and there is a hairline nondisplaced fracture  horizontally oriented  through the midportion of the anterior wall of the right maxillary  sinus. There is fluid and blood subtotally opacifying the left frontal  sinus partially opacifying the anterior ethmoid sinus and subtotally  opacifying the left maxillary sinus partially opacifying the posterior  third of the right maxillary sinus.     IMPRESSION:     1. This patient has scalp hematoma over the anterior inferior midline of  the frontal bone extending into the paranasal tissues and there are  hairline nondisplaced fractures in the anterior inferior medial aspect  of left frontal bone extending into the anterior table of the inferior  medial left frontal sinus, fracture involving the superior nasal bones  extending nasal bridge, there is a nondisplaced fracture of the far  anterior superior portion of the nasal septum. There is fracture plane  extending into the anterior superior medial wall of the left orbit as  well as involving the inferior medial wall of left orbit as well as some  minimally comminuted nondisplaced fracture of the left orbital floor  with several bubbles of air in the extraconal fat of the inferior left  orbit. There are multiple fracture planes involving the anterior wall  left maxillary sinus with some comminution in the inferior aspect of  anterior wall left maxillary sinus and there are fracture planes  involving the posterior lateral wall left maxillary sinus with several  bubbles of air in the lateral to left maxillary sinus in the april  maxillary fat and may be a subtle hairline nondisplaced fracture of the  left  lateral pterygoid plate. There is a questionable hairline  nondisplaced fracture hairline fracture of the medial aspect of the  right orbital roof bordering the inferior wall of the right frontal  sinus. There is probable hairline nondisplaced fracture horizontally  oriented  through the midportion of the right maxillary sinus. There is  questionable hairline nondisplaced  fractures of the medial lateral  pterygoid plates bilaterally. There is blood and fluid subtotally  opacifying left frontal sinus and anterior ethmoid sinuses bilaterally  the right frontal recess partially opacifying the posterior right  maxillary sinus and subtotally opacifying the left maxillary sinus.  Given the pterygoid plate fractures and facial fractures described this  is probably a variant of a LeFort fracture.     On sagittal images there is evidence of nondisplaced type II odontoid  fracture through the base of odontoid this will be discussed on the  cervical spine CT report below     CERVICAL SPINE CT TECHNIQUE: Spiral CT images were obtained from the  skull base to the T4 thoracic level and images were reformatted and are  submitted in 2 mm thick axial CT section with soft tissue and bone  algorithm and additional 2 mm thick sagittal and coronal reconstructions  were performed.     FINDINGS: There is arthritic change at the atlantodental interval with  marginal spurring off the anterior ring of C1 and the odontoid. There is  an oblique nondisplaced fracture through the base of the odontoid  indicating a type II unstable base of odontoid fracture     There is mild right and  moderate to severe left facet overgrowth at  C2-3, there is mild left bony foraminal narrowing posterior disc margin  is normal and there is no canal or right foraminal narrowing at C2-3     At C3-4, there is moderate to severe bilateral facet overgrowth. 2 mm  degenerative anterolisthesis of C3 on C4 mild posterior endplate  spurring eccentric left posterior laterally mildly narrowing the left  side of the canal, bilateral uncovertebral joint hypertrophy and there  is moderate left and mild right bony foraminal narrowing at C3-4     At C4-5, there is mild/moderate right and moderate severe left facet  overgrowth. 1 to 2 mm anterolisthesis of C4 on C5 there is mild  posterior endplate spurring mild canal and left foraminal  narrowing  moderate right bony foraminal narrowing at C4-5.     At C5-6, there is moderate left and minimal right facet overgrowth C5-6  disc space narrowing degenerative endplate change diffuse posterior disc  osteophyte complex and bilateral uncovertebral joint hypertrophy, there  is mild canal and there is moderate bilateral bony foraminal narrowing  at C5-6.     At C6-7, there is disc space narrowing degenerative endplate change  diffuse posterior disc osteophyte complex mild facet overgrowth and  uncovertebral joint hypertrophy and there is mild canal and left  foraminal narrowing.     At C7-T1 there is disc space narrowing, degenerative endplate changes  posterior endplate spurring and facet overgrowth contributing to mild  canal and bilateral foraminal narrowing     IMPRESSION:      1. There is an oblique nondisplaced fracture through the base of the  odontoid process indicating an acute type II nondisplaced type II   odontoid fracture which is an unstable fracture, neurosurgical  consultation is warranted   2. There is cervical spondylosis as described above. Extensive results  from  the study were discussed in great detail with Dr. Elder Reaves  from the emergency room by telephone on 01/13/2017 at 3:00 PM/       MRI Cervical Spine Without Contrast [[id number]] Collected:  01/13/17 2107     Updated:  01/13/17 2107    Narrative:       MRI EXAMINATION OF THE CERVICAL SPINE WITHOUT CONTRAST     HISTORY: Odontoid fracture.     A MRI examination of the cervical spine was performed without the use of  contrast and compared to the CT examination of the cervical spine from  earlier the same day.     FINDINGS: Signal intensity within the cervical cord is normal on the  sagittal T2 sequence. There is a grade 1 anterolisthesis of C4 upon C5  estimated to be 1-2 mm in severity. There is moderate loss of disc  height at C5-6, C6-7, and C7-T1. There is disc desiccation at all  levels.     C2-3: Moderate facet degenerative  disease is present on the left. There  is a mild broad-based disc osteophyte complex resulting in mild  flattening of the ventral surface of the thecal sac. Cerebrospinal fluid  is effaced ventral and dorsal to the cord.     C3-4: There is mild and mild to moderate facet degenerative disease on  the right and left respectively. There is moderate neuroforaminal  compromise on the left secondary to facet hypertrophy and uncovertebral  degenerative disease. A broad-based disc osteophyte complex is present  which results in moderate canal stenosis, more prominent on the left.  There is effacement of cerebrospinal fluid ventral and dorsal to the  cord.     C4-5: Moderate facet degenerative disease is present bilaterally.     C5-6: There is a broad-based disc osteophyte complex resulting in mild  flattening of the ventral surface of the thecal sac and cord.  Cerebrospinal fluid is effaced ventral and dorsal to the cord. There is  mild neuroforaminal compromise bilaterally secondary to uncovertebral  degenerative disease.     C6-7: There is a mild broad-based disc osteophyte complex resulting in  mild flattening of the ventral surface of the thecal sac and cord. There  is moderate neuroforaminal compromise bilaterally, more prominent on the  left secondary to uncovertebral degenerative disease.     C7-T1: There is a mild broad-based disc osteophyte complex with no  evidence of herniation. There is mild neuroforaminal compromise  bilaterally secondary to extension of the disc osteophyte complex into  the neural foramen.     The CT examination of the cervical spine demonstrated an oblique  fracture involving the dens. The fracture is better demonstrated on the  CT examination. There is only minimal increased signal intensity  involving the fracture on the sagittal T2 sequence. There is no evidence  of a prevertebral hematoma.       Impression:       There is an oblique minimally displaced fracture involving  the dens with  minimal increased signal intensity appreciated on the  sagittal T2 sequence at the fracture site. There is no evidence of an  epidural hematoma or of cord signal abnormality. There is moderate canal  stenosis from C2 to C7 secondary to broad-based disc osteophyte  complexes at each level flattening the ventral surface of the cord and  effacing cerebrospinal fluid ventral and dorsal to the cord at each  level. The greatest degree of spinal stenosis is at C6-7.     The above information was called to and discussed with LUCIANA Nieves  following the dictation.               Assessment:      Principal Problem:    Odontoid fracture  Active Problems:    Muscle weakness-general    Multiple facial bone fractures    Hyperlipidemia    HTN (hypertension)    Hypertension    Coronary artery disease      Patient Active Problem List   Diagnosis Code   • Chronic coronary artery disease I25.10   • Anemia D64.9   • Hyperlipidemia E78.5   • HTN (hypertension) I10   • Trigeminal neuralgia G50.0   • Benign prostatic hyperplasia N40.0   • Ulcerative colitis without complications K51.90   • Health care maintenance Z00.00   • Hypertension I10   • Coronary artery disease I25.10   • ANDERS (acute kidney injury) N17.9   • Muscle weakness-general M62.81   • Pulmonary nodule, right R91.1   • Pulmonary fibrosis J84.10   • Weakness R53.1   • Asterixis R27.8   • CVA (cerebral infarction) I63.9   • Irritable bowel syndrome with both constipation and diarrhea K58.2   • Odontoid fracture S12.100A   • Multiple facial bone fractures S02.92XA       Plan:    Start IV fluids  Pain/nausea control, add po pain medications  IS  Monitor and correct electrolytes  Continue home medications  Monitor mental status  PT/OT consult  DVT/stress ulcer prophylaxis  Labs in am          Loc Bolanos MD  1/14/2017  8:02 AM        Much of this encounter note is an electronic transcription/translation of spoken language to printed text. The electronic translation of  spoken language may permit erroneous, or at times, nonsensical words or phrases to be inadvertently transcribed; Although I have reviewed the note for such errors, some may still exist

## 2017-01-15 PROBLEM — E86.0 DEHYDRATION: Status: ACTIVE | Noted: 2017-01-01

## 2017-01-15 NOTE — PLAN OF CARE
Problem: Orthopaedic Fracture (Adult)  Goal: Signs and Symptoms of Listed Potential Problems Will be Absent or Manageable (Orthopaedic Fracture)  Outcome: Ongoing (interventions implemented as appropriate)    Problem: Patient Care Overview (Adult)  Goal: Plan of Care Review  Outcome: Ongoing (interventions implemented as appropriate)    01/15/17 0751   Patient Care Overview   Progress improving   Outcome Evaluation   Outcome Summary/Follow up Plan pain control better, plan for rehab earliest monday.        Goal: Adult Individualization and Mutuality  Outcome: Ongoing (interventions implemented as appropriate)  Goal: Discharge Needs Assessment  Outcome: Ongoing (interventions implemented as appropriate)    Problem: Fall Risk (Adult)  Goal: Absence of Falls  Outcome: Ongoing (interventions implemented as appropriate)

## 2017-01-15 NOTE — PROGRESS NOTES
"DAILY PROGRESS NOTE  [facility], [facility]      Patient Identification:  Name: Noah Isaac  Age: 81 y.o.  Sex: male  :  1935  MRN: [id number]         Primary Care Physician: Flash Jim MD    Subjective:  Interval History:    Patient is feeling better today, eating okay, hydrated.  Blood pressure is slightly elevated.  His sodium is still low and his creatinine has increased some.  She has baseline creatinine is 0.91.  His pain however is controlled.  He has walked with physical therapy this morning.  His pain is controlled with current medication.    ROS:     No nausea, vomiting, diarrhea, constipation, chest pain, shortness of breath.    Objective:    Scheduled Meds:    amLODIPine 5 mg Oral Q24H   atorvastatin 10 mg Oral Nightly   clopidogrel 75 mg Oral Daily   docusate sodium 100 mg Oral BID   enoxaparin 40 mg Subcutaneous Q24H   fentaNYL 1 patch Transdermal Q72H   finasteride 5 mg Oral Daily   gabapentin 300 mg Oral Q8H   isosorbide mononitrate 30 mg Oral Q24H   losartan 100 mg Oral Q24H   metoprolol tartrate 12.5 mg Oral BID   montelukast 10 mg Oral Nightly   prednisoLONE acetate 2 drop Both Eyes Q6H   sertraline 50 mg Oral Daily   sulfaSALAzine 1,000 mg Oral Q8H     Continuous Infusions:    sodium chloride 100 mL/hr Last Rate: 100 mL/hr (01/15/17 0608)       Intake/Output:    Intake/Output Summary (Last 24 hours) at 01/15/17 1313  Last data filed at 01/15/17 0755   Gross per 24 hour   Intake    975 ml   Output   1400 ml   Net   -425 ml         Exam:    tMax 24 hrs: Temp (24hrs), Av.9 °F (36.6 °C), Min:97.6 °F (36.4 °C), Max:98 °F (36.7 °C)    Vitals Ranges:   Temp:  [97.6 °F (36.4 °C)-98 °F (36.7 °C)] 98 °F (36.7 °C)  Heart Rate:  [62-79] 63  Resp:  [16-18] 16  BP: (101-165)/(63-82) 165/70    Visit Vitals   • /70 (BP Location: Right arm, Patient Position: Lying)   • Pulse 63   • Temp 98 °F (36.7 °C) (Oral)   • Resp 16   • Ht 70\" (177.8 cm) "   • Wt 168 lb 9 oz (76.5 kg)   • SpO2 92%   • BMI 23.51 kg/m2       General: Alert, cooperative, no distress, appears stated age  Face: Areas of ecchymosis in face, also abrasions in nose, forehead and chin., are healing.   Neck: Cervical collar in place. Neck is symmetrical, trachea midline, no adenopathy;   thyroid: no enlargement/tenderness/nodules;   no carotid bruit or JVD  Cardiovascular: Normal rate, regular rhythm and intact distal pulses.  Exam reveals no gallop and no friction rub. No murmur heard  Pulmonary: Clear to auscultation bilaterally, respirations unlabored.   No rhonchi, wheezing or rales.   Abdominal: Soft. Soft, non-tender, bowel sounds active all four quadrants,   no masses, no hepatomegaly, no splenomegaly.   Extremities: Normal, atraumatic, no cyanosis or edema, abrasion in right knee is fading  Neurological: He is alert and oriented to person, place, and time.   CNII-XII intact, normal strength, sensation intact throughout  Skin: Skin color, texture, turgor normal, no rashes or lesions      Data Review:      Results from last 7 days  Lab Units 01/15/17  0757 01/14/17  0618   WBC 10*3/mm3 7.25 8.08   HEMOGLOBIN g/dL 9.8* 10.4*   HEMATOCRIT % 30.7* 32.0*   PLATELETS 10*3/mm3 144 156         Results from last 7 days  Lab Units 01/15/17  0757 01/14/17  0618   SODIUM mmol/L 129* 129*   POTASSIUM mmol/L 4.1 4.9   CHLORIDE mmol/L 94* 92*   TOTAL CO2 mmol/L 25.7 24.3   BUN mg/dL 26* 24*   CREATININE mg/dL 1.44* 1.30*   CALCIUM mg/dL 8.3* 9.0   GLUCOSE mg/dL 103* 101*             0  Lab Value Date/Time   TROPONINT <0.010 09/16/2016 1818   TROPONINT <0.010 08/29/2016 1958   TROPONINT <0.010 08/28/2016 2009       Intake/Output       01/14/17 0700 - 01/15/17 0659 01/15/17 0700 - 01/16/17 0659    Intake (ml) 1975 --    Output (ml) 900 700    Net (ml) 1075 -700    Last Weight 168 lb 9 oz (76.5 kg) --           Imaging Results (last 7 days)     Procedure Component Value Units Date/Time    XR Knee 1 or 2  View Right [[id number]] Collected:  01/13/17 1316     Updated:  01/13/17 1320    Narrative:       Right knee 2 views 01/13/2017     HISTORY: Fell, right knee pain.     There is vascular calcification in the right knee.     No fractures or dislocations are seen. There may be very minimal  suprapatellar effusion. Surgical staples are seen in the medial aspect  of the right knee.       Impression:       1. No acute process except for possible minimal suprapatellar effusion.     This report was finalized on 1/13/2017 1:17 PM by Dr. Andres Grimes MD.       CT Head Without Contrast [[id number]] Collected:  01/13/17 1721     Updated:  01/13/17 1721    Narrative:       EMERGENCY NONCONTRAST HEAD CT, FACIAL CT AND CERVICAL SPINE CT  01/13/2017     CLINICAL HISTORY: Patient fell in parking lot of [facility],  loss  his balance fell face first, denies loss of consciousness     HEAD CT TECHNIQUE: Spiral CT images were obtained from the base of the  skull to the vertex without intravenous contrast and images were  reformatted and submitted in 3 mm thick axial CT section with brain  algorithm and 2 mm thick axial CT section with high-resolution bone  algorithm and additional 2 mm thick sagittal and coronal reconstructions  were performed at brain algorithm and due to questionable finding along  the anterior tip of the left temporal lobe patient was brought back for  additional spiral CT images through the head..     This correlated to an prior noncontrast head CT from [facility] 09/16/2016 and prior MRI of the head 09/17/2016.     FINDINGS: There is some patchy nodular low-density in periventricular  extending to subcortical white matter of cerebral hemispheres consistent  with mild-to-moderate small vessel disease. The remainder of the brain  parenchyma is normal in attenuation, the lateral and third ventricles  are minimally prominent size likely due to to central volume loss. There  is a thin sliver of  hyperdensity along the anterior tip of the left  temporal lobe on the initial set axial images, it is not reproduced on  the repeat axial images nor on the facial CT images and felt to be  artifact. Overall, no extra-axial fluid collections are identified and  there is no evidence of acute intracranial hemorrhage. There is a scalp  hematoma over the anterior inferior frontal bone extending paranasal and  periorbital soft tissues and there is multiple acute fractures including  a linear nondisplaced vertical fracture of the anterior inferior medial  left frontal bone extending into anterior table of the inferior medial  left frontal sinus, fractures of the anterosuperior tip in the anterior  aspect nasal septum fractures of the superior nasal bones extending  nasal bridge, fractures of the left orbital floor and anterior and  posterior lateral wall left maxillary sinus there are fractures of the  pterygoid plates.       Impression:       1. There is moderate small vessel disease in the cerebral white matter.     2. No acute intracranial hemorrhage is seen     3. There are multiple acute fractures with a linear nondisplaced  fracture in the anterior inferior medial left frontal bone extending  into the anterior table of the inferior medial left frontal sinus as  well as fracture of the anterosuperior and anterior aspect nasal septum,  fractures of the superior nasal bones and nasal bridge fractures of the  left orbital floor and anterior posterior lateral wall left maxillary  sinus likely fracture of the anterior wall of the right maxillary sinus  fractures of the pterygoid plates. There is blood subtotally opacifying  left frontal sinus, anterior ethmoid sinuses bilaterally left maxillary  sinus blood partially opacifying posterior right maxillary sinus and see  facial CT report below. The  remainder of the head CT is unremarkable.     FACIAL CT TECHNIQUE: Spiral CT images were obtained through the facial  bones in  the axial imaging plane. Images were reformatted and are  submitted in 2 mm thick axial CT section with soft tissue and  high-resolution bone algorithm and additional 2 mm thick sagittal and  coronal reconstructions were performed..     FINDINGS: Patient has a moderate size scalp hematoma over the anterior  inferior frontal bone extending into the paranasal tissues and  periorbital tissues from today's head and facial trauma. There is a  linear acute fracture involving the far anterior inferior medial left  frontal bone that extends into the anterior wall of the inferior medial  left frontal sinus. Fracture extends through the medial septa between  the  left and right frontal sinuses and fracture plane also involve the  superior nasal bones extending into the nasal bridge and there is  fracture of the far anterior superior aspect of the nasal septum. There  is  fracture plane extending into the anterior superior medial wall of  the left orbit and there is fracture plane involving the inferior medial  wall of the left orbit and there is mild with comminuted fracture that  is nondisplaced involving the left inferior orbital wall and the  fracture extending into the anterior wall of the left maxillary sinus  with minimal comminution in the anterior inferior wall left maxillary  sinus, there is fracture plane involving posterior lateral wall left  maxillary sinus with several bubbles of air extending into the left  infratemporal fossa just lateral to the left maxillary sinus. There may  be a linear nondisplaced fracture of the lateral left pterygoid plate.  Hairline nondisplaced fracture of the medial aspect of the right orbital  roof and there is a hairline nondisplaced fracture horizontally oriented  through the midportion of the anterior wall of the right maxillary  sinus. There is fluid and blood subtotally opacifying the left frontal  sinus partially opacifying the anterior ethmoid sinus and subtotally  opacifying  the left maxillary sinus partially opacifying the posterior  third of the right maxillary sinus.     IMPRESSION:     1. This patient has scalp hematoma over the anterior inferior midline of  the frontal bone extending into the paranasal tissues and there are  hairline nondisplaced fractures in the anterior inferior medial aspect  of left frontal bone extending into the anterior table of the inferior  medial left frontal sinus, fracture involving the superior nasal bones  extending nasal bridge, there is a nondisplaced fracture of the far  anterior superior portion of the nasal septum. There is fracture plane  extending into the anterior superior medial wall of the left orbit as  well as involving the inferior medial wall of left orbit as well as some  minimally comminuted nondisplaced fracture of the left orbital floor  with several bubbles of air in the extraconal fat of the inferior left  orbit. There are multiple fracture planes involving the anterior wall  left maxillary sinus with some comminution in the inferior aspect of  anterior wall left maxillary sinus and there are fracture planes  involving the posterior lateral wall left maxillary sinus with several  bubbles of air in the lateral to left maxillary sinus in the april  maxillary fat and may be a subtle hairline nondisplaced fracture of the  left  lateral pterygoid plate. There is a questionable hairline  nondisplaced fracture hairline fracture of the medial aspect of the  right orbital roof bordering the inferior wall of the right frontal  sinus. There is probable hairline nondisplaced fracture horizontally  oriented  through the midportion of the right maxillary sinus. There is  questionable hairline nondisplaced fractures of the medial lateral  pterygoid plates bilaterally. There is blood and fluid subtotally  opacifying left frontal sinus and anterior ethmoid sinuses bilaterally  the right frontal recess partially opacifying the posterior  right  maxillary sinus and subtotally opacifying the left maxillary sinus.  Given the pterygoid plate fractures and facial fractures described this  is probably a variant of a LeFort fracture.     On sagittal images there is evidence of nondisplaced type II odontoid  fracture through the base of odontoid this will be discussed on the  cervical spine CT report below     CERVICAL SPINE CT TECHNIQUE: Spiral CT images were obtained from the  skull base to the T4 thoracic level and images were reformatted and are  submitted in 2 mm thick axial CT section with soft tissue and bone  algorithm and additional 2 mm thick sagittal and coronal reconstructions  were performed.     FINDINGS: There is arthritic change at the atlantodental interval with  marginal spurring off the anterior ring of C1 and the odontoid. There is  an oblique nondisplaced fracture through the base of the odontoid  indicating a type II unstable base of odontoid fracture     There is mild right and  moderate to severe left facet overgrowth at  C2-3, there is mild left bony foraminal narrowing posterior disc margin  is normal and there is no canal or right foraminal narrowing at C2-3     At C3-4, there is moderate to severe bilateral facet overgrowth. 2 mm  degenerative anterolisthesis of C3 on C4 mild posterior endplate  spurring eccentric left posterior laterally mildly narrowing the left  side of the canal, bilateral uncovertebral joint hypertrophy and there  is moderate left and mild right bony foraminal narrowing at C3-4     At C4-5, there is mild/moderate right and moderate severe left facet  overgrowth. 1 to 2 mm anterolisthesis of C4 on C5 there is mild  posterior endplate spurring mild canal and left foraminal narrowing  moderate right bony foraminal narrowing at C4-5.     At C5-6, there is moderate left and minimal right facet overgrowth C5-6  disc space narrowing degenerative endplate change diffuse posterior disc  osteophyte complex and  bilateral uncovertebral joint hypertrophy, there  is mild canal and there is moderate bilateral bony foraminal narrowing  at C5-6.     At C6-7, there is disc space narrowing degenerative endplate change  diffuse posterior disc osteophyte complex mild facet overgrowth and  uncovertebral joint hypertrophy and there is mild canal and left  foraminal narrowing.     At C7-T1 there is disc space narrowing, degenerative endplate changes  posterior endplate spurring and facet overgrowth contributing to mild  canal and bilateral foraminal narrowing     IMPRESSION:      1. There is an oblique nondisplaced fracture through the base of the  odontoid process indicating an acute type II nondisplaced type II   odontoid fracture which is an unstable fracture, neurosurgical  consultation is warranted   2. There is cervical spondylosis as described above. Extensive results  from  the study were discussed in great detail with Dr. Elder Reaves  from the emergency room by telephone on 01/13/2017 at 3:00 PM/       CT Facial Bones Without Contrast [[id number]] Collected:  01/13/17 1721     Updated:  01/13/17 1721    Narrative:       EMERGENCY NONCONTRAST HEAD CT, FACIAL CT AND CERVICAL SPINE CT  01/13/2017     CLINICAL HISTORY: Patient fell in parking lot of [facility],  loss  his balance fell face first, denies loss of consciousness     HEAD CT TECHNIQUE: Spiral CT images were obtained from the base of the  skull to the vertex without intravenous contrast and images were  reformatted and submitted in 3 mm thick axial CT section with brain  algorithm and 2 mm thick axial CT section with high-resolution bone  algorithm and additional 2 mm thick sagittal and coronal reconstructions  were performed at brain algorithm and due to questionable finding along  the anterior tip of the left temporal lobe patient was brought back for  additional spiral CT images through the head..     This correlated to an prior noncontrast head CT from [facility] 09/16/2016 and prior MRI of the head 09/17/2016.     FINDINGS: There is some patchy nodular low-density in periventricular  extending to subcortical white matter of cerebral hemispheres consistent  with mild-to-moderate small vessel disease. The remainder of the brain  parenchyma is normal in attenuation, the lateral and third ventricles  are minimally prominent size likely due to to central volume loss. There  is a thin sliver of hyperdensity along the anterior tip of the left  temporal lobe on the initial set axial images, it is not reproduced on  the repeat axial images nor on the facial CT images and felt to be  artifact. Overall, no extra-axial fluid collections are identified and  there is no evidence of acute intracranial hemorrhage. There is a scalp  hematoma over the anterior inferior frontal bone extending paranasal and  periorbital soft tissues and there is multiple acute fractures including  a linear nondisplaced vertical fracture of the anterior inferior medial  left frontal bone extending into anterior table of the inferior medial  left frontal sinus, fractures of the anterosuperior tip in the anterior  aspect nasal septum fractures of the superior nasal bones extending  nasal bridge, fractures of the left orbital floor and anterior and  posterior lateral wall left maxillary sinus there are fractures of the  pterygoid plates.       Impression:       1. There is moderate small vessel disease in the cerebral white matter.     2. No acute intracranial hemorrhage is seen     3. There are multiple acute fractures with a linear nondisplaced  fracture in the anterior inferior medial left frontal bone extending  into the anterior table of the inferior medial left frontal sinus as  well as fracture of the anterosuperior and anterior aspect nasal septum,  fractures of the superior nasal bones and nasal bridge fractures of the  left orbital floor and anterior posterior lateral wall left  maxillary  sinus likely fracture of the anterior wall of the right maxillary sinus  fractures of the pterygoid plates. There is blood subtotally opacifying  left frontal sinus, anterior ethmoid sinuses bilaterally left maxillary  sinus blood partially opacifying posterior right maxillary sinus and see  facial CT report below. The  remainder of the head CT is unremarkable.     FACIAL CT TECHNIQUE: Spiral CT images were obtained through the facial  bones in the axial imaging plane. Images were reformatted and are  submitted in 2 mm thick axial CT section with soft tissue and  high-resolution bone algorithm and additional 2 mm thick sagittal and  coronal reconstructions were performed..     FINDINGS: Patient has a moderate size scalp hematoma over the anterior  inferior frontal bone extending into the paranasal tissues and  periorbital tissues from today's head and facial trauma. There is a  linear acute fracture involving the far anterior inferior medial left  frontal bone that extends into the anterior wall of the inferior medial  left frontal sinus. Fracture extends through the medial septa between  the  left and right frontal sinuses and fracture plane also involve the  superior nasal bones extending into the nasal bridge and there is  fracture of the far anterior superior aspect of the nasal septum. There  is  fracture plane extending into the anterior superior medial wall of  the left orbit and there is fracture plane involving the inferior medial  wall of the left orbit and there is mild with comminuted fracture that  is nondisplaced involving the left inferior orbital wall and the  fracture extending into the anterior wall of the left maxillary sinus  with minimal comminution in the anterior inferior wall left maxillary  sinus, there is fracture plane involving posterior lateral wall left  maxillary sinus with several bubbles of air extending into the left  infratemporal fossa just lateral to the left maxillary  sinus. There may  be a linear nondisplaced fracture of the lateral left pterygoid plate.  Hairline nondisplaced fracture of the medial aspect of the right orbital  roof and there is a hairline nondisplaced fracture horizontally oriented  through the midportion of the anterior wall of the right maxillary  sinus. There is fluid and blood subtotally opacifying the left frontal  sinus partially opacifying the anterior ethmoid sinus and subtotally  opacifying the left maxillary sinus partially opacifying the posterior  third of the right maxillary sinus.     IMPRESSION:     1. This patient has scalp hematoma over the anterior inferior midline of  the frontal bone extending into the paranasal tissues and there are  hairline nondisplaced fractures in the anterior inferior medial aspect  of left frontal bone extending into the anterior table of the inferior  medial left frontal sinus, fracture involving the superior nasal bones  extending nasal bridge, there is a nondisplaced fracture of the far  anterior superior portion of the nasal septum. There is fracture plane  extending into the anterior superior medial wall of the left orbit as  well as involving the inferior medial wall of left orbit as well as some  minimally comminuted nondisplaced fracture of the left orbital floor  with several bubbles of air in the extraconal fat of the inferior left  orbit. There are multiple fracture planes involving the anterior wall  left maxillary sinus with some comminution in the inferior aspect of  anterior wall left maxillary sinus and there are fracture planes  involving the posterior lateral wall left maxillary sinus with several  bubbles of air in the lateral to left maxillary sinus in the april  maxillary fat and may be a subtle hairline nondisplaced fracture of the  left  lateral pterygoid plate. There is a questionable hairline  nondisplaced fracture hairline fracture of the medial aspect of the  right orbital roof bordering the  inferior wall of the right frontal  sinus. There is probable hairline nondisplaced fracture horizontally  oriented  through the midportion of the right maxillary sinus. There is  questionable hairline nondisplaced fractures of the medial lateral  pterygoid plates bilaterally. There is blood and fluid subtotally  opacifying left frontal sinus and anterior ethmoid sinuses bilaterally  the right frontal recess partially opacifying the posterior right  maxillary sinus and subtotally opacifying the left maxillary sinus.  Given the pterygoid plate fractures and facial fractures described this  is probably a variant of a LeFort fracture.     On sagittal images there is evidence of nondisplaced type II odontoid  fracture through the base of odontoid this will be discussed on the  cervical spine CT report below     CERVICAL SPINE CT TECHNIQUE: Spiral CT images were obtained from the  skull base to the T4 thoracic level and images were reformatted and are  submitted in 2 mm thick axial CT section with soft tissue and bone  algorithm and additional 2 mm thick sagittal and coronal reconstructions  were performed.     FINDINGS: There is arthritic change at the atlantodental interval with  marginal spurring off the anterior ring of C1 and the odontoid. There is  an oblique nondisplaced fracture through the base of the odontoid  indicating a type II unstable base of odontoid fracture     There is mild right and  moderate to severe left facet overgrowth at  C2-3, there is mild left bony foraminal narrowing posterior disc margin  is normal and there is no canal or right foraminal narrowing at C2-3     At C3-4, there is moderate to severe bilateral facet overgrowth. 2 mm  degenerative anterolisthesis of C3 on C4 mild posterior endplate  spurring eccentric left posterior laterally mildly narrowing the left  side of the canal, bilateral uncovertebral joint hypertrophy and there  is moderate left and mild right bony foraminal narrowing  at C3-4     At C4-5, there is mild/moderate right and moderate severe left facet  overgrowth. 1 to 2 mm anterolisthesis of C4 on C5 there is mild  posterior endplate spurring mild canal and left foraminal narrowing  moderate right bony foraminal narrowing at C4-5.     At C5-6, there is moderate left and minimal right facet overgrowth C5-6  disc space narrowing degenerative endplate change diffuse posterior disc  osteophyte complex and bilateral uncovertebral joint hypertrophy, there  is mild canal and there is moderate bilateral bony foraminal narrowing  at C5-6.     At C6-7, there is disc space narrowing degenerative endplate change  diffuse posterior disc osteophyte complex mild facet overgrowth and  uncovertebral joint hypertrophy and there is mild canal and left  foraminal narrowing.     At C7-T1 there is disc space narrowing, degenerative endplate changes  posterior endplate spurring and facet overgrowth contributing to mild  canal and bilateral foraminal narrowing     IMPRESSION:      1. There is an oblique nondisplaced fracture through the base of the  odontoid process indicating an acute type II nondisplaced type II   odontoid fracture which is an unstable fracture, neurosurgical  consultation is warranted   2. There is cervical spondylosis as described above. Extensive results  from  the study were discussed in great detail with Dr. Elder Reaves  from the emergency room by telephone on 01/13/2017 at 3:00 PM/       CT Cervical Spine Without Contrast [[id number]] Collected:  01/13/17 1721     Updated:  01/13/17 1721    Narrative:       EMERGENCY NONCONTRAST HEAD CT, FACIAL CT AND CERVICAL SPINE CT  01/13/2017     CLINICAL HISTORY: Patient fell in parking lot of [facility],  loss  his balance fell face first, denies loss of consciousness     HEAD CT TECHNIQUE: Spiral CT images were obtained from the base of the  skull to the vertex without intravenous contrast and images were  reformatted and submitted in 3  mm thick axial CT section with brain  algorithm and 2 mm thick axial CT section with high-resolution bone  algorithm and additional 2 mm thick sagittal and coronal reconstructions  were performed at brain algorithm and due to questionable finding along  the anterior tip of the left temporal lobe patient was brought back for  additional spiral CT images through the head..     This correlated to an prior noncontrast head CT from [facility] 09/16/2016 and prior MRI of the head 09/17/2016.     FINDINGS: There is some patchy nodular low-density in periventricular  extending to subcortical white matter of cerebral hemispheres consistent  with mild-to-moderate small vessel disease. The remainder of the brain  parenchyma is normal in attenuation, the lateral and third ventricles  are minimally prominent size likely due to to central volume loss. There  is a thin sliver of hyperdensity along the anterior tip of the left  temporal lobe on the initial set axial images, it is not reproduced on  the repeat axial images nor on the facial CT images and felt to be  artifact. Overall, no extra-axial fluid collections are identified and  there is no evidence of acute intracranial hemorrhage. There is a scalp  hematoma over the anterior inferior frontal bone extending paranasal and  periorbital soft tissues and there is multiple acute fractures including  a linear nondisplaced vertical fracture of the anterior inferior medial  left frontal bone extending into anterior table of the inferior medial  left frontal sinus, fractures of the anterosuperior tip in the anterior  aspect nasal septum fractures of the superior nasal bones extending  nasal bridge, fractures of the left orbital floor and anterior and  posterior lateral wall left maxillary sinus there are fractures of the  pterygoid plates.       Impression:       1. There is moderate small vessel disease in the cerebral white matter.     2. No acute intracranial  hemorrhage is seen     3. There are multiple acute fractures with a linear nondisplaced  fracture in the anterior inferior medial left frontal bone extending  into the anterior table of the inferior medial left frontal sinus as  well as fracture of the anterosuperior and anterior aspect nasal septum,  fractures of the superior nasal bones and nasal bridge fractures of the  left orbital floor and anterior posterior lateral wall left maxillary  sinus likely fracture of the anterior wall of the right maxillary sinus  fractures of the pterygoid plates. There is blood subtotally opacifying  left frontal sinus, anterior ethmoid sinuses bilaterally left maxillary  sinus blood partially opacifying posterior right maxillary sinus and see  facial CT report below. The  remainder of the head CT is unremarkable.     FACIAL CT TECHNIQUE: Spiral CT images were obtained through the facial  bones in the axial imaging plane. Images were reformatted and are  submitted in 2 mm thick axial CT section with soft tissue and  high-resolution bone algorithm and additional 2 mm thick sagittal and  coronal reconstructions were performed..     FINDINGS: Patient has a moderate size scalp hematoma over the anterior  inferior frontal bone extending into the paranasal tissues and  periorbital tissues from today's head and facial trauma. There is a  linear acute fracture involving the far anterior inferior medial left  frontal bone that extends into the anterior wall of the inferior medial  left frontal sinus. Fracture extends through the medial septa between  the  left and right frontal sinuses and fracture plane also involve the  superior nasal bones extending into the nasal bridge and there is  fracture of the far anterior superior aspect of the nasal septum. There  is  fracture plane extending into the anterior superior medial wall of  the left orbit and there is fracture plane involving the inferior medial  wall of the left orbit and there is  mild with comminuted fracture that  is nondisplaced involving the left inferior orbital wall and the  fracture extending into the anterior wall of the left maxillary sinus  with minimal comminution in the anterior inferior wall left maxillary  sinus, there is fracture plane involving posterior lateral wall left  maxillary sinus with several bubbles of air extending into the left  infratemporal fossa just lateral to the left maxillary sinus. There may  be a linear nondisplaced fracture of the lateral left pterygoid plate.  Hairline nondisplaced fracture of the medial aspect of the right orbital  roof and there is a hairline nondisplaced fracture horizontally oriented  through the midportion of the anterior wall of the right maxillary  sinus. There is fluid and blood subtotally opacifying the left frontal  sinus partially opacifying the anterior ethmoid sinus and subtotally  opacifying the left maxillary sinus partially opacifying the posterior  third of the right maxillary sinus.     IMPRESSION:     1. This patient has scalp hematoma over the anterior inferior midline of  the frontal bone extending into the paranasal tissues and there are  hairline nondisplaced fractures in the anterior inferior medial aspect  of left frontal bone extending into the anterior table of the inferior  medial left frontal sinus, fracture involving the superior nasal bones  extending nasal bridge, there is a nondisplaced fracture of the far  anterior superior portion of the nasal septum. There is fracture plane  extending into the anterior superior medial wall of the left orbit as  well as involving the inferior medial wall of left orbit as well as some  minimally comminuted nondisplaced fracture of the left orbital floor  with several bubbles of air in the extraconal fat of the inferior left  orbit. There are multiple fracture planes involving the anterior wall  left maxillary sinus with some comminution in the inferior aspect  of  anterior wall left maxillary sinus and there are fracture planes  involving the posterior lateral wall left maxillary sinus with several  bubbles of air in the lateral to left maxillary sinus in the april  maxillary fat and may be a subtle hairline nondisplaced fracture of the  left  lateral pterygoid plate. There is a questionable hairline  nondisplaced fracture hairline fracture of the medial aspect of the  right orbital roof bordering the inferior wall of the right frontal  sinus. There is probable hairline nondisplaced fracture horizontally  oriented  through the midportion of the right maxillary sinus. There is  questionable hairline nondisplaced fractures of the medial lateral  pterygoid plates bilaterally. There is blood and fluid subtotally  opacifying left frontal sinus and anterior ethmoid sinuses bilaterally  the right frontal recess partially opacifying the posterior right  maxillary sinus and subtotally opacifying the left maxillary sinus.  Given the pterygoid plate fractures and facial fractures described this  is probably a variant of a LeFort fracture.     On sagittal images there is evidence of nondisplaced type II odontoid  fracture through the base of odontoid this will be discussed on the  cervical spine CT report below     CERVICAL SPINE CT TECHNIQUE: Spiral CT images were obtained from the  skull base to the T4 thoracic level and images were reformatted and are  submitted in 2 mm thick axial CT section with soft tissue and bone  algorithm and additional 2 mm thick sagittal and coronal reconstructions  were performed.     FINDINGS: There is arthritic change at the atlantodental interval with  marginal spurring off the anterior ring of C1 and the odontoid. There is  an oblique nondisplaced fracture through the base of the odontoid  indicating a type II unstable base of odontoid fracture     There is mild right and  moderate to severe left facet overgrowth at  C2-3, there is mild left bony  foraminal narrowing posterior disc margin  is normal and there is no canal or right foraminal narrowing at C2-3     At C3-4, there is moderate to severe bilateral facet overgrowth. 2 mm  degenerative anterolisthesis of C3 on C4 mild posterior endplate  spurring eccentric left posterior laterally mildly narrowing the left  side of the canal, bilateral uncovertebral joint hypertrophy and there  is moderate left and mild right bony foraminal narrowing at C3-4     At C4-5, there is mild/moderate right and moderate severe left facet  overgrowth. 1 to 2 mm anterolisthesis of C4 on C5 there is mild  posterior endplate spurring mild canal and left foraminal narrowing  moderate right bony foraminal narrowing at C4-5.     At C5-6, there is moderate left and minimal right facet overgrowth C5-6  disc space narrowing degenerative endplate change diffuse posterior disc  osteophyte complex and bilateral uncovertebral joint hypertrophy, there  is mild canal and there is moderate bilateral bony foraminal narrowing  at C5-6.     At C6-7, there is disc space narrowing degenerative endplate change  diffuse posterior disc osteophyte complex mild facet overgrowth and  uncovertebral joint hypertrophy and there is mild canal and left  foraminal narrowing.     At C7-T1 there is disc space narrowing, degenerative endplate changes  posterior endplate spurring and facet overgrowth contributing to mild  canal and bilateral foraminal narrowing     IMPRESSION:      1. There is an oblique nondisplaced fracture through the base of the  odontoid process indicating an acute type II nondisplaced type II   odontoid fracture which is an unstable fracture, neurosurgical  consultation is warranted   2. There is cervical spondylosis as described above. Extensive results  from  the study were discussed in great detail with Dr. Elder Reaves  from the emergency room by telephone on 01/13/2017 at 3:00 PM/       MRI Cervical Spine Without Contrast [[id number]]  Collected:  01/13/17 2107     Updated:  01/14/17 1606    Narrative:       MRI EXAMINATION OF THE CERVICAL SPINE WITHOUT CONTRAST     HISTORY: Odontoid fracture.     A MRI examination of the cervical spine was performed without the use of  contrast and compared to the CT examination of the cervical spine from  earlier the same day.     FINDINGS: Signal intensity within the cervical cord is normal on the  sagittal T2 sequence. There is a grade 1 anterolisthesis of C4 upon C5  estimated to be 1-2 mm in severity. There is moderate loss of disc  height at C5-6, C6-7, and C7-T1. There is disc desiccation at all  levels.     C2-3: Moderate facet degenerative disease is present on the left. There  is a mild broad-based disc osteophyte complex resulting in mild  flattening of the ventral surface of the thecal sac. Cerebrospinal fluid  is effaced ventral and dorsal to the cord.     C3-4: There is mild and mild to moderate facet degenerative disease on  the right and left respectively. There is moderate neuroforaminal  compromise on the left secondary to facet hypertrophy and uncovertebral  degenerative disease. A broad-based disc osteophyte complex is present  which results in moderate canal stenosis, more prominent on the left.  There is effacement of cerebrospinal fluid ventral and dorsal to the  cord.     C4-5: Moderate facet degenerative disease is present bilaterally.     C5-6: There is a broad-based disc osteophyte complex resulting in mild  flattening of the ventral surface of the thecal sac and cord.  Cerebrospinal fluid is effaced ventral and dorsal to the cord. There is  mild neuroforaminal compromise bilaterally secondary to uncovertebral  degenerative disease.     C6-7: There is a mild broad-based disc osteophyte complex resulting in  mild flattening of the ventral surface of the thecal sac and cord. There  is moderate neuroforaminal compromise bilaterally, more prominent on the  left secondary to uncovertebral  degenerative disease.     C7-T1: There is a mild broad-based disc osteophyte complex with no  evidence of herniation. There is mild neuroforaminal compromise  bilaterally secondary to extension of the disc osteophyte complex into  the neural foramen.     The CT examination of the cervical spine demonstrated an oblique  fracture involving the dens. The fracture is better demonstrated on the  CT examination. There is only minimal increased signal intensity  involving the fracture on the sagittal T2 sequence. There is no evidence  of a prevertebral hematoma.       Impression:       There is an oblique minimally displaced fracture involving  the dens with minimal increased signal intensity appreciated on the  sagittal T2 sequence at the fracture site. There is no evidence of an  epidural hematoma or of cord signal abnormality. There is moderate canal  stenosis from C2 to C7 secondary to broad-based disc osteophyte  complexes at each level flattening the ventral surface of the cord and  effacing cerebrospinal fluid ventral and dorsal to the cord at each  level. The greatest degree of spinal stenosis is at C6-7.     The above information was called to and discussed with Elsi Najera,  following the dictation.     This report was finalized on 1/14/2017 4:02 PM by Dr. Genaro Malagon MD.               Assessment:      Principal Problem:    Odontoid fracture  Active Problems:    Anemia    ANDERS (acute kidney injury)    Muscle weakness-general    Multiple facial bone fractures    Hyponatremia    Dehydration    Hyperlipidemia    HTN (hypertension)    Hypertension    Coronary artery disease      Patient Active Problem List   Diagnosis Code   • Chronic coronary artery disease I25.10   • Anemia D64.9   • Hyperlipidemia E78.5   • HTN (hypertension) I10   • Trigeminal neuralgia G50.0   • Benign prostatic hyperplasia N40.0   • Ulcerative colitis without complications K51.90   • Health care maintenance Z00.00   • Hypertension I10   •  Coronary artery disease I25.10   • ANDERS (acute kidney injury) N17.9   • Muscle weakness-general M62.81   • Pulmonary nodule, right R91.1   • Pulmonary fibrosis J84.10   • Weakness R53.1   • Asterixis R27.8   • CVA (cerebral infarction) I63.9   • Irritable bowel syndrome with both constipation and diarrhea K58.2   • Odontoid fracture S12.100A   • Multiple facial bone fractures S02.92XA   • Hyponatremia E87.1   • Dehydration E86.0       Plan:    Continue IV fluids  Pain and nausea controlled  Continue IS  Monitor and correct electrolytes  Continue home medications  PT/OT working with pt  DVT/stress ulcer prophylaxis  Labs in         Loc Bolanos MD  1/15/2017  1:13 PM        Much of this encounter note is an electronic transcription/translation of spoken language to printed text. The electronic translation of spoken language may permit erroneous, or at times, nonsensical words or phrases to be inadvertently transcribed; Although I have reviewed the note for such errors, some may still exist

## 2017-01-15 NOTE — PROGRESS NOTES
Acute Care - Physical Therapy Treatment Note  [facility]     Patient Name: Noah Isaac  : 1935  MRN: [id number]  Today's Date: 1/15/2017     Date of Referral to PT: 17       Admit Date: 2017    Visit Dx:    ICD-10-CM ICD-9-CM   1. Odontoid fracture, closed, initial encounter S12.100A 805.02   2. Facial fracture due to fall, closed, initial encounter S02.92XA 802.8    W19.XXXA E888.9   3. Facial abrasion, initial encounter S00.81XA 910.0   4. Weakness generalized R53.1 780.79     Patient Active Problem List   Diagnosis   • Chronic coronary artery disease   • Anemia   • Hyperlipidemia   • HTN (hypertension)   • Trigeminal neuralgia   • Benign prostatic hyperplasia   • Ulcerative colitis without complications   • Health care maintenance   • Hypertension   • Coronary artery disease   • ANDERS (acute kidney injury)   • Muscle weakness-general   • Pulmonary nodule, right   • Pulmonary fibrosis   • Weakness   • Asterixis   • CVA (cerebral infarction)   • Irritable bowel syndrome with both constipation and diarrhea   • Odontoid fracture   • Multiple facial bone fractures   • Hyponatremia   • Dehydration               Adult Rehabilitation Note       01/15/17 1225          Rehab Assessment/Intervention    Discipline physical therapy assistant  -MM      Document Type therapy note (daily note)  -MM      Subjective Information no complaints;agree to therapy  -MM      Recorded by [MM] Jaja Grace PTA      Pain Assessment    Pain Assessment No/denies pain  -MM      Recorded by [MM] Jaja Grace PTA      Bed Mobility, Assessment/Treatment    Bed Mobility, Assistive Device head of bed elevated  -MM      Bed Mob, Supine to Sit, [place] verbal cues required;contact guard assist;1 person + 1 person to manage equipment  -MM      Bed Mob, Sit to Supine, [place] not tested   up to the chair  -MM      Recorded by [MM] Jaja Grace PTA      Transfer Assessment/Treatment    Transfers,  Sit-Stand [place] verbal cues required;minimum assist (75% patient effort);1 person + 1 person to manage equipment  -MM      Transfers, Stand-Sit [place] verbal cues required;contact guard assist;1 person + 1 person to manage equipment  -MM      Recorded by [MM] Jaja Grace PTA      Gait Assessment/Treatment    Gait, [place] Level verbal cues required;minimum assist (75% patient effort);1 person + 1 person to manage equipment;2 person assist required  -MM      Gait, Assistive Device rolling walker  -MM      Gait, Distance (Feet) 40  -MM      Gait, Gait Pattern Analysis swing-to gait  -MM      Gait, Gait Deviations kareem decreased;step length decreased  -MM      Recorded by [MM] Jaja Grace PTA      Positioning and Restraints    Pre-Treatment Position in bed  -MM      Post Treatment Position chair  -MM      In Chair notified nsg;reclined;call light within reach;encouraged to call for assist  -MM      Recorded by [MM] Jaja Grace PTA        User Key  (r) = Recorded By, (t) = Taken By, (c) = Cosigned By    Initials Name Effective Dates    MM Jaja Grace PTA 02/18/16 -                 IP PT Goals       01/14/17 1542          Bed Mobility PT LTG    Bed Mobility PT LTG, Date Established 01/14/17  -AL      Bed Mobility PT LTG, Time to Achieve 1 wk  -AL      Bed Mobility PT LTG, Activity Type all bed mobility  -AL      Bed Mobility PT LTG, [place] Level supervision required  -AL      Bed Mobility PT Goal  LTG, Assist Device bed rails  -AL      Transfer Training PT LTG    Transfer Training PT LTG, Date Established 01/14/17  -AL      Transfer Training PT LTG, Time to Achieve 1 wk  -AL      Transfer Training PT LTG, Activity Type sit to stand/stand to sit  -AL      Transfer Training PT LTG, [place] Level contact guard assist  -AL      Transfer Training PT LTG, Assist Device walker, rolling  -AL      Gait Training PT LTG    Gait Training Goal PT LTG, Date Established 01/14/17  -AL       Gait Training Goal PT LTG, Time to Achieve 1 wk  -AL      Gait Training Goal PT LTG, [place] Level moderate assist (50% patient effort)  -AL      Gait Training Goal PT LTG, Assist Device walker, rolling  -AL      Gait Training Goal PT LTG, Distance to Achieve 50  -AL        User Key  (r) = Recorded By, (t) = Taken By, (c) = Cosigned By    Initials Name Provider Type    AL Cailin Ronquillo, PT Physical Therapist          Physical Therapy Education     Title: PT OT SLP Therapies (Active)     Topic: Physical Therapy (Active)     Point: Mobility training (Done)    Learning Progress Summary    Learner Readiness Method Response Comment Documented by Status   Patient Acceptance E VU  MM 01/15/17 1227 Done    Acceptance E VU,NR  AL 01/14/17 1539 Done               Point: Body mechanics (Done)    Learning Progress Summary    Learner Readiness Method Response Comment Documented by Status   Patient Acceptance E VU,NR  AL 01/14/17 1539 Done               Point: Precautions (Done)    Learning Progress Summary    Learner Readiness Method Response Comment Documented by Status   Patient Acceptance E VU,NR  AL 01/14/17 1539 Done                      User Key     Initials Effective Dates Name Provider Type Discipline    AL 12/01/15 -  Cailin Ronquillo, PT Physical Therapist PT     02/18/16 -  Jaja Grace PTA Physical Therapy Assistant PT                    PT Recommendation and Plan  Anticipated Discharge Disposition: inpatient rehabilitation facility  PT Frequency: daily  Plan of Care Review  Plan Of Care Reviewed With: patient  Progress: improving  Outcome Summary/Follow up Plan: pt able to ambulate 40 feet this date requiring Min A x 1 compared to Mod A.          Outcome Measures       01/15/17 1200 01/14/17 1500       How much help from another person do you currently need...    Turning from your back to your side while in flat bed without using bedrails? 3  -MM 3  -AL     Moving from lying on back to sitting on the side of  a flat bed without bedrails? 3  -MM 3  -AL     Moving to and from a bed to a chair (including a wheelchair)? 3  -MM 2  -AL     Standing up from a chair using your arms (e.g., wheelchair, bedside chair)? 3  -MM 3  -AL     Climbing 3-5 steps with a railing? 1  -MM 1  -AL     To walk in hospital room? 3  -MM 2  -AL     AM-PAC 6 Clicks Score 16  -MM 14  -AL     Functional Assessment    Outcome Measure Options  AM-PAC 6 Clicks Basic Mobility (PT)  -AL       User Key  (r) = Recorded By, (t) = Taken By, (c) = Cosigned By    Initials Name Provider Type    AL Cailin Ronquillo, PT Physical Therapist     Jaja Grace PTA Physical Therapy Assistant           Time Calculation:         PT Charges       01/15/17 1229          Time Calculation    Start Time 1049  -MM      Stop Time 1101  -MM      Time Calculation (min) 12 min  -MM      PT Received On 01/15/17  -MM      PT - Next Appointment 01/16/17  -MM        User Key  (r) = Recorded By, (t) = Taken By, (c) = Cosigned By    Initials Name Provider Type    TOM Grace PTA Physical Therapy Assistant          Therapy Charges for Today     Code Description Service Date Service Provider Modifiers Qty    22643316491 HC PT THER PROC EA 15 MIN 1/15/2017 Jaja Grace PTA GP 1          PT G-Codes  Outcome Measure Options: AM-PAC 6 Clicks Basic Mobility (PT)    Jaja Grace PTA  1/15/2017

## 2017-01-15 NOTE — PLAN OF CARE
Problem: Patient Care Overview (Adult)  Goal: Plan of Care Review  Outcome: Outcome(s) achieved Date Met:  01/15/17    01/15/17 0218   Patient Care Overview   Progress no change       Goal: Adult Individualization and Mutuality  Outcome: Ongoing (interventions implemented as appropriate)    Problem: Fall Risk (Adult)  Goal: Absence of Falls  Outcome: Ongoing (interventions implemented as appropriate)

## 2017-01-15 NOTE — PROGRESS NOTES
"   LOS: 2 days   Patient Care Team:  Flash Jim MD as PCP - General  Flash Jim MD as PCP - Family Medicine    Chief Complaint: Neck pain from fall resulting in type 2 odontoid fracture    Subjective     HPI Comments: Following this patient for a type 2 odontoid fracture. Decided on nonsurgical management with bracing. Has brace. Ambulated. Facial swelling from fall improving. Needs three months of immobilization. Will arrange f/u in office. Spoke with patient and daughter.     Fall   The accident occurred 3 to 5 days ago. The fall occurred while walking. He fell from a height of 3 to 5 ft. He landed on concrete. Pertinent negatives include no fever.   Facial Injury          Subjective:  Symptoms:  Improved.    Diet:  Adequate intake.    Activity level: Impaired due to pain.    Pain:  He complains of pain that is mild.        History taken from: patient chart family RN    Objective     Vital Signs  Temp:  [97.4 °F (36.3 °C)-98 °F (36.7 °C)] 97.4 °F (36.3 °C)  Heart Rate:  [62-78] 63  Resp:  [16-18] 16  BP: (101-165)/(63-82) 135/67    Objective:  General Appearance:  Comfortable.    Vital signs: (most recent): Blood pressure 135/67, pulse 63, temperature 97.4 °F (36.3 °C), temperature source Oral, resp. rate 16, height 70\" (177.8 cm), weight 168 lb 9 oz (76.5 kg), SpO2 95 %.  No fever.    Output: Producing urine and producing stool.    HEENT: Normal HEENT exam.  (Facial swelling and abrasions.)    Lungs:  Normal effort.    Heart: Normal rate.    Chest: Symmetric chest wall expansion.   Abdomen: Abdomen is soft.  Hypoactive bowel sounds.   There is no mass.   Extremities: Decreased range of motion.    Pulses: Distal pulses are intact.    Neurological: Patient is alert and oriented to person, place and time.  Normal strength.  Patient has normal reflexes, normal muscle tone and normal coordination.    Pupils:  Pupils are equal, round, and reactive to light.    Skin:  Warm.              Results Review:     I " reviewed the patient's new clinical results.  I reviewed the patient's new imaging results and agree with the interpretation.  I reviewed the patient's other test results and agree with the interpretation    Medication Review:   Current Facility-Administered Medications:   •  amLODIPine (NORVASC) tablet 5 mg, 5 mg, Oral, Q24H, Loc Bolanos MD, 5 mg at 01/15/17 0834  •  atorvastatin (LIPITOR) tablet 10 mg, 10 mg, Oral, Nightly, Loc Bolanos MD, 10 mg at 01/14/17 2003  •  bisacodyl (DULCOLAX) suppository 10 mg, 10 mg, Rectal, Daily PRN, Loc Bolanos MD  •  clonazePAM (KlonoPIN) tablet 0.5 mg, 0.5 mg, Oral, Nightly PRN, Loc Bolanos MD  •  clopidogrel (PLAVIX) tablet 75 mg, 75 mg, Oral, Daily, Loc Bolanos MD, 75 mg at 01/15/17 0834  •  docusate sodium (COLACE) capsule 100 mg, 100 mg, Oral, BID, Loc Bolanos MD, 100 mg at 01/15/17 0835  •  enoxaparin (LOVENOX) syringe 40 mg, 40 mg, Subcutaneous, Q24H, Loc Bolanos MD, 40 mg at 01/15/17 0834  •  fentaNYL (DURAGESIC) 12 MCG/HR 1 patch, 1 patch, Transdermal, Q72H, Loc Bolanos MD, 1 patch at 01/14/17 1106  •  finasteride (PROSCAR) tablet 5 mg, 5 mg, Oral, Daily, Loc Bolanos MD, 5 mg at 01/15/17 0835  •  gabapentin (NEURONTIN) capsule 300 mg, 300 mg, Oral, Q8H, Loc Bolanos MD, 300 mg at 01/15/17 1405  •  HYDROmorphone (DILAUDID) injection 0.5 mg, 0.5 mg, Intravenous, Q4H PRN, Loc Bolanos MD, 0.5 mg at 01/14/17 0811  •  isosorbide mononitrate (IMDUR) 24 hr tablet 30 mg, 30 mg, Oral, Q24H, Loc Bolanos MD, 30 mg at 01/15/17 0834  •  losartan (COZAAR) tablet 100 mg, 100 mg, Oral, Q24H, Loc Bolanos MD, 100 mg at 01/15/17 0834  •  metoprolol tartrate (LOPRESSOR) tablet 12.5 mg, 12.5 mg, Oral, BID, Loc Bolanos MD, 12.5 mg at 01/15/17 0835  •  montelukast (SINGULAIR) tablet 10 mg, 10 mg, Oral, Nightly, Loc Bolanos MD, 10 mg at 01/14/17 2003  •  nitroglycerin (NITROSTAT) SL tablet 0.4 mg, 0.4 mg, Sublingual, Q5 Min  PRN, Loc Bolanos MD  •  ondansetron (ZOFRAN) injection 4 mg, 4 mg, Intravenous, Q4H PRN, Loc Bolanos MD  •  oxyCODONE-acetaminophen (PERCOCET) 7.5-325 MG per tablet 1 tablet, 1 tablet, Oral, Q4H PRN, Loc Bolanos MD, 1 tablet at 01/15/17 1107  •  prednisoLONE acetate (PRED FORTE) 1 % ophthalmic suspension 2 drop, 2 drop, Both Eyes, Q6H, Loc Bolanos MD, 2 drop at 01/15/17 0540  •  sertraline (ZOLOFT) tablet 50 mg, 50 mg, Oral, Daily, Loc Bolanos MD, 50 mg at 01/15/17 0834  •  sodium chloride (OCEAN) nasal spray 2 spray, 2 spray, Each Nare, PRN, Loc Bolanos MD  •  sodium chloride 0.9 % flush 10 mL, 10 mL, Intravenous, PRN, Red Reaves MD  •  sodium chloride 0.9 % infusion, 100 mL/hr, Intravenous, Continuous, Loc Bolanos MD, Last Rate: 100 mL/hr at 01/15/17 0608, 100 mL/hr at 01/15/17 0608  •  sulfaSALAzine (AZULFIDINE) tablet 1,000 mg, 1,000 mg, Oral, Q8H, Loc Bolanos MD, 1,000 mg at 01/15/17 1405   WBC   Date Value Ref Range Status   01/15/2017 7.25 4.50 - 10.70 10*3/mm3 Final     RBC   Date Value Ref Range Status   01/15/2017 3.07 (L) 4.60 - 6.00 10*6/mm3 Final     HEMOGLOBIN   Date Value Ref Range Status   01/15/2017 9.8 (L) 13.7 - 17.6 g/dL Final     HEMATOCRIT   Date Value Ref Range Status   01/15/2017 30.7 (L) 40.4 - 52.2 % Final     MCV   Date Value Ref Range Status   01/15/2017 100.0 (H) 79.8 - 96.2 fL Final     MCH   Date Value Ref Range Status   01/15/2017 31.9 27.0 - 32.7 pg Final     MCHC   Date Value Ref Range Status   01/15/2017 31.9 (L) 32.6 - 36.4 g/dL Final     RDW   Date Value Ref Range Status   01/15/2017 13.1 11.5 - 14.5 % Final     RDW-SD   Date Value Ref Range Status   01/15/2017 47.8 37.0 - 54.0 fl Final     MPV   Date Value Ref Range Status   01/15/2017 9.6 6.0 - 12.0 fL Final     PLATELETS   Date Value Ref Range Status   01/15/2017 144 140 - 500 10*3/mm3 Final     Lab Results   Component Value Date    GLUCOSE 103 (H) 01/15/2017    BUN 26 (H)  01/15/2017    CREATININE 1.44 (H) 01/15/2017    EGFRIFNONA 47 (L) 01/15/2017    EGFRIFAFRI  09/21/2016      Comment:      <15 Indicative of kidney failure.    BCR 18.1 01/15/2017    CO2 25.7 01/15/2017    CALCIUM 8.3 (L) 01/15/2017    PROTENTOTREF 7.2 08/26/2016    ALBUMIN 4.20 09/16/2016    LABIL2 1.3 09/16/2016    AST 16 09/16/2016    ALT 10 09/16/2016         Assessment/Plan     Principal Problem:    Odontoid fracture  Active Problems:    Anemia    Hyperlipidemia    HTN (hypertension)    Hypertension    Coronary artery disease    ANDERS (acute kidney injury)    Muscle weakness-general    Multiple facial bone fractures    Hyponatremia    Dehydration      Assessment:    Condition: In stable condition.   (Nonoperative management of odontoid fracture. Has brace which will be needed for three months.).     Plan:   (We will arrange f/u in office as outpatient).       Abdon Castanon MD  01/15/17  2:28 PM    Time:

## 2017-01-16 NOTE — PROGRESS NOTES
Continued Stay Note  [facility]     Patient Name: Noah Isaac  MRN: [id number]  Today's Date: 1/16/2017    Admit Date: 1/13/2017          Discharge Plan       01/16/17 1650    Case Management/Social Work Plan    Plan Plan is for skilled bed at [facility] pending bed availability.      Additional Comments [facility], they cannot accept.  Pt's wife notified.  [facility] is first choice for subacute rehab.  [facility] states bed will possibly be available on Tuesday.               Discharge Codes     None            Jess Elkins RN

## 2017-01-16 NOTE — PROGRESS NOTES
"DAILY PROGRESS NOTE  [facility], [facility]      Patient Identification:  Name: Noah Isaac  Age: 81 y.o.  Sex: male  :  1935  MRN: [id number]         Primary Care Physician: Flash Jim MD    Subjective:  Interval History:    Mr. Isaac is awake and alert this morning. Stating his current pain medication if effective. He has ambulated with therapy to the hallway. He continues immobilization of odontoid fx with hard cervical collar in place. He denies N/V/D. He denies CP, SOA.     Objective:    Scheduled Meds:  amLODIPine 5 mg Oral Q24H   atorvastatin 10 mg Oral Nightly   bacitracin  Topical Q8H   clopidogrel 75 mg Oral Daily   docusate sodium 100 mg Oral BID   enoxaparin 40 mg Subcutaneous Q24H   fentaNYL 1 patch Transdermal Q72H   finasteride 5 mg Oral Daily   gabapentin 300 mg Oral Q8H   isosorbide mononitrate 30 mg Oral Q24H   losartan 100 mg Oral Q24H   metoprolol tartrate 12.5 mg Oral BID   montelukast 10 mg Oral Nightly   prednisoLONE acetate 2 drop Both Eyes Q6H   sertraline 50 mg Oral Daily   sulfaSALAzine 1,000 mg Oral Q8H     Continuous Infusions:  sodium chloride 100 mL/hr Last Rate: 100 mL/hr (01/15/17 1819)       Vital signs in last 24 hours:  Temp:  [97.4 °F (36.3 °C)-98.2 °F (36.8 °C)] 98.2 °F (36.8 °C)  Heart Rate:  [62-78] 78  Resp:  [16-18] 18  BP: (135-152)/(49-67) 152/60    tMax 24 hrs: Temp (24hrs), Av.7 °F (36.5 °C), Min:97.4 °F (36.3 °C), Max:98.2 °F (36.8 °C)      Intake/Output:    Intake/Output Summary (Last 24 hours) at 17 1042  Last data filed at 17 0823   Gross per 24 hour   Intake   1000 ml   Output   2375 ml   Net  -1375 ml       Exam:    Visit Vitals   • /60 (BP Location: Right arm, Patient Position: Lying)   • Pulse 78   • Temp 98.2 °F (36.8 °C) (Oral)   • Resp 18   • Ht 70\" (177.8 cm)   • Wt 196 lb 7 oz (89.1 kg)   • SpO2 93%   • BMI 27.4 kg/m2       General: Alert, cooperative, no distress, appears " stated age. In no acute distress.    HEENT:    Head: Normocephalic. Facial ecchymosis and swelling continues from recent fractures/ fall  Neck: Supple, symmetrical, trachea midline, no adenopathy;              thyroid:  no enlargement/tenderness/nodules;              no carotid bruit or JVD  Cardiovascular: Normal rate, regular rhythm and intact distal pulses.              Exam reveals no gallop and no friction rub. No murmur heard  Pulmonary: Clear to auscultation bilaterally, respirations unlabored.               No rhonchi, wheezing or rales.   Abdominal: Soft. Soft, non-tender, bowel sounds active all four quadrants,     no masses, no hepatomegaly, no splenomegaly.   Extremities: Normal, atraumatic, no cyanosis or edema  Pulses: 2 + symmetric all extremities  Neurological: He is alert and oriented to person, place, and time.                 CNII-XII intact, normal strength, sensation intact throughout      Data Review:      Results from last 7 days  Lab Units 01/16/17  0329 01/15/17  0757 01/14/17  0618   WBC 10*3/mm3 5.68 7.25 8.08   HEMOGLOBIN g/dL 9.2* 9.8* 10.4*   HEMATOCRIT % 27.9* 30.7* 32.0*   PLATELETS 10*3/mm3 117* 144 156         Results from last 7 days  Lab Units 01/16/17  0329 01/15/17  0757 01/14/17  0618   SODIUM mmol/L 130* 129* 129*   POTASSIUM mmol/L 4.3 4.1 4.9   CHLORIDE mmol/L 96* 94* 92*   TOTAL CO2 mmol/L 23.4 25.7 24.3   BUN mg/dL 20 26* 24*   CREATININE mg/dL 1.08 1.44* 1.30*   CALCIUM mg/dL 8.3* 8.3* 9.0   GLUCOSE mg/dL 99 103* 101*             0  Lab Value Date/Time   TROPONINT <0.010 09/16/2016 1818   TROPONINT <0.010 08/29/2016 1958   TROPONINT <0.010 08/28/2016 2009       Intake/Output       01/15/17 0700 - 01/16/17 0659 01/16/17 0700 - 01/17/17 0659    Intake (ml) 1000 --    Output (ml) 2325 750    Net (ml) -1325 -750    Last Weight 196 lb 7 oz (89.1 kg) --           Imaging Results (last 7 days)     Procedure Component Value Units Date/Time    XR Knee 1 or 2 View Right [[id number]]  Collected:  01/13/17 1316     Updated:  01/13/17 1320    Narrative:       Right knee 2 views 01/13/2017     HISTORY: Fell, right knee pain.     There is vascular calcification in the right knee.     No fractures or dislocations are seen. There may be very minimal  suprapatellar effusion. Surgical staples are seen in the medial aspect  of the right knee.       Impression:       1. No acute process except for possible minimal suprapatellar effusion.     This report was finalized on 1/13/2017 1:17 PM by Dr. Andres Grimes MD.       MRI Cervical Spine Without Contrast [[id number]] Collected:  01/13/17 2107     Updated:  01/14/17 1606    Narrative:       MRI EXAMINATION OF THE CERVICAL SPINE WITHOUT CONTRAST     HISTORY: Odontoid fracture.     A MRI examination of the cervical spine was performed without the use of  contrast and compared to the CT examination of the cervical spine from  earlier the same day.     FINDINGS: Signal intensity within the cervical cord is normal on the  sagittal T2 sequence. There is a grade 1 anterolisthesis of C4 upon C5  estimated to be 1-2 mm in severity. There is moderate loss of disc  height at C5-6, C6-7, and C7-T1. There is disc desiccation at all  levels.     C2-3: Moderate facet degenerative disease is present on the left. There  is a mild broad-based disc osteophyte complex resulting in mild  flattening of the ventral surface of the thecal sac. Cerebrospinal fluid  is effaced ventral and dorsal to the cord.     C3-4: There is mild and mild to moderate facet degenerative disease on  the right and left respectively. There is moderate neuroforaminal  compromise on the left secondary to facet hypertrophy and uncovertebral  degenerative disease. A broad-based disc osteophyte complex is present  which results in moderate canal stenosis, more prominent on the left.  There is effacement of cerebrospinal fluid ventral and dorsal to the  cord.     C4-5: Moderate facet degenerative disease  is present bilaterally.     C5-6: There is a broad-based disc osteophyte complex resulting in mild  flattening of the ventral surface of the thecal sac and cord.  Cerebrospinal fluid is effaced ventral and dorsal to the cord. There is  mild neuroforaminal compromise bilaterally secondary to uncovertebral  degenerative disease.     C6-7: There is a mild broad-based disc osteophyte complex resulting in  mild flattening of the ventral surface of the thecal sac and cord. There  is moderate neuroforaminal compromise bilaterally, more prominent on the  left secondary to uncovertebral degenerative disease.     C7-T1: There is a mild broad-based disc osteophyte complex with no  evidence of herniation. There is mild neuroforaminal compromise  bilaterally secondary to extension of the disc osteophyte complex into  the neural foramen.     The CT examination of the cervical spine demonstrated an oblique  fracture involving the dens. The fracture is better demonstrated on the  CT examination. There is only minimal increased signal intensity  involving the fracture on the sagittal T2 sequence. There is no evidence  of a prevertebral hematoma.       Impression:       There is an oblique minimally displaced fracture involving  the dens with minimal increased signal intensity appreciated on the  sagittal T2 sequence at the fracture site. There is no evidence of an  epidural hematoma or of cord signal abnormality. There is moderate canal  stenosis from C2 to C7 secondary to broad-based disc osteophyte  complexes at each level flattening the ventral surface of the cord and  effacing cerebrospinal fluid ventral and dorsal to the cord at each  level. The greatest degree of spinal stenosis is at C6-7.     The above information was called to and discussed with Elsi Najera,  following the dictation.     This report was finalized on 1/14/2017 4:02 PM by Dr. Genaro Malagon MD.       CT Head Without Contrast [[id number]] Collected:  01/13/17  1721     Updated:  01/16/17 0709    Narrative:       EMERGENCY NONCONTRAST HEAD CT, FACIAL CT AND CERVICAL SPINE CT  01/13/2017     CLINICAL HISTORY: Patient fell in parking lot of [employer],  loss  his balance fell face first, denies loss of consciousness     HEAD CT TECHNIQUE: Spiral CT images were obtained from the base of the  skull to the vertex without intravenous contrast and images were  reformatted and submitted in 3 mm thick axial CT section with brain  algorithm and 2 mm thick axial CT section with high-resolution bone  algorithm and additional 2 mm thick sagittal and coronal reconstructions  were performed at brain algorithm and due to questionable finding along  the anterior tip of the left temporal lobe patient was brought back for  additional spiral CT images through the head..     This correlated to an prior noncontrast head CT from [facility] 09/16/2016 and prior MRI of the head 09/17/2016.     FINDINGS: There is some patchy nodular low-density in periventricular  extending to subcortical white matter of cerebral hemispheres consistent  with mild-to-moderate small vessel disease. The remainder of the brain  parenchyma is normal in attenuation, the lateral and third ventricles  are minimally prominent size likely due to to central volume loss. There  is a thin sliver of hyperdensity along the anterior tip of the left  temporal lobe on the initial set axial images, it is not reproduced on  the repeat axial images nor on the facial CT images and felt to be  artifact. Overall, no extra-axial fluid collections are identified and  there is no evidence of acute intracranial hemorrhage. There is a scalp  hematoma over the anterior inferior frontal bone extending paranasal and  periorbital soft tissues and there is multiple acute fractures including  a linear nondisplaced vertical fracture of the anterior inferior medial  left frontal bone extending into anterior table of the inferior  medial  left frontal sinus, fractures of the anterosuperior tip in the anterior  aspect nasal septum fractures of the superior nasal bones extending  nasal bridge, fractures of the left orbital floor and anterior and  posterior lateral wall left maxillary sinus there are fractures of the  pterygoid plates.       Impression:       1. There is moderate small vessel disease in the cerebral white matter.     2. No acute intracranial hemorrhage is seen     3. There are multiple acute fractures with a linear nondisplaced  fracture in the anterior inferior medial left frontal bone extending  into the anterior table of the inferior medial left frontal sinus as  well as fracture of the anterosuperior and anterior aspect nasal septum,  fractures of the superior nasal bones and nasal bridge fractures of the  left orbital floor and anterior posterior lateral wall left maxillary  sinus likely fracture of the anterior wall of the right maxillary sinus  fractures of the pterygoid plates. There is blood subtotally opacifying  left frontal sinus, anterior ethmoid sinuses bilaterally left maxillary  sinus blood partially opacifying posterior right maxillary sinus and see  facial CT report below. The  remainder of the head CT is unremarkable.     FACIAL CT TECHNIQUE: Spiral CT images were obtained through the facial  bones in the axial imaging plane. Images were reformatted and are  submitted in 2 mm thick axial CT section with soft tissue and  high-resolution bone algorithm and additional 2 mm thick sagittal and  coronal reconstructions were performed..     FINDINGS: Patient has a moderate size scalp hematoma over the anterior  inferior frontal bone extending into the paranasal tissues and  periorbital tissues from today's head and facial trauma. There is a  linear acute fracture involving the far anterior inferior medial left  frontal bone that extends into the anterior wall of the inferior medial  left frontal sinus. Fracture  extends through the medial septa between  the  left and right frontal sinuses and fracture plane also involve the  superior nasal bones extending into the nasal bridge and there is  fracture of the far anterior superior aspect of the nasal septum. There  is  fracture plane extending into the anterior superior medial wall of  the left orbit and there is fracture plane involving the inferior medial  wall of the left orbit and there is mild with comminuted fracture that  is nondisplaced involving the left inferior orbital wall and the  fracture extending into the anterior wall of the left maxillary sinus  with minimal comminution in the anterior inferior wall left maxillary  sinus, there is fracture plane involving posterior lateral wall left  maxillary sinus with several bubbles of air extending into the left  infratemporal fossa just lateral to the left maxillary sinus. There may  be a linear nondisplaced fracture of the lateral left pterygoid plate.  Hairline nondisplaced fracture of the medial aspect of the right orbital  roof and there is a hairline nondisplaced fracture horizontally oriented  through the midportion of the anterior wall of the right maxillary  sinus. There is fluid and blood subtotally opacifying the left frontal  sinus partially opacifying the anterior ethmoid sinus and subtotally  opacifying the left maxillary sinus partially opacifying the posterior  third of the right maxillary sinus.     IMPRESSION:     1. This patient has scalp hematoma over the anterior inferior midline of  the frontal bone extending into the paranasal tissues and there are  hairline nondisplaced fractures in the anterior inferior medial aspect  of left frontal bone extending into the anterior table of the inferior  medial left frontal sinus, fracture involving the superior nasal bones  extending nasal bridge, there is a nondisplaced fracture of the far  anterior superior portion of the nasal septum. There is fracture  plane  extending into the anterior superior medial wall of the left orbit as  well as involving the inferior medial wall of left orbit as well as some  minimally comminuted nondisplaced fracture of the left orbital floor  with several bubbles of air in the extraconal fat of the inferior left  orbit. There are multiple fracture planes involving the anterior wall  left maxillary sinus with some comminution in the inferior aspect of  anterior wall left maxillary sinus and there are fracture planes  involving the posterior lateral wall left maxillary sinus with several  bubbles of air in the lateral to left maxillary sinus in the april  maxillary fat and may be a subtle hairline nondisplaced fracture of the  left  lateral pterygoid plate. There is a questionable hairline  nondisplaced fracture hairline fracture of the medial aspect of the  right orbital roof bordering the inferior wall of the right frontal  sinus. There is probable hairline nondisplaced fracture horizontally  oriented  through the midportion of the anterior wall of the right  maxillary sinus. There is questionable hairline nondisplaced fractures  of the medial lateral pterygoid plates bilaterally. There is blood and  fluid subtotally opacifying left frontal sinus and anterior ethmoid  sinuses bilaterally the right frontal recess partially opacifying the  posterior right maxillary sinus and subtotally opacifying the left  maxillary sinus. Given the probable subtle nondisplaced pterygoid plate  fractures and facial fractures described this is probably a variant of a  [place] fracture.     On sagittal images there is evidence of nondisplaced type II odontoid  fracture through the base of odontoid this will be discussed on the  cervical spine CT report below     CERVICAL SPINE CT TECHNIQUE: Spiral CT images were obtained from the  skull base to the T4 thoracic level and images were reformatted and are  submitted in 2 mm thick axial CT section with soft tissue  and bone  algorithm and additional 2 mm thick sagittal and coronal reconstructions  were performed.     FINDINGS: There is arthritic change at the atlantodental interval with  marginal spurring off the anterior ring of C1 and the odontoid. There is  an oblique nondisplaced fracture through the base of the odontoid  indicating a type II unstable base of odontoid fracture     There is mild right and  moderate to severe left facet overgrowth at  C2-3, there is mild left bony foraminal narrowing posterior disc margin  is normal and there is no canal or right foraminal narrowing at C2-3     At C3-4, there is moderate to severe bilateral facet overgrowth. 2 mm  degenerative anterolisthesis of C3 on C4 mild posterior endplate  spurring eccentric left posterior laterally mildly narrowing the left  side of the canal, bilateral uncovertebral joint hypertrophy and there  is moderate left and mild right bony foraminal narrowing at C3-4     At C4-5, there is mild/moderate right and moderate severe left facet  overgrowth. 1 to 2 mm anterolisthesis of C4 on C5 there is mild  posterior endplate spurring mild canal and left foraminal narrowing  moderate right bony foraminal narrowing at C4-5.     At C5-6, there is moderate left and minimal right facet overgrowth C5-6  disc space narrowing degenerative endplate change diffuse posterior disc  osteophyte complex and bilateral uncovertebral joint hypertrophy, there  is mild canal and there is moderate bilateral bony foraminal narrowing  at C5-6.     At C6-7, there is disc space narrowing degenerative endplate change  diffuse posterior disc osteophyte complex mild facet overgrowth and  uncovertebral joint hypertrophy and there is mild canal and left  foraminal narrowing.     At C7-T1 there is disc space narrowing, degenerative endplate changes  posterior endplate spurring and facet overgrowth contributing to mild  canal and bilateral foraminal narrowing     IMPRESSION:      1. There is  an oblique nondisplaced fracture through the base of the  odontoid process indicating an acute type II nondisplaced type II   odontoid fracture which is an unstable fracture, neurosurgical  consultation is warranted   2. There is cervical spondylosis as described above. Extensive results  from  the study were discussed in great detail with Dr. Elder Reaves  from the emergency room by telephone on 01/13/2017 at 3:00 PM/     This report was finalized on 1/16/2017 7:06 AM EST by Dr. Gabriel Cruz MD.       CT Facial Bones Without Contrast [[id number]] Collected:  01/13/17 1721     Updated:  01/16/17 0709    Narrative:       EMERGENCY NONCONTRAST HEAD CT, FACIAL CT AND CERVICAL SPINE CT  01/13/2017     CLINICAL HISTORY: Patient fell in parking lot of [employer],  loss  his balance fell face first, denies loss of consciousness     HEAD CT TECHNIQUE: Spiral CT images were obtained from the base of the  skull to the vertex without intravenous contrast and images were  reformatted and submitted in 3 mm thick axial CT section with brain  algorithm and 2 mm thick axial CT section with high-resolution bone  algorithm and additional 2 mm thick sagittal and coronal reconstructions  were performed at brain algorithm and due to questionable finding along  the anterior tip of the left temporal lobe patient was brought back for  additional spiral CT images through the head..     This correlated to an prior noncontrast head CT [facility] 09/16/2016 and prior MRI of the head 09/17/2016.     FINDINGS: There is some patchy nodular low-density in periventricular  extending to subcortical white matter of cerebral hemispheres consistent  with mild-to-moderate small vessel disease. The remainder of the brain  parenchyma is normal in attenuation, the lateral and third ventricles  are minimally prominent size likely due to to central volume loss. There  is a thin sliver of hyperdensity along the anterior tip of the  left  temporal lobe on the initial set axial images, it is not reproduced on  the repeat axial images nor on the facial CT images and felt to be  artifact. Overall, no extra-axial fluid collections are identified and  there is no evidence of acute intracranial hemorrhage. There is a scalp  hematoma over the anterior inferior frontal bone extending paranasal and  periorbital soft tissues and there is multiple acute fractures including  a linear nondisplaced vertical fracture of the anterior inferior medial  left frontal bone extending into anterior table of the inferior medial  left frontal sinus, fractures of the anterosuperior tip in the anterior  aspect nasal septum fractures of the superior nasal bones extending  nasal bridge, fractures of the left orbital floor and anterior and  posterior lateral wall left maxillary sinus there are fractures of the  pterygoid plates.       Impression:       1. There is moderate small vessel disease in the cerebral white matter.     2. No acute intracranial hemorrhage is seen     3. There are multiple acute fractures with a linear nondisplaced  fracture in the anterior inferior medial left frontal bone extending  into the anterior table of the inferior medial left frontal sinus as  well as fracture of the anterosuperior and anterior aspect nasal septum,  fractures of the superior nasal bones and nasal bridge fractures of the  left orbital floor and anterior posterior lateral wall left maxillary  sinus likely fracture of the anterior wall of the right maxillary sinus  fractures of the pterygoid plates. There is blood subtotally opacifying  left frontal sinus, anterior ethmoid sinuses bilaterally left maxillary  sinus blood partially opacifying posterior right maxillary sinus and see  facial CT report below. The  remainder of the head CT is unremarkable.     FACIAL CT TECHNIQUE: Spiral CT images were obtained through the facial  bones in the axial imaging plane. Images were  reformatted and are  submitted in 2 mm thick axial CT section with soft tissue and  high-resolution bone algorithm and additional 2 mm thick sagittal and  coronal reconstructions were performed..     FINDINGS: Patient has a moderate size scalp hematoma over the anterior  inferior frontal bone extending into the paranasal tissues and  periorbital tissues from today's head and facial trauma. There is a  linear acute fracture involving the far anterior inferior medial left  frontal bone that extends into the anterior wall of the inferior medial  left frontal sinus. Fracture extends through the medial septa between  the  left and right frontal sinuses and fracture plane also involve the  superior nasal bones extending into the nasal bridge and there is  fracture of the far anterior superior aspect of the nasal septum. There  is  fracture plane extending into the anterior superior medial wall of  the left orbit and there is fracture plane involving the inferior medial  wall of the left orbit and there is mild with comminuted fracture that  is nondisplaced involving the left inferior orbital wall and the  fracture extending into the anterior wall of the left maxillary sinus  with minimal comminution in the anterior inferior wall left maxillary  sinus, there is fracture plane involving posterior lateral wall left  maxillary sinus with several bubbles of air extending into the left  infratemporal fossa just lateral to the left maxillary sinus. There may  be a linear nondisplaced fracture of the lateral left pterygoid plate.  Hairline nondisplaced fracture of the medial aspect of the right orbital  roof and there is a hairline nondisplaced fracture horizontally oriented  through the midportion of the anterior wall of the right maxillary  sinus. There is fluid and blood subtotally opacifying the left frontal  sinus partially opacifying the anterior ethmoid sinus and subtotally  opacifying the left maxillary sinus partially  opacifying the posterior  third of the right maxillary sinus.     IMPRESSION:     1. This patient has scalp hematoma over the anterior inferior midline of  the frontal bone extending into the paranasal tissues and there are  hairline nondisplaced fractures in the anterior inferior medial aspect  of left frontal bone extending into the anterior table of the inferior  medial left frontal sinus, fracture involving the superior nasal bones  extending nasal bridge, there is a nondisplaced fracture of the far  anterior superior portion of the nasal septum. There is fracture plane  extending into the anterior superior medial wall of the left orbit as  well as involving the inferior medial wall of left orbit as well as some  minimally comminuted nondisplaced fracture of the left orbital floor  with several bubbles of air in the extraconal fat of the inferior left  orbit. There are multiple fracture planes involving the anterior wall  left maxillary sinus with some comminution in the inferior aspect of  anterior wall left maxillary sinus and there are fracture planes  involving the posterior lateral wall left maxillary sinus with several  bubbles of air in the lateral to left maxillary sinus in the april  maxillary fat and may be a subtle hairline nondisplaced fracture of the  left  lateral pterygoid plate. There is a questionable hairline  nondisplaced fracture hairline fracture of the medial aspect of the  right orbital roof bordering the inferior wall of the right frontal  sinus. There is probable hairline nondisplaced fracture horizontally  oriented  through the midportion of the anterior wall of the right  maxillary sinus. There is questionable hairline nondisplaced fractures  of the medial lateral pterygoid plates bilaterally. There is blood and  fluid subtotally opacifying left frontal sinus and anterior ethmoid  sinuses bilaterally the right frontal recess partially opacifying the  posterior right maxillary sinus and  subtotally opacifying the left  maxillary sinus. Given the probable subtle nondisplaced pterygoid plate  fractures and facial fractures described this is probably a variant of a  LeFort fracture.     On sagittal images there is evidence of nondisplaced type II odontoid  fracture through the base of odontoid this will be discussed on the  cervical spine CT report below     CERVICAL SPINE CT TECHNIQUE: Spiral CT images were obtained from the  skull base to the T4 thoracic level and images were reformatted and are  submitted in 2 mm thick axial CT section with soft tissue and bone  algorithm and additional 2 mm thick sagittal and coronal reconstructions  were performed.     FINDINGS: There is arthritic change at the atlantodental interval with  marginal spurring off the anterior ring of C1 and the odontoid. There is  an oblique nondisplaced fracture through the base of the odontoid  indicating a type II unstable base of odontoid fracture     There is mild right and  moderate to severe left facet overgrowth at  C2-3, there is mild left bony foraminal narrowing posterior disc margin  is normal and there is no canal or right foraminal narrowing at C2-3     At C3-4, there is moderate to severe bilateral facet overgrowth. 2 mm  degenerative anterolisthesis of C3 on C4 mild posterior endplate  spurring eccentric left posterior laterally mildly narrowing the left  side of the canal, bilateral uncovertebral joint hypertrophy and there  is moderate left and mild right bony foraminal narrowing at C3-4     At C4-5, there is mild/moderate right and moderate severe left facet  overgrowth. 1 to 2 mm anterolisthesis of C4 on C5 there is mild  posterior endplate spurring mild canal and left foraminal narrowing  moderate right bony foraminal narrowing at C4-5.     At C5-6, there is moderate left and minimal right facet overgrowth C5-6  disc space narrowing degenerative endplate change diffuse posterior disc  osteophyte complex and  bilateral uncovertebral joint hypertrophy, there  is mild canal and there is moderate bilateral bony foraminal narrowing  at C5-6.     At C6-7, there is disc space narrowing degenerative endplate change  diffuse posterior disc osteophyte complex mild facet overgrowth and  uncovertebral joint hypertrophy and there is mild canal and left  foraminal narrowing.     At C7-T1 there is disc space narrowing, degenerative endplate changes  posterior endplate spurring and facet overgrowth contributing to mild  canal and bilateral foraminal narrowing     IMPRESSION:      1. There is an oblique nondisplaced fracture through the base of the  odontoid process indicating an acute type II nondisplaced type II   odontoid fracture which is an unstable fracture, neurosurgical  consultation is warranted   2. There is cervical spondylosis as described above. Extensive results  from  the study were discussed in great detail with Dr. Elder Reaves  from the emergency room by telephone on 01/13/2017 at 3:00 PM/     This report was finalized on 1/16/2017 7:06 AM EST by Dr. Gabriel Cruz MD.       CT Cervical Spine Without Contrast [[id number]] Collected:  01/13/17 1721     Updated:  01/16/17 0709    Narrative:       EMERGENCY NONCONTRAST HEAD CT, FACIAL CT AND CERVICAL SPINE CT  01/13/2017     CLINICAL HISTORY: Patient fell in parking lot of [facility],  loss  his balance fell face first, denies loss of consciousness     HEAD CT TECHNIQUE: Spiral CT images were obtained from the base of the  skull to the vertex without intravenous contrast and images were  reformatted and submitted in 3 mm thick axial CT section with brain  algorithm and 2 mm thick axial CT section with high-resolution bone  algorithm and additional 2 mm thick sagittal and coronal reconstructions  were performed at brain algorithm and due to questionable finding along  the anterior tip of the left temporal lobe patient was brought back for  additional spiral CT images  through the head..     This correlated to an prior noncontrast head CT from [facility] 09/16/2016 and prior MRI of the head 09/17/2016.     FINDINGS: There is some patchy nodular low-density in periventricular  extending to subcortical white matter of cerebral hemispheres consistent  with mild-to-moderate small vessel disease. The remainder of the brain  parenchyma is normal in attenuation, the lateral and third ventricles  are minimally prominent size likely due to to central volume loss. There  is a thin sliver of hyperdensity along the anterior tip of the left  temporal lobe on the initial set axial images, it is not reproduced on  the repeat axial images nor on the facial CT images and felt to be  artifact. Overall, no extra-axial fluid collections are identified and  there is no evidence of acute intracranial hemorrhage. There is a scalp  hematoma over the anterior inferior frontal bone extending paranasal and  periorbital soft tissues and there is multiple acute fractures including  a linear nondisplaced vertical fracture of the anterior inferior medial  left frontal bone extending into anterior table of the inferior medial  left frontal sinus, fractures of the anterosuperior tip in the anterior  aspect nasal septum fractures of the superior nasal bones extending  nasal bridge, fractures of the left orbital floor and anterior and  posterior lateral wall left maxillary sinus there are fractures of the  pterygoid plates.       Impression:       1. There is moderate small vessel disease in the cerebral white matter.     2. No acute intracranial hemorrhage is seen     3. There are multiple acute fractures with a linear nondisplaced  fracture in the anterior inferior medial left frontal bone extending  into the anterior table of the inferior medial left frontal sinus as  well as fracture of the anterosuperior and anterior aspect nasal septum,  fractures of the superior nasal bones and nasal bridge  fractures of the  left orbital floor and anterior posterior lateral wall left maxillary  sinus likely fracture of the anterior wall of the right maxillary sinus  fractures of the pterygoid plates. There is blood subtotally opacifying  left frontal sinus, anterior ethmoid sinuses bilaterally left maxillary  sinus blood partially opacifying posterior right maxillary sinus and see  facial CT report below. The  remainder of the head CT is unremarkable.     FACIAL CT TECHNIQUE: Spiral CT images were obtained through the facial  bones in the axial imaging plane. Images were reformatted and are  submitted in 2 mm thick axial CT section with soft tissue and  high-resolution bone algorithm and additional 2 mm thick sagittal and  coronal reconstructions were performed..     FINDINGS: Patient has a moderate size scalp hematoma over the anterior  inferior frontal bone extending into the paranasal tissues and  periorbital tissues from today's head and facial trauma. There is a  linear acute fracture involving the far anterior inferior medial left  frontal bone that extends into the anterior wall of the inferior medial  left frontal sinus. Fracture extends through the medial septa between  the  left and right frontal sinuses and fracture plane also involve the  superior nasal bones extending into the nasal bridge and there is  fracture of the far anterior superior aspect of the nasal septum. There  is  fracture plane extending into the anterior superior medial wall of  the left orbit and there is fracture plane involving the inferior medial  wall of the left orbit and there is mild with comminuted fracture that  is nondisplaced involving the left inferior orbital wall and the  fracture extending into the anterior wall of the left maxillary sinus  with minimal comminution in the anterior inferior wall left maxillary  sinus, there is fracture plane involving posterior lateral wall left  maxillary sinus with several bubbles of air  extending into the left  infratemporal fossa just lateral to the left maxillary sinus. There may  be a linear nondisplaced fracture of the lateral left pterygoid plate.  Hairline nondisplaced fracture of the medial aspect of the right orbital  roof and there is a hairline nondisplaced fracture horizontally oriented  through the midportion of the anterior wall of the right maxillary  sinus. There is fluid and blood subtotally opacifying the left frontal  sinus partially opacifying the anterior ethmoid sinus and subtotally  opacifying the left maxillary sinus partially opacifying the posterior  third of the right maxillary sinus.     IMPRESSION:     1. This patient has scalp hematoma over the anterior inferior midline of  the frontal bone extending into the paranasal tissues and there are  hairline nondisplaced fractures in the anterior inferior medial aspect  of left frontal bone extending into the anterior table of the inferior  medial left frontal sinus, fracture involving the superior nasal bones  extending nasal bridge, there is a nondisplaced fracture of the far  anterior superior portion of the nasal septum. There is fracture plane  extending into the anterior superior medial wall of the left orbit as  well as involving the inferior medial wall of left orbit as well as some  minimally comminuted nondisplaced fracture of the left orbital floor  with several bubbles of air in the extraconal fat of the inferior left  orbit. There are multiple fracture planes involving the anterior wall  left maxillary sinus with some comminution in the inferior aspect of  anterior wall left maxillary sinus and there are fracture planes  involving the posterior lateral wall left maxillary sinus with several  bubbles of air in the lateral to left maxillary sinus in the april  maxillary fat and may be a subtle hairline nondisplaced fracture of the  left  lateral pterygoid plate. There is a questionable hairline  nondisplaced fracture  hairline fracture of the medial aspect of the  right orbital roof bordering the inferior wall of the right frontal  sinus. There is probable hairline nondisplaced fracture horizontally  oriented  through the midportion of the anterior wall of the right  maxillary sinus. There is questionable hairline nondisplaced fractures  of the medial lateral pterygoid plates bilaterally. There is blood and  fluid subtotally opacifying left frontal sinus and anterior ethmoid  sinuses bilaterally the right frontal recess partially opacifying the  posterior right maxillary sinus and subtotally opacifying the left  maxillary sinus. Given the probable subtle nondisplaced pterygoid plate  fractures and facial fractures described this is probably a variant of a  LeFort fracture.     On sagittal images there is evidence of nondisplaced type II odontoid  fracture through the base of odontoid this will be discussed on the  cervical spine CT report below     CERVICAL SPINE CT TECHNIQUE: Spiral CT images were obtained from the  skull base to the T4 thoracic level and images were reformatted and are  submitted in 2 mm thick axial CT section with soft tissue and bone  algorithm and additional 2 mm thick sagittal and coronal reconstructions  were performed.     FINDINGS: There is arthritic change at the atlantodental interval with  marginal spurring off the anterior ring of C1 and the odontoid. There is  an oblique nondisplaced fracture through the base of the odontoid  indicating a type II unstable base of odontoid fracture     There is mild right and  moderate to severe left facet overgrowth at  C2-3, there is mild left bony foraminal narrowing posterior disc margin  is normal and there is no canal or right foraminal narrowing at C2-3     At C3-4, there is moderate to severe bilateral facet overgrowth. 2 mm  degenerative anterolisthesis of C3 on C4 mild posterior endplate  spurring eccentric left posterior laterally mildly narrowing the  left  side of the canal, bilateral uncovertebral joint hypertrophy and there  is moderate left and mild right bony foraminal narrowing at C3-4     At C4-5, there is mild/moderate right and moderate severe left facet  overgrowth. 1 to 2 mm anterolisthesis of C4 on C5 there is mild  posterior endplate spurring mild canal and left foraminal narrowing  moderate right bony foraminal narrowing at C4-5.     At C5-6, there is moderate left and minimal right facet overgrowth C5-6  disc space narrowing degenerative endplate change diffuse posterior disc  osteophyte complex and bilateral uncovertebral joint hypertrophy, there  is mild canal and there is moderate bilateral bony foraminal narrowing  at C5-6.     At C6-7, there is disc space narrowing degenerative endplate change  diffuse posterior disc osteophyte complex mild facet overgrowth and  uncovertebral joint hypertrophy and there is mild canal and left  foraminal narrowing.     At C7-T1 there is disc space narrowing, degenerative endplate changes  posterior endplate spurring and facet overgrowth contributing to mild  canal and bilateral foraminal narrowing     IMPRESSION:      1. There is an oblique nondisplaced fracture through the base of the  odontoid process indicating an acute type II nondisplaced type II   odontoid fracture which is an unstable fracture, neurosurgical  consultation is warranted   2. There is cervical spondylosis as described above. Extensive results  from  the study were discussed in great detail with Dr. Elder Reaves  from the emergency room by telephone on 01/13/2017 at 3:00 PM/     This report was finalized on 1/16/2017 7:06 AM EST by Dr. Gabriel Cruz MD.               Assessment:      Principal Problem:    Odontoid fracture  Active Problems:    Anemia    Hyperlipidemia    HTN (hypertension)    Hypertension    Coronary artery disease    ANDERS (acute kidney injury)    Muscle weakness-general    Multiple facial bone fractures    Hyponatremia     Dehydration      Patient Active Problem List   Diagnosis Code   • Chronic coronary artery disease I25.10   • Anemia D64.9   • Hyperlipidemia E78.5   • HTN (hypertension) I10   • Trigeminal neuralgia G50.0   • Benign prostatic hyperplasia N40.0   • Ulcerative colitis without complications K51.90   • Health care maintenance Z00.00   • Hypertension I10   • Coronary artery disease I25.10   • ANDERS (acute kidney injury) N17.9   • Muscle weakness-general M62.81   • Pulmonary nodule, right R91.1   • Pulmonary fibrosis J84.10   • Weakness R53.1   • Asterixis R27.8   • CVA (cerebral infarction) I63.9   • Irritable bowel syndrome with both constipation and diarrhea K58.2   • Odontoid fracture S12.100A   • Multiple facial bone fractures S02.92XA   • Hyponatremia E87.1   • Dehydration E86.0       Plan:    DC IV fluids  Diet: Mechanical soft- encourage PO  Monitor and correct electrolytes  Monitor mental status  PT/OT/ST- encourage cooperation with therapies  DVT/stress ulcer prophylaxis  Labs in am    DC in am to NH      Loc Bolanos MD  1/16/2017  10:42 AM        Much of this encounter note is an electronic transcription/translation of spoken language to printed text. The electronic translation of spoken language may permit erroneous, or at times, nonsensical words or phrases to be inadvertently transcribed; Although I have reviewed the note for such errors, some may still exist

## 2017-01-16 NOTE — PLAN OF CARE
Problem: Patient Care Overview (Adult)  Goal: Plan of Care Review    01/16/17 1527   Patient Care Overview   Progress no change   Outcome Evaluation   Outcome Summary/Follow up Plan BSE: coughing w/ thin as liquid wash & 1x by cup, Rec: mech soft w/ thin; no liquid wash & meds w/ applesauce; will complete VFSS tomorrow          Problem: Inpatient SLP  Goal: Dysphagia- Patient will safely consume diet as per recommendation with no signs/symptoms of aspiration    01/16/17 1527   Safely Consume Diet   Safely Consume Diet- SLP, Date Established 01/16/17   Safely Consume Diet- SLP, Time to Achieve by discharge   Safely Consume Diet- SLP, Additional Goal no liquid wash w/ mech soft & thin; meds w/ applesauce   Safely Consume Diet- SLP, Date Goal Reviewed 01/16/17

## 2017-01-16 NOTE — PLAN OF CARE
Problem: Patient Care Overview (Adult)  Goal: Plan of Care Review  Outcome: Ongoing (interventions implemented as appropriate)    01/16/17 1038   Outcome Evaluation   Outcome Summary/Follow up Plan Pt will continue to benefit from OT for increasing safety and independence for adls         Problem: Inpatient Occupational Therapy  Goal: Patient Education Goal LTG- OT  Outcome: Ongoing (interventions implemented as appropriate)    01/16/17 1038   Patient Education OT LTG   Patient Education OT LTG, Date Established 01/16/17   Patient Education OT LTG, Time to Achieve 1 wk   Patient Education OT LTG, Education Type home safety   Patient Education OT LTG, Education Understanding verbalizes understanding       Goal: Eating Self-Feeding Goal LTG- OT  Outcome: Ongoing (interventions implemented as appropriate)    01/16/17 1038   Eating Self-Feeding OT LTG   Eat Self Feeding Goal OT LTG, Date Established 01/16/17   Eat Self Feeding Goal OT LTG, Time to Achieve 1 wk   Eat Self Feed Goal OT LTG, [place] minimum assist (75% patient effort)       Goal: UB Dressing Goal LTG- OT  Outcome: Ongoing (interventions implemented as appropriate)    01/16/17 1038   UB Dressing OT LTG   UB Dressing Goal OT LTG, Date Established 01/16/17   UB Dressing Goal OT LTG, Time to Achieve 1 wk   UB Dressing Goal OT LTG, [place] Level moderate assist (50% patient effort)

## 2017-01-16 NOTE — PROGRESS NOTES
"   LOS: 3 days   Patient Care Team:  Flash Jim MD as PCP - General  Flash Jim MD as PCP - Family Medicine    Chief Complaint:     FU type II odontoid fracture    Subjective     Neck Pain    This is a new problem. The current episode started in the past 7 days. The problem has been unchanged. The pain is associated with a fall. The quality of the pain is described as aching. The pain is mild. The pain is same all the time. Pertinent negatives include no chest pain, fever, numbness, tingling or weakness. Treatments tried: Doing okay in ASPEN collar.       Subjective:  Symptoms:  Stable.  No shortness of breath, chest pain or weakness.    Diet:  Adequate intake.    Activity level: Impaired due to pain.    Pain:  He complains of pain that is mild.        History taken from: patient chart family    Objective     Vital Signs  Temp:  [97.5 °F (36.4 °C)-98.2 °F (36.8 °C)] 98.2 °F (36.8 °C)  Heart Rate:  [62-78] 78  Resp:  [18] 18  BP: (138-152)/(49-60) 152/60    Objective:  General Appearance:  Comfortable.    Vital signs: (most recent): Blood pressure 152/60, pulse 78, temperature 98.2 °F (36.8 °C), temperature source Oral, resp. rate 18, height 70\" (177.8 cm), weight 196 lb 7 oz (89.1 kg), SpO2 93 %.  Vital signs are normal.  No fever.    HEENT: (Facial abrasions; racoon eyes noted. No otorrhea or rhinorrhea. )    Lungs:  Normal effort.    Heart: Normal rate.    Chest: Symmetric chest wall expansion.   Abdomen: Abdomen is soft.    Extremities: Normal range of motion.    Neurological: Patient is alert and oriented to person, place and time.  GCS score is 15.  Patient has normal reflexes.    Skin:  Warm and dry.            Results Review:     I reviewed the patient's new clinical results.    MRI EXAMINATION OF THE CERVICAL SPINE WITHOUT CONTRAST    Minimally displaced dens fracture. No epidural hematoma or cord abnormality. Multi level degenerative changes and moderate bilateral neuroforaminal narrowing at " C6/7.      Assessment/Plan     Principal Problem:    Odontoid fracture  Active Problems:    Anemia    Hyperlipidemia    HTN (hypertension)    Hypertension    Coronary artery disease    ANDERS (acute kidney injury)    Muscle weakness-general    Multiple facial bone fractures    Hyponatremia    Dehydration      Assessment & Plan     Odontoid fracture  S/P fall   Neck pain    Continue cervical collar  Will see in office February 8th at 1:30 pm with new Cervical xrays.   OK for d/c anytime.  Call if needed.    Keren Stokes, APRN  01/16/17  3:27 PM

## 2017-01-16 NOTE — PLAN OF CARE
Problem: Orthopaedic Fracture (Adult)  Goal: Signs and Symptoms of Listed Potential Problems Will be Absent or Manageable (Orthopaedic Fracture)  Outcome: Ongoing (interventions implemented as appropriate)    Problem: Patient Care Overview (Adult)  Goal: Plan of Care Review  Outcome: Ongoing (interventions implemented as appropriate)    01/16/17 0826   Patient Care Overview   Progress improving   Outcome Evaluation   Outcome Summary/Follow up Plan pt hopes to transfer to rehab today        Goal: Adult Individualization and Mutuality  Outcome: Ongoing (interventions implemented as appropriate)  Goal: Discharge Needs Assessment  Outcome: Ongoing (interventions implemented as appropriate)    Problem: Fall Risk (Adult)  Goal: Absence of Falls  Outcome: Ongoing (interventions implemented as appropriate)

## 2017-01-16 NOTE — PROGRESS NOTES
Acute Care - Occupational Therapy Initial Evaluation  [facility]     Patient Name: Noah Isaac  : 1935  MRN: [id number]  Today's Date: 2017        Referring Physician: Luis Miguel    Admit Date: 2017       ICD-10-CM ICD-9-CM   1. Odontoid fracture, closed, initial encounter S12.100A 805.02   2. Facial fracture due to fall, closed, initial encounter S02.92XA 802.8    W19.XXXA E888.9   3. Facial abrasion, initial encounter S00.81XA 910.0   4. Weakness generalized R53.1 780.79     Patient Active Problem List   Diagnosis   • Chronic coronary artery disease   • Anemia   • Hyperlipidemia   • HTN (hypertension)   • Trigeminal neuralgia   • Benign prostatic hyperplasia   • Ulcerative colitis without complications   • Health care maintenance   • Hypertension   • Coronary artery disease   • ANDERS (acute kidney injury)   • Muscle weakness-general   • Pulmonary nodule, right   • Pulmonary fibrosis   • Weakness   • Asterixis   • CVA (cerebral infarction)   • Irritable bowel syndrome with both constipation and diarrhea   • Odontoid fracture   • Multiple facial bone fractures   • Hyponatremia   • Dehydration     Past Medical History   Diagnosis Date   • Anemia    • Anxiety disorder    • Arthritis    • Asterixis    • Coronary artery disease    • Coronary artery disease    • Depression    • Hard of hearing    • Hyperlipidemia    • Hypertension    • Pneumonia    • Pulmonary nodule    • S/P tooth extraction 2017   • Ulcerative colitis    • Use of cane as ambulatory aid      Past Surgical History   Procedure Laterality Date   • Coronary artery bypass graft     • Carpal tunnel release     • Rotator cuff repair     • Cardiac surgery     • Cataract extraction     • Cataract extraction w/  intraocular lens implant Bilateral      L eye in 2016, R eye in 2017          OT ASSESSMENT FLOWSHEET (last 72 hours)      OT Evaluation       17 1026 01/15/17 1225 17 1727 17 1420 176     Rehab Evaluation    Document Type evaluation  -SG therapy note (daily note)  -MM  evaluation  -AL     Subjective Information agree to therapy  -SG no complaints;agree to therapy  -MM  agree to therapy;complains of;weakness;pain  -AL     Patient Effort, Rehab Treatment good  -SG   good  -AL     General Information    Patient Profile Review yes  -SG        Referring Physician Janellua  -SG        General Observations sitting in bed  -SG        Precautions/Limitations fall precautions  -SG        Prior Level of Function independent:;ADL's  -SG   independent:;gait;transfer;bed mobility;all household mobility;ADL's  -AL     Equipment Currently Used at Home   walker, rolling;cane, straight  -RA walker, rolling;cane, straight  -AL     Plans/Goals Discussed With patient;spouse/S.O.;agreed upon  -SG   patient  -AL     Risks Reviewed    patient:  -AL     Benefits Reviewed    patient:  -AL     Living Environment    Lives With   spouse  -RA      Living Arrangements   house  -RA      Transportation Available   family or friend will provide;car  -RA      Clinical Impression    OT Diagnosis decreased independence for adls  -SG        Patient/Family Goals Statement to go to rehab   -SG        Criteria for Skilled Therapeutic Interventions Met yes;treatment indicated  -SG        Rehab Potential good, to achieve stated therapy goals  -SG        Therapy Frequency 3-5 times/wk  -SG        Functional Level Prior    Ambulation     1-->assistive equipment  -EB    Transferring     0-->independent  -EB    Toileting     0-->independent  -EB    Bathing     0-->independent  -EB    Dressing     0-->independent  -EB    Eating     0-->independent  -EB    Communication     0-->understands/communicates without difficulty  -EB    Swallowing     0-->swallows foods/liquids without difficulty  -EB    Pain Assessment    Pain Assessment 0-10  -SG No/denies pain  -MM  0-10  -AL     Pain Score 3  -SG   4  -AL     Pain Type    Acute pain  -AL     Pain  Location Neck  -SG   Neck  -AL     Vision Assessment/Intervention    Visual Impairment --   pt denies visual changes. States had recent cataract surgery  -SG        Cognitive Assessment/Intervention    Current Cognitive/Communication Assessment functional  -SG   functional  -AL     Orientation Status oriented x 4  -SG   oriented x 4  -AL     Follows Commands/Answers Questions 100% of the time;able to follow single-step instructions  -SG   100% of the time  -AL     Personal Safety    WNL/WFL  -AL     Personal Safety Interventions    fall prevention program maintained;gait belt  -AL     Short/Long Term Memory    intact short term memory;intact long term memory  -AL     ROM (Range of Motion)    General ROM    no range of motion deficits identified  -AL     General ROM Detail UE's WFL to 1/2 shoulder flexion.   -SG        MMT (Manual Muscle Testing)    General MMT Assessment    --   generalized weakness  -AL     Bed Mobility, Assessment/Treatment    Bed Mobility, Assistive Device  head of bed elevated  -MM       Bed Mob, Supine to Sit, [place]  verbal cues required;contact guard assist;1 person + 1 person to manage equipment  -MM  contact guard assist;verbal cues required  -AL     Bed Mob, Sit to Supine, [place]  not tested   up to the chair  -MM  contact guard assist;verbal cues required  -AL     Transfer Assessment/Treatment    Transfers, Sit-Stand [place]  verbal cues required;minimum assist (75% patient effort);1 person + 1 person to manage equipment  -MM  minimum assist (75% patient effort);verbal cues required;nonverbal cues required (demo/gesture)  -AL     Transfers, Stand-Sit [place]  verbal cues required;contact guard assist;1 person + 1 person to manage equipment  -MM  minimum assist (75% patient effort);verbal cues required;nonverbal cues required (demo/gesture)  -AL     Transfer, Comment    Patient was leaning to right with standing; LOB towards the right 3 times.  -AL     ADL  Assessment/Intervention    Additional Documentation --   pt states min difficulty with self feeding   -SG        Upper Body Dressing Assessment/Training    UB Dressing Assess/Train, [place] dependent (less than 25% patient effort)  -        UB Dressing Assess/Train, Impairments --   limited due to collar and safety .    -        UB Dressing Assess/Train, Comment Discussed safety with pt and spouse and technique  -        Sensory Assessment/Intervention    Light Touch LUE;RUE  -SG        LUE Light Touch WNL  -SG        General Therapy Interventions    Planned Therapy Interventions activity intolerance;ADL retraining  -        Positioning and Restraints    Pre-Treatment Position in bed  -SG in bed  -MM  in bed  -AL     Post Treatment Position bed  -SG chair  -MM  bed  -AL     In Bed call light within reach;encouraged to call for assist;exit alarm on;with family/caregiver  -SG   notified nsg;supine;call light within reach   pillow under legs  -AL     In Chair  notified nsg;reclined;call light within reach;encouraged to call for assist  -MM         01/13/17 1251                General Information    Equipment Currently Used at Home walker, rolling  -SS        Living Environment    Lives With spouse  -SS        Living Arrangements house  -SS        Home Accessibility stairs to enter home  -        Number of Stairs to Enter Home 4  -SS        Stair Railings at Home outside, present at both sides  -        Type of Financial/Environmental Concern none  -        Transportation Available family or friend will provide  -          User Key  (r) = Recorded By, (t) = Taken By, (c) = Cosigned By    Initials Name Effective Dates    SG Jess Ingram, OTR 04/13/15 -     EB Nancy García, SOTO 06/16/16 -     PAUL Ronquillo, PT 12/01/15 -     SS Myah Chaidez RN 06/16/16 -     MM Jaja Grace, PTA 02/18/16 -     RA Sean Chandra RN 02/18/16 -            Occupational Therapy Education     Title: PT OT SLP Therapies  (Active)     Topic: Occupational Therapy (Active)     Point: ADL training (Done)    Description: Instruct learner(s) on proper safety adaptation and remediation techniques during self care or transfers.   Instruct in proper use of assistive devices.    Learning Progress Summary    Learner Readiness Method Response Comment Documented by Status   Patient Acceptance E,TB,D VU,NR safety with UE dress and cervical collar  01/16/17 1036 Done   Family Acceptance E,TB,D VU,NR safety with UE dress and cervical collar  01/16/17 1036 Done                      User Key     Initials Effective Dates Name Provider Type Discipline     04/13/15 -  Jess Ingram OTANDREZ Occupational Therapist OT                  OT Recommendation and Plan  Planned Therapy Interventions: activity intolerance, ADL retraining  Therapy Frequency: 3-5 times/wk  Plan of Care Review  Plan Of Care Reviewed With: patient  Progress: improving  Outcome Summary/Follow up Plan: Pt will continue to benefit from OT for increasing safety and independence for adls          OT Goals       01/16/17 1038          Patient Education OT LTG    Patient Education OT LTG, Date Established 01/16/17  -SG      Patient Education OT LTG, Time to Achieve 1 wk  -SG      Patient Education OT LTG, Education Type home safety  -SG      Patient Education OT LTG, Education Understanding verbalizes understanding  -SG      Eating Self-Feeding OT LTG    Eat Self Feeding Goal OT LTG, Date Established 01/16/17  -SG      Eat Self Feeding Goal OT LTG, Time to Achieve 1 wk  -SG      Eat Self Feed Goal OT LTG, [place] Level minimum assist (75% patient effort)  -SG      UB Dressing OT LTG    UB Dressing Goal OT LTG, Date Established 01/16/17  -SG      UB Dressing Goal OT LTG, Time to Achieve 1 wk  -SG      UB Dressing Goal OT LTG, [place] Level moderate assist (50% patient effort)  -SG        User Key  (r) = Recorded By, (t) = Taken By, (c) = Cosigned By    Initials Name Provider  Type    SG OMAIRA Sarmiento Occupational Therapist                Outcome Measures       01/16/17 1041 01/15/17 1200 01/14/17 1500    How much help from another person do you currently need...    Turning from your back to your side while in flat bed without using bedrails?  3  -MM 3  -AL    Moving from lying on back to sitting on the side of a flat bed without bedrails?  3  -MM 3  -AL    Moving to and from a bed to a chair (including a wheelchair)?  3  -MM 2  -AL    Standing up from a chair using your arms (e.g., wheelchair, bedside chair)?  3  -MM 3  -AL    Climbing 3-5 steps with a railing?  1  -MM 1  -AL    To walk in hospital room?  3  -MM 2  -AL    AM-PAC 6 Clicks Score  16  -MM 14  -AL    How much help from another is currently needed...    Putting on and taking off regular lower body clothing? 1  -SG      Bathing (including washing, rinsing, and drying) 2  -SG      Toileting (which includes using toilet bed pan or urinal) 1  -SG      Putting on and taking off regular upper body clothing 1  -SG      Taking care of personal grooming (such as brushing teeth) 2  -SG      Eating meals 2  -SG      Score 9  -SG      Functional Assessment    Outcome Measure Options AM-PAC 6 Clicks Daily Activity (OT)  -SG  AM-PAC 6 Clicks Basic Mobility (PT)  -AL      User Key  (r) = Recorded By, (t) = Taken By, (c) = Cosigned By    Initials Name Provider Type    SG OMAIRA Sarmiento Occupational Therapist    PAUL Ronquillo, PT Physical Therapist    TOM Grace, PTA Physical Therapy Assistant          Time Calculation:   OT Start Time: 0943  OT Stop Time: 1002  OT Time Calculation (min): 19 min    Therapy Charges for Today     Code Description Service Date Service Provider Modifiers Qty    89835304454 HC OT EVAL LOW COMPLEXITY 2 1/16/2017 OMAIRA Sarmiento GO 1               OMAIRA Sarmiento  1/16/2017

## 2017-01-16 NOTE — PROGRESS NOTES
Acute Care - Speech Language Pathology   Swallow Initial Evaluation [facility]     Patient Name: Noah Isaac  : 1935  MRN: [id number]  Today's Date: 2017               Admit Date: 2017    SPEECH-LANGUAGE PATHOLOGY EVALUATION - SWALLOW  Subjective: The patient was seen on this date for a Clinical Swallow evaluation.  Patient was alert and cooperative.  Flat affect.   The patient admitted after falling & suffered odontoid fx; no surgery, stabilized by neck brace.   Objective: Textures given included thin liquid, nectar thick liquid, puree consistency, mechanical soft consistency and regular consistency.  Assessment: Wet voicing noted 1x w/ thin by straw.  Coughing w/ thin as a liquid wash; which the RN reported choking w/ thin and pills.  Difficulty triggering a swallow w/ nectar thick (? Disgust vs. Impairment).  No difficulty w/ mastication but the pt said mech soft was easier in the collar.   SLP Findings:  Patient presents with mild oropharyngeal dysphagia, without esophageal component.   Recommendations: Diet Textures: thin liquid, mechanical soft consistency food.  Medications should be taken whole with puree.   Recommended Strategies: no liquid wash--if needed use applesauce, Upright for PO and small bites and sips. Oral care before breakfast, after all meals and PRN.  Other Recommended Evaluations: VFSS      Visit Dx:     ICD-10-CM ICD-9-CM   1. Odontoid fracture, closed, initial encounter S12.100A 805.02   2. Facial fracture due to fall, closed, initial encounter S02.92XA 802.8    W19.XXXA E888.9   3. Facial abrasion, initial encounter S00.81XA 910.0   4. Weakness generalized R53.1 780.79     Patient Active Problem List   Diagnosis   • Chronic coronary artery disease   • Anemia   • Hyperlipidemia   • HTN (hypertension)   • Trigeminal neuralgia   • Benign prostatic hyperplasia   • Ulcerative colitis without complications   • Health care maintenance   • Hypertension   • Coronary  artery disease   • ANDERS (acute kidney injury)   • Muscle weakness-general   • Pulmonary nodule, right   • Pulmonary fibrosis   • Weakness   • Asterixis   • CVA (cerebral infarction)   • Irritable bowel syndrome with both constipation and diarrhea   • Odontoid fracture   • Multiple facial bone fractures   • Hyponatremia   • Dehydration     Past Medical History   Diagnosis Date   • Anemia    • Anxiety disorder    • Arthritis    • Asterixis    • Coronary artery disease    • Coronary artery disease    • Depression    • Hard of hearing    • Hyperlipidemia    • Hypertension    • Pneumonia    • Pulmonary nodule    • S/P tooth extraction 01/09/2017   • Ulcerative colitis    • Use of cane as ambulatory aid      Past Surgical History   Procedure Laterality Date   • Coronary artery bypass graft     • Carpal tunnel release     • Rotator cuff repair     • Cardiac surgery     • Cataract extraction     • Cataract extraction w/  intraocular lens implant Bilateral      L eye in November 2016, R eye in Jan 2017          SWALLOW EVALUATION (last 72 hours)      Swallow Evaluation       01/16/17 1529                Rehab Evaluation    Document Type evaluation  -NB        Symptoms Noted During/After Treatment none  -NB        General Information    Patient Profile Review yes  -NB        Current Diet Limitations mechanical soft;thin liquids  -NB        Precautions/Limitations, Vision WFL with corrective lenses  -NB        Precautions/Limitations, Hearing hearing aid, left;hearing impairment, bilaterally  -NB        Prior Level of Function- Communication functional in all spheres  -NB        Prior Level of Function- Swallowing no diet consistency restrictions  -NB        Plans/Goals Discussed With patient and family;agreed upon  -NB        Barriers to Rehab medically complex  -NB        Clinical Impression    Patient's Goals For Discharge patient did not state  -NB        Family Goals For Discharge family did not state  -NB        SLP  Swallowing Diagnosis mild dysphagia;oral dysfunction;pharyngeal dysfunction  -NB        Rehab Potential/Prognosis, Swallowing good, to achieve stated therapy goals  -NB        Criteria for Skilled Therapeutic Interventions Met skilled criteria for dysphagia intervention met  -NB        Therapy Frequency PRN  -NB        Predicted Duration Therapy Interv (days) until discharge  -NB        Expected Duration Therapy Session (min) 15-30 minutes  -NB        SLP Diet Recommendation soft textures;ground;thin liquids  -NB        Recommended Diagnostics VFSS ([facility])  -NB        Recommended Feeding/Eating Techniques maintain upright posture during/after eating for 30 mins;other (see comments)   no liquid wash  -NB        SLP Rec. for Method of Medication Administration meds whole in pudding/applesauce  -NB        Monitor For Signs Of Aspiration cough;gurgly voice;throat clearing  -NB        Anticipated Discharge Disposition transitional care  -NB        Pain Assessment    Pain Assessment No/denies pain  -NB        Cognitive Assessment/Intervention    Current Cognitive/Communication Assessment functional  -NB        Orientation Status oriented x 4  -NB        Follows Commands/Answers Questions 100% of the time  -NB        Oral Motor Structure and Function    Oral Motor Anatomy and Physiology patient demonstrates anatomy that is WNL  -NB        Dentition Assessment present and adequate  -NB        Secretion Management WNL/WFL  -NB        Mucosal Quality moist, healthy  -NB        Velar Elevation other (see comments)   CNA  -NB        Volitional Swallow mild to moderate difficulties initiating volitional swallow  -NB        Volitional Cough no difficulties initiating volitional cough  -NB        Oral Musculature General Assessment other (see comments)   CNA  -NB          User Key  (r) = Recorded By, (t) = Taken By, (c) = Cosigned By    Initials Name Effective Dates    NB Cara Newton MS [facility]-SLP 04/13/15 -         EDUCATION  The  patient has been educated in the following areas:   Dysphagia (Swallowing Impairment).    SLP Recommendation and Plan  SLP Swallowing Diagnosis: mild dysphagia, oral dysfunction, pharyngeal dysfunction  SLP Diet Recommendation: soft textures, ground, thin liquids  Recommended Feeding/Eating Techniques: maintain upright posture during/after eating for 30 mins, other (see comments) (no liquid wash)  SLP Rec. for Method of Medication Administration: meds whole in pudding/applesauce  Monitor For Signs Of Aspiration: cough, gurgly voice, throat clearing  Recommended Diagnostics: VFSS ([facility])  Criteria for Skilled Therapeutic Interventions Met: skilled criteria for dysphagia intervention met  Anticipated Discharge Disposition: transitional care  Rehab Potential/Prognosis, Swallowing: good, to achieve stated therapy goals  Therapy Frequency: PRN             Plan of Care Review  Plan Of Care Reviewed With: patient  Progress: no change  Outcome Summary/Follow up Plan: BSE: coughing w/ thin as liquid wash & 1x by cup, Rec: mech soft w/ thin; no liquid wash & meds w/ applesauce; will complete VFSS tomorrow           IP SLP Goals       01/16/17 1527          Safely Consume Diet    Safely Consume Diet- SLP, Date Established 01/16/17  -NB      Safely Consume Diet- SLP, Time to Achieve by discharge  -NB      Safely Consume Diet- SLP, Additional Goal no liquid wash w/ mech soft & thin; meds w/ applesauce  -NB      Safely Consume Diet- SLP, Date Goal Reviewed 01/16/17  -NB        User Key  (r) = Recorded By, (t) = Taken By, (c) = Cosigned By    Initials Name Provider Type    ADDISON Newton MS CCC-SLP Speech and Language Pathologist             SLP Outcome Measures (last 72 hours)      SLP Outcome Measures       01/16/17 1528          SLP Outcome Measures    Outcome Measure Used? Adult NOMS  -NB      FCM Scores    FCM Chosen Swallowing  -NB      Swallowing FCM Score 4  -NB        User Key  (r) = Recorded By, (t) = Taken By, (c) =  Cosigned By    Initials Name Effective Dates    ADDISON Newton MS CCC-SLP 04/13/15 -            Time Calculation:         Time Calculation- SLP       01/16/17 1533          Time Calculation- SLP    SLP Received On 01/16/17  -NB        User Key  (r) = Recorded By, (t) = Taken By, (c) = Cosigned By    Initials Name Provider Type    ADDISON Newton MS CCC-SLP Speech and Language Pathologist          Therapy Charges for Today     Code Description Service Date Service Provider Modifiers Qty    [id number]  ST EVAL ORAL PHARYNG SWALLOW 4 1/16/2017 Cara Newton MS CCC-SLP GN 1               Cara Newton MS CCC-SLP  1/16/2017

## 2017-01-16 NOTE — PROGRESS NOTES
Acute Care - Physical Therapy Treatment Note  [facility]     Patient Name: Noah Isaac  : 1935  MRN: [id number]  Today's Date: 2017     Date of Referral to PT: 17  Referring Physician: Luis Miguel    Admit Date: 2017    Visit Dx:    ICD-10-CM ICD-9-CM   1. Odontoid fracture, closed, initial encounter S12.100A 805.02   2. Facial fracture due to fall, closed, initial encounter S02.92XA 802.8    W19.XXXA E888.9   3. Facial abrasion, initial encounter S00.81XA 910.0   4. Weakness generalized R53.1 780.79     Patient Active Problem List   Diagnosis   • Chronic coronary artery disease   • Anemia   • Hyperlipidemia   • HTN (hypertension)   • Trigeminal neuralgia   • Benign prostatic hyperplasia   • Ulcerative colitis without complications   • Health care maintenance   • Hypertension   • Coronary artery disease   • ANDERS (acute kidney injury)   • Muscle weakness-general   • Pulmonary nodule, right   • Pulmonary fibrosis   • Weakness   • Asterixis   • CVA (cerebral infarction)   • Irritable bowel syndrome with both constipation and diarrhea   • Odontoid fracture   • Multiple facial bone fractures   • Hyponatremia   • Dehydration               Adult Rehabilitation Note       17 1200 01/15/17 1225       Rehab Assessment/Intervention    Discipline physical therapy assistant  -CW physical therapy assistant  -MM     Document Type therapy note (daily note)  -CW therapy note (daily note)  -MM     Subjective Information agree to therapy;complains of;weakness;pain  -CW no complaints;agree to therapy  -MM     Patient Effort, Rehab Treatment good  -CW      Precautions/Limitations fall precautions  -CW      Recorded by [CW] Elmer Estevez [MM] Jaja Grace PTA     Pain Assessment    Pain Assessment 0-10  -CW No/denies pain  -MM     Pain Score 6  -CW      Pain Location Neck  -CW      Recorded by [CW] Elmer Estevez [MM] Jaja Grace PTA     Cognitive Assessment/Intervention    Current  Cognitive/Communication Assessment functional  -CW      Orientation Status oriented x 4  -CW      Follows Commands/Answers Questions 100% of the time  -CW      Personal Safety WNL/WFL  -CW      Personal Safety Interventions fall prevention program maintained;gait belt;nonskid shoes/slippers when out of bed  -CW      Recorded by [CW] Elmer Estevez      Bed Mobility, Assessment/Treatment    Bed Mobility, Assistive Device head of bed elevated  -CW head of bed elevated  -MM     Bed Mob, Supine to Sit, [place] verbal cues required;nonverbal cues required (demo/gesture);contact guard assist  -CW verbal cues required;contact guard assist;1 person + 1 person to manage equipment  -MM     Bed Mob, Sit to Supine, [place] verbal cues required;nonverbal cues required (demo/gesture);contact guard assist  -CW not tested   up to the chair  -MM     Recorded by [CW] Elmre Estevez [MM] Jaja Grace, RONAN     Transfer Assessment/Treatment    Transfers, Sit-Stand [place] verbal cues required;nonverbal cues required (demo/gesture);contact guard assist  -CW verbal cues required;minimum assist (75% patient effort);1 person + 1 person to manage equipment  -MM     Transfers, Stand-Sit [place] verbal cues required;nonverbal cues required (demo/gesture);contact guard assist  -CW verbal cues required;contact guard assist;1 person + 1 person to manage equipment  -MM     Transfers, Sit-Stand-Sit, Assist Device rolling walker  -CW      Recorded by [CW] Elmer Estevez [MM] Jaja Grace, PTA     Gait Assessment/Treatment    Gait, [place] Level verbal cues required;nonverbal cues required (demo/gesture);contact guard assist;2 person assist required  -CW verbal cues required;minimum assist (75% patient effort);1 person + 1 person to manage equipment;2 person assist required  -MM     Gait, Assistive Device rolling walker  -CW rolling walker  -MM     Gait, Distance (Feet) 120  -CW 40  -MM     Gait, Gait  Pattern Analysis  swing-to gait  -MM     Gait, Gait Deviations kareem decreased;step length decreased;stride length decreased  -CW kareem decreased;step length decreased  -MM     Recorded by [CW] Elmer Estevez [MM] Jaja Grace PTA     Therapy Exercises    Bilateral Lower Extremities AROM:;15 reps;sitting;ankle pumps/circles;hip flexion;LAQ  -CW      Recorded by [CW] Elmer Estevez      Positioning and Restraints    Pre-Treatment Position in bed  -CW in bed  -MM     Post Treatment Position bed  -CW chair  -MM     In Bed supine;call light within reach;encouraged to call for assist;exit alarm on;with family/caregiver  -CW      In Chair  notified nsg;reclined;call light within reach;encouraged to call for assist  -MM     Recorded by [CW] Elmer Estevez [MM] Jaja Grace PTA       User Key  (r) = Recorded By, (t) = Taken By, (c) = Cosigned By    Initials Name Effective Dates    MM Jaja Grace PTA 02/18/16 -     CW Elmer Estevez 12/13/16 -                 IP PT Goals       01/14/17 1542          Bed Mobility PT LTG    Bed Mobility PT LTG, Date Established 01/14/17  -AL      Bed Mobility PT LTG, Time to Achieve 1 wk  -AL      Bed Mobility PT LTG, Activity Type all bed mobility  -AL      Bed Mobility PT LTG, [place] Level supervision required  -AL      Bed Mobility PT Goal  LTG, Assist Device bed rails  -AL      Transfer Training PT LTG    Transfer Training PT LTG, Date Established 01/14/17  -AL      Transfer Training PT LTG, Time to Achieve 1 wk  -AL      Transfer Training PT LTG, Activity Type sit to stand/stand to sit  -AL      Transfer Training PT LTG, [place] Level contact guard assist  -AL      Transfer Training PT LTG, Assist Device walker, rolling  -AL      Gait Training PT LTG    Gait Training Goal PT LTG, Date Established 01/14/17  -AL      Gait Training Goal PT LTG, Time to Achieve 1 wk  -AL      Gait Training Goal PT LTG, [place] Level moderate assist (50% patient  effort)  -AL      Gait Training Goal PT LTG, Assist Device walker, rolling  -AL      Gait Training Goal PT LTG, Distance to Achieve 50  -AL        User Key  (r) = Recorded By, (t) = Taken By, (c) = Cosigned By    Initials Name Provider Type    APUL Ronquillo, PT Physical Therapist          Physical Therapy Education     Title: PT OT SLP Therapies (Active)     Topic: Physical Therapy (Done)     Point: Mobility training (Done)    Learning Progress Summary    Learner Readiness Method Response Comment Documented by Status   Patient Acceptance E,TB DU,VU   01/16/17 1202 Done    Acceptance E VU  MM 01/15/17 1227 Done    Acceptance E VU,NR  AL 01/14/17 1539 Done               Point: Home exercise program (Done)    Learning Progress Summary    Learner Readiness Method Response Comment Documented by Status   Patient Acceptance E,TB DU,VU   01/16/17 1202 Done               Point: Body mechanics (Done)    Learning Progress Summary    Learner Readiness Method Response Comment Documented by Status   Patient Acceptance E,TB DU,VU  CW 01/16/17 1202 Done    Acceptance E VU,NR  AL 01/14/17 1539 Done               Point: Precautions (Done)    Learning Progress Summary    Learner Readiness Method Response Comment Documented by Status   Patient Acceptance E,TB DU,VU  CW 01/16/17 1202 Done    Acceptance E VU,NR  AL 01/14/17 1539 Done                      User Key     Initials Effective Dates Name Provider Type Discipline    AL 12/01/15 -  Cailin Ronquillo, PT Physical Therapist PT     02/18/16 -  Jaja Grace, PTA Physical Therapy Assistant PT     12/13/16 -  Elmer Estevez Physical Therapy Assistant PT                    PT Recommendation and Plan  Anticipated Discharge Disposition: inpatient rehabilitation facility  PT Frequency: daily  Plan of Care Review  Plan Of Care Reviewed With: patient  Progress: progress toward functional goals as expected  Outcome Summary/Follow up Plan: Pt increasing with strength and balance when  transferring from the bed to RWX          Outcome Measures       01/16/17 1200 01/16/17 1041 01/15/17 1200    How much help from another person do you currently need...    Turning from your back to your side while in flat bed without using bedrails? 3  -CW  3  -MM    Moving from lying on back to sitting on the side of a flat bed without bedrails? 3  -CW  3  -MM    Moving to and from a bed to a chair (including a wheelchair)? 3  -CW  3  -MM    Standing up from a chair using your arms (e.g., wheelchair, bedside chair)? 3  -CW  3  -MM    Climbing 3-5 steps with a railing? 2  -CW  1  -MM    To walk in hospital room? 3  -CW  3  -MM    AM-PAC 6 Clicks Score 17  -CW  16  -MM    How much help from another is currently needed...    Putting on and taking off regular lower body clothing?  1  -SG     Bathing (including washing, rinsing, and drying)  2  -SG     Toileting (which includes using toilet bed pan or urinal)  1  -SG     Putting on and taking off regular upper body clothing  1  -SG     Taking care of personal grooming (such as brushing teeth)  2  -SG     Eating meals  2  -SG     Score  9  -SG     Functional Assessment    Outcome Measure Options AM-PAC 6 Clicks Basic Mobility (PT)  -CW AM-PAC 6 Clicks Daily Activity (OT)  -SG       01/14/17 1500          How much help from another person do you currently need...    Turning from your back to your side while in flat bed without using bedrails? 3  -AL      Moving from lying on back to sitting on the side of a flat bed without bedrails? 3  -AL      Moving to and from a bed to a chair (including a wheelchair)? 2  -AL      Standing up from a chair using your arms (e.g., wheelchair, bedside chair)? 3  -AL      Climbing 3-5 steps with a railing? 1  -AL      To walk in hospital room? 2  -AL      AM-PAC 6 Clicks Score 14  -AL      Functional Assessment    Outcome Measure Options AM-PAC 6 Clicks Basic Mobility (PT)  -AL        User Key  (r) = Recorded By, (t) = Taken By, (c) =  Cosigned By    Initials Name Provider Type    SG Jess Ingram, OTR Occupational Therapist    PAUL Ronquillo, PT Physical Therapist    MM Jaja Grace, PTA Physical Therapy Assistant    CW Elmer Estevez Physical Therapy Assistant           Time Calculation:         PT Charges       01/16/17 1202          Time Calculation    Start Time 1144  -CW      Stop Time 1200  -CW      Time Calculation (min) 16 min  -CW      PT Received On 01/16/17  -CW      PT - Next Appointment 01/17/17  -CW        User Key  (r) = Recorded By, (t) = Taken By, (c) = Cosigned By    Initials Name Provider Type    CW Elmer Estevez Physical Therapy Assistant          Therapy Charges for Today     Code Description Service Date Service Provider Modifiers Qty    32821759873 HC PT THER SUPP EA 15 MIN 1/16/2017 Elmer Estevez GP 1    41144568048 HC PT THER PROC EA 15 MIN 1/16/2017 Elmer Estevez GP 1          PT G-Codes  Outcome Measure Options: AM-PAC 6 Clicks Basic Mobility (PT)    Elmer Estevez  1/16/2017

## 2017-01-16 NOTE — PLAN OF CARE
Problem: Patient Care Overview (Adult)  Goal: Plan of Care Review  Outcome: Ongoing (interventions implemented as appropriate)    01/16/17 1202   Patient Care Overview   Progress progress toward functional goals as expected   Outcome Evaluation   Outcome Summary/Follow up Plan Pt increasing with strength and balance when transferring from the bed to RWX

## 2017-01-17 NOTE — SIGNIFICANT NOTE
01/17/17 1350   Rehab Treatment   Discipline physical therapy assistant   Treatment Not Performed patient/family decline treatment, pt not feeling well   Recommendation   PT - Next Appointment 01/18/17

## 2017-01-17 NOTE — PLAN OF CARE
Problem: Patient Care Overview (Adult)  Goal: Plan of Care Review  Outcome: Outcome(s) achieved Date Met:  01/17/17 01/17/17 0359   Patient Care Overview   Progress no change   Outcome Evaluation   Outcome Summary/Follow up Plan VFSS planned for today. Acceptance with [facility], will have bed available today. VS are stable. Ok to take neckbrace off for assisted shower per neuro. VS stable, no c/o pain or discomfort at present. WIll continue to monitor.        Goal: Adult Individualization and Mutuality  Outcome: Ongoing (interventions implemented as appropriate)    Problem: Fall Risk (Adult)  Goal: Absence of Falls  Outcome: Ongoing (interventions implemented as appropriate)

## 2017-01-17 NOTE — PLAN OF CARE
Problem: Orthopaedic Fracture (Adult)  Goal: Signs and Symptoms of Listed Potential Problems Will be Absent or Manageable (Orthopaedic Fracture)  Outcome: Ongoing (interventions implemented as appropriate)    Problem: Patient Care Overview (Adult)  Goal: Plan of Care Review  Outcome: Ongoing (interventions implemented as appropriate)    01/14/17 0451 01/17/17 1618   Coping/Psychosocial Response Interventions   Plan Of Care Reviewed With patient --    Patient Care Overview   Progress --  progress toward functional goals as expected   Outcome Evaluation   Outcome Summary/Follow up Plan --  Video swallow conducted and no penetration and/or aspiration noted. EKG showed sinus tach with ventricular bigening and left bundle branch block this morning. Magnesium 1.6 and 2 g of magnesium given. Tylenol given for generalized pain. Vitals stable. Will continue to monitor.         Problem: Fall Risk (Adult)  Goal: Absence of Falls  Outcome: Ongoing (interventions implemented as appropriate)

## 2017-01-17 NOTE — PLAN OF CARE
Problem: Inpatient SLP  Goal: Dysphagia- Patient will safely consume diet as per recommendation with no signs/symptoms of aspiration  Outcome: Outcome(s) achieved Date Met:  01/17/17 01/16/17 1527 01/17/17 0927   Safely Consume Diet   Safely Consume Diet- SLP, Date Established 01/16/17 --    Safely Consume Diet- SLP, Time to Achieve by discharge --    Safely Consume Diet- SLP, Outcome --  goal met

## 2017-01-17 NOTE — DISCHARGE SUMMARY
PHYSICIAN DISCHARGE SUMMARY  [facility]    Patient Identification:  Name: Noah Isaac  Age: 81 y.o.  Sex: male  :  1935  MRN: [id number]    Primary Care Physician: Flash Jim MD    Admit date: 2017  Discharge date and time 2017    Discharged Condition: stable    Discharge Diagnoses:  Principal Problem:    Odontoid fracture  Active Problems:    Anemia    ANDERS (acute kidney injury)    Muscle weakness-general    Multiple facial bone fractures    Hyponatremia    Dehydration    Hyperlipidemia    HTN (hypertension)    Hypertension    Coronary artery disease    Patient Active Problem List   Diagnosis Code   • Chronic coronary artery disease I25.10   • Anemia D64.9   • Hyperlipidemia E78.5   • HTN (hypertension) I10   • Trigeminal neuralgia G50.0   • Benign prostatic hyperplasia N40.0   • Ulcerative colitis without complications K51.90   • Health care maintenance Z00.00   • Hypertension I10   • Coronary artery disease I25.10   • ANDERS (acute kidney injury) N17.9   • Muscle weakness-general M62.81   • Pulmonary nodule, right R91.1   • Pulmonary fibrosis J84.10   • Weakness R53.1   • Asterixis R27.8   • CVA (cerebral infarction) I63.9   • Irritable bowel syndrome with both constipation and diarrhea K58.2   • Odontoid fracture S12.100A   • Multiple facial bone fractures S02.92XA   • Hyponatremia E87.1   • Dehydration E86.0          Hospital Course: Noah Isaac  is an 80 y/o male with h/o CAD, s/p CABG, HTN, Hyperlipidemia, , s/p CVA, UC, Tribe. Apparently he tripped and fell in between 2 cars in a parking lot, he fell and hit his face, EMS brought him to ER. In ER he was found to have odontoid fracture, multiple facial fractures and nose laceration. During the visit in ER his BP was very elevated. Patient was admited for further management. Neurosurgery was consulted for odontoid fracture and it was agreed that this would be managed  "conservatively with bracing. He has been working with PT with a hard cervical collar in place. This will need to continue for 3 months. He has scheduled f/u with Dr. Castanon on 2-8-17. He is taking po diet with no vomiting for 24 hours.  . He had swallow study done yesterday  morning. His pain has been well controlled on current regimen. Yesterday, he had PVC's for which cardiology was consulted. Cardiology increased his Lopressor and state he can follow up with regular cardiologist after discharge.  He will be discharged to rehabilitation services.     PMHX:   Past Medical History   Diagnosis Date   • Anemia    • Anxiety disorder    • Arthritis    • Asterixis    • Coronary artery disease    • Coronary artery disease    • Depression    • Hard of hearing    • Hyperlipidemia    • Hypertension    • Pneumonia    • Pulmonary nodule    • S/P tooth extraction 01/09/2017   • Ulcerative colitis    • Use of cane as ambulatory aid      PSHX:   Past Surgical History   Procedure Laterality Date   • Coronary artery bypass graft     • Carpal tunnel release     • Rotator cuff repair     • Cardiac surgery     • Cataract extraction     • Cataract extraction w/  intraocular lens implant Bilateral      L eye in November 2016, R eye in Jan 2017           Consults:     Consults     Date and Time Order Name Status Description    1/19/2017 0642 Ophthalmology (on-call MD unless specified) In process     1/17/2017 1122 Inpatient Consult to Cardiology Completed     1/13/2017 1922 Neurosurgery (on-call MD unless specified) In process     1/13/2017 1537 Family Medicine Consult Completed           Discharge Exam:    Visit Vitals   • /74 (BP Location: Right arm, Patient Position: Lying)   • Pulse 101   • Temp 98.4 °F (36.9 °C) (Oral)   • Resp 18   • Ht 70\" (177.8 cm)   • Wt 188 lb 8 oz (85.5 kg)   • SpO2 95%   • BMI 26.29 kg/m2       General: Alert, cooperative, no distress, appears stated age  HEENT:    Head: Normocephalic, with " continued ecchymosis of periorbital spaces, nasal folds, cheeks.   Neck: Supple, symmetrical, trachea midline, no adenopathy;              thyroid:  no enlargement/tenderness/nodules;              no carotid bruit or JVD  Cardiovascular: Normal rate, regular rhythm and intact distal pulses.              Exam reveals no gallop and no friction rub. No murmur heard  Pulmonary: Clear to auscultation bilaterally, respirations unlabored.               No rhonchi, wheezing or rales.   Abdominal: Soft. Soft, non-tender, bowel sounds active all four quadrants,     no masses, no hepatomegaly, no splenomegaly.   Extremities: Normal, atraumatic, no cyanosis or edema  Pulses: 2 + symmetric all extremities  Neurological: He is alert and oriented to person, place, and time.                 CNII-XII intact, normal strength, sensation intact throughout        Data Review:        Results from last 7 days  Lab Units 01/18/17  0441 01/17/17  0522 01/16/17  0329   WBC 10*3/mm3 5.46 6.10 5.68   HEMOGLOBIN g/dL 9.4* 9.4* 9.2*   HEMATOCRIT % 28.3* 29.0* 27.9*   PLATELETS 10*3/mm3 152 152 117*         Results from last 7 days  Lab Units 01/18/17  0441 01/17/17  1150 01/17/17  0522   SODIUM mmol/L 129* 131* 132*   POTASSIUM mmol/L 3.9 4.1 4.1   CHLORIDE mmol/L 92* 92* 94*   TOTAL CO2 mmol/L 24.7 25.1 25.7   BUN mg/dL 15 13 14   CREATININE mg/dL 0.95 0.95 0.90   CALCIUM mg/dL 8.6 8.9 8.8   GLUCOSE mg/dL 98 117* 96             0  Lab Value Date/Time   TROPONINT <0.010 01/17/2017 1150   TROPONINT <0.010 09/16/2016 1818   TROPONINT <0.010 08/29/2016 1958   TROPONINT <0.010 08/28/2016 2009       Intake/Output       01/18/17 0700 - 01/19/17 0659 01/19/17 0700 - 01/20/17 0659    Intake (ml) 200 --    Output (ml) 1650 300    Net (ml) -1450 -300    Last Weight 188 lb 8 oz (85.5 kg) --           Imaging Results (all)     Procedure Component Value Units Date/Time    XR Knee 1 or 2 View Right [[id number]] Collected:  01/13/17 1316     Updated:  01/13/17  1320    Narrative:       Right knee 2 views 01/13/2017     HISTORY: Fell, right knee pain.     There is vascular calcification in the right knee.     No fractures or dislocations are seen. There may be very minimal  suprapatellar effusion. Surgical staples are seen in the medial aspect  of the right knee.       Impression:       1. No acute process except for possible minimal suprapatellar effusion.     This report was finalized on 1/13/2017 1:17 PM by Dr. Andres Grimes MD.       MRI Cervical Spine Without Contrast [[id number]] Collected:  01/13/17 2107     Updated:  01/14/17 1606    Narrative:       MRI EXAMINATION OF THE CERVICAL SPINE WITHOUT CONTRAST     HISTORY: Odontoid fracture.     A MRI examination of the cervical spine was performed without the use of  contrast and compared to the CT examination of the cervical spine from  earlier the same day.     FINDINGS: Signal intensity within the cervical cord is normal on the  sagittal T2 sequence. There is a grade 1 anterolisthesis of C4 upon C5  estimated to be 1-2 mm in severity. There is moderate loss of disc  height at C5-6, C6-7, and C7-T1. There is disc desiccation at all  levels.     C2-3: Moderate facet degenerative disease is present on the left. There  is a mild broad-based disc osteophyte complex resulting in mild  flattening of the ventral surface of the thecal sac. Cerebrospinal fluid  is effaced ventral and dorsal to the cord.     C3-4: There is mild and mild to moderate facet degenerative disease on  the right and left respectively. There is moderate neuroforaminal  compromise on the left secondary to facet hypertrophy and uncovertebral  degenerative disease. A broad-based disc osteophyte complex is present  which results in moderate canal stenosis, more prominent on the left.  There is effacement of cerebrospinal fluid ventral and dorsal to the  cord.     C4-5: Moderate facet degenerative disease is present bilaterally.     C5-6: There is a  broad-based disc osteophyte complex resulting in mild  flattening of the ventral surface of the thecal sac and cord.  Cerebrospinal fluid is effaced ventral and dorsal to the cord. There is  mild neuroforaminal compromise bilaterally secondary to uncovertebral  degenerative disease.     C6-7: There is a mild broad-based disc osteophyte complex resulting in  mild flattening of the ventral surface of the thecal sac and cord. There  is moderate neuroforaminal compromise bilaterally, more prominent on the  left secondary to uncovertebral degenerative disease.     C7-T1: There is a mild broad-based disc osteophyte complex with no  evidence of herniation. There is mild neuroforaminal compromise  bilaterally secondary to extension of the disc osteophyte complex into  the neural foramen.     The CT examination of the cervical spine demonstrated an oblique  fracture involving the dens. The fracture is better demonstrated on the  CT examination. There is only minimal increased signal intensity  involving the fracture on the sagittal T2 sequence. There is no evidence  of a prevertebral hematoma.       Impression:       There is an oblique minimally displaced fracture involving  the dens with minimal increased signal intensity appreciated on the  sagittal T2 sequence at the fracture site. There is no evidence of an  epidural hematoma or of cord signal abnormality. There is moderate canal  stenosis from C2 to C7 secondary to broad-based disc osteophyte  complexes at each level flattening the ventral surface of the cord and  effacing cerebrospinal fluid ventral and dorsal to the cord at each  level. The greatest degree of spinal stenosis is at C6-7.     The above information was called to and discussed with Elsi Najera,  following the dictation.     This report was finalized on 1/14/2017 4:02 PM by Dr. Genaro Malagon MD.       CT Head Without Contrast [[id number]] Collected:  01/13/17 1721     Updated:  01/16/17 0709    Narrative:        EMERGENCY NONCONTRAST HEAD CT, FACIAL CT AND CERVICAL SPINE CT  01/13/2017     CLINICAL HISTORY: [facility],  loss  his balance fell face first, denies loss of consciousness     HEAD CT TECHNIQUE: Spiral CT images were obtained from the base of the  skull to the vertex without intravenous contrast and images were  reformatted and submitted in 3 mm thick axial CT section with brain  algorithm and 2 mm thick axial CT section with high-resolution bone  algorithm and additional 2 mm thick sagittal and coronal reconstructions  were performed at brain algorithm and due to questionable finding along  the anterior tip of the left temporal lobe patient was brought back for  additional spiral CT images through the head..     This correlated to an prior noncontrast head CT from [facility] 09/16/2016 and prior MRI of the head 09/17/2016.     FINDINGS: There is some patchy nodular low-density in periventricular  extending to subcortical white matter of cerebral hemispheres consistent  with mild-to-moderate small vessel disease. The remainder of the brain  parenchyma is normal in attenuation, the lateral and third ventricles  are minimally prominent size likely due to to central volume loss. There  is a thin sliver of hyperdensity along the anterior tip of the left  temporal lobe on the initial set axial images, it is not reproduced on  the repeat axial images nor on the facial CT images and felt to be  artifact. Overall, no extra-axial fluid collections are identified and  there is no evidence of acute intracranial hemorrhage. There is a scalp  hematoma over the anterior inferior frontal bone extending paranasal and  periorbital soft tissues and there is multiple acute fractures including  a linear nondisplaced vertical fracture of the anterior inferior medial  left frontal bone extending into anterior table of the inferior medial  left frontal sinus, fractures of the  anterosuperior tip in the anterior  aspect nasal septum fractures of the superior nasal bones extending  nasal bridge, fractures of the left orbital floor and anterior and  posterior lateral wall left maxillary sinus there are fractures of the  pterygoid plates.       Impression:       1. There is moderate small vessel disease in the cerebral white matter.     2. No acute intracranial hemorrhage is seen     3. There are multiple acute fractures with a linear nondisplaced  fracture in the anterior inferior medial left frontal bone extending  into the anterior table of the inferior medial left frontal sinus as  well as fracture of the anterosuperior and anterior aspect nasal septum,  fractures of the superior nasal bones and nasal bridge fractures of the  left orbital floor and anterior posterior lateral wall left maxillary  sinus likely fracture of the anterior wall of the right maxillary sinus  fractures of the pterygoid plates. There is blood subtotally opacifying  left frontal sinus, anterior ethmoid sinuses bilaterally left maxillary  sinus blood partially opacifying posterior right maxillary sinus and see  facial CT report below. The  remainder of the head CT is unremarkable.     FACIAL CT TECHNIQUE: Spiral CT images were obtained through the facial  bones in the axial imaging plane. Images were reformatted and are  submitted in 2 mm thick axial CT section with soft tissue and  high-resolution bone algorithm and additional 2 mm thick sagittal and  coronal reconstructions were performed..     FINDINGS: Patient has a moderate size scalp hematoma over the anterior  inferior frontal bone extending into the paranasal tissues and  periorbital tissues from today's head and facial trauma. There is a  linear acute fracture involving the far anterior inferior medial left  frontal bone that extends into the anterior wall of the inferior medial  left frontal sinus. Fracture extends through the medial septa between  the   left and right frontal sinuses and fracture plane also involve the  superior nasal bones extending into the nasal bridge and there is  fracture of the far anterior superior aspect of the nasal septum. There  is  fracture plane extending into the anterior superior medial wall of  the left orbit and there is fracture plane involving the inferior medial  wall of the left orbit and there is mild with comminuted fracture that  is nondisplaced involving the left inferior orbital wall and the  fracture extending into the anterior wall of the left maxillary sinus  with minimal comminution in the anterior inferior wall left maxillary  sinus, there is fracture plane involving posterior lateral wall left  maxillary sinus with several bubbles of air extending into the left  infratemporal fossa just lateral to the left maxillary sinus. There may  be a linear nondisplaced fracture of the lateral left pterygoid plate.  Hairline nondisplaced fracture of the medial aspect of the right orbital  roof and there is a hairline nondisplaced fracture horizontally oriented  through the midportion of the anterior wall of the right maxillary  sinus. There is fluid and blood subtotally opacifying the left frontal  sinus partially opacifying the anterior ethmoid sinus and subtotally  opacifying the left maxillary sinus partially opacifying the posterior  third of the right maxillary sinus.     IMPRESSION:     1. This patient has scalp hematoma over the anterior inferior midline of  the frontal bone extending into the paranasal tissues and there are  hairline nondisplaced fractures in the anterior inferior medial aspect  of left frontal bone extending into the anterior table of the inferior  medial left frontal sinus, fracture involving the superior nasal bones  extending nasal bridge, there is a nondisplaced fracture of the far  anterior superior portion of the nasal septum. There is fracture plane  extending into the anterior superior medial  wall of the left orbit as  well as involving the inferior medial wall of left orbit as well as some  minimally comminuted nondisplaced fracture of the left orbital floor  with several bubbles of air in the extraconal fat of the inferior left  orbit. There are multiple fracture planes involving the anterior wall  left maxillary sinus with some comminution in the inferior aspect of  anterior wall left maxillary sinus and there are fracture planes  involving the posterior lateral wall left maxillary sinus with several  bubbles of air in the lateral to left maxillary sinus in the april  maxillary fat and may be a subtle hairline nondisplaced fracture of the  left  lateral pterygoid plate. There is a questionable hairline  nondisplaced fracture hairline fracture of the medial aspect of the  right orbital roof bordering the inferior wall of the right frontal  sinus. There is probable hairline nondisplaced fracture horizontally  oriented  through the midportion of the anterior wall of the right  maxillary sinus. There is questionable hairline nondisplaced fractures  of the medial lateral pterygoid plates bilaterally. There is blood and  fluid subtotally opacifying left frontal sinus and anterior ethmoid  sinuses bilaterally the right frontal recess partially opacifying the  posterior right maxillary sinus and subtotally opacifying the left  maxillary sinus. Given the probable subtle nondisplaced pterygoid plate  fractures and facial fractures described this is probably a variant of a  LeFort fracture.     On sagittal images there is evidence of nondisplaced type II odontoid  fracture through the base of odontoid this will be discussed on the  cervical spine CT report below     CERVICAL SPINE CT TECHNIQUE: Spiral CT images were obtained from the  skull base to the T4 thoracic level and images were reformatted and are  submitted in 2 mm thick axial CT section with soft tissue and bone  algorithm and additional 2 mm thick  sagittal and coronal reconstructions  were performed.     FINDINGS: There is arthritic change at the atlantodental interval with  marginal spurring off the anterior ring of C1 and the odontoid. There is  an oblique nondisplaced fracture through the base of the odontoid  indicating a type II unstable base of odontoid fracture     There is mild right and  moderate to severe left facet overgrowth at  C2-3, there is mild left bony foraminal narrowing posterior disc margin  is normal and there is no canal or right foraminal narrowing at C2-3     At C3-4, there is moderate to severe bilateral facet overgrowth. 2 mm  degenerative anterolisthesis of C3 on C4 mild posterior endplate  spurring eccentric left posterior laterally mildly narrowing the left  side of the canal, bilateral uncovertebral joint hypertrophy and there  is moderate left and mild right bony foraminal narrowing at C3-4     At C4-5, there is mild/moderate right and moderate severe left facet  overgrowth. 1 to 2 mm anterolisthesis of C4 on C5 there is mild  posterior endplate spurring mild canal and left foraminal narrowing  moderate right bony foraminal narrowing at C4-5.     At C5-6, there is moderate left and minimal right facet overgrowth C5-6  disc space narrowing degenerative endplate change diffuse posterior disc  osteophyte complex and bilateral uncovertebral joint hypertrophy, there  is mild canal and there is moderate bilateral bony foraminal narrowing  at C5-6.     At C6-7, there is disc space narrowing degenerative endplate change  diffuse posterior disc osteophyte complex mild facet overgrowth and  uncovertebral joint hypertrophy and there is mild canal and left  foraminal narrowing.     At C7-T1 there is disc space narrowing, degenerative endplate changes  posterior endplate spurring and facet overgrowth contributing to mild  canal and bilateral foraminal narrowing     IMPRESSION:      1. There is an oblique nondisplaced fracture through the  base of the  odontoid process indicating an acute type II nondisplaced type II   odontoid fracture which is an unstable fracture, neurosurgical  consultation is warranted   2. There is cervical spondylosis as described above. Extensive results  from  the study were discussed in great detail with Dr. Elder Reaves  from the emergency room by telephone on 01/13/2017 at 3:00 PM/     This report was finalized on 1/16/2017 7:06 AM EST by Dr. Gabriel Cruz MD.       CT Facial Bones Without Contrast [[id number]] Collected:  01/13/17 1721     Updated:  01/16/17 0709    Narrative:       EMERGENCY NONCONTRAST HEAD CT, FACIAL CT AND CERVICAL SPINE CT  01/13/2017     CLINICAL HISTORY: Patient fell in parking lot of [employer],  loss  his balance fell face first, denies loss of consciousness     HEAD CT TECHNIQUE: Spiral CT images were obtained from the base of the  skull to the vertex without intravenous contrast and images were  reformatted and submitted in 3 mm thick axial CT section with brain  algorithm and 2 mm thick axial CT section with high-resolution bone  algorithm and additional 2 mm thick sagittal and coronal reconstructions  were performed at brain algorithm and due to questionable finding along  the anterior tip of the left temporal lobe patient was brought back for  additional spiral CT images through the head..     This correlated to an prior noncontrast head CT from [facility] 09/16/2016 and prior MRI of the head 09/17/2016.     FINDINGS: There is some patchy nodular low-density in periventricular  extending to subcortical white matter of cerebral hemispheres consistent  with mild-to-moderate small vessel disease. The remainder of the brain  parenchyma is normal in attenuation, the lateral and third ventricles  are minimally prominent size likely due to to central volume loss. There  is a thin sliver of hyperdensity along the anterior tip of the left  temporal lobe on the initial set axial  images, it is not reproduced on  the repeat axial images nor on the facial CT images and felt to be  artifact. Overall, no extra-axial fluid collections are identified and  there is no evidence of acute intracranial hemorrhage. There is a scalp  hematoma over the anterior inferior frontal bone extending paranasal and  periorbital soft tissues and there is multiple acute fractures including  a linear nondisplaced vertical fracture of the anterior inferior medial  left frontal bone extending into anterior table of the inferior medial  left frontal sinus, fractures of the anterosuperior tip in the anterior  aspect nasal septum fractures of the superior nasal bones extending  nasal bridge, fractures of the left orbital floor and anterior and  posterior lateral wall left maxillary sinus there are fractures of the  pterygoid plates.       Impression:       1. There is moderate small vessel disease in the cerebral white matter.     2. No acute intracranial hemorrhage is seen     3. There are multiple acute fractures with a linear nondisplaced  fracture in the anterior inferior medial left frontal bone extending  into the anterior table of the inferior medial left frontal sinus as  well as fracture of the anterosuperior and anterior aspect nasal septum,  fractures of the superior nasal bones and nasal bridge fractures of the  left orbital floor and anterior posterior lateral wall left maxillary  sinus likely fracture of the anterior wall of the right maxillary sinus  fractures of the pterygoid plates. There is blood subtotally opacifying  left frontal sinus, anterior ethmoid sinuses bilaterally left maxillary  sinus blood partially opacifying posterior right maxillary sinus and see  facial CT report below. The  remainder of the head CT is unremarkable.     FACIAL CT TECHNIQUE: Spiral CT images were obtained through the facial  bones in the axial imaging plane. Images were reformatted and are  submitted in 2 mm thick axial  CT section with soft tissue and  high-resolution bone algorithm and additional 2 mm thick sagittal and  coronal reconstructions were performed..     FINDINGS: Patient has a moderate size scalp hematoma over the anterior  inferior frontal bone extending into the paranasal tissues and  periorbital tissues from today's head and facial trauma. There is a  linear acute fracture involving the far anterior inferior medial left  frontal bone that extends into the anterior wall of the inferior medial  left frontal sinus. Fracture extends through the medial septa between  the  left and right frontal sinuses and fracture plane also involve the  superior nasal bones extending into the nasal bridge and there is  fracture of the far anterior superior aspect of the nasal septum. There  is  fracture plane extending into the anterior superior medial wall of  the left orbit and there is fracture plane involving the inferior medial  wall of the left orbit and there is mild with comminuted fracture that  is nondisplaced involving the left inferior orbital wall and the  fracture extending into the anterior wall of the left maxillary sinus  with minimal comminution in the anterior inferior wall left maxillary  sinus, there is fracture plane involving posterior lateral wall left  maxillary sinus with several bubbles of air extending into the left  infratemporal fossa just lateral to the left maxillary sinus. There may  be a linear nondisplaced fracture of the lateral left pterygoid plate.  Hairline nondisplaced fracture of the medial aspect of the right orbital  roof and there is a hairline nondisplaced fracture horizontally oriented  through the midportion of the anterior wall of the right maxillary  sinus. There is fluid and blood subtotally opacifying the left frontal  sinus partially opacifying the anterior ethmoid sinus and subtotally  opacifying the left maxillary sinus partially opacifying the posterior  third of the right  maxillary sinus.     IMPRESSION:     1. This patient has scalp hematoma over the anterior inferior midline of  the frontal bone extending into the paranasal tissues and there are  hairline nondisplaced fractures in the anterior inferior medial aspect  of left frontal bone extending into the anterior table of the inferior  medial left frontal sinus, fracture involving the superior nasal bones  extending nasal bridge, there is a nondisplaced fracture of the far  anterior superior portion of the nasal septum. There is fracture plane  extending into the anterior superior medial wall of the left orbit as  well as involving the inferior medial wall of left orbit as well as some  minimally comminuted nondisplaced fracture of the left orbital floor  with several bubbles of air in the extraconal fat of the inferior left  orbit. There are multiple fracture planes involving the anterior wall  left maxillary sinus with some comminution in the inferior aspect of  anterior wall left maxillary sinus and there are fracture planes  involving the posterior lateral wall left maxillary sinus with several  bubbles of air in the lateral to left maxillary sinus in the april  maxillary fat and may be a subtle hairline nondisplaced fracture of the  left  lateral pterygoid plate. There is a questionable hairline  nondisplaced fracture hairline fracture of the medial aspect of the  right orbital roof bordering the inferior wall of the right frontal  sinus. There is probable hairline nondisplaced fracture horizontally  oriented  through the midportion of the anterior wall of the right  maxillary sinus. There is questionable hairline nondisplaced fractures  of the medial lateral pterygoid plates bilaterally. There is blood and  fluid subtotally opacifying left frontal sinus and anterior ethmoid  sinuses bilaterally the right frontal recess partially opacifying the  posterior right maxillary sinus and subtotally opacifying the left  maxillary  sinus. Given the probable subtle nondisplaced pterygoid plate  fractures and facial fractures described this is probably a variant of a  LeFort fracture.     On sagittal images there is evidence of nondisplaced type II odontoid  fracture through the base of odontoid this will be discussed on the  cervical spine CT report below     CERVICAL SPINE CT TECHNIQUE: Spiral CT images were obtained from the  skull base to the T4 thoracic level and images were reformatted and are  submitted in 2 mm thick axial CT section with soft tissue and bone  algorithm and additional 2 mm thick sagittal and coronal reconstructions  were performed.     FINDINGS: There is arthritic change at the atlantodental interval with  marginal spurring off the anterior ring of C1 and the odontoid. There is  an oblique nondisplaced fracture through the base of the odontoid  indicating a type II unstable base of odontoid fracture     There is mild right and  moderate to severe left facet overgrowth at  C2-3, there is mild left bony foraminal narrowing posterior disc margin  is normal and there is no canal or right foraminal narrowing at C2-3     At C3-4, there is moderate to severe bilateral facet overgrowth. 2 mm  degenerative anterolisthesis of C3 on C4 mild posterior endplate  spurring eccentric left posterior laterally mildly narrowing the left  side of the canal, bilateral uncovertebral joint hypertrophy and there  is moderate left and mild right bony foraminal narrowing at C3-4     At C4-5, there is mild/moderate right and moderate severe left facet  overgrowth. 1 to 2 mm anterolisthesis of C4 on C5 there is mild  posterior endplate spurring mild canal and left foraminal narrowing  moderate right bony foraminal narrowing at C4-5.     At C5-6, there is moderate left and minimal right facet overgrowth C5-6  disc space narrowing degenerative endplate change diffuse posterior disc  osteophyte complex and bilateral uncovertebral joint hypertrophy,  there  is mild canal and there is moderate bilateral bony foraminal narrowing  at C5-6.     At C6-7, there is disc space narrowing degenerative endplate change  diffuse posterior disc osteophyte complex mild facet overgrowth and  uncovertebral joint hypertrophy and there is mild canal and left  foraminal narrowing.     At C7-T1 there is disc space narrowing, degenerative endplate changes  posterior endplate spurring and facet overgrowth contributing to mild  canal and bilateral foraminal narrowing     IMPRESSION:      1. There is an oblique nondisplaced fracture through the base of the  odontoid process indicating an acute type II nondisplaced type II   odontoid fracture which is an unstable fracture, neurosurgical  consultation is warranted   2. There is cervical spondylosis as described above. Extensive results  from  the study were discussed in great detail with Dr. Elder Reaves  from the emergency room by telephone on 01/13/2017 at 3:00 PM/     This report was finalized on 1/16/2017 7:06 AM EST by Dr. Gabriel Cruz MD.       CT Cervical Spine Without Contrast [[id number]] Collected:  01/13/17 1721     Updated:  01/16/17 0709    Narrative:       EMERGENCY NONCONTRAST HEAD CT, FACIAL CT AND CERVICAL SPINE CT  01/13/2017     CLINICAL HISTORY: Patient fell in parking lot of [facility] [place],  loss  his balance fell face first, denies loss of consciousness     HEAD CT TECHNIQUE: Spiral CT images were obtained from the base of the  skull to the vertex without intravenous contrast and images were  reformatted and submitted in 3 mm thick axial CT section with brain  algorithm and 2 mm thick axial CT section with high-resolution bone  algorithm and additional 2 mm thick sagittal and coronal reconstructions  were performed at brain algorithm and due to questionable finding along  the anterior tip of the left temporal lobe patient was brought back for  additional spiral CT images through the head..     This correlated to an  prior noncontrast head CT from [facility] 09/16/2016 and prior MRI of the head 09/17/2016.     FINDINGS: There is some patchy nodular low-density in periventricular  extending to subcortical white matter of cerebral hemispheres consistent  with mild-to-moderate small vessel disease. The remainder of the brain  parenchyma is normal in attenuation, the lateral and third ventricles  are minimally prominent size likely due to to central volume loss. There  is a thin sliver of hyperdensity along the anterior tip of the left  temporal lobe on the initial set axial images, it is not reproduced on  the repeat axial images nor on the facial CT images and felt to be  artifact. Overall, no extra-axial fluid collections are identified and  there is no evidence of acute intracranial hemorrhage. There is a scalp  hematoma over the anterior inferior frontal bone extending paranasal and  periorbital soft tissues and there is multiple acute fractures including  a linear nondisplaced vertical fracture of the anterior inferior medial  left frontal bone extending into anterior table of the inferior medial  left frontal sinus, fractures of the anterosuperior tip in the anterior  aspect nasal septum fractures of the superior nasal bones extending  nasal bridge, fractures of the left orbital floor and anterior and  posterior lateral wall left maxillary sinus there are fractures of the  pterygoid plates.       Impression:       1. There is moderate small vessel disease in the cerebral white matter.     2. No acute intracranial hemorrhage is seen     3. There are multiple acute fractures with a linear nondisplaced  fracture in the anterior inferior medial left frontal bone extending  into the anterior table of the inferior medial left frontal sinus as  well as fracture of the anterosuperior and anterior aspect nasal septum,  fractures of the superior nasal bones and nasal bridge fractures of the  left orbital floor and  anterior posterior lateral wall left maxillary  sinus likely fracture of the anterior wall of the right maxillary sinus  fractures of the pterygoid plates. There is blood subtotally opacifying  left frontal sinus, anterior ethmoid sinuses bilaterally left maxillary  sinus blood partially opacifying posterior right maxillary sinus and see  facial CT report below. The  remainder of the head CT is unremarkable.     FACIAL CT TECHNIQUE: Spiral CT images were obtained through the facial  bones in the axial imaging plane. Images were reformatted and are  submitted in 2 mm thick axial CT section with soft tissue and  high-resolution bone algorithm and additional 2 mm thick sagittal and  coronal reconstructions were performed..     FINDINGS: Patient has a moderate size scalp hematoma over the anterior  inferior frontal bone extending into the paranasal tissues and  periorbital tissues from today's head and facial trauma. There is a  linear acute fracture involving the far anterior inferior medial left  frontal bone that extends into the anterior wall of the inferior medial  left frontal sinus. Fracture extends through the medial septa between  the  left and right frontal sinuses and fracture plane also involve the  superior nasal bones extending into the nasal bridge and there is  fracture of the far anterior superior aspect of the nasal septum. There  is  fracture plane extending into the anterior superior medial wall of  the left orbit and there is fracture plane involving the inferior medial  wall of the left orbit and there is mild with comminuted fracture that  is nondisplaced involving the left inferior orbital wall and the  fracture extending into the anterior wall of the left maxillary sinus  with minimal comminution in the anterior inferior wall left maxillary  sinus, there is fracture plane involving posterior lateral wall left  maxillary sinus with several bubbles of air extending into the left  infratemporal  fossa just lateral to the left maxillary sinus. There may  be a linear nondisplaced fracture of the lateral left pterygoid plate.  Hairline nondisplaced fracture of the medial aspect of the right orbital  roof and there is a hairline nondisplaced fracture horizontally oriented  through the midportion of the anterior wall of the right maxillary  sinus. There is fluid and blood subtotally opacifying the left frontal  sinus partially opacifying the anterior ethmoid sinus and subtotally  opacifying the left maxillary sinus partially opacifying the posterior  third of the right maxillary sinus.     IMPRESSION:     1. This patient has scalp hematoma over the anterior inferior midline of  the frontal bone extending into the paranasal tissues and there are  hairline nondisplaced fractures in the anterior inferior medial aspect  of left frontal bone extending into the anterior table of the inferior  medial left frontal sinus, fracture involving the superior nasal bones  extending nasal bridge, there is a nondisplaced fracture of the far  anterior superior portion of the nasal septum. There is fracture plane  extending into the anterior superior medial wall of the left orbit as  well as involving the inferior medial wall of left orbit as well as some  minimally comminuted nondisplaced fracture of the left orbital floor  with several bubbles of air in the extraconal fat of the inferior left  orbit. There are multiple fracture planes involving the anterior wall  left maxillary sinus with some comminution in the inferior aspect of  anterior wall left maxillary sinus and there are fracture planes  involving the posterior lateral wall left maxillary sinus with several  bubbles of air in the lateral to left maxillary sinus in the april  maxillary fat and may be a subtle hairline nondisplaced fracture of the  left  lateral pterygoid plate. There is a questionable hairline  nondisplaced fracture hairline fracture of the medial aspect  of the  right orbital roof bordering the inferior wall of the right frontal  sinus. There is probable hairline nondisplaced fracture horizontally  oriented  through the midportion of the anterior wall of the right  maxillary sinus. There is questionable hairline nondisplaced fractures  of the medial lateral pterygoid plates bilaterally. There is blood and  fluid subtotally opacifying left frontal sinus and anterior ethmoid  sinuses bilaterally the right frontal recess partially opacifying the  posterior right maxillary sinus and subtotally opacifying the left  maxillary sinus. Given the probable subtle nondisplaced pterygoid plate  fractures and facial fractures described this is probably a variant of a  LeFort fracture.     On sagittal images there is evidence of nondisplaced type II odontoid  fracture through the base of odontoid this will be discussed on the  cervical spine CT report below     CERVICAL SPINE CT TECHNIQUE: Spiral CT images were obtained from the  skull base to the T4 thoracic level and images were reformatted and are  submitted in 2 mm thick axial CT section with soft tissue and bone  algorithm and additional 2 mm thick sagittal and coronal reconstructions  were performed.     FINDINGS: There is arthritic change at the atlantodental interval with  marginal spurring off the anterior ring of C1 and the odontoid. There is  an oblique nondisplaced fracture through the base of the odontoid  indicating a type II unstable base of odontoid fracture     There is mild right and  moderate to severe left facet overgrowth at  C2-3, there is mild left bony foraminal narrowing posterior disc margin  is normal and there is no canal or right foraminal narrowing at C2-3     At C3-4, there is moderate to severe bilateral facet overgrowth. 2 mm  degenerative anterolisthesis of C3 on C4 mild posterior endplate  spurring eccentric left posterior laterally mildly narrowing the left  side of the canal, bilateral  uncovertebral joint hypertrophy and there  is moderate left and mild right bony foraminal narrowing at C3-4     At C4-5, there is mild/moderate right and moderate severe left facet  overgrowth. 1 to 2 mm anterolisthesis of C4 on C5 there is mild  posterior endplate spurring mild canal and left foraminal narrowing  moderate right bony foraminal narrowing at C4-5.     At C5-6, there is moderate left and minimal right facet overgrowth C5-6  disc space narrowing degenerative endplate change diffuse posterior disc  osteophyte complex and bilateral uncovertebral joint hypertrophy, there  is mild canal and there is moderate bilateral bony foraminal narrowing  at C5-6.     At C6-7, there is disc space narrowing degenerative endplate change  diffuse posterior disc osteophyte complex mild facet overgrowth and  uncovertebral joint hypertrophy and there is mild canal and left  foraminal narrowing.     At C7-T1 there is disc space narrowing, degenerative endplate changes  posterior endplate spurring and facet overgrowth contributing to mild  canal and bilateral foraminal narrowing     IMPRESSION:      1. There is an oblique nondisplaced fracture through the base of the  odontoid process indicating an acute type II nondisplaced type II   odontoid fracture which is an unstable fracture, neurosurgical  consultation is warranted   2. There is cervical spondylosis as described above. Extensive results  from  the study were discussed in great detail with Dr. Elder Reaves  from the emergency room by telephone on 01/13/2017 at 3:00 PM/     This report was finalized on 1/16/2017 7:06 AM EST by Dr. Gabriel Cruz MD.       FL Video Swallow [[id number]] Collected:  01/17/17 1010     Updated:  01/17/17 1013    Narrative:       BARIUM SWALLOW WITH VIDEO     CLINICAL HISTORY:   Male who is 81 years-old, with dysphagia.     TECHNIQUE: The swallowing mechanism was evaluated with real-time  fluoroscopic imaging, captured on digital disk and  performed in  conjunction with speech pathologist (please see report).     FINDINGS: The swallowing mechanism is within functional limits       Impression:       No increased risk of aspiration.     FLUOROSCOPY TIME: 32 seconds, 0 images      This report was finalized on 1/17/2017 10:10 AM by Dr. Navdeep Brown MD.               Disposition:    Rehab    Patient Instructions: See After Visit Summary for Medications      Follow-up Information     Follow up with Abdon Castanon MD Follow up on 2/8/2017.    Specialty:  Neurosurgery    Why:  1:30 pm with new cervical Xrays - instruct patient to go to hospital 60-90 minutes before appointment and get xrays and then come to office appointment after.     Contact information:    [address]  [address] [id number] [phone]          Follow up with Rosendo Milton MD Follow up in 1 week(s).    Specialties:  Ophthalmology, Oculoplastics Ophthalmology    Contact information:    [address] [facility] [address]  [phone]          Follow up with [facility] .    Specialty:  Skilled Nursing Facility    Contact information:    [facility] [facility] [address] [phone]        Discharge Order     None          Total time spent discharging patient including evaluation,post hospitalization follow up,  medication and post hospitalization instructions and education total time exceeds 33 minutes.    Signed:  Loc Bolanos MD  1/19/2017  11:51 AM      Much of this encounter note is an electronic transcription/translation of spoken language to printed text. The electronic translation of spoken language may permit erroneous, or at times, nonsensical words or phrases to be inadvertently transcribed; Although I have reviewed the note for such errors, some may still exist

## 2017-01-17 NOTE — CONSULTS
Spiritual care consult.  Requested by family.  Spiritual assessment reveals that the patient is coping his diagnosis and has support from his family.  He engaged in minimal conversation and expressed that he had no needs at this time.  Informed him that if he should need a  while he is here to have the nurse page us.

## 2017-01-17 NOTE — PROGRESS NOTES
Acute Care - Speech Language Pathology Initial Evaluation  [facility]     Patient Name: Noah Isaac  : 1935  MRN: [id number]  Today's Date: 2017               Admit Date: 2017   SPEECH-LANGUAGE PATHOLOGY EVALUTION - VFSS  Subjective: The patient was seen on this date for a VFSS(Videofluoroscopic Swallowing Study).  Patient was alert and cooperative.    Objective: Risks/benefits were reviewed with the patient, and consent was obtained. The study was completed with SLP present and Radiologist review. The patient was seen in lateral view(s). Textures given included thin liquid, puree consistency and regular consistency.  Pt declined trials of peaches.  Assessment: Pt demonstrated swallowing within functional limits with adequate hyolaryngeal elevation and trace pharyngeal residue.  No penetration and/or aspiration noted with any consistency.  Pt demonstrated reduced range of motion during mastication due to neck collar.      Recommendations: Diet Textures: thin liquid and soft, chopped food per pt preference due to hard collar. OK to advance to regular as desired.  Medications should be taken whole with thin liquids or puree.  Recommended Strategies: Upright for PO. Oral care before breakfast, after all meals and PRN.   Dysphagia therapy not recommended. Rationale: Swallow WFL.      Visit Dx:    ICD-10-CM ICD-9-CM   1. Odontoid fracture, closed, initial encounter S12.100A 805.02   2. Facial fracture due to fall, closed, initial encounter S02.92XA 802.8    W19.XXXA E888.9   3. Facial abrasion, initial encounter S00.81XA 910.0   4. Weakness generalized R53.1 780.79     Patient Active Problem List   Diagnosis   • Chronic coronary artery disease   • Anemia   • Hyperlipidemia   • HTN (hypertension)   • Trigeminal neuralgia   • Benign prostatic hyperplasia   • Ulcerative colitis without complications   • Health care maintenance   • Hypertension   • Coronary artery disease   • ANDERS (acute kidney  injury)   • Muscle weakness-general   • Pulmonary nodule, right   • Pulmonary fibrosis   • Weakness   • Asterixis   • CVA (cerebral infarction)   • Irritable bowel syndrome with both constipation and diarrhea   • Odontoid fracture   • Multiple facial bone fractures   • Hyponatremia   • Dehydration     Past Medical History   Diagnosis Date   • Anemia    • Anxiety disorder    • Arthritis    • Asterixis    • Coronary artery disease    • Coronary artery disease    • Depression    • Hard of hearing    • Hyperlipidemia    • Hypertension    • Pneumonia    • Pulmonary nodule    • S/P tooth extraction 01/09/2017   • Ulcerative colitis    • Use of cane as ambulatory aid      Past Surgical History   Procedure Laterality Date   • Coronary artery bypass graft     • Carpal tunnel release     • Rotator cuff repair     • Cardiac surgery     • Cataract extraction     • Cataract extraction w/  intraocular lens implant Bilateral      L eye in November 2016, R eye in Jan 2017            EDUCATION  The patient has been educated in the following areas:   Dysphagia (Swallowing Impairment).    SLP Recommendation and Plan              Anticipated Discharge Disposition: transitional care                Plan of Care Review  Plan Of Care Reviewed With: patient  Progress: no change  Outcome Summary/Follow up Plan: VFSS planned for today. Acceptance with [facility], will have bed available today. VS are stable. Ok to take neckbrace off for assisted shower per neuro. VS stable, no c/o pain or discomfort at present. WIll continue to monitor.           IP SLP Goals       01/17/17 0927 01/17/17 0926 01/16/17 1527    Safely Consume Diet    Safely Consume Diet- SLP, Date Established   01/16/17  -NB    Safely Consume Diet- SLP, Time to Achieve   by discharge  -NB    Safely Consume Diet- SLP, Additional Goal   no liquid wash w/ mech soft & thin; meds w/ applesauce  -NB    Safely Consume Diet- SLP, Date Goal Reviewed   01/16/17  -NB    Safely Consume  Diet- SLP, Outcome goal met  -CP (P)  goal met  -CP       User Key  (r) = Recorded By, (t) = Taken By, (c) = Cosigned By    Initials Name Provider Type    ADDISON Newton MS CCC-SLP Speech and Language Pathologist    FRANCESCO River MS CCC-SLP Speech and Language Pathologist             SLP Outcome Measures (last 72 hours)      SLP Outcome Measures       01/17/17 0900 01/16/17 1528       SLP Outcome Measures    Outcome Measure Used? Adult NOMS  -CP Adult NOMS  -NB     FCM Scores    FCM Chosen Swallowing  -CP Swallowing  -NB     Swallowing FCM Score 7  -CP 4  -NB       User Key  (r) = Recorded By, (t) = Taken By, (c) = Cosigned By    Initials Name Effective Dates    ADDISON Newton MS CCC-SLP 04/13/15 -     CP Radha River MS CCC-SLP 04/13/15 -           Time Calculation:         Time Calculation- SLP       01/17/17 0929          Time Calculation- SLP    SLP Received On 01/17/17  -CP        User Key  (r) = Recorded By, (t) = Taken By, (c) = Cosigned By    Initials Name Provider Type    FRANCESCO River MS CCC-SLP Speech and Language Pathologist          Therapy Charges for Today     Code Description Service Date Service Provider Modifiers Qty    [id number] HC ST MOTION FLUORO EVAL SWALLOW 5 1/17/2017 Radha River MS CCC-SLP GN 1             ADULT NOMS (last 72 hours)      Adult NOMS       01/17/17 0900 01/16/17 1528             FCM Scores    FCM Chosen Swallowing  -CP Swallowing  -NB       Swallowing FCM Score 7  -CP 4  -NB         User Key  (r) = Recorded By, (t) = Taken By, (c) = Cosigned By    Initials Name Effective Dates    ADDISON Newton MS CCC-SLP 04/13/15 -     CP Radha River, MS TANG-SLP 04/13/15 -                Radha River, MS CCC-SLP  1/17/2017

## 2017-01-17 NOTE — PROGRESS NOTES
Continued Stay Note  [facility]     Patient Name: Noah Isaac  MRN: [id number]  Today's Date: 1/17/2017    Admit Date: 1/13/2017          Discharge Plan       01/17/17 1618    Case Management/Social Work Plan    Plan Plans are for skilled bed @ [facility] pending bed availability.  CCP will follow,               Discharge Codes     None            Betina Toro RN

## 2017-01-17 NOTE — SIGNIFICANT NOTE
01/17/17 1136   Rehab Treatment   Discipline physical therapy assistant   Treatment Not Performed patient/family decline treatment, pt not feeling well  (Pt is vomiting this AM PT to follow up this PM)   Recommendation   PT - Next Appointment 01/17/17

## 2017-01-17 NOTE — PROGRESS NOTES
"DAILY PROGRESS NOTE  [facility], [facility]      Patient Identification:  Name: Noah Isaac  Age: 81 y.o.  Sex: male  :  1935  MRN: [id number]         Primary Care Physician: Flash Jim MD    Subjective:  Interval History:  Mr. Isaac is awake and oriented today. Aggravated that he had to repeat video swallow test. Denies CP, SOA, Denies vomiting, but reports vomiting X 2 last night and again this morning. Has continued to work with PT. Nsg reports dysrythmias today.   He has developed multiples pvc, bigemini this am. Cardiology has been consulted    Objective:    Scheduled Meds:    amLODIPine 5 mg Oral Q24H   atorvastatin 10 mg Oral Nightly   bacitracin  Topical Q8H   clopidogrel 75 mg Oral Daily   docusate sodium 100 mg Oral BID   enoxaparin 40 mg Subcutaneous Q24H   fentaNYL 1 patch Transdermal Q72H   finasteride 5 mg Oral Daily   gabapentin 300 mg Oral Q8H   isosorbide mononitrate 30 mg Oral Q24H   losartan 100 mg Oral Q24H   metoprolol tartrate 25 mg Oral BID   montelukast 10 mg Oral Nightly   sertraline 50 mg Oral Daily   sulfaSALAzine 1,000 mg Oral Q8H     Continuous Infusions:     Vital signs in last 24 hours:  Temp:  [97.3 °F (36.3 °C)-97.9 °F (36.6 °C)] 97.7 °F (36.5 °C)  Heart Rate:  [71-77] 77  Resp:  [15-18] 18  BP: (131-184)/(70-94) 144/70    tMax 24 hrs: Temp (24hrs), Av.6 °F (36.4 °C), Min:97.3 °F (36.3 °C), Max:97.9 °F (36.6 °C)      Intake/Output:    Intake/Output Summary (Last 24 hours) at 17  Last data filed at 17 1002   Gross per 24 hour   Intake      0 ml   Output   1625 ml   Net  -1625 ml       Exam:    Visit Vitals   • /70 (BP Location: Right arm, Patient Position: Lying)   • Pulse 77   • Temp 97.7 °F (36.5 °C) (Oral)   • Resp 18   • Ht 70\" (177.8 cm)   • Wt 185 lb 12.8 oz (84.3 kg)   • SpO2 94%   • BMI 25.91 kg/m2       General: Alert, cooperative, no distress, appears stated age. In no acute distress. "    HEENT:    Head: Normocephalic, without obvious abnormality,multiple areas of ecchymosis  Neck: Supple, symmetrical, trachea midline, no adenopathy;              thyroid:  no enlargement/tenderness/nodules;              no carotid bruit or JVD  Cardiovascular: Normal rate, regular rhythm and intact distal pulses.              Exam reveals no gallop and no friction rub. No murmur heard  Pulmonary: Clear to auscultation bilaterally, respirations unlabored.               No rhonchi, wheezing or rales.   Abdominal: Soft. Soft, non-tender, bowel sounds active all four quadrants,     no masses, no hepatomegaly, no splenomegaly.   Extremities: Normal, atraumatic, no cyanosis or edema  Pulses: 2 + symmetric all extremities  Neurological: He is alert and oriented to person, place, and time.                 CNII-XII intact, normal strength, sensation intact throughout      Data Review:      Results from last 7 days  Lab Units 01/17/17  0522 01/16/17  0329 01/15/17  0757   WBC 10*3/mm3 6.10 5.68 7.25   HEMOGLOBIN g/dL 9.4* 9.2* 9.8*   HEMATOCRIT % 29.0* 27.9* 30.7*   PLATELETS 10*3/mm3 152 117* 144         Results from last 7 days  Lab Units 01/17/17  1150 01/17/17  0522 01/16/17  0329   SODIUM mmol/L 131* 132* 130*   POTASSIUM mmol/L 4.1 4.1 4.3   CHLORIDE mmol/L 92* 94* 96*   TOTAL CO2 mmol/L 25.1 25.7 23.4   BUN mg/dL 13 14 20   CREATININE mg/dL 0.95 0.90 1.08   CALCIUM mg/dL 8.9 8.8 8.3*   GLUCOSE mg/dL 117* 96 99             0  Lab Value Date/Time   TROPONINT <0.010 01/17/2017 1150   TROPONINT <0.010 09/16/2016 1818   TROPONINT <0.010 08/29/2016 1958   TROPONINT <0.010 08/28/2016 2009       Intake/Output       01/16/17 0700 - 01/17/17 0659 01/17/17 0700 - 01/18/17 0659    Intake (ml) 1150 --    Output (ml) 2300 600    Net (ml) -1150 -600    Last Weight 185 lb 12.8 oz (84.3 kg) --           Imaging Results (last 7 days)     Procedure Component Value Units Date/Time    XR Knee 1 or 2 View Right [[id number]] Collected:   01/13/17 1316     Updated:  01/13/17 1320    Narrative:       Right knee 2 views 01/13/2017     HISTORY: Fell, right knee pain.     There is vascular calcification in the right knee.     No fractures or dislocations are seen. There may be very minimal  suprapatellar effusion. Surgical staples are seen in the medial aspect  of the right knee.       Impression:       1. No acute process except for possible minimal suprapatellar effusion.     This report was finalized on 1/13/2017 1:17 PM by Dr. Andres Grimes MD.       MRI Cervical Spine Without Contrast [[id number]] Collected:  01/13/17 2107     Updated:  01/14/17 1606    Narrative:       MRI EXAMINATION OF THE CERVICAL SPINE WITHOUT CONTRAST     HISTORY: Odontoid fracture.     A MRI examination of the cervical spine was performed without the use of  contrast and compared to the CT examination of the cervical spine from  earlier the same day.     FINDINGS: Signal intensity within the cervical cord is normal on the  sagittal T2 sequence. There is a grade 1 anterolisthesis of C4 upon C5  estimated to be 1-2 mm in severity. There is moderate loss of disc  height at C5-6, C6-7, and C7-T1. There is disc desiccation at all  levels.     C2-3: Moderate facet degenerative disease is present on the left. There  is a mild broad-based disc osteophyte complex resulting in mild  flattening of the ventral surface of the thecal sac. Cerebrospinal fluid  is effaced ventral and dorsal to the cord.     C3-4: There is mild and mild to moderate facet degenerative disease on  the right and left respectively. There is moderate neuroforaminal  compromise on the left secondary to facet hypertrophy and uncovertebral  degenerative disease. A broad-based disc osteophyte complex is present  which results in moderate canal stenosis, more prominent on the left.  There is effacement of cerebrospinal fluid ventral and dorsal to the  cord.     C4-5: Moderate facet degenerative disease is present  bilaterally.     C5-6: There is a broad-based disc osteophyte complex resulting in mild  flattening of the ventral surface of the thecal sac and cord.  Cerebrospinal fluid is effaced ventral and dorsal to the cord. There is  mild neuroforaminal compromise bilaterally secondary to uncovertebral  degenerative disease.     C6-7: There is a mild broad-based disc osteophyte complex resulting in  mild flattening of the ventral surface of the thecal sac and cord. There  is moderate neuroforaminal compromise bilaterally, more prominent on the  left secondary to uncovertebral degenerative disease.     C7-T1: There is a mild broad-based disc osteophyte complex with no  evidence of herniation. There is mild neuroforaminal compromise  bilaterally secondary to extension of the disc osteophyte complex into  the neural foramen.     The CT examination of the cervical spine demonstrated an oblique  fracture involving the dens. The fracture is better demonstrated on the  CT examination. There is only minimal increased signal intensity  involving the fracture on the sagittal T2 sequence. There is no evidence  of a prevertebral hematoma.       Impression:       There is an oblique minimally displaced fracture involving  the dens with minimal increased signal intensity appreciated on the  sagittal T2 sequence at the fracture site. There is no evidence of an  epidural hematoma or of cord signal abnormality. There is moderate canal  stenosis from C2 to C7 secondary to broad-based disc osteophyte  complexes at each level flattening the ventral surface of the cord and  effacing cerebrospinal fluid ventral and dorsal to the cord at each  level. The greatest degree of spinal stenosis is at C6-7.     The above information was called to and discussed with Elsi Najera,  following the dictation.     This report was finalized on 1/14/2017 4:02 PM by Dr. Genaro Malagon MD.       CT Head Without Contrast [[id number]] Collected:  01/13/17 9859      Updated:  01/16/17 0709    Narrative:       EMERGENCY NONCONTRAST HEAD CT, FACIAL CT AND CERVICAL SPINE CT  01/13/2017     CLINICAL HISTORY: Patient fell in parking lot of [employer],  loss  his balance fell face first, denies loss of consciousness     HEAD CT TECHNIQUE: Spiral CT images were obtained from the base of the  skull to the vertex without intravenous contrast and images were  reformatted and submitted in 3 mm thick axial CT section with brain  algorithm and 2 mm thick axial CT section with high-resolution bone  algorithm and additional 2 mm thick sagittal and coronal reconstructions  were performed at brain algorithm and due to questionable finding along  the anterior tip of the left temporal lobe patient was brought back for  additional spiral CT images through the head..     This correlated to an prior noncontrast head CT from [facility] 09/16/2016 and prior MRI of the head 09/17/2016.     FINDINGS: There is some patchy nodular low-density in periventricular  extending to subcortical white matter of cerebral hemispheres consistent  with mild-to-moderate small vessel disease. The remainder of the brain  parenchyma is normal in attenuation, the lateral and third ventricles  are minimally prominent size likely due to to central volume loss. There  is a thin sliver of hyperdensity along the anterior tip of the left  temporal lobe on the initial set axial images, it is not reproduced on  the repeat axial images nor on the facial CT images and felt to be  artifact. Overall, no extra-axial fluid collections are identified and  there is no evidence of acute intracranial hemorrhage. There is a scalp  hematoma over the anterior inferior frontal bone extending paranasal and  periorbital soft tissues and there is multiple acute fractures including  a linear nondisplaced vertical fracture of the anterior inferior medial  left frontal bone extending into anterior table of the inferior  medial  left frontal sinus, fractures of the anterosuperior tip in the anterior  aspect nasal septum fractures of the superior nasal bones extending  nasal bridge, fractures of the left orbital floor and anterior and  posterior lateral wall left maxillary sinus there are fractures of the  pterygoid plates.       Impression:       1. There is moderate small vessel disease in the cerebral white matter.     2. No acute intracranial hemorrhage is seen     3. There are multiple acute fractures with a linear nondisplaced  fracture in the anterior inferior medial left frontal bone extending  into the anterior table of the inferior medial left frontal sinus as  well as fracture of the anterosuperior and anterior aspect nasal septum,  fractures of the superior nasal bones and nasal bridge fractures of the  left orbital floor and anterior posterior lateral wall left maxillary  sinus likely fracture of the anterior wall of the right maxillary sinus  fractures of the pterygoid plates. There is blood subtotally opacifying  left frontal sinus, anterior ethmoid sinuses bilaterally left maxillary  sinus blood partially opacifying posterior right maxillary sinus and see  facial CT report below. The  remainder of the head CT is unremarkable.     FACIAL CT TECHNIQUE: Spiral CT images were obtained through the facial  bones in the axial imaging plane. Images were reformatted and are  submitted in 2 mm thick axial CT section with soft tissue and  high-resolution bone algorithm and additional 2 mm thick sagittal and  coronal reconstructions were performed..     FINDINGS: Patient has a moderate size scalp hematoma over the anterior  inferior frontal bone extending into the paranasal tissues and  periorbital tissues from today's head and facial trauma. There is a  linear acute fracture involving the far anterior inferior medial left  frontal bone that extends into the anterior wall of the inferior medial  left frontal sinus. Fracture  extends through the medial septa between  the  left and right frontal sinuses and fracture plane also involve the  superior nasal bones extending into the nasal bridge and there is  fracture of the far anterior superior aspect of the nasal septum. There  is  fracture plane extending into the anterior superior medial wall of  the left orbit and there is fracture plane involving the inferior medial  wall of the left orbit and there is mild with comminuted fracture that  is nondisplaced involving the left inferior orbital wall and the  fracture extending into the anterior wall of the left maxillary sinus  with minimal comminution in the anterior inferior wall left maxillary  sinus, there is fracture plane involving posterior lateral wall left  maxillary sinus with several bubbles of air extending into the left  infratemporal fossa just lateral to the left maxillary sinus. There may  be a linear nondisplaced fracture of the lateral left pterygoid plate.  Hairline nondisplaced fracture of the medial aspect of the right orbital  roof and there is a hairline nondisplaced fracture horizontally oriented  through the midportion of the anterior wall of the right maxillary  sinus. There is fluid and blood subtotally opacifying the left frontal  sinus partially opacifying the anterior ethmoid sinus and subtotally  opacifying the left maxillary sinus partially opacifying the posterior  third of the right maxillary sinus.     IMPRESSION:     1. This patient has scalp hematoma over the anterior inferior midline of  the frontal bone extending into the paranasal tissues and there are  hairline nondisplaced fractures in the anterior inferior medial aspect  of left frontal bone extending into the anterior table of the inferior  medial left frontal sinus, fracture involving the superior nasal bones  extending nasal bridge, there is a nondisplaced fracture of the far  anterior superior portion of the nasal septum. There is fracture  plane  extending into the anterior superior medial wall of the left orbit as  well as involving the inferior medial wall of left orbit as well as some  minimally comminuted nondisplaced fracture of the left orbital floor  with several bubbles of air in the extraconal fat of the inferior left  orbit. There are multiple fracture planes involving the anterior wall  left maxillary sinus with some comminution in the inferior aspect of  anterior wall left maxillary sinus and there are fracture planes  involving the posterior lateral wall left maxillary sinus with several  bubbles of air in the lateral to left maxillary sinus in the april  maxillary fat and may be a subtle hairline nondisplaced fracture of the  left  lateral pterygoid plate. There is a questionable hairline  nondisplaced fracture hairline fracture of the medial aspect of the  right orbital roof bordering the inferior wall of the right frontal  sinus. There is probable hairline nondisplaced fracture horizontally  oriented  through the midportion of the anterior wall of the right  maxillary sinus. There is questionable hairline nondisplaced fractures  of the medial lateral pterygoid plates bilaterally. There is blood and  fluid subtotally opacifying left frontal sinus and anterior ethmoid  sinuses bilaterally the right frontal recess partially opacifying the  posterior right maxillary sinus and subtotally opacifying the left  maxillary sinus. Given the probable subtle nondisplaced pterygoid plate  fractures and facial fractures described this is probably a variant of a  [place] fracture.     On sagittal images there is evidence of nondisplaced type II odontoid  fracture through the base of odontoid this will be discussed on the  cervical spine CT report below     CERVICAL SPINE CT TECHNIQUE: Spiral CT images were obtained from the  skull base to the T4 thoracic level and images were reformatted and are  submitted in 2 mm thick axial CT section with soft tissue  and bone  algorithm and additional 2 mm thick sagittal and coronal reconstructions  were performed.     FINDINGS: There is arthritic change at the atlantodental interval with  marginal spurring off the anterior ring of C1 and the odontoid. There is  an oblique nondisplaced fracture through the base of the odontoid  indicating a type II unstable base of odontoid fracture     There is mild right and  moderate to severe left facet overgrowth at  C2-3, there is mild left bony foraminal narrowing posterior disc margin  is normal and there is no canal or right foraminal narrowing at C2-3     At C3-4, there is moderate to severe bilateral facet overgrowth. 2 mm  degenerative anterolisthesis of C3 on C4 mild posterior endplate  spurring eccentric left posterior laterally mildly narrowing the left  side of the canal, bilateral uncovertebral joint hypertrophy and there  is moderate left and mild right bony foraminal narrowing at C3-4     At C4-5, there is mild/moderate right and moderate severe left facet  overgrowth. 1 to 2 mm anterolisthesis of C4 on C5 there is mild  posterior endplate spurring mild canal and left foraminal narrowing  moderate right bony foraminal narrowing at C4-5.     At C5-6, there is moderate left and minimal right facet overgrowth C5-6  disc space narrowing degenerative endplate change diffuse posterior disc  osteophyte complex and bilateral uncovertebral joint hypertrophy, there  is mild canal and there is moderate bilateral bony foraminal narrowing  at C5-6.     At C6-7, there is disc space narrowing degenerative endplate change  diffuse posterior disc osteophyte complex mild facet overgrowth and  uncovertebral joint hypertrophy and there is mild canal and left  foraminal narrowing.     At C7-T1 there is disc space narrowing, degenerative endplate changes  posterior endplate spurring and facet overgrowth contributing to mild  canal and bilateral foraminal narrowing     IMPRESSION:      1. There is  an oblique nondisplaced fracture through the base of the  odontoid process indicating an acute type II nondisplaced type II   odontoid fracture which is an unstable fracture, neurosurgical  consultation is warranted   2. There is cervical spondylosis as described above. Extensive results  from  the study were discussed in great detail with Dr. Elder Reaves  from the emergency room by telephone on 01/13/2017 at 3:00 PM/     This report was finalized on 1/16/2017 7:06 AM EST by Dr. Gabriel Cruz MD.       CT Facial Bones Without Contrast [[id number]] Collected:  01/13/17 1721     Updated:  01/16/17 0709    Narrative:       EMERGENCY NONCONTRAST HEAD CT, FACIAL CT AND CERVICAL SPINE CT  01/13/2017     CLINICAL HISTORY: Patient fell in parking lot of [employer],  loss  his balance fell face first, denies loss of consciousness     HEAD CT TECHNIQUE: Spiral CT images were obtained from the base of the  skull to the vertex without intravenous contrast and images were  reformatted and submitted in 3 mm thick axial CT section with brain  algorithm and 2 mm thick axial CT section with high-resolution bone  algorithm and additional 2 mm thick sagittal and coronal reconstructions  were performed at brain algorithm and due to questionable finding along  the anterior tip of the left temporal lobe patient was brought back for  additional spiral CT images through the head..     This correlated to an prior noncontrast head CT from [facility] 09/16/2016 and prior MRI of the head 09/17/2016.     FINDINGS: There is some patchy nodular low-density in periventricular  extending to subcortical white matter of cerebral hemispheres consistent  with mild-to-moderate small vessel disease. The remainder of the brain  parenchyma is normal in attenuation, the lateral and third ventricles  are minimally prominent size likely due to to central volume loss. There  is a thin sliver of hyperdensity along the anterior tip of the  left  temporal lobe on the initial set axial images, it is not reproduced on  the repeat axial images nor on the facial CT images and felt to be  artifact. Overall, no extra-axial fluid collections are identified and  there is no evidence of acute intracranial hemorrhage. There is a scalp  hematoma over the anterior inferior frontal bone extending paranasal and  periorbital soft tissues and there is multiple acute fractures including  a linear nondisplaced vertical fracture of the anterior inferior medial  left frontal bone extending into anterior table of the inferior medial  left frontal sinus, fractures of the anterosuperior tip in the anterior  aspect nasal septum fractures of the superior nasal bones extending  nasal bridge, fractures of the left orbital floor and anterior and  posterior lateral wall left maxillary sinus there are fractures of the  pterygoid plates.       Impression:       1. There is moderate small vessel disease in the cerebral white matter.     2. No acute intracranial hemorrhage is seen     3. There are multiple acute fractures with a linear nondisplaced  fracture in the anterior inferior medial left frontal bone extending  into the anterior table of the inferior medial left frontal sinus as  well as fracture of the anterosuperior and anterior aspect nasal septum,  fractures of the superior nasal bones and nasal bridge fractures of the  left orbital floor and anterior posterior lateral wall left maxillary  sinus likely fracture of the anterior wall of the right maxillary sinus  fractures of the pterygoid plates. There is blood subtotally opacifying  left frontal sinus, anterior ethmoid sinuses bilaterally left maxillary  sinus blood partially opacifying posterior right maxillary sinus and see  facial CT report below. The  remainder of the head CT is unremarkable.     FACIAL CT TECHNIQUE: Spiral CT images were obtained through the facial  bones in the axial imaging plane. Images were  reformatted and are  submitted in 2 mm thick axial CT section with soft tissue and  high-resolution bone algorithm and additional 2 mm thick sagittal and  coronal reconstructions were performed..     FINDINGS: Patient has a moderate size scalp hematoma over the anterior  inferior frontal bone extending into the paranasal tissues and  periorbital tissues from today's head and facial trauma. There is a  linear acute fracture involving the far anterior inferior medial left  frontal bone that extends into the anterior wall of the inferior medial  left frontal sinus. Fracture extends through the medial septa between  the  left and right frontal sinuses and fracture plane also involve the  superior nasal bones extending into the nasal bridge and there is  fracture of the far anterior superior aspect of the nasal septum. There  is  fracture plane extending into the anterior superior medial wall of  the left orbit and there is fracture plane involving the inferior medial  wall of the left orbit and there is mild with comminuted fracture that  is nondisplaced involving the left inferior orbital wall and the  fracture extending into the anterior wall of the left maxillary sinus  with minimal comminution in the anterior inferior wall left maxillary  sinus, there is fracture plane involving posterior lateral wall left  maxillary sinus with several bubbles of air extending into the left  infratemporal fossa just lateral to the left maxillary sinus. There may  be a linear nondisplaced fracture of the lateral left pterygoid plate.  Hairline nondisplaced fracture of the medial aspect of the right orbital  roof and there is a hairline nondisplaced fracture horizontally oriented  through the midportion of the anterior wall of the right maxillary  sinus. There is fluid and blood subtotally opacifying the left frontal  sinus partially opacifying the anterior ethmoid sinus and subtotally  opacifying the left maxillary sinus partially  opacifying the posterior  third of the right maxillary sinus.     IMPRESSION:     1. This patient has scalp hematoma over the anterior inferior midline of  the frontal bone extending into the paranasal tissues and there are  hairline nondisplaced fractures in the anterior inferior medial aspect  of left frontal bone extending into the anterior table of the inferior  medial left frontal sinus, fracture involving the superior nasal bones  extending nasal bridge, there is a nondisplaced fracture of the far  anterior superior portion of the nasal septum. There is fracture plane  extending into the anterior superior medial wall of the left orbit as  well as involving the inferior medial wall of left orbit as well as some  minimally comminuted nondisplaced fracture of the left orbital floor  with several bubbles of air in the extraconal fat of the inferior left  orbit. There are multiple fracture planes involving the anterior wall  left maxillary sinus with some comminution in the inferior aspect of  anterior wall left maxillary sinus and there are fracture planes  involving the posterior lateral wall left maxillary sinus with several  bubbles of air in the lateral to left maxillary sinus in the april  maxillary fat and may be a subtle hairline nondisplaced fracture of the  left  lateral pterygoid plate. There is a questionable hairline  nondisplaced fracture hairline fracture of the medial aspect of the  right orbital roof bordering the inferior wall of the right frontal  sinus. There is probable hairline nondisplaced fracture horizontally  oriented  through the midportion of the anterior wall of the right  maxillary sinus. There is questionable hairline nondisplaced fractures  of the medial lateral pterygoid plates bilaterally. There is blood and  fluid subtotally opacifying left frontal sinus and anterior ethmoid  sinuses bilaterally the right frontal recess partially opacifying the  posterior right maxillary sinus and  subtotally opacifying the left  maxillary sinus. Given the probable subtle nondisplaced pterygoid plate  fractures and facial fractures described this is probably a variant of a  LeFort fracture.     On sagittal images there is evidence of nondisplaced type II odontoid  fracture through the base of odontoid this will be discussed on the  cervical spine CT report below     CERVICAL SPINE CT TECHNIQUE: Spiral CT images were obtained from the  skull base to the T4 thoracic level and images were reformatted and are  submitted in 2 mm thick axial CT section with soft tissue and bone  algorithm and additional 2 mm thick sagittal and coronal reconstructions  were performed.     FINDINGS: There is arthritic change at the atlantodental interval with  marginal spurring off the anterior ring of C1 and the odontoid. There is  an oblique nondisplaced fracture through the base of the odontoid  indicating a type II unstable base of odontoid fracture     There is mild right and  moderate to severe left facet overgrowth at  C2-3, there is mild left bony foraminal narrowing posterior disc margin  is normal and there is no canal or right foraminal narrowing at C2-3     At C3-4, there is moderate to severe bilateral facet overgrowth. 2 mm  degenerative anterolisthesis of C3 on C4 mild posterior endplate  spurring eccentric left posterior laterally mildly narrowing the left  side of the canal, bilateral uncovertebral joint hypertrophy and there  is moderate left and mild right bony foraminal narrowing at C3-4     At C4-5, there is mild/moderate right and moderate severe left facet  overgrowth. 1 to 2 mm anterolisthesis of C4 on C5 there is mild  posterior endplate spurring mild canal and left foraminal narrowing  moderate right bony foraminal narrowing at C4-5.     At C5-6, there is moderate left and minimal right facet overgrowth C5-6  disc space narrowing degenerative endplate change diffuse posterior disc  osteophyte complex and  bilateral uncovertebral joint hypertrophy, there  is mild canal and there is moderate bilateral bony foraminal narrowing  at C5-6.     At C6-7, there is disc space narrowing degenerative endplate change  diffuse posterior disc osteophyte complex mild facet overgrowth and  uncovertebral joint hypertrophy and there is mild canal and left  foraminal narrowing.     At C7-T1 there is disc space narrowing, degenerative endplate changes  posterior endplate spurring and facet overgrowth contributing to mild  canal and bilateral foraminal narrowing     IMPRESSION:      1. There is an oblique nondisplaced fracture through the base of the  odontoid process indicating an acute type II nondisplaced type II   odontoid fracture which is an unstable fracture, neurosurgical  consultation is warranted   2. There is cervical spondylosis as described above. Extensive results  from  the study were discussed in great detail with Dr. Elder Reaves  from the emergency room by telephone on 01/13/2017 at 3:00 PM/     This report was finalized on 1/16/2017 7:06 AM EST by Dr. Gabriel Cruz MD.       CT Cervical Spine Without Contrast [[id number]] Collected:  01/13/17 1721     Updated:  01/16/17 0709    Narrative:       EMERGENCY NONCONTRAST HEAD CT, FACIAL CT AND CERVICAL SPINE CT  01/13/2017     CLINICAL HISTORY: Patient fell in parking lot of [facility],  loss  his balance fell face first, denies loss of consciousness     HEAD CT TECHNIQUE: Spiral CT images were obtained from the base of the  skull to the vertex without intravenous contrast and images were  reformatted and submitted in 3 mm thick axial CT section with brain  algorithm and 2 mm thick axial CT section with high-resolution bone  algorithm and additional 2 mm thick sagittal and coronal reconstructions  were performed at brain algorithm and due to questionable finding along  the anterior tip of the left temporal lobe patient was brought back for  additional spiral CT images  through the head..     This correlated to an prior noncontrast head CT from [facility] 09/16/2016 and prior MRI of the head 09/17/2016.     FINDINGS: There is some patchy nodular low-density in periventricular  extending to subcortical white matter of cerebral hemispheres consistent  with mild-to-moderate small vessel disease. The remainder of the brain  parenchyma is normal in attenuation, the lateral and third ventricles  are minimally prominent size likely due to to central volume loss. There  is a thin sliver of hyperdensity along the anterior tip of the left  temporal lobe on the initial set axial images, it is not reproduced on  the repeat axial images nor on the facial CT images and felt to be  artifact. Overall, no extra-axial fluid collections are identified and  there is no evidence of acute intracranial hemorrhage. There is a scalp  hematoma over the anterior inferior frontal bone extending paranasal and  periorbital soft tissues and there is multiple acute fractures including  a linear nondisplaced vertical fracture of the anterior inferior medial  left frontal bone extending into anterior table of the inferior medial  left frontal sinus, fractures of the anterosuperior tip in the anterior  aspect nasal septum fractures of the superior nasal bones extending  nasal bridge, fractures of the left orbital floor and anterior and  posterior lateral wall left maxillary sinus there are fractures of the  pterygoid plates.       Impression:       1. There is moderate small vessel disease in the cerebral white matter.     2. No acute intracranial hemorrhage is seen     3. There are multiple acute fractures with a linear nondisplaced  fracture in the anterior inferior medial left frontal bone extending  into the anterior table of the inferior medial left frontal sinus as  well as fracture of the anterosuperior and anterior aspect nasal septum,  fractures of the superior nasal bones and nasal bridge  fractures of the  left orbital floor and anterior posterior lateral wall left maxillary  sinus likely fracture of the anterior wall of the right maxillary sinus  fractures of the pterygoid plates. There is blood subtotally opacifying  left frontal sinus, anterior ethmoid sinuses bilaterally left maxillary  sinus blood partially opacifying posterior right maxillary sinus and see  facial CT report below. The  remainder of the head CT is unremarkable.     FACIAL CT TECHNIQUE: Spiral CT images were obtained through the facial  bones in the axial imaging plane. Images were reformatted and are  submitted in 2 mm thick axial CT section with soft tissue and  high-resolution bone algorithm and additional 2 mm thick sagittal and  coronal reconstructions were performed..     FINDINGS: Patient has a moderate size scalp hematoma over the anterior  inferior frontal bone extending into the paranasal tissues and  periorbital tissues from today's head and facial trauma. There is a  linear acute fracture involving the far anterior inferior medial left  frontal bone that extends into the anterior wall of the inferior medial  left frontal sinus. Fracture extends through the medial septa between  the  left and right frontal sinuses and fracture plane also involve the  superior nasal bones extending into the nasal bridge and there is  fracture of the far anterior superior aspect of the nasal septum. There  is  fracture plane extending into the anterior superior medial wall of  the left orbit and there is fracture plane involving the inferior medial  wall of the left orbit and there is mild with comminuted fracture that  is nondisplaced involving the left inferior orbital wall and the  fracture extending into the anterior wall of the left maxillary sinus  with minimal comminution in the anterior inferior wall left maxillary  sinus, there is fracture plane involving posterior lateral wall left  maxillary sinus with several bubbles of air  extending into the left  infratemporal fossa just lateral to the left maxillary sinus. There may  be a linear nondisplaced fracture of the lateral left pterygoid plate.  Hairline nondisplaced fracture of the medial aspect of the right orbital  roof and there is a hairline nondisplaced fracture horizontally oriented  through the midportion of the anterior wall of the right maxillary  sinus. There is fluid and blood subtotally opacifying the left frontal  sinus partially opacifying the anterior ethmoid sinus and subtotally  opacifying the left maxillary sinus partially opacifying the posterior  third of the right maxillary sinus.     IMPRESSION:     1. This patient has scalp hematoma over the anterior inferior midline of  the frontal bone extending into the paranasal tissues and there are  hairline nondisplaced fractures in the anterior inferior medial aspect  of left frontal bone extending into the anterior table of the inferior  medial left frontal sinus, fracture involving the superior nasal bones  extending nasal bridge, there is a nondisplaced fracture of the far  anterior superior portion of the nasal septum. There is fracture plane  extending into the anterior superior medial wall of the left orbit as  well as involving the inferior medial wall of left orbit as well as some  minimally comminuted nondisplaced fracture of the left orbital floor  with several bubbles of air in the extraconal fat of the inferior left  orbit. There are multiple fracture planes involving the anterior wall  left maxillary sinus with some comminution in the inferior aspect of  anterior wall left maxillary sinus and there are fracture planes  involving the posterior lateral wall left maxillary sinus with several  bubbles of air in the lateral to left maxillary sinus in the april  maxillary fat and may be a subtle hairline nondisplaced fracture of the  left  lateral pterygoid plate. There is a questionable hairline  nondisplaced fracture  hairline fracture of the medial aspect of the  right orbital roof bordering the inferior wall of the right frontal  sinus. There is probable hairline nondisplaced fracture horizontally  oriented  through the midportion of the anterior wall of the right  maxillary sinus. There is questionable hairline nondisplaced fractures  of the medial lateral pterygoid plates bilaterally. There is blood and  fluid subtotally opacifying left frontal sinus and anterior ethmoid  sinuses bilaterally the right frontal recess partially opacifying the  posterior right maxillary sinus and subtotally opacifying the left  maxillary sinus. Given the probable subtle nondisplaced pterygoid plate  fractures and facial fractures described this is probably a variant of a  LeFort fracture.     On sagittal images there is evidence of nondisplaced type II odontoid  fracture through the base of odontoid this will be discussed on the  cervical spine CT report below     CERVICAL SPINE CT TECHNIQUE: Spiral CT images were obtained from the  skull base to the T4 thoracic level and images were reformatted and are  submitted in 2 mm thick axial CT section with soft tissue and bone  algorithm and additional 2 mm thick sagittal and coronal reconstructions  were performed.     FINDINGS: There is arthritic change at the atlantodental interval with  marginal spurring off the anterior ring of C1 and the odontoid. There is  an oblique nondisplaced fracture through the base of the odontoid  indicating a type II unstable base of odontoid fracture     There is mild right and  moderate to severe left facet overgrowth at  C2-3, there is mild left bony foraminal narrowing posterior disc margin  is normal and there is no canal or right foraminal narrowing at C2-3     At C3-4, there is moderate to severe bilateral facet overgrowth. 2 mm  degenerative anterolisthesis of C3 on C4 mild posterior endplate  spurring eccentric left posterior laterally mildly narrowing the  left  side of the canal, bilateral uncovertebral joint hypertrophy and there  is moderate left and mild right bony foraminal narrowing at C3-4     At C4-5, there is mild/moderate right and moderate severe left facet  overgrowth. 1 to 2 mm anterolisthesis of C4 on C5 there is mild  posterior endplate spurring mild canal and left foraminal narrowing  moderate right bony foraminal narrowing at C4-5.     At C5-6, there is moderate left and minimal right facet overgrowth C5-6  disc space narrowing degenerative endplate change diffuse posterior disc  osteophyte complex and bilateral uncovertebral joint hypertrophy, there  is mild canal and there is moderate bilateral bony foraminal narrowing  at C5-6.     At C6-7, there is disc space narrowing degenerative endplate change  diffuse posterior disc osteophyte complex mild facet overgrowth and  uncovertebral joint hypertrophy and there is mild canal and left  foraminal narrowing.     At C7-T1 there is disc space narrowing, degenerative endplate changes  posterior endplate spurring and facet overgrowth contributing to mild  canal and bilateral foraminal narrowing     IMPRESSION:      1. There is an oblique nondisplaced fracture through the base of the  odontoid process indicating an acute type II nondisplaced type II   odontoid fracture which is an unstable fracture, neurosurgical  consultation is warranted   2. There is cervical spondylosis as described above. Extensive results  from  the study were discussed in great detail with Dr. Elder Reaves  from the emergency room by telephone on 01/13/2017 at 3:00 PM/     This report was finalized on 1/16/2017 7:06 AM EST by Dr. Gabriel Cruz MD.       FL Video Swallow [[id number]] Collected:  01/17/17 1010     Updated:  01/17/17 1013    Narrative:       BARIUM SWALLOW WITH VIDEO     CLINICAL HISTORY:   Male who is 81 years-old, with dysphagia.     TECHNIQUE: The swallowing mechanism was evaluated with real-time  fluoroscopic imaging,  captured on digital disk and performed in  conjunction with speech pathologist (please see report).     FINDINGS: The swallowing mechanism is within functional limits       Impression:       No increased risk of aspiration.     FLUOROSCOPY TIME: 32 seconds, 0 images      This report was finalized on 1/17/2017 10:10 AM by Dr. Navdeep Brown MD.               Assessment:      Principal Problem:    Odontoid fracture  Active Problems:    Anemia    ANDERS (acute kidney injury)    Muscle weakness-general    Multiple facial bone fractures    Hyponatremia    Dehydration    Hyperlipidemia    HTN (hypertension)    Hypertension    Coronary artery disease      Patient Active Problem List   Diagnosis Code   • Chronic coronary artery disease I25.10   • Anemia D64.9   • Hyperlipidemia E78.5   • HTN (hypertension) I10   • Trigeminal neuralgia G50.0   • Benign prostatic hyperplasia N40.0   • Ulcerative colitis without complications K51.90   • Health care maintenance Z00.00   • Hypertension I10   • Coronary artery disease I25.10   • ANDERS (acute kidney injury) N17.9   • Muscle weakness-general M62.81   • Pulmonary nodule, right R91.1   • Pulmonary fibrosis J84.10   • Weakness R53.1   • Asterixis R27.8   • CVA (cerebral infarction) I63.9   • Irritable bowel syndrome with both constipation and diarrhea K58.2   • Odontoid fracture S12.100A   • Multiple facial bone fractures S02.92XA   • Hyponatremia E87.1   • Dehydration E86.0       Plan:    Continue support therapy.  Cardiology consult for multiples pvc, bigemini.  Nausea control.  Decrease dose of pain medication, as nausea seems to be a side effect from them  Diet: Encourage PO   Monitor and correct electrolytes  Monitor mental status  PT working with pt  DVT/stress ulcer prophylaxis  Labs in     Hopefully to NH in         Loc Bolanos MD  1/17/2017        Much of this encounter note is an electronic transcription/translation of spoken language to printed text. The electronic  translation of spoken language may permit erroneous, or at times, nonsensical words or phrases to be inadvertently transcribed; Although I have reviewed the note for such errors, some may still exist

## 2017-01-17 NOTE — CONSULTS
Patient Name: Noah Isaac  :1935  81 y.o.    Date of Admission: 2017  Date of Consultation:  17  Encounter Provider: Malina Mckeon MD  Place of Service: [facility] CARDIOLOGY  Referring Provider: Flash Jim MD  Patient Care Team:  Flash Jim MD as PCP - General  Flash Jim MD as PCP - Family Medicine      Chief complaint:  PVCs; Bigeminy    History of Present Illness:  Patient is an 81 year old male who is followed by Dr. Nazario with [facility] for history of coronary artery disease- s/p CABG, hypertension, and hyperlipidemia.  He also has a history of carotid artery stenosis which is followed by Dr. Kumar and previous CVA.    Patient presented to the ER on 17 after tripping and suffering a mechanical fall in a parking lot.  He hit his face during the fall.  EMS called and patient brought to the ER for further evaluation/treatment.  He was found to have an odontoid fracture, multiple facial fractures, and nose laceration.  Nasal dorsum laceration repaired in the ER.  His BP was extremely elevated in the ER- 200s/80s.  Neurosurgery consulted- no surgical interventions; cervical collar in place; follow-up as outpatient.  Ophthalmology consulted- will follow-up as outpatient.  Plan to discharge to nursing home soon.  Patient having frequent PVCs with occasional bigeminy.  Currently in NSR with occasional PVCs.  Potassium- 4.1 today.  Magnesium level ordered.    He is having a lot of vomiting and has been held here due to this.  He has no exertional chest pain or dyspnea.  He has no palpitations or dizziness.  He is very weak but does not want to go to rehab. He has no edema or h/o heart failure.  He has never had syncope.     Stress Test-   IMPRESSION:   1. Abnormal stress Cardiolite.  2. Small to moderate sized, mild partially reversible defect in basal   inferolateral wall. Consider non-transmural infarct with  small amount of   april-infarct ischemia.  3. Small mild fixed defect in mid to distal anteroseptal wall, consider   non-transmural infarct versus chest wall attenuation artifact.  4. Normal left ventricular systolic function.     Past Medical History   Diagnosis Date   • Anemia    • Anxiety disorder    • Arthritis    • Asterixis    • Coronary artery disease    • Coronary artery disease    • Depression    • Hard of hearing    • Hyperlipidemia    • Hypertension    • Pneumonia    • Pulmonary nodule    • S/P tooth extraction 01/09/2017   • Ulcerative colitis    • Use of cane as ambulatory aid        Past Surgical History   Procedure Laterality Date   • Coronary artery bypass graft     • Carpal tunnel release     • Rotator cuff repair     • Cardiac surgery     • Cataract extraction     • Cataract extraction w/  intraocular lens implant Bilateral      L eye in November 2016, R eye in Jan 2017         Prior to Admission medications    Medication Sig Start Date End Date Taking? Authorizing Provider   amLODIPine (NORVASC) 5 MG tablet TAKE ONE TABLET BY MOUTH DAILY 11/9/16  Yes Flash Jim MD   Calcium Carb-Cholecalciferol 600-800 MG-UNIT tablet Take 1 tablet by mouth daily. 9/1/15  Yes Historical Provider, MD   clonazePAM (KlonoPIN) 0.5 MG tablet Take 1 tablet by mouth At Night As Needed for anxiety.  Patient taking differently: Take 0.5 mg by mouth 2 (Two) Times a Day As Needed for anxiety. 11/9/16  Yes Flash Jim MD   clopidogrel (PLAVIX) 75 MG tablet Take 1 tablet by mouth daily. 9/21/16  Yes Loc Bolanos MD   dicyclomine (BENTYL) 10 MG capsule TAKE ONE CAPSULE BY MOUTH FOUR TIMES DAILY AS NEEDED 12/19/16  Yes Tomasa Smyth MD   finasteride (PROSCAR) 5 MG tablet Take 1 tablet by mouth daily. 7/8/16  Yes Flash Jim MD   folic acid (FOLVITE) 1 MG tablet Take 1 tablet by mouth  daily 12/5/16  Yes Flash Jim MD   furosemide (LASIX) 20 MG tablet Take 20 mg by mouth every morning.   Yes Historical  Provider, MD   gabapentin (NEURONTIN) 300 MG capsule Take 1 capsule by mouth every 8 (eight) hours. 9/21/16  Yes Loc Bolanos MD   hydrOXYzine (ATARAX) 25 MG tablet TAKE 1 TABLET BY MOUTH TWO TIMES DAILY 10/12/16  Yes Flash Jim MD   ILEVRO 0.3 % suspension  10/31/16  Yes Historical Provider, MD   isosorbide mononitrate (IMDUR) 30 MG 24 hr tablet Take 1 tablet by mouth  daily 12/5/16  Yes Flash Jim MD   losartan (COZAAR) 100 MG tablet Take 1 tablet by mouth  daily 12/5/16  Yes Flash Jim MD   metoprolol tartrate (LOPRESSOR) 25 MG tablet Take 0.5 tablets by mouth 2 (two) times a day. 9/21/16  Yes Loc Bolanos MD   montelukast (SINGULAIR) 10 MG tablet Take 1 tablet by mouth every night.  Patient taking differently: Take 10 mg by mouth daily as needed (seasonal allergies). 7/7/16  Yes Flash Jim MD   nitroglycerin (NITROSTAT) 0.4 MG SL tablet Place 0.4 mg under the tongue. Dissolve one tablet under tongue as needed for chest pain 11/22/13  Yes Flash Jim MD   ondansetron (ZOFRAN) 4 MG tablet TAKE ONE TABLET BY MOUTH EVERY EIGHT HOURS AS NEEDED 9/22/16  Yes Tomasa Smyth MD   potassium chloride (K-DUR) 10 MEQ CR tablet Take 1 tablet by mouth two  times daily 12/5/16  Yes Flash Jim MD   prednisoLONE acetate (PRED FORTE) 1 % ophthalmic suspension INSTILL 1 DROP INTO LEFT EYE 3 TIMES A DAY FOR 3 WK,TWICE A DAY X1 WK,AND ONCE DAILY X1WK 10/28/16  Yes Historical Provider, MD   sertraline (ZOLOFT) 50 MG tablet TAKE 1 TABLET BY MOUTH DAILY. 12/6/16  Yes Flash Jim MD   simvastatin (ZOCOR) 20 MG tablet Take 1 tablet by mouth  daily 12/5/16  Yes Flash Jim MD   sodium chloride (OCEAN) 0.65 % nasal spray 2 sprays into each nostril every 2 (two) hours as needed for congestion. 9/1/16  Yes Loc Bolanos MD   sulfaSALAzine (AZULFIDINE) 500 MG tablet Take 2 tablets by mouth 3  times a day 12/6/16  Yes Tomasa Smyth MD   traMADol (ULTRAM) 50 MG tablet 50 mg every 8 (eight)  hours as needed. 2/26/16  Yes Historical Provider, MD   VIGAMOX 0.5 % ophthalmic solution USE 1 DROP INTO LEFT EYE 3 TIMES A DAY 3 DAYS PRIOR TO SURGERY AND FOR 3 DAYS AFTER SURGERY 10/28/16  Yes Historical Provider, MD   betamethasone dipropionate (DIPROLENE) 0.05 % cream  11/7/16   Historical Provider, MD   fluocinolone (SYNALAR) 0.01 % external solution APPLY TO THE SCALP SPARINGLY ONCE A DAY 11/4/16   Historical Provider, MD   FLUZONE HIGH-DOSE 0.5 ML suspension prefilled syringe injection TO BE ADMINISTERED BY PHARMACIST FOR IMMUNIZATION 10/3/16   Historical Provider, MD   hydrocortisone-pramoxine (ANALPRAM-HC) 2.5-1 % rectal cream APPLY PER RECTUM THREE TIMES DAILY AS NEEDED 11/30/16   Tomasa Smyth MD   triamcinolone (KENALOG) 0.1 % cream  10/5/16   Historical Provider, MD       Allergies   Allergen Reactions   • Carbamazepine Other (See Comments)     Extreme nervousness  Hyponatremia    • Lactose Intolerance (Gi)        Social History     Social History   • Marital status:      Spouse name: N/A   • Number of children: N/A   • Years of education: N/A     Social History Main Topics   • Smoking status: Former Smoker     Packs/day: 3.00     Years: 20.00   • Smokeless tobacco: None      Comment: quit 30 40 years ago   • Alcohol use No   • Drug use: No   • Sexual activity: Defer     Other Topics Concern   • None     Social History Narrative       Family History   Problem Relation Age of Onset   • Heart disease Mother    • Heart disease Father    • Stroke Father    • Heart disease Sister    • Cancer Brother      malignant neoplasm       REVIEW OF SYSTEMS:   All systems reviewed.  Pertinent positives identified in HPI.  All other systems are negative.      Objective:     Vitals:    01/16/17 1937 01/16/17 2301 01/17/17 0607 01/17/17 0711   BP: 131/76 (!) 184/81  (!) 181/87   BP Location: Right arm Right arm  Right arm   Patient Position: Lying Lying  Lying   Pulse:       Resp: 15 16  18   Temp: 97.5 °F  (36.4 °C) 97.3 °F (36.3 °C)  97.9 °F (36.6 °C)   TempSrc: Oral Oral  Oral   SpO2:    92%   Weight:   185 lb 12.8 oz (84.3 kg)    Height:         Body mass index is 25.91 kg/(m^2).    General Appearance:    Alert, cooperative, in no acute distress   Head:    Normocephalic, without obvious abnormality, atraumatic   Eyes:            Lids and lashes normal, conjunctivae and sclerae normal, no   icterus, no pallor, corneas clear,   Ears:    Ears appear intact with no abnormalities noted   Throat:   No oral lesions, no thrush, oral mucosa moist   Neck:   In neck brace       Lungs:     Clear to auscultation,respirations regular, even and unlabored    Heart:    Regular rhythm and normal rate, normal S1 and S2, 2/6 CULLEN, no gallop, no rub, no click   Chest Wall:    No abnormalities observed   Abdomen:     Normal bowel sounds, no masses, no organomegaly, soft        non-tender, non-distended, no guarding, no rebound  tenderness   Extremities:   Moves all extremities well, no edema, no cyanosis, no redness   Pulses:   Pulses palpable and equal bilaterally. Normal radial, carotid, dorsalis pedis and posterior tibial pulses bilaterally.    Skin:  Psychiatric:   No bleeding, bruising or rash    Alert and oriented x 3, normal mood and affect   Lab Review:       Results from last 7 days  Lab Units 01/17/17  1150   SODIUM mmol/L 131*   POTASSIUM mmol/L 4.1   CHLORIDE mmol/L 92*   TOTAL CO2 mmol/L 25.1   BUN mg/dL 13   CREATININE mg/dL 0.95   CALCIUM mg/dL 8.9   GLUCOSE mg/dL 117*       Results from last 7 days  Lab Units 01/17/17  1150   TROPONIN T ng/mL <0.010       Results from last 7 days  Lab Units 01/17/17  0522   WBC 10*3/mm3 6.10   HEMOGLOBIN g/dL 9.4*   HEMATOCRIT % 29.0*   PLATELETS 10*3/mm3 152           Results from last 7 days  Lab Units 01/17/17  1150   MAGNESIUM mg/dL 1.8                   I personally viewed and interpreted the patient's EKG/Telemetry data.          Assessment and Plan:       1. Falls with facial  fractures.  2. Hyponatremia  3. Anemia  4. pvcs  5. Cad, h/o CABG  6. htn  7. Hyperlipidemia    Asymptomatic pvcs - replace mag, increase lopressor, otherwise stable, will see as needed. F/u with Dr. Nazario in 6 weeks.     Malina Mckeon MD  01/17/17  2:11 PM

## 2017-01-18 NOTE — PROGRESS NOTES
Continued Stay Note  [facility]     Patient Name: Noah Isaac  MRN: [id number]  Today's Date: 1/18/2017    Admit Date: 1/13/2017          Discharge Plan       01/18/17 1250    Case Management/Social Work Plan    [facility] a bed and can accept today if ready for d/c.              Discharge Codes     None            Betina Toro RN

## 2017-01-18 NOTE — SIGNIFICANT NOTE
01/18/17 5624   Rehab Treatment   Discipline occupational therapist   Treatment Not Performed patient unavailable for treatment  (getting cleaned up 1320)

## 2017-01-18 NOTE — PLAN OF CARE
Problem: Orthopaedic Fracture (Adult)  Goal: Signs and Symptoms of Listed Potential Problems Will be Absent or Manageable (Orthopaedic Fracture)  Outcome: Ongoing (interventions implemented as appropriate)    Problem: Patient Care Overview (Adult)  Goal: Plan of Care Review  Outcome: Ongoing (interventions implemented as appropriate)    01/14/17 0451 01/18/17 0151   Coping/Psychosocial Response Interventions   Plan Of Care Reviewed With patient --    Patient Care Overview   Progress --  no change   Outcome Evaluation   Outcome Summary/Follow up Plan --  no falls. vital signs stable. UA C&S sent. monitor labs. will continue to monitor.         Problem: Fall Risk (Adult)  Goal: Absence of Falls  Outcome: Ongoing (interventions implemented as appropriate)

## 2017-01-18 NOTE — PLAN OF CARE
Problem: Patient Care Overview (Adult)  Goal: Plan of Care Review  Outcome: Ongoing (interventions implemented as appropriate)    01/18/17 1050   Patient Care Overview   Progress improving   Outcome Evaluation   Outcome Summary/Follow up Plan Pt is increasing with bed mobility and transfers

## 2017-01-18 NOTE — PLAN OF CARE
Problem: Patient Care Overview (Adult)  Goal: Plan of Care Review  Outcome: Ongoing (interventions implemented as appropriate)    01/18/17 5944   Outcome Evaluation   Outcome Summary/Follow up Plan patient likely to discharge today to rehab       Goal: Adult Individualization and Mutuality  Outcome: Ongoing (interventions implemented as appropriate)  Goal: Discharge Needs Assessment  Outcome: Ongoing (interventions implemented as appropriate)    Problem: Fall Risk (Adult)  Goal: Absence of Falls  Outcome: Ongoing (interventions implemented as appropriate)

## 2017-01-18 NOTE — PROGRESS NOTES
Acute Care - Physical Therapy Treatment Note  [facility]     Patient Name: Noah Iasac  : 1935  MRN: [id number]  Today's Date: 2017     Date of Referral to PT: 17  Referring Physician: Luis Miguel    Admit Date: 2017    Visit Dx:    ICD-10-CM ICD-9-CM   1. Odontoid fracture, closed, initial encounter S12.100A 805.02   2. Facial fracture due to fall, closed, initial encounter S02.92XA 802.8    W19.XXXA E888.9   3. Facial abrasion, initial encounter S00.81XA 910.0   4. Weakness generalized R53.1 780.79     Patient Active Problem List   Diagnosis   • Chronic coronary artery disease   • Anemia   • Hyperlipidemia   • HTN (hypertension)   • Trigeminal neuralgia   • Benign prostatic hyperplasia   • Ulcerative colitis without complications   • Health care maintenance   • Hypertension   • Coronary artery disease   • ANDERS (acute kidney injury)   • Muscle weakness-general   • Pulmonary nodule, right   • Pulmonary fibrosis   • Weakness   • Asterixis   • CVA (cerebral infarction)   • Irritable bowel syndrome with both constipation and diarrhea   • Odontoid fracture   • Multiple facial bone fractures   • Hyponatremia   • Dehydration               Adult Rehabilitation Note       17 1000 17 1200 01/15/17 1225    Rehab Assessment/Intervention    Discipline physical therapy assistant  -CW physical therapy assistant  -CW physical therapy assistant  -MM    Document Type therapy note (daily note)  -CW therapy note (daily note)  -CW therapy note (daily note)  -MM    Subjective Information agree to therapy;complains of;fatigue;nausea/vomiting  -CW agree to therapy;complains of;weakness;pain  -CW no complaints;agree to therapy  -MM    Patient Effort, Rehab Treatment adequate  -CW good  -CW     Precautions/Limitations fall precautions  -CW fall precautions  -CW     Recorded by [CW] Elmer Estevez [CW] Elmer Estevez [MM] Jaja Grace, RONAN    Pain Assessment    Pain Assessment No/denies pain   -CW 0-10  -CW No/denies pain  -MM    Pain Score  6  -CW     Pain Location  Neck  -CW     Recorded by [CW] Elmer Estevez [CW] Elmer Estevez [MM] Jaja Grace PTA    Cognitive Assessment/Intervention    Current Cognitive/Communication Assessment functional  -CW functional  -CW     Orientation Status oriented x 4  -CW oriented x 4  -CW     Follows Commands/Answers Questions 100% of the time  -% of the time  -CW     Personal Safety WNL/WFL  -CW WNL/WFL  -CW     Personal Safety Interventions fall prevention program maintained;gait belt;nonskid shoes/slippers when out of bed  -CW fall prevention program maintained;gait belt;nonskid shoes/slippers when out of bed  -CW     Recorded by [CW] Elmer Estevez [CW] Elmer Estevez     Bed Mobility, Assessment/Treatment    Bed Mobility, Assistive Device  head of bed elevated  -CW head of bed elevated  -MM    Bed Mob, Supine to Sit, [place] verbal cues required;nonverbal cues required (demo/gesture);contact guard assist  -CW verbal cues required;nonverbal cues required (demo/gesture);contact guard assist  -CW verbal cues required;contact guard assist;1 person + 1 person to manage equipment  -MM    Bed Mob, Sit to Supine, [place] not tested  -CW verbal cues required;nonverbal cues required (demo/gesture);contact guard assist  -CW not tested   up to the chair  -MM    Bed Mobility, Comment up in chair  -CW      Recorded by [CW] Elmer Estevez [CW] Elmer Estevez [MM] Jaja Grace PTA    Transfer Assessment/Treatment    Transfers, Sit-Stand [place] verbal cues required;nonverbal cues required (demo/gesture);contact guard assist  -CW verbal cues required;nonverbal cues required (demo/gesture);contact guard assist  -CW verbal cues required;minimum assist (75% patient effort);1 person + 1 person to manage equipment  -MM    Transfers, Stand-Sit [place] verbal cues required;nonverbal cues required (demo/gesture);contact guard  assist  -CW verbal cues required;nonverbal cues required (demo/gesture);contact guard assist  -CW verbal cues required;contact guard assist;1 person + 1 person to manage equipment  -MM    Transfers, Sit-Stand-Sit, Assist Device rolling walker  -CW rolling walker  -CW     Recorded by [CW] Elmer Estevez [CW] Elmer Estevez [MM] Jaja Grace PTA    Gait Assessment/Treatment    Gait, [place] Level verbal cues required;nonverbal cues required (demo/gesture);contact guard assist  -CW verbal cues required;nonverbal cues required (demo/gesture);contact guard assist;2 person assist required  -CW verbal cues required;minimum assist (75% patient effort);1 person + 1 person to manage equipment;2 person assist required  -MM    Gait, Assistive Device rolling walker  -CW rolling walker  -CW rolling walker  -MM    Gait, Distance (Feet) 120  -  -CW 40  -MM    Gait, Gait Pattern Analysis   swing-to gait  -MM    Gait, Gait Deviations  kareem decreased;step length decreased;stride length decreased  -CW kareem decreased;step length decreased  -MM    Recorded by [CW] Elmer Estevez [CW] Elmer Estevez [MM] Jaja Grace PTA    Therapy Exercises    Bilateral Lower Extremities  AROM:;15 reps;sitting;ankle pumps/circles;hip flexion;LAQ  -CW     Recorded by  [CW] Elmer Estevez     Positioning and Restraints    Pre-Treatment Position in bed  -CW in bed  -CW in bed  -MM    Post Treatment Position chair  -CW bed  -CW chair  -MM    In Bed  supine;call light within reach;encouraged to call for assist;exit alarm on;with family/caregiver  -CW     In Chair reclined;call light within reach;encouraged to call for assist;notified nsg;with nsg  -CW  notified nsg;reclined;call light within reach;encouraged to call for assist  -MM    Recorded by [CW] Elmer Estevez [CW] Emler Estevez [MM] Jaja Grace PTA      User Key  (r) = Recorded By, (t) = Taken By, (c) = Cosigned By    Initials Name Effective  Dates    MM Jaja Grace, PTA 02/18/16 -     CW Elmer Estevez 12/13/16 -                 IP PT Goals       01/14/17 1542          Bed Mobility PT LTG    Bed Mobility PT LTG, Date Established 01/14/17  -AL      Bed Mobility PT LTG, Time to Achieve 1 wk  -AL      Bed Mobility PT LTG, Activity Type all bed mobility  -AL      Bed Mobility PT LTG, [place] Level supervision required  -AL      Bed Mobility PT Goal  LTG, Assist Device bed rails  -AL      Transfer Training PT LTG    Transfer Training PT LTG, Date Established 01/14/17  -AL      Transfer Training PT LTG, Time to Achieve 1 wk  -AL      Transfer Training PT LTG, Activity Type sit to stand/stand to sit  -AL      Transfer Training PT LTG, [place] Level contact guard assist  -AL      Transfer Training PT LTG, Assist Device walker, rolling  -AL      Gait Training PT LTG    Gait Training Goal PT LTG, Date Established 01/14/17  -AL      Gait Training Goal PT LTG, Time to Achieve 1 wk  -AL      Gait Training Goal PT LTG, [place] Level moderate assist (50% patient effort)  -AL      Gait Training Goal PT LTG, Assist Device walker, rolling  -AL      Gait Training Goal PT LTG, Distance to Achieve 50  -AL        User Key  (r) = Recorded By, (t) = Taken By, (c) = Cosigned By    Initials Name Provider Type    PAUL Ronquillo, PT Physical Therapist          Physical Therapy Education     Title: PT OT SLP Therapies (Active)     Topic: Physical Therapy (Done)     Point: Mobility training (Done)    Learning Progress Summary    Learner Readiness Method Response Comment Documented by Status   Patient Acceptance EG[place]IA VU CW 01/18/17 1050 Done    Acceptance EGREG[place]IA VU CW 01/16/17 1202 Done    Acceptance E VU  MM 01/15/17 1227 Done    Acceptance E VU,NR  AL 01/14/17 1539 Done               Point: Home exercise program (Done)    Learning Progress Summary    Learner Readiness Method Response Comment Documented by Status   Patient Acceptance [place]IA VU CW  01/18/17 1050 Done    Acceptance E,TB DU,VU  CW 01/16/17 1202 Done               Point: Body mechanics (Done)    Learning Progress Summary    Learner Readiness Method Response Comment Documented by Status   Patient Acceptance E,TB DU,VU  CW 01/18/17 1050 Done    Acceptance E,TB DU,VU  CW 01/16/17 1202 Done    Acceptance E VU,NR  AL 01/14/17 1539 Done               Point: Precautions (Done)    Learning Progress Summary    Learner Readiness Method Response Comment Documented by Status   Patient Acceptance E,TB DU,VU  CW 01/18/17 1050 Done    Acceptance E,TB DU,VU  CW 01/16/17 1202 Done    Acceptance E VU,NR  AL 01/14/17 1539 Done                      User Key     Initials Effective Dates Name Provider Type Discipline    AL 12/01/15 -  Cailin Ronquillo, PT Physical Therapist PT    MM 02/18/16 -  Jaja Grace, PTA Physical Therapy Assistant PT     12/13/16 -  Elmer Estevez Physical Therapy Assistant PT                    PT Recommendation and Plan  Anticipated Discharge Disposition: inpatient rehabilitation facility  PT Frequency: daily  Plan of Care Review  Plan Of Care Reviewed With: patient  Progress: improving  Outcome Summary/Follow up Plan: Pt is increasing with bed mobility and transfers          Outcome Measures       01/18/17 1000 01/16/17 1200 01/16/17 1041    How much help from another person do you currently need...    Turning from your back to your side while in flat bed without using bedrails? 3  -CW 3  -CW     Moving from lying on back to sitting on the side of a flat bed without bedrails? 3  -CW 3  -CW     Moving to and from a bed to a chair (including a wheelchair)? 3  -CW 3  -CW     Standing up from a chair using your arms (e.g., wheelchair, bedside chair)? 3  -CW 3  -CW     Climbing 3-5 steps with a railing? 2  -CW 2  -CW     To walk in hospital room? 3  -CW 3  -CW     AM-PAC 6 Clicks Score 17  -CW 17  -CW     How much help from another is currently needed...    Putting on and taking off regular  lower body clothing?   1  -SG    Bathing (including washing, rinsing, and drying)   2  -SG    Toileting (which includes using toilet bed pan or urinal)   1  -SG    Putting on and taking off regular upper body clothing   1  -SG    Taking care of personal grooming (such as brushing teeth)   2  -SG    Eating meals   2  -SG    Score   9  -SG    Functional Assessment    Outcome Measure Options AM-PAC 6 Clicks Basic Mobility (PT)  -CW AM-PAC 6 Clicks Basic Mobility (PT)  -CW AM-PAC 6 Clicks Daily Activity (OT)  -SG      01/15/17 1200          How much help from another person do you currently need...    Turning from your back to your side while in flat bed without using bedrails? 3  -MM      Moving from lying on back to sitting on the side of a flat bed without bedrails? 3  -MM      Moving to and from a bed to a chair (including a wheelchair)? 3  -MM      Standing up from a chair using your arms (e.g., wheelchair, bedside chair)? 3  -MM      Climbing 3-5 steps with a railing? 1  -MM      To walk in hospital room? 3  -MM      AM-PAC 6 Clicks Score 16  -MM        User Key  (r) = Recorded By, (t) = Taken By, (c) = Cosigned By    Initials Name Provider Type    SG Jess Ingram, OTR Occupational Therapist    MM Jaja Grace, PTA Physical Therapy Assistant    LORNA Estevez Physical Therapy Assistant           Time Calculation:         PT Charges       01/18/17 1051          Time Calculation    Start Time 1035  -CW      Stop Time 1051  -CW      Time Calculation (min) 16 min  -CW      PT Received On 01/18/17  -CW      PT - Next Appointment 01/19/17  -CW        User Key  (r) = Recorded By, (t) = Taken By, (c) = Cosigned By    Initials Name Provider Type    CW Elmer Estevez Physical Therapy Assistant          Therapy Charges for Today     Code Description Service Date Service Provider Modifiers Qty    95283843455 HC PT THER PROC EA 15 MIN 1/18/2017 Elmer Estevez GP 1          PT G-Codes  Outcome Measure  Options: AM-PAC 6 Clicks Basic Mobility (PT)    Elmer Estevez  1/18/2017

## 2017-01-18 NOTE — PROGRESS NOTES
"DAILY PROGRESS NOTE  [facility], [facility]    2017    Patient Identification:  Name: Noah Isaac  Age: 81 y.o.  Sex: male  :  1935  MRN: [id number]           Primary Care Physician: Flash Jim MD    Subjective:    Interval History:    Nauseated and vomited x 1 today.  Feeling better this afternoon.  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    ROS:     No shortness of breath, chest pain, diarrhea.    Objective:    Scheduled Meds:    amLODIPine 5 mg Oral Q24H   atorvastatin 10 mg Oral Nightly   bacitracin  Topical Q8H   clopidogrel 75 mg Oral Daily   docusate sodium 100 mg Oral BID   enoxaparin 40 mg Subcutaneous Q24H   fentaNYL 1 patch Transdermal Q72H   finasteride 5 mg Oral Daily   gabapentin 300 mg Oral Q8H   isosorbide mononitrate 30 mg Oral Q24H   losartan 100 mg Oral Q24H   metoprolol tartrate 25 mg Oral BID   montelukast 10 mg Oral Nightly   sertraline 50 mg Oral Daily   sulfaSALAzine 1,000 mg Oral Q8H       Continuous Infusions:       PRN Meds:  •  acetaminophen  •  bisacodyl  •  clonazePAM  •  HYDROmorphone  •  nitroglycerin  •  ondansetron  •  oxyCODONE-acetaminophen  •  pantoprazole  •  sodium chloride  •  sodium chloride    Intake/Output:    Intake/Output Summary (Last 24 hours) at 17 1836  Last data filed at 17 1808   Gross per 24 hour   Intake      0 ml   Output    900 ml   Net   -900 ml         Exam:    tMax 24 hrs: Temp (24hrs), Av.9 °F (36.6 °C), Min:97.4 °F (36.3 °C), Max:98.3 °F (36.8 °C)    Vitals Ranges:   Temp:  [97.4 °F (36.3 °C)-98.3 °F (36.8 °C)] 97.4 °F (36.3 °C)  Heart Rate:  [71-80] 71  Resp:  [18-20] 18  BP: (153-168)/(62-86) 161/86    Visit Vitals   • /86 (BP Location: Right arm, Patient Position: Lying)   • Pulse 71   • Temp 97.4 °F (36.3 °C) (Oral)   • Resp 18   • Ht 70\" (177.8 cm)   • Wt 185 lb 11.2 oz (84.2 kg)   • SpO2 96%   • BMI 25.9 kg/m2       General: Alert, cooperative, no distress, " appears stated age. In no acute distress.   HEENT:    Head: Normocephalic, without obvious abnormality,multiple areas of ecchymosis  Neck: Supple, symmetrical, trachea midline, no adenopathy;   thyroid: no enlargement/tenderness/nodules;   no carotid bruit or JVD  Cardiovascular: Normal rate, regular rhythm and intact distal pulses.  Exam reveals no gallop and no friction rub. No murmur heard  Pulmonary: Clear to auscultation bilaterally, respirations unlabored.   No rhonchi, wheezing or rales.   Abdominal: Soft. Soft, non-tender, bowel sounds active all four quadrants,   no masses, no hepatomegaly, no splenomegaly.   Extremities: Normal, atraumatic, no cyanosis or edema  Pulses: 2 + symmetric all extremities  Neurological: He is alert and oriented to person, place, and time.   CNII-XII intact, normal strength, sensation intact throughout         Data Review:      Results from last 7 days  Lab Units 01/18/17  0441 01/17/17  0522 01/16/17  0329   WBC 10*3/mm3 5.46 6.10 5.68   HEMOGLOBIN g/dL 9.4* 9.4* 9.2*   HEMATOCRIT % 28.3* 29.0* 27.9*   PLATELETS 10*3/mm3 152 152 117*         Results from last 7 days  Lab Units 01/18/17  0441 01/17/17  1150 01/17/17  0522   SODIUM mmol/L 129* 131* 132*   POTASSIUM mmol/L 3.9 4.1 4.1   CHLORIDE mmol/L 92* 92* 94*   TOTAL CO2 mmol/L 24.7 25.1 25.7   BUN mg/dL 15 13 14   CREATININE mg/dL 0.95 0.95 0.90   CALCIUM mg/dL 8.6 8.9 8.8   GLUCOSE mg/dL 98 117* 96             0  Lab Value Date/Time   TROPONINT <0.010 01/17/2017 1150   TROPONINT <0.010 09/16/2016 1818   TROPONINT <0.010 08/29/2016 1958   TROPONINT <0.010 08/28/2016 2009       Intake/Output       01/17/17 0700 - 01/18/17 0659 01/18/17 0700 - 01/19/17 0659    Intake (ml) -- --    Output (ml) 1600 200    Net (ml) -1600 -200    Last Weight 185 lb 11.2 oz (84.2 kg) --           Imaging Results (last 7 days)     Procedure Component Value Units Date/Time    XR Knee 1 or 2 View Right [[id number]] Collected:  01/13/17 1316     Updated:   01/13/17 1320    Narrative:       Right knee 2 views 01/13/2017     HISTORY: Fell, right knee pain.     There is vascular calcification in the right knee.     No fractures or dislocations are seen. There may be very minimal  suprapatellar effusion. Surgical staples are seen in the medial aspect  of the right knee.       Impression:       1. No acute process except for possible minimal suprapatellar effusion.     This report was finalized on 1/13/2017 1:17 PM by Dr. Andres Grimes MD.       MRI Cervical Spine Without Contrast [[id number]] Collected:  01/13/17 2107     Updated:  01/14/17 1606    Narrative:       MRI EXAMINATION OF THE CERVICAL SPINE WITHOUT CONTRAST     HISTORY: Odontoid fracture.     A MRI examination of the cervical spine was performed without the use of  contrast and compared to the CT examination of the cervical spine from  earlier the same day.     FINDINGS: Signal intensity within the cervical cord is normal on the  sagittal T2 sequence. There is a grade 1 anterolisthesis of C4 upon C5  estimated to be 1-2 mm in severity. There is moderate loss of disc  height at C5-6, C6-7, and C7-T1. There is disc desiccation at all  levels.     C2-3: Moderate facet degenerative disease is present on the left. There  is a mild broad-based disc osteophyte complex resulting in mild  flattening of the ventral surface of the thecal sac. Cerebrospinal fluid  is effaced ventral and dorsal to the cord.     C3-4: There is mild and mild to moderate facet degenerative disease on  the right and left respectively. There is moderate neuroforaminal  compromise on the left secondary to facet hypertrophy and uncovertebral  degenerative disease. A broad-based disc osteophyte complex is present  which results in moderate canal stenosis, more prominent on the left.  There is effacement of cerebrospinal fluid ventral and dorsal to the  cord.     C4-5: Moderate facet degenerative disease is present bilaterally.     C5-6: There  is a broad-based disc osteophyte complex resulting in mild  flattening of the ventral surface of the thecal sac and cord.  Cerebrospinal fluid is effaced ventral and dorsal to the cord. There is  mild neuroforaminal compromise bilaterally secondary to uncovertebral  degenerative disease.     C6-7: There is a mild broad-based disc osteophyte complex resulting in  mild flattening of the ventral surface of the thecal sac and cord. There  is moderate neuroforaminal compromise bilaterally, more prominent on the  left secondary to uncovertebral degenerative disease.     C7-T1: There is a mild broad-based disc osteophyte complex with no  evidence of herniation. There is mild neuroforaminal compromise  bilaterally secondary to extension of the disc osteophyte complex into  the neural foramen.     The CT examination of the cervical spine demonstrated an oblique  fracture involving the dens. The fracture is better demonstrated on the  CT examination. There is only minimal increased signal intensity  involving the fracture on the sagittal T2 sequence. There is no evidence  of a prevertebral hematoma.       Impression:       There is an oblique minimally displaced fracture involving  the dens with minimal increased signal intensity appreciated on the  sagittal T2 sequence at the fracture site. There is no evidence of an  epidural hematoma or of cord signal abnormality. There is moderate canal  stenosis from C2 to C7 secondary to broad-based disc osteophyte  complexes at each level flattening the ventral surface of the cord and  effacing cerebrospinal fluid ventral and dorsal to the cord at each  level. The greatest degree of spinal stenosis is at C6-7.     The above information was called to and discussed with Elsi Najera,  following the dictation.     This report was finalized on 1/14/2017 4:02 PM by Dr. Genaro Malagon MD.       CT Head Without Contrast [[id number]] Collected:  01/13/17 1721     Updated:  01/16/17 0709     Narrative:       EMERGENCY NONCONTRAST HEAD CT, FACIAL CT AND CERVICAL SPINE CT  01/13/2017     CLINICAL HISTORY: Patient fell in parking lot of [employer],  loss  his balance fell face first, denies loss of consciousness     HEAD CT TECHNIQUE: Spiral CT images were obtained from the base of the  skull to the vertex without intravenous contrast and images were  reformatted and submitted in 3 mm thick axial CT section with brain  algorithm and 2 mm thick axial CT section with high-resolution bone  algorithm and additional 2 mm thick sagittal and coronal reconstructions  were performed at brain algorithm and due to questionable finding along  the anterior tip of the left temporal lobe patient was brought back for  additional spiral CT images through the head..     This correlated to an prior noncontrast head CT from [facility] 09/16/2016 and prior MRI of the head 09/17/2016.     FINDINGS: There is some patchy nodular low-density in periventricular  extending to subcortical white matter of cerebral hemispheres consistent  with mild-to-moderate small vessel disease. The remainder of the brain  parenchyma is normal in attenuation, the lateral and third ventricles  are minimally prominent size likely due to to central volume loss. There  is a thin sliver of hyperdensity along the anterior tip of the left  temporal lobe on the initial set axial images, it is not reproduced on  the repeat axial images nor on the facial CT images and felt to be  artifact. Overall, no extra-axial fluid collections are identified and  there is no evidence of acute intracranial hemorrhage. There is a scalp  hematoma over the anterior inferior frontal bone extending paranasal and  periorbital soft tissues and there is multiple acute fractures including  a linear nondisplaced vertical fracture of the anterior inferior medial  left frontal bone extending into anterior table of the inferior medial  left frontal sinus, fractures  of the anterosuperior tip in the anterior  aspect nasal septum fractures of the superior nasal bones extending  nasal bridge, fractures of the left orbital floor and anterior and  posterior lateral wall left maxillary sinus there are fractures of the  pterygoid plates.       Impression:       1. There is moderate small vessel disease in the cerebral white matter.     2. No acute intracranial hemorrhage is seen     3. There are multiple acute fractures with a linear nondisplaced  fracture in the anterior inferior medial left frontal bone extending  into the anterior table of the inferior medial left frontal sinus as  well as fracture of the anterosuperior and anterior aspect nasal septum,  fractures of the superior nasal bones and nasal bridge fractures of the  left orbital floor and anterior posterior lateral wall left maxillary  sinus likely fracture of the anterior wall of the right maxillary sinus  fractures of the pterygoid plates. There is blood subtotally opacifying  left frontal sinus, anterior ethmoid sinuses bilaterally left maxillary  sinus blood partially opacifying posterior right maxillary sinus and see  facial CT report below. The  remainder of the head CT is unremarkable.     FACIAL CT TECHNIQUE: Spiral CT images were obtained through the facial  bones in the axial imaging plane. Images were reformatted and are  submitted in 2 mm thick axial CT section with soft tissue and  high-resolution bone algorithm and additional 2 mm thick sagittal and  coronal reconstructions were performed..     FINDINGS: Patient has a moderate size scalp hematoma over the anterior  inferior frontal bone extending into the paranasal tissues and  periorbital tissues from today's head and facial trauma. There is a  linear acute fracture involving the far anterior inferior medial left  frontal bone that extends into the anterior wall of the inferior medial  left frontal sinus. Fracture extends through the medial septa  between  the  left and right frontal sinuses and fracture plane also involve the  superior nasal bones extending into the nasal bridge and there is  fracture of the far anterior superior aspect of the nasal septum. There  is  fracture plane extending into the anterior superior medial wall of  the left orbit and there is fracture plane involving the inferior medial  wall of the left orbit and there is mild with comminuted fracture that  is nondisplaced involving the left inferior orbital wall and the  fracture extending into the anterior wall of the left maxillary sinus  with minimal comminution in the anterior inferior wall left maxillary  sinus, there is fracture plane involving posterior lateral wall left  maxillary sinus with several bubbles of air extending into the left  infratemporal fossa just lateral to the left maxillary sinus. There may  be a linear nondisplaced fracture of the lateral left pterygoid plate.  Hairline nondisplaced fracture of the medial aspect of the right orbital  roof and there is a hairline nondisplaced fracture horizontally oriented  through the midportion of the anterior wall of the right maxillary  sinus. There is fluid and blood subtotally opacifying the left frontal  sinus partially opacifying the anterior ethmoid sinus and subtotally  opacifying the left maxillary sinus partially opacifying the posterior  third of the right maxillary sinus.     IMPRESSION:     1. This patient has scalp hematoma over the anterior inferior midline of  the frontal bone extending into the paranasal tissues and there are  hairline nondisplaced fractures in the anterior inferior medial aspect  of left frontal bone extending into the anterior table of the inferior  medial left frontal sinus, fracture involving the superior nasal bones  extending nasal bridge, there is a nondisplaced fracture of the far  anterior superior portion of the nasal septum. There is fracture plane  extending into the anterior  superior medial wall of the left orbit as  well as involving the inferior medial wall of left orbit as well as some  minimally comminuted nondisplaced fracture of the left orbital floor  with several bubbles of air in the extraconal fat of the inferior left  orbit. There are multiple fracture planes involving the anterior wall  left maxillary sinus with some comminution in the inferior aspect of  anterior wall left maxillary sinus and there are fracture planes  involving the posterior lateral wall left maxillary sinus with several  bubbles of air in the lateral to left maxillary sinus in the april  maxillary fat and may be a subtle hairline nondisplaced fracture of the  left  lateral pterygoid plate. There is a questionable hairline  nondisplaced fracture hairline fracture of the medial aspect of the  right orbital roof bordering the inferior wall of the right frontal  sinus. There is probable hairline nondisplaced fracture horizontally  oriented  through the midportion of the anterior wall of the right  maxillary sinus. There is questionable hairline nondisplaced fractures  of the medial lateral pterygoid plates bilaterally. There is blood and  fluid subtotally opacifying left frontal sinus and anterior ethmoid  sinuses bilaterally the right frontal recess partially opacifying the  posterior right maxillary sinus and subtotally opacifying the left  maxillary sinus. Given the probable subtle nondisplaced pterygoid plate  fractures and facial fractures described this is probably a variant of a  [place] fracture.     On sagittal images there is evidence of nondisplaced type II odontoid  fracture through the base of odontoid this will be discussed on the  cervical spine CT report below     CERVICAL SPINE CT TECHNIQUE: Spiral CT images were obtained from the  skull base to the T4 thoracic level and images were reformatted and are  submitted in 2 mm thick axial CT section with soft tissue and bone  algorithm and additional  2 mm thick sagittal and coronal reconstructions  were performed.     FINDINGS: There is arthritic change at the atlantodental interval with  marginal spurring off the anterior ring of C1 and the odontoid. There is  an oblique nondisplaced fracture through the base of the odontoid  indicating a type II unstable base of odontoid fracture     There is mild right and  moderate to severe left facet overgrowth at  C2-3, there is mild left bony foraminal narrowing posterior disc margin  is normal and there is no canal or right foraminal narrowing at C2-3     At C3-4, there is moderate to severe bilateral facet overgrowth. 2 mm  degenerative anterolisthesis of C3 on C4 mild posterior endplate  spurring eccentric left posterior laterally mildly narrowing the left  side of the canal, bilateral uncovertebral joint hypertrophy and there  is moderate left and mild right bony foraminal narrowing at C3-4     At C4-5, there is mild/moderate right and moderate severe left facet  overgrowth. 1 to 2 mm anterolisthesis of C4 on C5 there is mild  posterior endplate spurring mild canal and left foraminal narrowing  moderate right bony foraminal narrowing at C4-5.     At C5-6, there is moderate left and minimal right facet overgrowth C5-6  disc space narrowing degenerative endplate change diffuse posterior disc  osteophyte complex and bilateral uncovertebral joint hypertrophy, there  is mild canal and there is moderate bilateral bony foraminal narrowing  at C5-6.     At C6-7, there is disc space narrowing degenerative endplate change  diffuse posterior disc osteophyte complex mild facet overgrowth and  uncovertebral joint hypertrophy and there is mild canal and left  foraminal narrowing.     At C7-T1 there is disc space narrowing, degenerative endplate changes  posterior endplate spurring and facet overgrowth contributing to mild  canal and bilateral foraminal narrowing     IMPRESSION:      1. There is an oblique nondisplaced fracture  through the base of the  odontoid process indicating an acute type II nondisplaced type II   odontoid fracture which is an unstable fracture, neurosurgical  consultation is warranted   2. There is cervical spondylosis as described above. Extensive results  from  the study were discussed in great detail with Dr. Elder Reaves  from the emergency room by telephone on 01/13/2017 at 3:00 PM/     This report was finalized on 1/16/2017 7:06 AM EST by Dr. Gabriel Cruz MD.       CT Facial Bones Without Contrast [[id number]] Collected:  01/13/17 1721     Updated:  01/16/17 0709    Narrative:       EMERGENCY NONCONTRAST HEAD CT, FACIAL CT AND CERVICAL SPINE CT  01/13/2017     CLINICAL HISTORY: Patient fell in parking lot of [employer],  loss  his balance fell face first, denies loss of consciousness     HEAD CT TECHNIQUE: Spiral CT images were obtained from the base of the  skull to the vertex without intravenous contrast and images were  reformatted and submitted in 3 mm thick axial CT section with brain  algorithm and 2 mm thick axial CT section with high-resolution bone  algorithm and additional 2 mm thick sagittal and coronal reconstructions  were performed at brain algorithm and due to questionable finding along  the anterior tip of the left temporal lobe patient was brought back for  additional spiral CT images through the head..     This correlated to an prior noncontrast head CT from [facility] 09/16/2016 and prior MRI of the head 09/17/2016.     FINDINGS: There is some patchy nodular low-density in periventricular  extending to subcortical white matter of cerebral hemispheres consistent  with mild-to-moderate small vessel disease. The remainder of the brain  parenchyma is normal in attenuation, the lateral and third ventricles  are minimally prominent size likely due to to central volume loss. There  is a thin sliver of hyperdensity along the anterior tip of the left  temporal lobe on the initial  set axial images, it is not reproduced on  the repeat axial images nor on the facial CT images and felt to be  artifact. Overall, no extra-axial fluid collections are identified and  there is no evidence of acute intracranial hemorrhage. There is a scalp  hematoma over the anterior inferior frontal bone extending paranasal and  periorbital soft tissues and there is multiple acute fractures including  a linear nondisplaced vertical fracture of the anterior inferior medial  left frontal bone extending into anterior table of the inferior medial  left frontal sinus, fractures of the anterosuperior tip in the anterior  aspect nasal septum fractures of the superior nasal bones extending  nasal bridge, fractures of the left orbital floor and anterior and  posterior lateral wall left maxillary sinus there are fractures of the  pterygoid plates.       Impression:       1. There is moderate small vessel disease in the cerebral white matter.     2. No acute intracranial hemorrhage is seen     3. There are multiple acute fractures with a linear nondisplaced  fracture in the anterior inferior medial left frontal bone extending  into the anterior table of the inferior medial left frontal sinus as  well as fracture of the anterosuperior and anterior aspect nasal septum,  fractures of the superior nasal bones and nasal bridge fractures of the  left orbital floor and anterior posterior lateral wall left maxillary  sinus likely fracture of the anterior wall of the right maxillary sinus  fractures of the pterygoid plates. There is blood subtotally opacifying  left frontal sinus, anterior ethmoid sinuses bilaterally left maxillary  sinus blood partially opacifying posterior right maxillary sinus and see  facial CT report below. The  remainder of the head CT is unremarkable.     FACIAL CT TECHNIQUE: Spiral CT images were obtained through the facial  bones in the axial imaging plane. Images were reformatted and are  submitted in 2 mm  thick axial CT section with soft tissue and  high-resolution bone algorithm and additional 2 mm thick sagittal and  coronal reconstructions were performed..     FINDINGS: Patient has a moderate size scalp hematoma over the anterior  inferior frontal bone extending into the paranasal tissues and  periorbital tissues from today's head and facial trauma. There is a  linear acute fracture involving the far anterior inferior medial left  frontal bone that extends into the anterior wall of the inferior medial  left frontal sinus. Fracture extends through the medial septa between  the  left and right frontal sinuses and fracture plane also involve the  superior nasal bones extending into the nasal bridge and there is  fracture of the far anterior superior aspect of the nasal septum. There  is  fracture plane extending into the anterior superior medial wall of  the left orbit and there is fracture plane involving the inferior medial  wall of the left orbit and there is mild with comminuted fracture that  is nondisplaced involving the left inferior orbital wall and the  fracture extending into the anterior wall of the left maxillary sinus  with minimal comminution in the anterior inferior wall left maxillary  sinus, there is fracture plane involving posterior lateral wall left  maxillary sinus with several bubbles of air extending into the left  infratemporal fossa just lateral to the left maxillary sinus. There may  be a linear nondisplaced fracture of the lateral left pterygoid plate.  Hairline nondisplaced fracture of the medial aspect of the right orbital  roof and there is a hairline nondisplaced fracture horizontally oriented  through the midportion of the anterior wall of the right maxillary  sinus. There is fluid and blood subtotally opacifying the left frontal  sinus partially opacifying the anterior ethmoid sinus and subtotally  opacifying the left maxillary sinus partially opacifying the posterior  third of the  right maxillary sinus.     IMPRESSION:     1. This patient has scalp hematoma over the anterior inferior midline of  the frontal bone extending into the paranasal tissues and there are  hairline nondisplaced fractures in the anterior inferior medial aspect  of left frontal bone extending into the anterior table of the inferior  medial left frontal sinus, fracture involving the superior nasal bones  extending nasal bridge, there is a nondisplaced fracture of the far  anterior superior portion of the nasal septum. There is fracture plane  extending into the anterior superior medial wall of the left orbit as  well as involving the inferior medial wall of left orbit as well as some  minimally comminuted nondisplaced fracture of the left orbital floor  with several bubbles of air in the extraconal fat of the inferior left  orbit. There are multiple fracture planes involving the anterior wall  left maxillary sinus with some comminution in the inferior aspect of  anterior wall left maxillary sinus and there are fracture planes  involving the posterior lateral wall left maxillary sinus with several  bubbles of air in the lateral to left maxillary sinus in the april  maxillary fat and may be a subtle hairline nondisplaced fracture of the  left  lateral pterygoid plate. There is a questionable hairline  nondisplaced fracture hairline fracture of the medial aspect of the  right orbital roof bordering the inferior wall of the right frontal  sinus. There is probable hairline nondisplaced fracture horizontally  oriented  through the midportion of the anterior wall of the right  maxillary sinus. There is questionable hairline nondisplaced fractures  of the medial lateral pterygoid plates bilaterally. There is blood and  fluid subtotally opacifying left frontal sinus and anterior ethmoid  sinuses bilaterally the right frontal recess partially opacifying the  posterior right maxillary sinus and subtotally opacifying the  left  maxillary sinus. Given the probable subtle nondisplaced pterygoid plate  fractures and facial fractures described this is probably a variant of a  LeFort fracture.     On sagittal images there is evidence of nondisplaced type II odontoid  fracture through the base of odontoid this will be discussed on the  cervical spine CT report below     CERVICAL SPINE CT TECHNIQUE: Spiral CT images were obtained from the  skull base to the T4 thoracic level and images were reformatted and are  submitted in 2 mm thick axial CT section with soft tissue and bone  algorithm and additional 2 mm thick sagittal and coronal reconstructions  were performed.     FINDINGS: There is arthritic change at the atlantodental interval with  marginal spurring off the anterior ring of C1 and the odontoid. There is  an oblique nondisplaced fracture through the base of the odontoid  indicating a type II unstable base of odontoid fracture     There is mild right and  moderate to severe left facet overgrowth at  C2-3, there is mild left bony foraminal narrowing posterior disc margin  is normal and there is no canal or right foraminal narrowing at C2-3     At C3-4, there is moderate to severe bilateral facet overgrowth. 2 mm  degenerative anterolisthesis of C3 on C4 mild posterior endplate  spurring eccentric left posterior laterally mildly narrowing the left  side of the canal, bilateral uncovertebral joint hypertrophy and there  is moderate left and mild right bony foraminal narrowing at C3-4     At C4-5, there is mild/moderate right and moderate severe left facet  overgrowth. 1 to 2 mm anterolisthesis of C4 on C5 there is mild  posterior endplate spurring mild canal and left foraminal narrowing  moderate right bony foraminal narrowing at C4-5.     At C5-6, there is moderate left and minimal right facet overgrowth C5-6  disc space narrowing degenerative endplate change diffuse posterior disc  osteophyte complex and bilateral uncovertebral joint  hypertrophy, there  is mild canal and there is moderate bilateral bony foraminal narrowing  at C5-6.     At C6-7, there is disc space narrowing degenerative endplate change  diffuse posterior disc osteophyte complex mild facet overgrowth and  uncovertebral joint hypertrophy and there is mild canal and left  foraminal narrowing.     At C7-T1 there is disc space narrowing, degenerative endplate changes  posterior endplate spurring and facet overgrowth contributing to mild  canal and bilateral foraminal narrowing     IMPRESSION:      1. There is an oblique nondisplaced fracture through the base of the  odontoid process indicating an acute type II nondisplaced type II   odontoid fracture which is an unstable fracture, neurosurgical  consultation is warranted   2. There is cervical spondylosis as described above. Extensive results  from  the study were discussed in great detail with Dr. Elder Reaves  from the emergency room by telephone on 01/13/2017 at 3:00 PM/     This report was finalized on 1/16/2017 7:06 AM EST by Dr. Gabriel Cruz MD.       CT Cervical Spine Without Contrast [[id number]] Collected:  01/13/17 1721     Updated:  01/16/17 0709    Narrative:       EMERGENCY NONCONTRAST HEAD CT, FACIAL CT AND CERVICAL SPINE CT  01/13/2017     CLINICAL HISTORY: Patient fell in parking lot of [facility],  loss  his balance fell face first, denies loss of consciousness     HEAD CT TECHNIQUE: Spiral CT images were obtained from the base of the  skull to the vertex without intravenous contrast and images were  reformatted and submitted in 3 mm thick axial CT section with brain  algorithm and 2 mm thick axial CT section with high-resolution bone  algorithm and additional 2 mm thick sagittal and coronal reconstructions  were performed at brain algorithm and due to questionable finding along  the anterior tip of the left temporal lobe patient was brought back for  additional spiral CT images through the head..     This  correlated to an prior noncontrast head CT from [facility] 09/16/2016 and prior MRI of the head 09/17/2016.     FINDINGS: There is some patchy nodular low-density in periventricular  extending to subcortical white matter of cerebral hemispheres consistent  with mild-to-moderate small vessel disease. The remainder of the brain  parenchyma is normal in attenuation, the lateral and third ventricles  are minimally prominent size likely due to to central volume loss. There  is a thin sliver of hyperdensity along the anterior tip of the left  temporal lobe on the initial set axial images, it is not reproduced on  the repeat axial images nor on the facial CT images and felt to be  artifact. Overall, no extra-axial fluid collections are identified and  there is no evidence of acute intracranial hemorrhage. There is a scalp  hematoma over the anterior inferior frontal bone extending paranasal and  periorbital soft tissues and there is multiple acute fractures including  a linear nondisplaced vertical fracture of the anterior inferior medial  left frontal bone extending into anterior table of the inferior medial  left frontal sinus, fractures of the anterosuperior tip in the anterior  aspect nasal septum fractures of the superior nasal bones extending  nasal bridge, fractures of the left orbital floor and anterior and  posterior lateral wall left maxillary sinus there are fractures of the  pterygoid plates.       Impression:       1. There is moderate small vessel disease in the cerebral white matter.     2. No acute intracranial hemorrhage is seen     3. There are multiple acute fractures with a linear nondisplaced  fracture in the anterior inferior medial left frontal bone extending  into the anterior table of the inferior medial left frontal sinus as  well as fracture of the anterosuperior and anterior aspect nasal septum,  fractures of the superior nasal bones and nasal bridge fractures of the  left  orbital floor and anterior posterior lateral wall left maxillary  sinus likely fracture of the anterior wall of the right maxillary sinus  fractures of the pterygoid plates. There is blood subtotally opacifying  left frontal sinus, anterior ethmoid sinuses bilaterally left maxillary  sinus blood partially opacifying posterior right maxillary sinus and see  facial CT report below. The  remainder of the head CT is unremarkable.     FACIAL CT TECHNIQUE: Spiral CT images were obtained through the facial  bones in the axial imaging plane. Images were reformatted and are  submitted in 2 mm thick axial CT section with soft tissue and  high-resolution bone algorithm and additional 2 mm thick sagittal and  coronal reconstructions were performed..     FINDINGS: Patient has a moderate size scalp hematoma over the anterior  inferior frontal bone extending into the paranasal tissues and  periorbital tissues from today's head and facial trauma. There is a  linear acute fracture involving the far anterior inferior medial left  frontal bone that extends into the anterior wall of the inferior medial  left frontal sinus. Fracture extends through the medial septa between  the  left and right frontal sinuses and fracture plane also involve the  superior nasal bones extending into the nasal bridge and there is  fracture of the far anterior superior aspect of the nasal septum. There  is  fracture plane extending into the anterior superior medial wall of  the left orbit and there is fracture plane involving the inferior medial  wall of the left orbit and there is mild with comminuted fracture that  is nondisplaced involving the left inferior orbital wall and the  fracture extending into the anterior wall of the left maxillary sinus  with minimal comminution in the anterior inferior wall left maxillary  sinus, there is fracture plane involving posterior lateral wall left  maxillary sinus with several bubbles of air extending into the  left  infratemporal fossa just lateral to the left maxillary sinus. There may  be a linear nondisplaced fracture of the lateral left pterygoid plate.  Hairline nondisplaced fracture of the medial aspect of the right orbital  roof and there is a hairline nondisplaced fracture horizontally oriented  through the midportion of the anterior wall of the right maxillary  sinus. There is fluid and blood subtotally opacifying the left frontal  sinus partially opacifying the anterior ethmoid sinus and subtotally  opacifying the left maxillary sinus partially opacifying the posterior  third of the right maxillary sinus.     IMPRESSION:     1. This patient has scalp hematoma over the anterior inferior midline of  the frontal bone extending into the paranasal tissues and there are  hairline nondisplaced fractures in the anterior inferior medial aspect  of left frontal bone extending into the anterior table of the inferior  medial left frontal sinus, fracture involving the superior nasal bones  extending nasal bridge, there is a nondisplaced fracture of the far  anterior superior portion of the nasal septum. There is fracture plane  extending into the anterior superior medial wall of the left orbit as  well as involving the inferior medial wall of left orbit as well as some  minimally comminuted nondisplaced fracture of the left orbital floor  with several bubbles of air in the extraconal fat of the inferior left  orbit. There are multiple fracture planes involving the anterior wall  left maxillary sinus with some comminution in the inferior aspect of  anterior wall left maxillary sinus and there are fracture planes  involving the posterior lateral wall left maxillary sinus with several  bubbles of air in the lateral to left maxillary sinus in the april  maxillary fat and may be a subtle hairline nondisplaced fracture of the  left  lateral pterygoid plate. There is a questionable hairline  nondisplaced fracture hairline fracture  of the medial aspect of the  right orbital roof bordering the inferior wall of the right frontal  sinus. There is probable hairline nondisplaced fracture horizontally  oriented  through the midportion of the anterior wall of the right  maxillary sinus. There is questionable hairline nondisplaced fractures  of the medial lateral pterygoid plates bilaterally. There is blood and  fluid subtotally opacifying left frontal sinus and anterior ethmoid  sinuses bilaterally the right frontal recess partially opacifying the  posterior right maxillary sinus and subtotally opacifying the left  maxillary sinus. Given the probable subtle nondisplaced pterygoid plate  fractures and facial fractures described this is probably a variant of a  LeFort fracture.     On sagittal images there is evidence of nondisplaced type II odontoid  fracture through the base of odontoid this will be discussed on the  cervical spine CT report below     CERVICAL SPINE CT TECHNIQUE: Spiral CT images were obtained from the  skull base to the T4 thoracic level and images were reformatted and are  submitted in 2 mm thick axial CT section with soft tissue and bone  algorithm and additional 2 mm thick sagittal and coronal reconstructions  were performed.     FINDINGS: There is arthritic change at the atlantodental interval with  marginal spurring off the anterior ring of C1 and the odontoid. There is  an oblique nondisplaced fracture through the base of the odontoid  indicating a type II unstable base of odontoid fracture     There is mild right and  moderate to severe left facet overgrowth at  C2-3, there is mild left bony foraminal narrowing posterior disc margin  is normal and there is no canal or right foraminal narrowing at C2-3     At C3-4, there is moderate to severe bilateral facet overgrowth. 2 mm  degenerative anterolisthesis of C3 on C4 mild posterior endplate  spurring eccentric left posterior laterally mildly narrowing the left  side of the  canal, bilateral uncovertebral joint hypertrophy and there  is moderate left and mild right bony foraminal narrowing at C3-4     At C4-5, there is mild/moderate right and moderate severe left facet  overgrowth. 1 to 2 mm anterolisthesis of C4 on C5 there is mild  posterior endplate spurring mild canal and left foraminal narrowing  moderate right bony foraminal narrowing at C4-5.     At C5-6, there is moderate left and minimal right facet overgrowth C5-6  disc space narrowing degenerative endplate change diffuse posterior disc  osteophyte complex and bilateral uncovertebral joint hypertrophy, there  is mild canal and there is moderate bilateral bony foraminal narrowing  at C5-6.     At C6-7, there is disc space narrowing degenerative endplate change  diffuse posterior disc osteophyte complex mild facet overgrowth and  uncovertebral joint hypertrophy and there is mild canal and left  foraminal narrowing.     At C7-T1 there is disc space narrowing, degenerative endplate changes  posterior endplate spurring and facet overgrowth contributing to mild  canal and bilateral foraminal narrowing     IMPRESSION:      1. There is an oblique nondisplaced fracture through the base of the  odontoid process indicating an acute type II nondisplaced type II   odontoid fracture which is an unstable fracture, neurosurgical  consultation is warranted   2. There is cervical spondylosis as described above. Extensive results  from  the study were discussed in great detail with Dr. Elder Reaves  from the emergency room by telephone on 01/13/2017 at 3:00 PM/     This report was finalized on 1/16/2017 7:06 AM EST by Dr. Gabriel Cruz MD.       FL Video Swallow [[id number]] Collected:  01/17/17 1010     Updated:  01/17/17 1013    Narrative:       BARIUM SWALLOW WITH VIDEO     CLINICAL HISTORY:   Male who is 81 years-old, with dysphagia.     TECHNIQUE: The swallowing mechanism was evaluated with real-time  fluoroscopic imaging, captured on  digital disk and performed in  conjunction with speech pathologist (please see report).     FINDINGS: The swallowing mechanism is within functional limits       Impression:       No increased risk of aspiration.     FLUOROSCOPY TIME: 32 seconds, 0 images      This report was finalized on 1/17/2017 10:10 AM by Dr. Navdeep Brown MD.               Assessment:      Principal Problem:    Odontoid fracture  Active Problems:    Anemia    ANDERS (acute kidney injury)    Muscle weakness-general    Multiple facial bone fractures    Hyponatremia    Dehydration    Hyperlipidemia    HTN (hypertension)    Hypertension    Coronary artery disease      Patient Active Problem List   Diagnosis Code   • Chronic coronary artery disease I25.10   • Anemia D64.9   • Hyperlipidemia E78.5   • HTN (hypertension) I10   • Trigeminal neuralgia G50.0   • Benign prostatic hyperplasia N40.0   • Ulcerative colitis without complications K51.90   • Health care maintenance Z00.00   • Hypertension I10   • Coronary artery disease I25.10   • ANDERS (acute kidney injury) N17.9   • Muscle weakness-general M62.81   • Pulmonary nodule, right R91.1   • Pulmonary fibrosis J84.10   • Weakness R53.1   • Asterixis R27.8   • CVA (cerebral infarction) I63.9   • Irritable bowel syndrome with both constipation and diarrhea K58.2   • Odontoid fracture S12.100A   • Multiple facial bone fractures S02.92XA   • Hyponatremia E87.1   • Dehydration E86.0       Plan:      He was nauseated this morning.  No other symptoms.  Will benefit from medication for nausea in the morning, he has history of severe reflux disease.    Tolerating pain medication.   Encourage PO   Monitor and correct electrolytes  Monitor mental status  PT working with pt  DVT/stress ulcer prophylaxis  Labs in am     Hopefully to NH in am    Loc Bolanos MD  1/18/2017  6:36 PM        Much of this encounter note is an electronic transcription/translation of spoken language to printed text. The electronic  translation of spoken language may permit erroneous, or at times, nonsensical words or phrases to be inadvertently transcribed; Although I have reviewed the note for such errors, some may still exist

## 2017-01-19 NOTE — SIGNIFICANT NOTE
01/19/17 1159   PT Discharge Summary   Reason for Discharge Discharge from facility   Outcomes Achieved Refer to plan of care for updates on goals achieved   Discharge Destination SNF

## 2017-01-19 NOTE — PROGRESS NOTES
Continued Stay Note  [facility]     Patient Name: Noah Isaac  MRN: [id number]  Today's Date: 1/19/2017    Admit Date: 1/13/2017          Discharge Plan       01/19/17 1047    Case Management/Social Work Plan    Plan Plan is for skilled bed at [facility].      Additional Comments S/W [facility], bed at [facility] is available at present.  Anticipate DC today.              Discharge Codes     None            Jess Elkins RN

## 2017-01-19 NOTE — PLAN OF CARE
Problem: Orthopaedic Fracture (Adult)  Goal: Signs and Symptoms of Listed Potential Problems Will be Absent or Manageable (Orthopaedic Fracture)  Outcome: Ongoing (interventions implemented as appropriate)    Problem: Patient Care Overview (Adult)  Goal: Plan of Care Review  Outcome: Ongoing (interventions implemented as appropriate)    Problem: Fall Risk (Adult)  Goal: Absence of Falls  Outcome: Ongoing (interventions implemented as appropriate)

## 2017-01-19 NOTE — PLAN OF CARE
Problem: Inpatient Physical Therapy  Goal: Bed Mobility Goal LTG- PT  Outcome: Unable to achieve outcome(s) by discharge Date Met:  01/19/17 01/19/17 1158   Bed Mobility PT LTG   Bed Mobility PT LTG, Outcome goal not met   Bed Mobility PT LTG, Reason Goal Not Met discharged from facility       Goal: Transfer Training Goal 1 LTG- PT  Outcome: Outcome(s) achieved Date Met:  01/19/17 01/19/17 1158   Transfer Training PT LTG   Transfer Training PT LTG, Outcome goal met       Goal: Gait Training Goal LTG- PT  Outcome: Outcome(s) achieved Date Met:  01/19/17 01/19/17 1158   Gait Training PT LTG   Gait Training Goal PT LTG, Outcome goal met

## 2017-01-19 NOTE — PROGRESS NOTES
Continued Stay Note  [facility]     Patient Name: Noah Isaac  MRN: [id number]  Today's Date: 1/19/2017    Admit Date: 1/13/2017          Discharge Plan       01/19/17 1803    Case Management/Social Work Plan    Additional Comments DC orders noted. Call to [facility], bed remains available.   S/W pt's family who are in agreement with DC to [facility] today.  DC summary given to RN who will fax DC summary and AVS and place in pkt.   Rx x3 copied to scan and placed in pkt.  IMM letter given .  Family will transport.       Final Note    Final Note Pt DC'd to skilled bed at [facility].              Discharge Codes       01/19/17 1805    Discharge Codes    Discharge Codes 03  Discharged/transferred to skilled nursing facility (SNF) with Medicare certification in anticipation of skilled care   [facility]        Expected Discharge Date and Time     Expected Discharge Date Expected Discharge Time    Jan 19, 2017             Jess Elkins RN

## 2017-01-28 PROBLEM — J18.9 BILATERAL PNEUMONIA: Status: ACTIVE | Noted: 2017-01-01

## 2017-01-29 PROBLEM — J96.21 ACUTE AND CHRONIC RESPIRATORY FAILURE WITH HYPOXIA (HCC): Status: ACTIVE | Noted: 2017-01-01

## 2017-01-29 PROBLEM — A41.9 SEPSIS (HCC): Status: ACTIVE | Noted: 2017-01-01

## 2017-01-29 PROBLEM — I50.32 CHRONIC DIASTOLIC CHF (CONGESTIVE HEART FAILURE) (HCC): Status: ACTIVE | Noted: 2017-01-01

## 2017-01-29 PROBLEM — R60.0 LEG EDEMA: Status: ACTIVE | Noted: 2017-01-01

## 2017-01-31 PROBLEM — I48.91 ATRIAL FIBRILLATION WITH RVR (HCC): Status: ACTIVE | Noted: 2017-01-01

## 2017-02-01 NOTE — PLAN OF CARE
Problem: Patient Care Overview (Adult)  Goal: Plan of Care Review  Outcome: Ongoing (interventions implemented as appropriate)    02/01/17 1803   Coping/Psychosocial Response Interventions   Plan Of Care Reviewed With patient;family   Patient Care Overview   Progress improving   Outcome Evaluation   Outcome Summary/Follow up Plan SBT this am on 40% and pressure support of 4; Pt failed due to increased respiratory rate/drive and placed on pressure control. In afternoon RT switched Pt back to spontaneous and is weaning down pressure support. Pt on minimual sedation with prop at 5-10 and PRN 25mcg Fent; pain well controlled, RASS 0 to -1. Cortrack placed orogastrically and TF started this early evening. All antibiotics d/c per MD Mcqueen; no growth. Urine output remains below minimum, but improving; MD Mcqueen aware MIVF increased. Creatinine elevated, so one-time dose 20MEq Potassium given for k=3.5; now k=3.8.         Problem: Fall Risk (Adult)  Goal: Absence of Falls  Outcome: Ongoing (interventions implemented as appropriate)    Problem: Pneumonia (Adult)  Goal: Signs and Symptoms of Listed Potential Problems Will be Absent or Manageable (Pneumonia)  Outcome: Ongoing (interventions implemented as appropriate)    Problem: Mobility, Physical Impaired (Adult)  Goal: Enhanced Mobility Skills  Outcome: Ongoing (interventions implemented as appropriate)  Goal: Enhanced Functionality Ability  Outcome: Ongoing (interventions implemented as appropriate)    Problem: Respiratory Insufficiency (Adult)  Goal: Acid/Base Balance  Outcome: Ongoing (interventions implemented as appropriate)  Goal: Effective Ventilation  Outcome: Ongoing (interventions implemented as appropriate)    Problem: SAFETY - NON-VIOLENT RESTRAINT  Goal: Remains free of injury from restraints (Non-Violent Restraint)  Outcome: Ongoing (interventions implemented as appropriate)  Goal: Free from restraint(s) (Non-Violent Restraint)  Outcome: Ongoing (interventions  implemented as appropriate)    Problem: Mechanical Ventilation, Invasive (Adult)  Goal: Signs and Symptoms of Listed Potential Problems Will be Absent or Manageable (Mechanical Ventilation, Invasive)  Outcome: Ongoing (interventions implemented as appropriate)

## 2017-02-01 NOTE — PROGRESS NOTES
"DAILY PROGRESS NOTE  [facility], [facility]      Patient Identification:  Name: Noah Isaac  Age: 81 y.o.  Sex: male  :  1935  MRN: [id number]         Primary Care Physician: Flash Jim MD    Subjective:    Interval History:  Mr. Isaac is intubated and remains on mechanical ventilation this morning. However, he is awake and following all commands. He is off all pressors. He remains in A.Flutter with controlled rate. He indicates he is not having any pain. He continues on levaquin, zosyn, and vancomycin. His Hgb is down to 7.4. His wife is at his bedside and her questions were answered.     Objective:    Scheduled Meds:  albuterol 6 puff Inhalation 4x Daily - RT   enoxaparin 1 mg/kg Subcutaneous Q24H   fentaNYL 1 patch Transdermal Q72H   insulin aspart 1-20 Units Subcutaneous Q4H   iopamidol 100 mL Intravenous Once in imaging   metoprolol 2.5 mg Intravenous Q6H   pantoprazole 40 mg Intravenous Q AM   sennosides-docusate sodium 2 tablet Oral Nightly   sodium chloride 10 mL Intracatheter Q12H     Continuous Infusions:  norepinephrine 0.02-0.3 mcg/kg/min Last Rate: Stopped (17 0615)   Pharmacy to Dose enoxaparin (LOVENOX)     propofol 5-50 mcg/kg/min Last Rate: Stopped (17 0915)   sodium chloride 100 mL/hr Last Rate: 100 mL/hr (17 1047)       Vital signs in last 24 hours:  Temp:  [97.3 °F (36.3 °C)-98 °F (36.7 °C)] 97.9 °F (36.6 °C)  Heart Rate:  [] 78  Resp:  [18-36] 29  BP: ()/(45-72) 118/60  FiO2 (%):  [40 %] 40 %    tMax 24 hrs: Temp (24hrs), Av.6 °F (36.4 °C), Min:97.3 °F (36.3 °C), Max:98 °F (36.7 °C)      Intake/Output:    Intake/Output Summary (Last 24 hours) at 17 1222  Last data filed at 17 1135   Gross per 24 hour   Intake 4696.39 ml   Output    870 ml   Net 3826.39 ml       Exam:    Visit Vitals   • /60   • Pulse 78   • Temp 97.9 °F (36.6 °C)   • Resp (!) 29   • Ht 69.02\" (175.3 cm)   • Wt 193 " lb (87.5 kg)   • SpO2 94%   • BMI 28.49 kg/m2       General: Alert, cooperative, appears stated age. In no acute distress. Intubated, on ventilator   Head: Normocephalic, without obvious abnormality, atraumatic  Neck: Supple, symmetrical; trachea midline, without  adenopathy;              Thyroid without  enlargement/tenderness/nodules;              no carotid bruit or JVD. Hard cervical collar in place  Cardiovascular: Normal rate, irregular rhythm and intact distal pulses.                              Exam reveals no gallop and no friction rub. No murmur heard  Pulmonary: Bibasilar rhonchi, respirations unlabored.                         No wheezing or rales.   Abdominal: Soft, non-tender, bowel sounds continue to be hypoactive all four quadrants, but better.                        no masses,  hepatomegaly, or  splenomegaly.   Extremities: Normal, atraumatic; no cyanosis or edema  Pulses:        1 + symmetric all extremities  Neurological: He is alert and oriented to person, place, and time.                          CNII-XII intact, normal strength, sensation intact throughout  Skin:  Smooth, without lesions, rash, or wounds. Warm, dry and intact.         Data Review:      Results from last 7 days  Lab Units 02/01/17  0511 01/31/17  0606 01/30/17  0422   WBC 10*3/mm3 10.93* 13.83* 13.93*   HEMOGLOBIN g/dL 7.4* 8.0* 8.7*   HEMATOCRIT % 23.2* 24.9* 26.6*   PLATELETS 10*3/mm3 230 233 230         Results from last 7 days  Lab Units 02/01/17  0511 01/31/17  0606 01/30/17  0422  01/28/17  2116   SODIUM mmol/L 143 141 139  < > 136   POTASSIUM mmol/L 3.5 4.0 3.9  < > 4.2   CHLORIDE mmol/L 107 105 102  < > 101   TOTAL CO2 mmol/L 20.8* 21.0* 20.8*  < > 19.6*   BUN mg/dL 45* 34* 28*  < > 31*   CREATININE mg/dL 1.97* 1.56* 1.41*  < > 1.36*   CALCIUM mg/dL 8.5* 8.2* 8.5*  < > 8.6   BILIRUBIN mg/dL 0.4 0.4  --   --  0.4   ALK PHOS U/L 101 82  --   --  95   ALT (SGPT) U/L 17 14  --   --  18   AST (SGOT) U/L 17 18  --   --  33    GLUCOSE mg/dL 96 120* 106*  < > 120*   < > = values in this interval not displayed.    Results from last 7 days  Lab Units 01/28/17 2116   INR  1.30*         0  Lab Value Date/Time   TROPONINT 0.022 01/28/2017 2116   TROPONINT <0.010 01/17/2017 1150   TROPONINT <0.010 09/16/2016 1818   TROPONINT <0.010 08/29/2016 1958   TROPONINT <0.010 08/28/2016 2009       Microbiology Results (last 10 days)     Procedure Component Value - Date/Time    Respiratory Culture [[id number]] Collected:  02/01/17 0525    Lab Status:  Preliminary result Specimen:  Sputum from Cough Updated:  02/01/17 1221     Gram Stain Result Rare (1+) WBCs seen              Rare (1+) Epithelial cells per low power field      No organisms seen     Blood Culture [[id number]]  (Normal) Collected:  01/28/17 2119    Lab Status:  Preliminary result Specimen:  Blood from Arm, Left Updated:  01/31/17 2201     Blood Culture No growth at 3 days     Blood Culture [[id number]]  (Normal) Collected:  01/28/17 2117    Lab Status:  Preliminary result Specimen:  Blood from Arm, Right Updated:  01/31/17 2201     Blood Culture No growth at 3 days            Imaging Results (last 7 days)     Procedure Component Value Units Date/Time    XR Chest 1 View [[id number]] Collected:  01/29/17 0805     Updated:  01/29/17 0902    Narrative:       PORTABLE CHEST X-RAY     HISTORY: Shortness of breath, pneumonia.     Portable chest x-ray consisting of 2 images is correlated with a chest  x-ray from yesterday evening and other previous images.     FINDINGS: There are sternal wires. Fairly diffuse patchy opacity is  observed throughout the lungs bilaterally, appearing more extensive and  dense today than last night. No pleural effusion or pneumothorax is  present.       Impression:       Interval worsening in the density and extent of patchy  opacity throughout both lungs, favored to represent pneumonia. I called  the findings to Marianne, the nurse caring for the patient, at 7:35 AM.     This  report was finalized on 1/29/2017 8:59 AM by Dr. Shahzad Carreon MD.       XR Chest 1 View [[id number]] Collected:  01/28/17 2144     Updated:  01/30/17 0139    Narrative:       X-RAY CHEST 1 VIEW.     HISTORY: Shortness of breath.     COMPARISON: 9/16/2016.     FINDINGS:  Cardiomediastinal silhouette is within normal limits.          Chronic lung changes. There is interval development of bilateral patchy  opacities, left greater than right.               Impression:       Infiltrates are suspected, left greater than right.  Follow-up to resolution is recommended.          This report was finalized on 1/30/2017 1:36 AM by Dr. Ancelmo Pedraza MD.       CT Head Without Contrast [[id number]] Collected:  01/28/17 2242     Updated:  01/30/17 0140    Narrative:       CT SCAN OF THE BRAIN WITHOUT CONTRAST.     TECHNIQUE: Multiple axial images of the brain were obtained from the  vertex to the base of the brain.     HISTORY:  Fall.     COMPARISON:  1/13/2017.     FINDINGS:   Midline structures are within normal limits, there is no hydrocephalus.   Gray-white matter differentiation is maintained.  Small vessel ischemic  disease and cortical atrophy related changes are stable.     Orbits are within normal limits. Moderately severe mucosal disease of  the paranasal sinuses, there is interval improvement. Mastoid air cells  are well aerated.          Impression:       No acute intracranial pathology.          ----------------------------------------------------------------     CT CERVICAL SPINE WITHOUT CONTRAST.     TECHNIQUE: Routine axial images of the cervical spine with coronal and  sagittal reconstructed images.     HISTORY:  Fall, neck pain.     COMPARISON:  No prior studies for comparison.     FINDINGS:   Vertebral body height is normal, no acute fracture is seen.   Intervertebral discs are maintained. There is an old fracture of the  dens, with calcification of the surrounding soft tissue. Mild loss of  intervertebral disc  height throughout the spine most severe at C5-6  through C7-T1, minimal spurring.     Prevertebral soft tissue is unremarkable.         IMPRESSION:   No acute fracture of the cervical spine. Old fracture of the dens is  unchanged.     This report was finalized on 1/30/2017 1:36 AM by Dr. Ancelmo Pedraza MD.       CT Cervical Spine Without Contrast [[id number]] Collected:  01/28/17 2242     Updated:  01/30/17 0140    Narrative:       CT SCAN OF THE BRAIN WITHOUT CONTRAST.     TECHNIQUE: Multiple axial images of the brain were obtained from the  vertex to the base of the brain.     HISTORY:  Fall.     COMPARISON:  1/13/2017.     FINDINGS:   Midline structures are within normal limits, there is no hydrocephalus.   Gray-white matter differentiation is maintained.  Small vessel ischemic  disease and cortical atrophy related changes are stable.     Orbits are within normal limits. Moderately severe mucosal disease of  the paranasal sinuses, there is interval improvement. Mastoid air cells  are well aerated.          Impression:       No acute intracranial pathology.          ----------------------------------------------------------------     CT CERVICAL SPINE WITHOUT CONTRAST.     TECHNIQUE: Routine axial images of the cervical spine with coronal and  sagittal reconstructed images.     HISTORY:  Fall, neck pain.     COMPARISON:  No prior studies for comparison.     FINDINGS:   Vertebral body height is normal, no acute fracture is seen.   Intervertebral discs are maintained. There is an old fracture of the  dens, with calcification of the surrounding soft tissue. Mild loss of  intervertebral disc height throughout the spine most severe at C5-6  through C7-T1, minimal spurring.     Prevertebral soft tissue is unremarkable.         IMPRESSION:   No acute fracture of the cervical spine. Old fracture of the dens is  unchanged.     This report was finalized on 1/30/2017 1:36 AM by Dr. Ancelmo Pedraza MD.       XR Chest 1 View  [[id number]] Collected:  01/30/17 0715     Updated:  01/30/17 0719    Narrative:       PORTABLE CHEST 06:11     HISTORY: 81-year-old male follow-up pneumonia     COMPARISON: 01/29/2017     FINDINGS:  1. Mild interval improvement in bilateral pneumonia as previously  described.  2. Recommend continued follow-up to confirm complete resolution.  3. No new abnormality.     This report was finalized on 1/30/2017 7:16 AM by Dr. Navdeep Brown MD.       CT Chest Without Contrast [[id number]] Collected:  01/31/17 1115     Updated:  01/31/17 1137    Narrative:       CT CHEST WITHOUT CONTRAST     HISTORY: Progressive pulmonary infiltrates     TECHNIQUE: Spiral axial CT imaging of the chest was performed at 3 mm  intervals throughout. No intravenous contrast was administered.     COMPARISON: Correlation is made with recent chest radiographs. There is  no previous CT imaging of the chest.     FINDINGS: Heart and great vessels demonstrate largely unremarkable  noncontrasted appearance. 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 Advanced calcified  coronary disease is noted in the native vessels. There are a few shotty  nodes present in the mediastinum. Calcified nodes are present consistent  with granulomatous disease. Endotracheal tube is present terminating  just below the level of sternal notch. Chest wall is unremarkable.  Degenerative changes are present at the shoulders and spine.     Lung windows demonstrate a fairly extensive and diffuse pattern of  groundglass infiltrates with some geographic sparing and air trapping  present. The right middle lobe is relatively spared. Air bronchograms  are present in the areas of greatest consolidation. Groundglass  opacities coalesce to  alveolar infiltrate at the base. Air bronchograms  are present in the areas of greatest consolidation. No complicating  features are identified. There are small bilateral pleural effusions  with some overlying passive atelectasis. No pneumothorax is  identified.  Visualized upper abdomen demonstrates an unremarkable noncontrasted  appearance.       Impression:       There are diffuse groundglass and alveolar infiltrates  within both lungs. Air bronchograms are present in the areas of greatest  consolidation. No complicating features are identified. Small bilateral  pleural effusions are noted.     This report was finalized on 1/31/2017 11:34 AM by Dr. Rickey Freedman MD.       XR Chest Post CVA Port [[id number]] Collected:  01/31/17 1324     Updated:  01/31/17 1329    Narrative:       XR CHEST POST CVA PORT-     INDICATIONS: PICC placement     TECHNIQUE: FRONTAL VIEW OF THE CHEST     COMPARISON: 1/31/2017     FINDINGS:      Right PICC extends to the superior vena cava. Endotracheal tube does  not appear significantly changed. Sternotomy wires are present. Heart  size is normal. Aorta is calcified. Diffuse patchy infiltrates in both  lungs persist. No pneumothorax or pleural effusion. Otherwise stable.       Impression:          Right PICC extends to the superior vena cava. Persistent diffuse  bilateral pulmonary infiltrates, continued follow-up advised.     This report was finalized on 1/31/2017 1:26 PM by Dr. Jet Rodgers MD.       XR Chest 1 View [[id number]] Collected:  01/31/17 0149     Updated:  01/31/17 2319    Narrative:       X-RAY CHEST 1 VIEW.     HISTORY: Patient intubated.     COMPARISON: 1/30/2017.     FINDINGS:  Cardiomediastinal silhouette is within normal limits.  Interval  placement of a endotracheal tube in good position.     Extensive bilateral infiltrates, there is slight worsening.               Impression:       Endotracheal tube in good position. Worsening infiltrates.          This report was finalized on 1/31/2017 11:16 PM by Dr. Ancelmo Pedraza MD.               Assessment:      Principal Problem:    Sepsis  Active Problems:    Anemia    Hyperlipidemia    HTN (hypertension)    Coronary artery disease    ANDERS (acute kidney injury)     Muscle weakness-general    Bilateral pneumonia    Acute and chronic respiratory failure with hypoxia    Chronic diastolic CHF (congestive heart failure)    Leg edema    Atrial fibrillation with RVR      Patient Active Problem List   Diagnosis Code   • Chronic coronary artery disease I25.10   • Anemia D64.9   • Hyperlipidemia E78.5   • HTN (hypertension) I10   • Trigeminal neuralgia G50.0   • Benign prostatic hyperplasia N40.0   • Ulcerative colitis without complications K51.90   • Health care maintenance Z00.00   • Hypertension I10   • Coronary artery disease I25.10   • ANDERS (acute kidney injury) N17.9   • Muscle weakness-general M62.81   • Pulmonary nodule, right R91.1   • Pulmonary fibrosis J84.10   • Weakness R53.1   • Asterixis R27.8   • CVA (cerebral infarction) I63.9   • Irritable bowel syndrome with both constipation and diarrhea K58.2   • Odontoid fracture S12.100A   • Multiple facial bone fractures S02.92XA   • Hyponatremia E87.1   • Dehydration E86.0   • Bilateral pneumonia J18.9   • Sepsis A41.9   • Acute and chronic respiratory failure with hypoxia J96.21   • Chronic diastolic CHF (congestive heart failure) I50.32   • Leg edema R60.0   • Atrial fibrillation with RVR I48.91       Plan:    Continue IV fluids  Continue IV antibiotics  Follow culture results, so far no MRSA, await sputum culture  Ventilatory management per ICU team. Hopefully to be extubated in am  Diet: NPO at present, on TF   Monitor and correct electrolytes  Monitor mental status  DVT/stress ulcer prophylaxis  Labs in am        Loc Bolanos MD  2/1/2017  12:22 PM        Much of this encounter note is an electronic transcription/translation of spoken language to printed text. The electronic translation of spoken language may permit erroneous, or at times, nonsensical words or phrases to be inadvertently transcribed; Although I have reviewed the note for such errors, some may still exist

## 2017-02-01 NOTE — PROGRESS NOTES
"    Patient Name: Noah Isaac  :1935  81 y.o.      Patient Care Team:  Flash Jim MD as PCP - General  Flash Jim MD as PCP - Family Medicine    Chief Complaint: AFlutter    Interval History: Remains in controlled AFlutter.   HR and resp rate are better.  Is awake on vent and not distressed.     Objective   Vital Signs  Temp:  [97.3 °F (36.3 °C)-98 °F (36.7 °C)] 98 °F (36.7 °C)  Heart Rate:  [] 100  Resp:  [18-36] 29  BP: ()/(43-72) 116/57  FiO2 (%):  [40 %] 40 %    Intake/Output Summary (Last 24 hours) at 17 0903  Last data filed at 17 0815   Gross per 24 hour   Intake 4164.17 ml   Output    750 ml   Net 3414.17 ml     Flowsheet Rows         First Filed Value    Admission Height  70\" (177.8 cm) Documented at 2017    Admission Weight  187 lb (84.8 kg) Documented at 2017          Physical Exam:   General Appearance:    Awake, in no acute distress, orally intubated.   Lungs:     Fine rales in mid to bases  Normal respiratory effort and rate.      Heart:    Regular rhythm and normal rate, normal S1 and S2, no murmurs, gallops or rubs.     Chest Wall:    No abnormalities observed   Abdomen:     Soft, nontender, positive bowel sounds.     Extremities:   no cyanosis, clubbing or edema.  No marked joint deformities.  Feet warm.       Results Review:      Results from last 7 days  Lab Units 17  0511   SODIUM mmol/L 143   POTASSIUM mmol/L 3.5   CHLORIDE mmol/L 107   TOTAL CO2 mmol/L 20.8*   BUN mg/dL 45*   CREATININE mg/dL 1.97*   GLUCOSE mg/dL 96   CALCIUM mg/dL 8.5*       Results from last 7 days  Lab Units 17   TROPONIN T ng/mL 0.022       Results from last 7 days  Lab Units 17  0511   WBC 10*3/mm3 10.93*   HEMOGLOBIN g/dL 7.4*   HEMATOCRIT % 23.2*   PLATELETS 10*3/mm3 230       Results from last 7 days  Lab Units 17   INR  1.30*                       Medication Review:     albuterol 6 puff Inhalation 4x Daily - RT "   enoxaparin 1 mg/kg Subcutaneous Q24H   fentaNYL 1 patch Transdermal Q72H   insulin aspart 1-20 Units Subcutaneous Q4H   iopamidol 100 mL Intravenous Once in imaging   [START ON 2/2/2017] levoFLOXacin 750 mg Intravenous Q48H   metoprolol 2.5 mg Intravenous Q6H   pantoprazole 40 mg Intravenous Q AM   piperacillin-tazobactam 3.375 g Intravenous Q8H   sennosides-docusate sodium 2 tablet Oral Nightly   sodium chloride 10 mL Intracatheter Q12H   vancomycin 1,000 mg Intravenous Q12H          norepinephrine 0.02-0.3 mcg/kg/min Last Rate: Stopped (02/01/17 0615)   Pharmacy to Dose enoxaparin (LOVENOX)     Pharmacy to dose vancomycin     Pharmacy to Dose Zosyn     propofol 5-50 mcg/kg/min Last Rate: 10 mcg/kg/min (02/01/17 0651)   sodium chloride 50 mL/hr Last Rate: 50 mL/hr (02/01/17 0656)       Assessment/Plan      1. Respiratory failure on the vent  2. PNA - b/l - no aspiration per the daughter  3. Sepsis syndrome  4. Atrial flutter - now controlled, unknown duration, use therapeutic lovenox when able for cva prevention but may not be able to use long term AC due to severe fall. Is high chads vasc score.  5. Recent fall with facial fractures and c-spine fracture. Very high risk for long term anticoagulation.   6. Anemia - worsening  7. ANDERS - worsening  8. H/o CAD, s/p CABG, LVEF 50% on echo 8/2016  9. nstemi - type 2 - demand due to sepsis.  10. CVA 11/2016 - is on plavix for this.     Off all pressors, prob needs transfusion today.  Will follow. Spoke with his wife who is at bedside.       Malina Mckeon MD  [facility]  02/01/17  9:03 AM

## 2017-02-01 NOTE — PROGRESS NOTES
Dr. ELIN Mcqueen    [facility] INTENSIVE CARE    2/1/2017    Patient ID:  Name:  Noah Isaac  MRN:  [id number]  1935  81 y.o.  male            CC/Reason for visit: Follow-up for atrial flutter, acute respiratory failure    HPI: The patient remains intubated, mechanically ventilated.  He is arousable.  He nods and follows all commands on the ventilator.  I discussed the case with his ICU nurse as well as family at the bedside.    Mechanical ventilator settings were reviewed and adjusted by me today.    Vitals:  Vitals:    02/01/17 1057 02/01/17 1105 02/01/17 1125 02/01/17 1135   BP:  106/51  118/60   BP Location:       Patient Position:       Pulse:  76 84 86   Resp:   (!) 29    Temp: 97.9 °F (36.6 °C)      TempSrc:       SpO2:  93% 95% 96%   Weight:       Height:         FiO2 (%): 40 %     Body mass index is 28.5 kg/(m^2).    Intake/Output Summary (Last 24 hours) at 02/01/17 1147  Last data filed at 02/01/17 1135   Gross per 24 hour   Intake 4696.39 ml   Output    870 ml   Net 3826.39 ml       Exam:  GEN:  Awake, alert, intubated, follows all commands.  In no acute distress  EYES:   EOM-i, anicteric sclera bilat  ENT:    Oral exam shows endotracheal tube in good position.  NECK:  C-collar is in place  LUNGS: Clear breath sounds bilat, nonlabored breathing  CV:  Irregularly irregular.  Trace ankle edema  ABD:  Non tender, no enlarged liver or masses  EXT:  No cyanosis or clubbing    Scheduled meds:    albuterol 6 puff Inhalation 4x Daily - RT   enoxaparin 1 mg/kg Subcutaneous Q24H   fentaNYL 1 patch Transdermal Q72H   insulin aspart 1-20 Units Subcutaneous Q4H   iopamidol 100 mL Intravenous Once in imaging   metoprolol 2.5 mg Intravenous Q6H   pantoprazole 40 mg Intravenous Q AM   sennosides-docusate sodium 2 tablet Oral Nightly   sodium chloride 10 mL Intracatheter Q12H     IV meds:                        norepinephrine 0.02-0.3 mcg/kg/min Last Rate: Stopped (02/01/17 0615)   Pharmacy to  Dose enoxaparin (LOVENOX)     propofol 5-50 mcg/kg/min Last Rate: Stopped (02/01/17 0915)   sodium chloride 100 mL/hr Last Rate: 100 mL/hr (02/01/17 1047)       Data Review:   I reviewed the patient's medications and new clinical results.  Lab Results   Component Value Date    CALCIUM 8.5 (L) 02/01/2017     Results from last 7 days  Lab Units 02/01/17  0511 01/31/17  0606 01/30/17  0422  01/28/17  2116   AST (SGOT) U/L 17 18  --   --  33   SODIUM mmol/L 143 141 139  < > 136   POTASSIUM mmol/L 3.5 4.0 3.9  < > 4.2   CHLORIDE mmol/L 107 105 102  < > 101   TOTAL CO2 mmol/L 20.8* 21.0* 20.8*  < > 19.6*   BUN mg/dL 45* 34* 28*  < > 31*   CREATININE mg/dL 1.97* 1.56* 1.41*  < > 1.36*   GLUCOSE mg/dL 96 120* 106*  < > 120*   CALCIUM mg/dL 8.5* 8.2* 8.5*  < > 8.6   WBC 10*3/mm3 10.93* 13.83* 13.93*  < > 14.63*   HEMOGLOBIN g/dL 7.4* 8.0* 8.7*  < > 8.3*   PLATELETS 10*3/mm3 230 233 230  < > 255   ALT (SGPT) U/L 17 14  --   --  18   < > = values in this interval not displayed.    Results from last 7 days  Lab Units 01/28/17  2116   TROPONIN T ng/mL 0.022       Results from last 7 days  Lab Units 01/31/17  0510   PH, ARTERIAL pH units 7.346*   PO2 ART mm Hg 147.2*   PCO2, ARTERIAL mm Hg 43.1   HCO3 ART mmol/L 23.6     I personally visualized images of his portable chest x-ray.  I agree with the radiologist's interpretation.  Endotracheal tube in good position. Worsening infiltrates.     ASSESSMENT:   1. Acute hypoxemic respiratory failure  2. Bilateral pneumonia, healthcare associated pneumonia plus aspiration pneumonia suspected  3. Sepsis without shock at this point but with lactic acidosis and significant leukocytosis and obvious infection source from the pneumonia  4. Acute kidney injury with increase in creatinine from baseline of 0.9-1.0 up to 1.36, this is trending down  5. Acute on chronic diastolic congestive heart failure with a proBNP of 11,000  6. Lactic acidosis with a trend up on the lactic acid  7. History of  hypertension with several antihypertensive medication on home medication list that need to be held at this point  8. Patient is a DNI and DNR and a conditional code  9. Acute on chronic diastolic congestive heart failure  10. Recent stroke and fall with cervical fracture, patient has a hard neck collar  11. Atrial flutter, with rapid ventricular response  12. Recent fall with facial fractures as well as C-spine fracture.  Extremely high risk for long-term anticoagulation  13. History of pulmonary fibrosis  14. History of odontoid fracture  15. History of pulmonary nodule subcentimeter in size on a previous CAT scan with some bibasilar fibrotic changes  16. History of dysphagia on modified diet    PLAN:  I will adjust mechanical ventilator today, we will try spontaneous breathing trial.  He has a c-collar in place, therefore I am not going to be very aggressive in trying to extubate him today.  We will see what his ventilator parameters as well as ABG show.  If he does not do well, we will simply leave him on the ventilator for a couple of more days, and we will try spontaneous breathing trial every morning.    Discussed directly with Dr. Mckeon.  Discussed with ICU team.    Total critical care time 35 minutes, excluding any separately billable procedure time    Michael Mcqueen MD  2/1/2017

## 2017-02-02 NOTE — PROGRESS NOTES
Pulmonary addendum patient's respiratory status is better or at least his blood gases are better with ventilator changes although he's become a little hypotensive and he still requiring significant oxygen and entertain the possibility of pulmonary emboli with his extensive lung infiltrate consolidation would have you I don't think he is going to be a real good candidate for a ventilation perfusion scan and with this acute kidney injury I'm afraid a contrasted CT will that his kidneys down.  Patient is getting Lovenox which should reduce the risk of clot and maybe Precedex is contributed some in the ventilator changes were going to try and back down on the Precedex see what his blood pressure does I'm hesitant to give him fluid boluses.  In trying to diurese him and may be forced to.  Regards to antibiotics and they stopped him yesterday with cultures negative his x-ray certainly is worse his pro-calcitonin however is only 0.5 which is actually down from what it was certainly not very impressive taking into account his renal decline.  We will watch him closely see how these changes affect him and make further decisions

## 2017-02-02 NOTE — PROGRESS NOTES
"DAILY PROGRESS NOTE  [facility], [facility]      Patient Identification:  Name: Noah Isaac  Age: 81 y.o.  Sex: male  :  1935  MRN: [id number]         Primary Care Physician: Flash Jim MD    Subjective:  Interval History:  Mr. Isaac is awake and alert this morning. He remains intubated, but is breathing on his own and following commands. He continues without pressors. All ABX's have been dc'd due to negative sputum, blood cultures. He denies CP, SOA, N/V/D. His daughter is at the bedside.     Objective:    Scheduled Meds:  albuterol 6 puff Inhalation 4x Daily - RT   enoxaparin 1 mg/kg Subcutaneous Q24H   fentaNYL 1 patch Transdermal Q72H   insulin aspart 1-20 Units Subcutaneous Q4H   iopamidol 100 mL Intravenous Once in imaging   metoprolol 2.5 mg Intravenous Q6H   pantoprazole 40 mg Intravenous Q AM   sennosides-docusate sodium 2 tablet Oral Nightly   sodium chloride 10 mL Intracatheter Q12H     Continuous Infusions:  norepinephrine 0.02-0.3 mcg/kg/min Last Rate: Stopped (17 0615)   Pharmacy to Dose enoxaparin (LOVENOX)     propofol 5-50 mcg/kg/min Last Rate: 30 mcg/kg/min (17 06)       Vital signs in last 24 hours:  Temp:  [97.3 °F (36.3 °C)-97.9 °F (36.6 °C)] 97.5 °F (36.4 °C)  Heart Rate:  [] 112  Resp:  [22-38] 25  BP: ()/(45-76) 137/70  FiO2 (%):  [40 %-50 %] 45 %    tMax 24 hrs: Temp (24hrs), Av.6 °F (36.4 °C), Min:97.3 °F (36.3 °C), Max:97.9 °F (36.6 °C)      Intake/Output:    Intake/Output Summary (Last 24 hours) at 17 1100  Last data filed at 17 06   Gross per 24 hour   Intake 973.64 ml   Output    705 ml   Net 268.64 ml       Exam:    Visit Vitals   • /70   • Pulse 112   • Temp 97.5 °F (36.4 °C)   • Resp 25   • Ht 69.02\" (175.3 cm)   • Wt 193 lb (87.5 kg)   • SpO2 91%   • BMI 28.49 kg/m2     General: Alert, cooperative, appears stated age. In no acute distress. Intubated, on ventilator "   Head: Normocephalic, without obvious abnormality, atraumatic  Neck: Supple, symmetrical; trachea midline, without  adenopathy;   Thyroid without  enlargement/tenderness/nodules;   no carotid bruit or JVD. Hard cervical collar in place  Cardiovascular: Normal rate, irregular rhythm and intact distal pulses.  Exam reveals no gallop and no friction rub. No murmur heard  Pulmonary: Bibasilar rhonchi, respirations unlabored.   No wheezing or rales.   Abdominal: Soft, non-tender, bowel sounds continue to be hypoactive all four quadrants, but better.   no masses, hepatomegaly, or  splenomegaly.   Extremities: Normal, atraumatic; no cyanosis or edema  Pulses:  1 + symmetric all extremities  Neurological: He is alert and oriented to person, place, and time.   CNII-XII intact, normal strength, sensation intact throughout  Skin: Smooth, without lesions, rash, or wounds. Warm, dry and intact.         Data Review:      Results from last 7 days  Lab Units 02/02/17  0512 02/01/17  0511 01/31/17  0606   WBC 10*3/mm3 8.59 10.93* 13.83*   HEMOGLOBIN g/dL 7.6* 7.4* 8.0*   HEMATOCRIT % 24.1* 23.2* 24.9*   PLATELETS 10*3/mm3 202 230 233         Results from last 7 days  Lab Units 02/02/17  0513 02/01/17  1604 02/01/17  0511 01/31/17  0606   SODIUM mmol/L 146*  --  143 141   POTASSIUM mmol/L 3.7 3.8 3.5 4.0   CHLORIDE mmol/L 113*  --  107 105   TOTAL CO2 mmol/L 21.4*  --  20.8* 21.0*   BUN mg/dL 51*  --  45* 34*   CREATININE mg/dL 1.72*  --  1.97* 1.56*   CALCIUM mg/dL 8.1*  --  8.5* 8.2*   BILIRUBIN mg/dL 0.4  --  0.4 0.4   ALK PHOS U/L 148*  --  101 82   ALT (SGPT) U/L 21  --  17 14   AST (SGOT) U/L 34  --  17 18   GLUCOSE mg/dL 140*  --  96 120*       Results from last 7 days  Lab Units 01/28/17  2116   INR  1.30*         0  Lab Value Date/Time   TROPONINT 0.022 01/28/2017 2116   TROPONINT <0.010 01/17/2017 1150   TROPONINT <0.010 09/16/2016 1818   TROPONINT <0.010 08/29/2016 1958   TROPONINT <0.010 08/28/2016 2009        Microbiology Results (last 10 days)     Procedure Component Value - Date/Time    Respiratory Culture [[id number]] Collected:  02/01/17 0525    Lab Status:  Preliminary result Specimen:  Sputum from Cough Updated:  02/01/17 1221     Gram Stain Result Rare (1+) WBCs seen              Rare (1+) Epithelial cells per low power field      No organisms seen     Blood Culture [[id number]]  (Normal) Collected:  01/28/17 2119    Lab Status:  Preliminary result Specimen:  Blood from Arm, Left Updated:  02/01/17 2201     Blood Culture No growth at 4 days     Blood Culture [[id number]]  (Normal) Collected:  01/28/17 2117    Lab Status:  Preliminary result Specimen:  Blood from Arm, Right Updated:  02/01/17 2201     Blood Culture No growth at 4 days            Imaging Results (last 7 days)     Procedure Component Value Units Date/Time    XR Chest 1 View [[id number]] Collected:  01/29/17 0805     Updated:  01/29/17 0902    Narrative:       PORTABLE CHEST X-RAY     HISTORY: Shortness of breath, pneumonia.     Portable chest x-ray consisting of 2 images is correlated with a chest  x-ray from yesterday evening and other previous images.     FINDINGS: There are sternal wires. Fairly diffuse patchy opacity is  observed throughout the lungs bilaterally, appearing more extensive and  dense today than last night. No pleural effusion or pneumothorax is  present.       Impression:       Interval worsening in the density and extent of patchy  opacity throughout both lungs, favored to represent pneumonia. I called  the findings to Marianne, the nurse caring for the patient, at 7:35 AM.     This report was finalized on 1/29/2017 8:59 AM by Dr. Shahzad Carreon MD.       XR Chest 1 View [[id number]] Collected:  01/28/17 2144     Updated:  01/30/17 0139    Narrative:       X-RAY CHEST 1 VIEW.     HISTORY: Shortness of breath.     COMPARISON: 9/16/2016.     FINDINGS:  Cardiomediastinal silhouette is within normal limits.          Chronic lung changes.  There is interval development of bilateral patchy  opacities, left greater than right.               Impression:       Infiltrates are suspected, left greater than right.  Follow-up to resolution is recommended.          This report was finalized on 1/30/2017 1:36 AM by Dr. Ancelmo Pedraza MD.       CT Head Without Contrast [[id number]] Collected:  01/28/17 2242     Updated:  01/30/17 0140    Narrative:       CT SCAN OF THE BRAIN WITHOUT CONTRAST.     TECHNIQUE: Multiple axial images of the brain were obtained from the  vertex to the base of the brain.     HISTORY:  Fall.     COMPARISON:  1/13/2017.     FINDINGS:   Midline structures are within normal limits, there is no hydrocephalus.   Gray-white matter differentiation is maintained.  Small vessel ischemic  disease and cortical atrophy related changes are stable.     Orbits are within normal limits. Moderately severe mucosal disease of  the paranasal sinuses, there is interval improvement. Mastoid air cells  are well aerated.          Impression:       No acute intracranial pathology.          ----------------------------------------------------------------     CT CERVICAL SPINE WITHOUT CONTRAST.     TECHNIQUE: Routine axial images of the cervical spine with coronal and  sagittal reconstructed images.     HISTORY:  Fall, neck pain.     COMPARISON:  No prior studies for comparison.     FINDINGS:   Vertebral body height is normal, no acute fracture is seen.   Intervertebral discs are maintained. There is an old fracture of the  dens, with calcification of the surrounding soft tissue. Mild loss of  intervertebral disc height throughout the spine most severe at C5-6  through C7-T1, minimal spurring.     Prevertebral soft tissue is unremarkable.         IMPRESSION:   No acute fracture of the cervical spine. Old fracture of the dens is  unchanged.     This report was finalized on 1/30/2017 1:36 AM by Dr. Ancelmo Pedraza MD.       CT Cervical Spine Without Contrast  [[id number]] Collected:  01/28/17 2242     Updated:  01/30/17 0140    Narrative:       CT SCAN OF THE BRAIN WITHOUT CONTRAST.     TECHNIQUE: Multiple axial images of the brain were obtained from the  vertex to the base of the brain.     HISTORY:  Fall.     COMPARISON:  1/13/2017.     FINDINGS:   Midline structures are within normal limits, there is no hydrocephalus.   Gray-white matter differentiation is maintained.  Small vessel ischemic  disease and cortical atrophy related changes are stable.     Orbits are within normal limits. Moderately severe mucosal disease of  the paranasal sinuses, there is interval improvement. Mastoid air cells  are well aerated.          Impression:       No acute intracranial pathology.          ----------------------------------------------------------------     CT CERVICAL SPINE WITHOUT CONTRAST.     TECHNIQUE: Routine axial images of the cervical spine with coronal and  sagittal reconstructed images.     HISTORY:  Fall, neck pain.     COMPARISON:  No prior studies for comparison.     FINDINGS:   Vertebral body height is normal, no acute fracture is seen.   Intervertebral discs are maintained. There is an old fracture of the  dens, with calcification of the surrounding soft tissue. Mild loss of  intervertebral disc height throughout the spine most severe at C5-6  through C7-T1, minimal spurring.     Prevertebral soft tissue is unremarkable.         IMPRESSION:   No acute fracture of the cervical spine. Old fracture of the dens is  unchanged.     This report was finalized on 1/30/2017 1:36 AM by Dr. Ancelmo Pedraza MD.       XR Chest 1 View [[id number]] Collected:  01/30/17 0715     Updated:  01/30/17 0719    Narrative:       PORTABLE CHEST 06:11     HISTORY: 81-year-old male follow-up pneumonia     COMPARISON: 01/29/2017     FINDINGS:  1. Mild interval improvement in bilateral pneumonia as previously  described.  2. Recommend continued follow-up to confirm complete resolution.  3. No new  abnormality.     This report was finalized on 1/30/2017 7:16 AM by Dr. Navdeep Brown MD.       CT Chest Without Contrast [[id number]] Collected:  01/31/17 1115     Updated:  01/31/17 1137    Narrative:       CT CHEST WITHOUT CONTRAST     HISTORY: Progressive pulmonary infiltrates     TECHNIQUE: Spiral axial CT imaging of the chest was performed at 3 mm  intervals throughout. No intravenous contrast was administered.     COMPARISON: Correlation is made with recent chest radiographs. There is  no previous CT imaging of the chest.     FINDINGS: Heart and great vessels demonstrate largely unremarkable  noncontrasted appearance. 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 Advanced calcified  coronary disease is noted in the native vessels. There are a few shotty  nodes present in the mediastinum. Calcified nodes are present consistent  with granulomatous disease. Endotracheal tube is present terminating  just below the level of sternal notch. Chest wall is unremarkable.  Degenerative changes are present at the shoulders and spine.     Lung windows demonstrate a fairly extensive and diffuse pattern of  groundglass infiltrates with some geographic sparing and air trapping  present. The right middle lobe is relatively spared. Air bronchograms  are present in the areas of greatest consolidation. Groundglass  opacities coalesce to  alveolar infiltrate at the base. Air bronchograms  are present in the areas of greatest consolidation. No complicating  features are identified. There are small bilateral pleural effusions  with some overlying passive atelectasis. No pneumothorax is identified.  Visualized upper abdomen demonstrates an unremarkable noncontrasted  appearance.       Impression:       There are diffuse groundglass and alveolar infiltrates  within both lungs. Air bronchograms are present in the areas of greatest  consolidation. No complicating features are identified. Small bilateral  pleural effusions are noted.     This report was  finalized on 1/31/2017 11:34 AM by Dr. Rickey Freedman MD.       XR Chest Post CVA Port [[id number]] Collected:  01/31/17 1324     Updated:  01/31/17 1329    Narrative:       XR CHEST POST CVA PORT-     INDICATIONS: PICC placement     TECHNIQUE: FRONTAL VIEW OF THE CHEST     COMPARISON: 1/31/2017     FINDINGS:      Right PICC extends to the superior vena cava. Endotracheal tube does  not appear significantly changed. Sternotomy wires are present. Heart  size is normal. Aorta is calcified. Diffuse patchy infiltrates in both  lungs persist. No pneumothorax or pleural effusion. Otherwise stable.       Impression:          Right PICC extends to the superior vena cava. Persistent diffuse  bilateral pulmonary infiltrates, continued follow-up advised.     This report was finalized on 1/31/2017 1:26 PM by Dr. Jet Rodgers MD.       XR Chest 1 View [[id number]] Collected:  01/31/17 0149     Updated:  01/31/17 2319    Narrative:       X-RAY CHEST 1 VIEW.     HISTORY: Patient intubated.     COMPARISON: 1/30/2017.     FINDINGS:  Cardiomediastinal silhouette is within normal limits.  Interval  placement of a endotracheal tube in good position.     Extensive bilateral infiltrates, there is slight worsening.               Impression:       Endotracheal tube in good position. Worsening infiltrates.          This report was finalized on 1/31/2017 11:16 PM by Dr. Ancelmo Pedraza MD.       XR Chest 1 View [[id number]] Collected:  02/02/17 0450     Updated:  02/02/17 0450    Narrative:       X-RAY CHEST 1 VIEW.     HISTORY: Acute respiratory failure.     COMPARISON: 1/31/2017.     FINDINGS:  Cardiomediastinal silhouette is within normal limits.  Lines and tubes  are stable. Lung volumes are low.     Extensive bilateral infiltrates, bilateral small effusions are  suspected.               Impression:       Worsening infiltrates or congestive heart failure please clinically  correlate.                    Assessment:      Principal  Problem:    Sepsis  Active Problems:    Anemia    Hyperlipidemia    HTN (hypertension)    Coronary artery disease    ANDERS (acute kidney injury)    Muscle weakness-general    Bilateral pneumonia    Acute and chronic respiratory failure with hypoxia    Chronic diastolic CHF (congestive heart failure)    Leg edema    Atrial fibrillation with RVR      Patient Active Problem List   Diagnosis Code   • Chronic coronary artery disease I25.10   • Anemia D64.9   • Hyperlipidemia E78.5   • HTN (hypertension) I10   • Trigeminal neuralgia G50.0   • Benign prostatic hyperplasia N40.0   • Ulcerative colitis without complications K51.90   • Health care maintenance Z00.00   • Hypertension I10   • Coronary artery disease I25.10   • ANDERS (acute kidney injury) N17.9   • Muscle weakness-general M62.81   • Pulmonary nodule, right R91.1   • Pulmonary fibrosis J84.10   • Weakness R53.1   • Asterixis R27.8   • CVA (cerebral infarction) I63.9   • Irritable bowel syndrome with both constipation and diarrhea K58.2   • Odontoid fracture S12.100A   • Multiple facial bone fractures S02.92XA   • Hyponatremia E87.1   • Dehydration E86.0   • Bilateral pneumonia J18.9   • Sepsis A41.9   • Acute and chronic respiratory failure with hypoxia J96.21   • Chronic diastolic CHF (congestive heart failure) I50.32   • Leg edema R60.0   • Atrial fibrillation with RVR I48.91       Plan:    Continue IV fluids  ICU team management of ventilator  Diet: Osmolite per cortarabella  Check 2D echo  Will consult ID, patient had fever and chills before coming to hospital from a NH  Monitor and correct electrolytes  Monitor mental status  PT/OT/ST  DVT/stress ulcer prophylaxis  Labs in am      Loc Bolanos MD  2/2/2017  11:00 AM        Much of this encounter note is an electronic transcription/translation of spoken language to printed text. The electronic translation of spoken language may permit erroneous, or at times, nonsensical words or phrases to be inadvertently  transcribed; Although I have reviewed the note for such errors, some may still exist

## 2017-02-02 NOTE — PROGRESS NOTES
LOS: 5 days   Patient Care Team:  Flash Jim MD as PCP - General  Flash Jim MD as PCP - Family Medicine        Subjective     Interval History:     Patient remains intubated on the ventilator he is sedated with propofol but he is arousable and follows commands.  Patient has not required vasopressors.  They have did do a spontaneous breathing trial this morning he failed that and he's had increasing O2 requirements since then.  He denies any pain.  Nursing reports they have initially were sucking out some thick yellowish secretions but now it's more of a frothy copious sputum          Objective     Vital Signs  Temp:  [97.3 °F (36.3 °C)-97.9 °F (36.6 °C)] 97.5 °F (36.4 °C)  Heart Rate:  [] 112  Resp:  [22-38] 30  BP: ()/(45-76) 155/64  FiO2 (%):  [40 %-60 %] 60 %  Vent Mode: PC/AC  Ventilator/Non-Invasive Ventilation Settings     Start     Ordered    01/29/17 0721  . BIPAP; IPAP: 16; EPAP: 8; Titrate for spO2: per policy  Until Discontinued     Question Answer Comment   . BIPAP    IPAP 16    EPAP 8    Titrate for spO2 per policy        01/29/17 0722                       Body mass index is 28.49 kg/(m^2).    Intake/Output Summary (Last 24 hours) at 02/02/17 1218  Last data filed at 02/02/17 1100   Gross per 24 hour   Intake 1465.24 ml   Output    970 ml   Net 495.24 ml     I/O this shift:  In: 907 [I.V.:190; Other:717]  Out: 385 [Urine:385]    Physical Exam:  General Appearance: Well-developed elderly white male he is resting on a ventilator he does not appear in acute distress he is on propofol at 25 mics.  He is on a pressure control of 18 inspiratory pressure of 15 and PEEP of 7.5 FiO2 of 60% SPO2 is 92% his tidal volumes are about 540 cc he is breathing about 22  Eyes: Conjunctiva are clear pupils are about 2-1/2 mm equal and reactive to light  ENT: He has a 7.5 endotracheal tube at 24 cm at the lips.  Oral mucous membranes are dry he has a core Trac feeding tube in place  Neck:  Trachea midline I don't really appreciate definite jugular venous distention  Lungs: His breath sounds are fairly clear with the ventilator I don't hear any wheezes or rales  Cardiac: Tachycardic heart rates in the 110-120 range, irregular looks to be sinus with some APCs  Abdomen: Soft no palpable organomegaly or masses  : Aj catheter to drainage clear yellow urine  Musc/Skel: Grossly normal  Skin: No jaundice no petechiae no rashes noted  Neuro: Patient is sedated with propofol but he is arousable he is following commands moving all 4 extremities he will not his head which seems to be appropriately for questions  Extremities/P Vascular: No clubbing cyanosis or edema.  Palpable radial and dorsalis pedis pulses bilaterally  MSE: Hard to assess       Labs:  WBC No results found for: WBCS   HGB HEMOGLOBIN   Date Value Ref Range Status   02/02/2017 7.6 (L) 13.7 - 17.6 g/dL Final   02/01/2017 7.4 (L) 13.7 - 17.6 g/dL Final   01/31/2017 8.0 (L) 13.7 - 17.6 g/dL Final      HCT HEMATOCRIT   Date Value Ref Range Status   02/02/2017 24.1 (L) 40.4 - 52.2 % Final   02/01/2017 23.2 (L) 40.4 - 52.2 % Final   01/31/2017 24.9 (L) 40.4 - 52.2 % Final      Platlets No results found for: LABPLAT     PT/INR:  No results found for: PROTIME/No results found for: INR    Sodium SODIUM   Date Value Ref Range Status   02/02/2017 146 (H) 136 - 145 mmol/L Final   02/01/2017 143 136 - 145 mmol/L Final   01/31/2017 141 136 - 145 mmol/L Final      Potassium POTASSIUM   Date Value Ref Range Status   02/02/2017 3.7 3.5 - 5.2 mmol/L Final   02/01/2017 3.8 3.5 - 5.2 mmol/L Final   02/01/2017 3.5 3.5 - 5.2 mmol/L Final   01/31/2017 4.0 3.5 - 5.2 mmol/L Final      Chloride CHLORIDE   Date Value Ref Range Status   02/02/2017 113 (H) 98 - 107 mmol/L Final   02/01/2017 107 98 - 107 mmol/L Final   01/31/2017 105 98 - 107 mmol/L Final      Bicarbonate No results found for: PLASMABICARB   BUN BUN   Date Value Ref Range Status   02/02/2017 51 (H) 8 - 23  mg/dL Final   02/01/2017 45 (H) 8 - 23 mg/dL Final   01/31/2017 34 (H) 8 - 23 mg/dL Final      Creatinine CREATININE   Date Value Ref Range Status   02/02/2017 1.72 (H) 0.76 - 1.27 mg/dL Final   02/01/2017 1.97 (H) 0.76 - 1.27 mg/dL Final   01/31/2017 1.56 (H) 0.76 - 1.27 mg/dL Final      Calcium CALCIUM   Date Value Ref Range Status   02/02/2017 8.1 (L) 8.6 - 10.5 mg/dL Final   02/01/2017 8.5 (L) 8.6 - 10.5 mg/dL Final   01/31/2017 8.2 (L) 8.6 - 10.5 mg/dL Final      Magnesium No results found for: MG         pH PH, ARTERIAL   Date Value Ref Range Status   02/02/2017 7.341 (L) 7.350 - 7.450 pH units Final   01/31/2017 7.346 (L) 7.350 - 7.450 pH units Final   01/31/2017 7.309 (L) 7.350 - 7.450 pH units Final   01/30/2017 7.441 7.350 - 7.450 pH units Final      pO2 PO2, ARTERIAL   Date Value Ref Range Status   02/02/2017 60.1 (L) 80.0 - 100.0 mm Hg Final   01/31/2017 147.2 (H) 80.0 - 100.0 mm Hg Final   01/31/2017 115.6 (H) 80.0 - 100.0 mm Hg Final   01/30/2017 86.6 80.0 - 100.0 mm Hg Final      pCO2 PCO2, ARTERIAL   Date Value Ref Range Status   02/02/2017 41.2 35.0 - 45.0 mm Hg Final   01/31/2017 43.1 35.0 - 45.0 mm Hg Final   01/31/2017 47.3 (H) 35.0 - 45.0 mm Hg Final   01/30/2017 35.7 35.0 - 45.0 mm Hg Final      HCO3 HCO3, ARTERIAL   Date Value Ref Range Status   02/02/2017 22.3 22.0 - 28.0 mmol/L Final   01/31/2017 23.6 22.0 - 28.0 mmol/L Final   01/31/2017 23.8 22.0 - 28.0 mmol/L Final   01/30/2017 24.3 22.0 - 28.0 mmol/L Final          albuterol 6 puff Inhalation 4x Daily - RT   enoxaparin 1 mg/kg Subcutaneous Q24H   fentaNYL 1 patch Transdermal Q72H   insulin aspart 1-20 Units Subcutaneous Q4H   iopamidol 100 mL Intravenous Once in imaging   metoprolol 2.5 mg Intravenous Q6H   pantoprazole 40 mg Intravenous Q AM   sennosides-docusate sodium 2 tablet Oral Nightly   sodium chloride 10 mL Intracatheter Q12H       dexmedetomidine 0.2-1.5 mcg/kg/hr    norepinephrine 0.02-0.3 mcg/kg/min Last Rate: Stopped  (02/01/17 0615)   Pharmacy to Dose enoxaparin (LOVENOX)     propofol 5-50 mcg/kg/min Last Rate: 30 mcg/kg/min (02/02/17 0606)       Diagnostics:  Imaging Results (last 24 hours)     Procedure Component Value Units Date/Time    XR Chest 1 View [[id number]] Collected:  02/02/17 0450     Updated:  02/02/17 0450    Narrative:       X-RAY CHEST 1 VIEW.     HISTORY: Acute respiratory failure.     COMPARISON: 1/31/2017.     FINDINGS:  Cardiomediastinal silhouette is within normal limits.  Lines and tubes  are stable. Lung volumes are low.     Extensive bilateral infiltrates, bilateral small effusions are  suspected.               Impression:       Worsening infiltrates or congestive heart failure please clinically  correlate.                  I did review his radiographs he does have bilateral infiltrates bilateral pleural effusions bibasilar consolidation and atelectasis/infiltrate.  I looked at his recent CT scan and his scan from April and April he did have some subpleural reticulation/fibrosis and wouldn't it didn't seem to be worse in the bases than in the upper lung fields the current film or so much patchy groundglass infiltrates and bibasilar atelectasis/infiltrates and pleural effusions it's hard to determine      Assessment/Plan     Patient Active Problem List   Diagnosis Code   • Chronic coronary artery disease I25.10   • Anemia D64.9   • Hyperlipidemia E78.5   • HTN (hypertension) I10   • Trigeminal neuralgia G50.0   • Benign prostatic hyperplasia N40.0   • Ulcerative colitis without complications K51.90   • Health care maintenance Z00.00   • Hypertension I10   • Coronary artery disease I25.10   • ANDERS (acute kidney injury) N17.9   • Muscle weakness-general M62.81   • Pulmonary nodule, right R91.1   • Pulmonary fibrosis J84.10   • Weakness R53.1   • Asterixis R27.8   • CVA (cerebral infarction) I63.9   • Irritable bowel syndrome with both constipation and diarrhea K58.2   • Odontoid fracture S12.100A   • Multiple  facial bone fractures S02.92XA   • Hyponatremia E87.1   • Dehydration E86.0   • Bilateral pneumonia J18.9   • Sepsis A41.9   • Acute and chronic respiratory failure with hypoxia J96.21   • Chronic diastolic CHF (congestive heart failure) I50.32   • Leg edema R60.0   • Atrial fibrillation with RVR I48.91       Impression #1 respiratory failure acute hypoxemic patient has deteriorated deterioration seem to go with the spontaneous breathing trial I'm not sure the etiology I'm wondering if we send his heart failure, maybe he was not able to tolerate the work of breathing.  I'm going to check laboratory studies including a BNP and troponin and pro-calcitonin white count 10 get a follow-up chest x-ray.  For right now we'll put him back on pressure control ventilation we've increased his FiO2 and PEEP seems to be a little better now  #2 acute  diastolic CHF will need to check a BNP to help us guide diuresis some  #3 acute kidney injury creatinine has increased with diuresis and this does complicate her PEEP.  #4 questionable pneumonia cultures were negative he did have several days of antibiotics were discontinued yesterday I am going to recheck a pro-calcitonin to help determine whether we need to resume antibiotics.  #5 non-ST elevation MI type II demand ischemia again repeat troponin  #6 anemia mild hemoglobin fairly stable we'll continue to follow.  If we were to see further evidence of demand ischemia then we may need to transfuse and get his hemoglobin up 8 or so.  #7 atrial flutter with rapid ventricular response to normal sinus rhythm  #8 pulmonary fibrosis do not know the definitive etiology  #9 history of subcentimeter pulmonary nodule noncalcified on CT scan from April of last year  #10 history of dysphagia will likely be worsened with the endotracheal tube  #11 recent lactic acidosis I will check a follow-up lactic acid level  #12 odontoid fracture continue cervical collar  #13 diarrhea patient is developed  some diarrhea with his tube feeds he has a history of irritable bowel syndrome his daughter notes that he gets quite anxious and he has a bowel movement.  We will monitor  #14 acute kidney injury monitor renal function  #15 neuro patient has a lot of anxiety according to the daughter is in getting progressively worse not just acutely but over the last number of months I am going to add some Precedex see if we can wean propofol and see if that doesn't help us a little  #16 fluids/I/nutrition patient is on tube feeds will continue    Discussed with patient's daughter at bedside  Plan for disposition:    Norris Stapleton MD  02/02/17  12:18 PM    Time: I spent over 45 minutes critical care time with the patient thus far today

## 2017-02-02 NOTE — PROGRESS NOTES
LOS: 5 days   Patient Care Team:  Flash Jim MD as PCP - General  Flash Jim MD as PCP - Family Medicine    Chief Complaint:   pna, resp failure, atrial flutter    Interval History:   Sedate on vent but had a good night per daughter who is at bedside    Objective   Vital Signs  Temp:  [97.3 °F (36.3 °C)-97.9 °F (36.6 °C)] 97.5 °F (36.4 °C)  Heart Rate:  [] 110  Resp:  [22-38] 24  BP: ()/(45-76) 137/60  FiO2 (%):  [40 %-50 %] 45 %    Intake/Output Summary (Last 24 hours) at 02/02/17 0745  Last data filed at 02/02/17 0606   Gross per 24 hour   Intake 1298.61 ml   Output    855 ml   Net 443.61 ml       Comfortable NAD  S1/S2 RRR, no m/r/g  Lungs decreased at bases  Abdomen S/NT/ND (+) BS, no HSM appreciated  Extremities warm, no clubbing, cyanosis, or edema  No visible or palpable skin lesions      Results Review:        Results from last 7 days  Lab Units 02/02/17  0513 02/01/17  1604 02/01/17  0511 01/31/17  0606   SODIUM mmol/L 146*  --  143 141   POTASSIUM mmol/L 3.7 3.8 3.5 4.0   CHLORIDE mmol/L 113*  --  107 105   TOTAL CO2 mmol/L 21.4*  --  20.8* 21.0*   BUN mg/dL 51*  --  45* 34*   CREATININE mg/dL 1.72*  --  1.97* 1.56*   GLUCOSE mg/dL 140*  --  96 120*   CALCIUM mg/dL 8.1*  --  8.5* 8.2*       Results from last 7 days  Lab Units 01/28/17  2116   TROPONIN T ng/mL 0.022       Results from last 7 days  Lab Units 02/02/17  0512 02/01/17  0511 01/31/17  0606   WBC 10*3/mm3 8.59 10.93* 13.83*   HEMOGLOBIN g/dL 7.6* 7.4* 8.0*   HEMATOCRIT % 24.1* 23.2* 24.9*   PLATELETS 10*3/mm3 202 230 233       Results from last 7 days  Lab Units 01/28/17  2116   INR  1.30*                   I reviewed the patient's new clinical results.  I personally viewed and interpreted the patient's EKG/Telemetry data - nsr        Medication Review:     albuterol 6 puff Inhalation 4x Daily - RT   enoxaparin 1 mg/kg Subcutaneous Q24H   fentaNYL 1 patch Transdermal Q72H   insulin aspart 1-20 Units Subcutaneous Q4H    iopamidol 100 mL Intravenous Once in imaging   metoprolol 2.5 mg Intravenous Q6H   pantoprazole 40 mg Intravenous Q AM   sennosides-docusate sodium 2 tablet Oral Nightly   sodium chloride 10 mL Intracatheter Q12H         norepinephrine 0.02-0.3 mcg/kg/min Last Rate: Stopped (02/01/17 0615)   Pharmacy to Dose enoxaparin (LOVENOX)     propofol 5-50 mcg/kg/min Last Rate: 30 mcg/kg/min (02/02/17 0606)   sodium chloride 100 mL/hr Last Rate: 100 mL/hr (02/01/17 1047)       Assessment/Plan     Principal Problem:    Sepsis  Active Problems:    Anemia    Hyperlipidemia    HTN (hypertension)    Coronary artery disease    ANDERS (acute kidney injury)    Muscle weakness-general    Bilateral pneumonia    Acute and chronic respiratory failure with hypoxia    Chronic diastolic CHF (congestive heart failure)    Leg edema    Atrial fibrillation with RVR    1. Respiratory failure on the vent  2. PNA - b/l - no aspiration per the daughter  3. Sepsis syndrome  4. Atrial flutter - now NSR, unknown duration of atrial flutter prior to arrival, on therapeutic lovenox when able for cva prevention but cannot use long term AC due to severe fall. Is high chads vasc score.  5. Recent fall with facial fractures and c-spine fracture. Very high risk for long term anticoagulation. His daughter says that he falls a lot and she would not favor AC due to this. She understands the CVA risk.   6. Anemia - worsening  7. ANDERS - worsening  8. H/o CAD, s/p CABG, LVEF 50% on echo 8/2016  9. nstemi - type 2 - demand due to sepsis.  10. CVA 11/2016 - is on plavix for this.     May consider iron infusion. Has h/o iron deficiency anemia but cannot tolerate po iron due to severe constipation.     Continue iv metoprolol - will follow        Malina Mckeon MD  02/02/17  7:45 AM

## 2017-02-03 NOTE — PROGRESS NOTES
"DAILY PROGRESS NOTE  [facility], [facility]      Patient Identification:  Name: Noah Isaac  Age: 81 y.o.  Sex: male  :  1935  MRN: [id number]         Primary Care Physician: Flash Jim MD    Subjective:  Interval History:  Mr. Isaac remains sedated, intubated and on ventilator with coretrak feedings. ID is recommending the restart of his antibiotics. He is asleep now, but wife report he was awake \"all night\" and was following commands as well. His Hgb continues to be low.     Objective:    Scheduled Meds:  albumin human 25 g Intravenous Q6H   albuterol 6 puff Inhalation 4x Daily - RT   enoxaparin 1 mg/kg Subcutaneous Q24H   fentaNYL 1 patch Transdermal Q72H   insulin aspart 1-20 Units Subcutaneous Q4H   iopamidol 100 mL Intravenous Once in imaging   levoFLOXacin 500 mg Intravenous Q24H   metoprolol 5 mg Intravenous Q6H   metoprolol tartrate 25 mg Oral Q8H   pantoprazole 40 mg Intravenous Q AM   piperacillin-tazobactam 3.375 g Intravenous Q8H   sennosides-docusate sodium 2 tablet Oral Nightly   sodium chloride 10 mL Intracatheter Q12H     Continuous Infusions:  dexmedetomidine 0.2-1.5 mcg/kg/hr Last Rate: 1.2 mcg/kg/hr (17 0900)   norepinephrine 0.02-0.3 mcg/kg/min Last Rate: Stopped (17 0615)   Pharmacy to Dose enoxaparin (LOVENOX)     propofol 5-50 mcg/kg/min Last Rate: Stopped (17 1430)   sodium chloride 9 mL/hr Last Rate: 9 mL/hr (17 1116)       Vital signs in last 24 hours:  Temp:  [97.7 °F (36.5 °C)-98.9 °F (37.2 °C)] 97.7 °F (36.5 °C)  Heart Rate:  [] 102  Resp:  [27-34] 34  BP: ()/() 128/80  FiO2 (%):  [50 %-60 %] 50 %    tMax 24 hrs: Temp (24hrs), Av.2 °F (36.8 °C), Min:97.7 °F (36.5 °C), Max:98.9 °F (37.2 °C)      Intake/Output:    Intake/Output Summary (Last 24 hours) at 17 1152  Last data filed at 17 0900   Gross per 24 hour   Intake   2471 ml   Output    600 ml   Net   1871 ml " "      Exam:    Visit Vitals   • /80   • Pulse 102   • Temp 97.7 °F (36.5 °C) (Oral)   • Resp (!) 34   • Ht 69.02\" (175.3 cm)   • Wt 193 lb (87.5 kg)   • SpO2 92%   • BMI 28.49 kg/m2     General: Alerts, cooperative, appears stated age. In no acute distress. Intubated, on ventilator   Head: Normocephalic, without obvious abnormality, atraumatic  Neck: Supple, symmetrical; trachea midline, without adenopathy;   Thyroid without enlargement/tenderness/nodules;   no carotid bruit or JVD. Hard cervical collar in place  Cardiovascular: Normal rate, irregular rhythm and intact distal pulses.  Exam reveals no gallop and no friction rub. No murmur heard  Pulmonary: Bibasilar rhonchi, respirations unlabored.   No wheezing or rales.   Abdominal: Soft, non-tender, bowel sounds continue to be hypoactive all four quadrants, but better.   no masses, hepatomegaly, or splenomegaly.   Extremities: Normal, atraumatic; no cyanosis or edema  Pulses:  1 + symmetric all extremities  Neurological: He is sedated and intubated.   CNII-XII intact, normal strength, sensation intact throughout  Skin: Smooth, without lesions, rash, or wounds. Warm, dry and intact.         Data Review:      Results from last 7 days  Lab Units 02/03/17  0322 02/02/17  1219 02/02/17  0512   WBC 10*3/mm3 8.64 11.17* 8.59   HEMOGLOBIN g/dL 7.8* 8.6* 7.6*   HEMATOCRIT % 24.8* 27.7* 24.1*   PLATELETS 10*3/mm3 185 206 202         Results from last 7 days  Lab Units 02/03/17  0322 02/02/17  0513 02/01/17  1604 02/01/17  0511 01/31/17  0606   SODIUM mmol/L 148* 146*  --  143 141   POTASSIUM mmol/L 3.8 3.7 3.8 3.5 4.0   CHLORIDE mmol/L 115* 113*  --  107 105   TOTAL CO2 mmol/L 22.3 21.4*  --  20.8* 21.0*   BUN mg/dL 46* 51*  --  45* 34*   CREATININE mg/dL 1.43* 1.72*  --  1.97* 1.56*   CALCIUM mg/dL 8.5* 8.1*  --  8.5* 8.2*   BILIRUBIN mg/dL  --  0.4  --  0.4 0.4   ALK PHOS U/L  --  148*  --  101 82   ALT (SGPT) U/L  --  21  --  17 14   AST (SGOT) U/L  --  34  --  17 " 18   GLUCOSE mg/dL 168* 140*  --  96 120*       Results from last 7 days  Lab Units 01/28/17  2116   INR  1.30*         0  Lab Value Date/Time   TROPONINT 0.091 (C) 02/02/2017 1219   TROPONINT 0.022 01/28/2017 2116   TROPONINT <0.010 01/17/2017 1150   TROPONINT <0.010 09/16/2016 1818   TROPONINT <0.010 08/29/2016 1958   TROPONINT <0.010 08/28/2016 2009       Microbiology Results (last 10 days)     Procedure Component Value - Date/Time    Respiratory Panel, PCR [[id number]]  (Normal) Collected:  02/02/17 1219    Lab Status:  Final result Specimen:  Wash from ET Suction Updated:  02/02/17 1447     ADENOVIRUS, PCR Not Detected      Coronavirus 229E Not Detected      Coronavirus HKU1 Not Detected      Coronavirus NL63 Not Detected      Coronavirus OC43 Not Detected      Human Metapneumovirus Not Detected      Human Rhinovirus/Enterovirus Not Detected      Influenza B PCR Not Detected      Parainfluenza Virus 1 Not Detected      Parainfluenza Virus 2 Not Detected      Parainfluenza Virus 3 Not Detected      Parainfluenza Virus 4 Not Detected      Bordetella pertussis pcr Not Detected      Influenza 2009 H1N1 by PCR Not Detected      Chlamydophila pneumoniae PCR Not Detected      Mycoplasma pneumo by PCR Not Detected      Influenza A PCR Not Detected      Influenza A H3 Not Detected      Influenza A H1 Not Detected      RSV, PCR Not Detected     Respiratory Culture [[id number]] Collected:  02/02/17 1219    Lab Status:  Preliminary result Specimen:  Wash from ET Suction Updated:  02/03/17 0958     Respiratory Culture No growth at 24 hours      Gram Stain Result Few (2+) WBCs seen              Moderate (3+) Epithelial cells per low power field      No organisms seen     Respiratory Culture [[id number]] Collected:  02/01/17 0525    Lab Status:  Final result Specimen:  Sputum from Cough Updated:  02/03/17 0819     Respiratory Culture No growth      Gram Stain Result Rare (1+) WBCs seen              Rare (1+) Epithelial cells per  low power field      No organisms seen     Blood Culture [[id number]]  (Normal) Collected:  01/28/17 2119    Lab Status:  Final result Specimen:  Blood from Arm, Left Updated:  02/02/17 2201     Blood Culture No growth at 5 days     Blood Culture [[id number]]  (Normal) Collected:  01/28/17 2117    Lab Status:  Final result Specimen:  Blood from Arm, Right Updated:  02/02/17 2201     Blood Culture No growth at 5 days            Imaging Results (last 7 days)     Procedure Component Value Units Date/Time    XR Chest 1 View [[id number]] Collected:  01/29/17 0805     Updated:  01/29/17 0902    Narrative:       PORTABLE CHEST X-RAY     HISTORY: Shortness of breath, pneumonia.     Portable chest x-ray consisting of 2 images is correlated with a chest  x-ray from yesterday evening and other previous images.     FINDINGS: There are sternal wires. Fairly diffuse patchy opacity is  observed throughout the lungs bilaterally, appearing more extensive and  dense today than last night. No pleural effusion or pneumothorax is  present.       Impression:       Interval worsening in the density and extent of patchy  opacity throughout both lungs, favored to represent pneumonia. I called  the findings to Marianne, the nurse caring for the patient, at 7:35 AM.     This report was finalized on 1/29/2017 8:59 AM by Dr. Shahzad Carreon MD.       XR Chest 1 View [[id number]] Collected:  01/28/17 2144     Updated:  01/30/17 0139    Narrative:       X-RAY CHEST 1 VIEW.     HISTORY: Shortness of breath.     COMPARISON: 9/16/2016.     FINDINGS:  Cardiomediastinal silhouette is within normal limits.          Chronic lung changes. There is interval development of bilateral patchy  opacities, left greater than right.               Impression:       Infiltrates are suspected, left greater than right.  Follow-up to resolution is recommended.          This report was finalized on 1/30/2017 1:36 AM by Dr. Ancelmo Pedraza MD.       CT Head Without Contrast  [[id number]] Collected:  01/28/17 2242     Updated:  01/30/17 0140    Narrative:       CT SCAN OF THE BRAIN WITHOUT CONTRAST.     TECHNIQUE: Multiple axial images of the brain were obtained from the  vertex to the base of the brain.     HISTORY:  Fall.     COMPARISON:  1/13/2017.     FINDINGS:   Midline structures are within normal limits, there is no hydrocephalus.   Gray-white matter differentiation is maintained.  Small vessel ischemic  disease and cortical atrophy related changes are stable.     Orbits are within normal limits. Moderately severe mucosal disease of  the paranasal sinuses, there is interval improvement. Mastoid air cells  are well aerated.          Impression:       No acute intracranial pathology.          ----------------------------------------------------------------     CT CERVICAL SPINE WITHOUT CONTRAST.     TECHNIQUE: Routine axial images of the cervical spine with coronal and  sagittal reconstructed images.     HISTORY:  Fall, neck pain.     COMPARISON:  No prior studies for comparison.     FINDINGS:   Vertebral body height is normal, no acute fracture is seen.   Intervertebral discs are maintained. There is an old fracture of the  dens, with calcification of the surrounding soft tissue. Mild loss of  intervertebral disc height throughout the spine most severe at C5-6  through C7-T1, minimal spurring.     Prevertebral soft tissue is unremarkable.         IMPRESSION:   No acute fracture of the cervical spine. Old fracture of the dens is  unchanged.     This report was finalized on 1/30/2017 1:36 AM by Dr. Ancelmo Pedraza MD.       CT Cervical Spine Without Contrast [[id number]] Collected:  01/28/17 2242     Updated:  01/30/17 0140    Narrative:       CT SCAN OF THE BRAIN WITHOUT CONTRAST.     TECHNIQUE: Multiple axial images of the brain were obtained from the  vertex to the base of the brain.     HISTORY:  Fall.     COMPARISON:  1/13/2017.     FINDINGS:   Midline structures are within normal  limits, there is no hydrocephalus.   Gray-white matter differentiation is maintained.  Small vessel ischemic  disease and cortical atrophy related changes are stable.     Orbits are within normal limits. Moderately severe mucosal disease of  the paranasal sinuses, there is interval improvement. Mastoid air cells  are well aerated.          Impression:       No acute intracranial pathology.          ----------------------------------------------------------------     CT CERVICAL SPINE WITHOUT CONTRAST.     TECHNIQUE: Routine axial images of the cervical spine with coronal and  sagittal reconstructed images.     HISTORY:  Fall, neck pain.     COMPARISON:  No prior studies for comparison.     FINDINGS:   Vertebral body height is normal, no acute fracture is seen.   Intervertebral discs are maintained. There is an old fracture of the  dens, with calcification of the surrounding soft tissue. Mild loss of  intervertebral disc height throughout the spine most severe at C5-6  through C7-T1, minimal spurring.     Prevertebral soft tissue is unremarkable.         IMPRESSION:   No acute fracture of the cervical spine. Old fracture of the dens is  unchanged.     This report was finalized on 1/30/2017 1:36 AM by Dr. Ancelmo Pedraza MD.       XR Chest 1 View [[id number]] Collected:  01/30/17 0715     Updated:  01/30/17 0719    Narrative:       PORTABLE CHEST 06:11     HISTORY: 81-year-old male follow-up pneumonia     COMPARISON: 01/29/2017     FINDINGS:  1. Mild interval improvement in bilateral pneumonia as previously  described.  2. Recommend continued follow-up to confirm complete resolution.  3. No new abnormality.     This report was finalized on 1/30/2017 7:16 AM by Dr. Navdeep Brown MD.       CT Chest Without Contrast [[id number]] Collected:  01/31/17 1115     Updated:  01/31/17 1137    Narrative:       CT CHEST WITHOUT CONTRAST     HISTORY: Progressive pulmonary infiltrates     TECHNIQUE: Spiral axial CT imaging of the chest  was performed at 3 mm  intervals throughout. No intravenous contrast was administered.     COMPARISON: Correlation is made with recent chest radiographs. There is  no previous CT imaging of the chest.     FINDINGS: Heart and great vessels demonstrate largely unremarkable  noncontrasted appearance. 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 Advanced calcified  coronary disease is noted in the native vessels. There are a few shotty  nodes present in the mediastinum. Calcified nodes are present consistent  with granulomatous disease. Endotracheal tube is present terminating  just below the level of sternal notch. Chest wall is unremarkable.  Degenerative changes are present at the shoulders and spine.     Lung windows demonstrate a fairly extensive and diffuse pattern of  groundglass infiltrates with some geographic sparing and air trapping  present. The right middle lobe is relatively spared. Air bronchograms  are present in the areas of greatest consolidation. Groundglass  opacities coalesce to  alveolar infiltrate at the base. Air bronchograms  are present in the areas of greatest consolidation. No complicating  features are identified. There are small bilateral pleural effusions  with some overlying passive atelectasis. No pneumothorax is identified.  Visualized upper abdomen demonstrates an unremarkable noncontrasted  appearance.       Impression:       There are diffuse groundglass and alveolar infiltrates  within both lungs. Air bronchograms are present in the areas of greatest  consolidation. No complicating features are identified. Small bilateral  pleural effusions are noted.     This report was finalized on 1/31/2017 11:34 AM by Dr. Rickey Freedman MD.       XR Chest Post CVA Port [[id number]] Collected:  01/31/17 1324     Updated:  01/31/17 1329    Narrative:       XR CHEST POST CVA PORT-     INDICATIONS: PICC placement     TECHNIQUE: FRONTAL VIEW OF THE CHEST     COMPARISON: 1/31/2017     FINDINGS:      Right PICC extends  to the superior vena cava. Endotracheal tube does  not appear significantly changed. Sternotomy wires are present. Heart  size is normal. Aorta is calcified. Diffuse patchy infiltrates in both  lungs persist. No pneumothorax or pleural effusion. Otherwise stable.       Impression:          Right PICC extends to the superior vena cava. Persistent diffuse  bilateral pulmonary infiltrates, continued follow-up advised.     This report was finalized on 1/31/2017 1:26 PM by Dr. Jet Rodgers MD.       XR Chest 1 View [[id number]] Collected:  01/31/17 0149     Updated:  01/31/17 2319    Narrative:       X-RAY CHEST 1 VIEW.     HISTORY: Patient intubated.     COMPARISON: 1/30/2017.     FINDINGS:  Cardiomediastinal silhouette is within normal limits.  Interval  placement of a endotracheal tube in good position.     Extensive bilateral infiltrates, there is slight worsening.               Impression:       Endotracheal tube in good position. Worsening infiltrates.          This report was finalized on 1/31/2017 11:16 PM by Dr. Ancelmo Pedraza MD.       XR Chest 1 View [[id number]] Collected:  02/02/17 1308     Updated:  02/02/17 1313    Narrative:       XR CHEST 1 VW-     HISTORY: Male who is 81 years-old,  respiratory distress     TECHNIQUE: Frontal view of the chest     COMPARISON: 02/02/2017 at 0427 hours     FINDINGS: Stable appearing right PICC, endotracheal tube, NG tube.  Sternotomy wires are noted. Heart, mediastinum and pulmonary vasculature  are unremarkable. Extensive bilateral infiltrates appear similar to  prior exam. No large pleural effusion is seen, there may be minimal  pleural effusions. No pneumothorax. No acute osseous process.       Impression:       No significant change.     This report was finalized on 2/2/2017 1:10 PM by Dr. Jet Rodgers MD.       XR Chest 1 View [[id number]] Collected:  02/02/17 0450     Updated:  02/03/17 0025    Narrative:       X-RAY CHEST 1 VIEW.     HISTORY: Acute  respiratory failure.     COMPARISON: 1/31/2017.     FINDINGS:  Cardiomediastinal silhouette is within normal limits.  Lines and tubes  are stable. Lung volumes are low.     Extensive bilateral infiltrates, bilateral small effusions are  suspected.               Impression:       Worsening infiltrates or congestive heart failure please clinically  correlate.          This report was finalized on 2/3/2017 12:22 AM by Dr. Ancelmo Pedraza MD.       XR Chest 1 View [[id number]] Collected:  02/03/17 0440     Updated:  02/03/17 0440    Narrative:       X-RAY CHEST 1 VIEW.     HISTORY: Follow-up pneumonia.     COMPARISON: No prior studies for comparison.     FINDINGS:  Cardiomediastinal silhouette is within normal limits.  Lines and tubes  are stable.     Patchy bilateral lung opacities are present however lung volumes have  improved.               Impression:       1.  Improved lung volumes however patchy bilateral opacities remain.  2.  Follow-up to resolution is recommended.                    Assessment:      Principal Problem:    Sepsis  Active Problems:    Anemia    Hyperlipidemia    HTN (hypertension)    Coronary artery disease    ANDERS (acute kidney injury)    Muscle weakness-general    Bilateral pneumonia    Acute and chronic respiratory failure with hypoxia    Chronic diastolic CHF (congestive heart failure)    Leg edema    Atrial fibrillation with RVR      Patient Active Problem List   Diagnosis Code   • Chronic coronary artery disease I25.10   • Anemia D64.9   • Hyperlipidemia E78.5   • HTN (hypertension) I10   • Trigeminal neuralgia G50.0   • Benign prostatic hyperplasia N40.0   • Ulcerative colitis without complications K51.90   • Health care maintenance Z00.00   • Hypertension I10   • Coronary artery disease I25.10   • ANDERS (acute kidney injury) N17.9   • Muscle weakness-general M62.81   • Pulmonary nodule, right R91.1   • Pulmonary fibrosis J84.10   • Weakness R53.1   • Asterixis R27.8   • CVA (cerebral  infarction) I63.9   • Irritable bowel syndrome with both constipation and diarrhea K58.2   • Odontoid fracture S12.100A   • Multiple facial bone fractures S02.92XA   • Hyponatremia E87.1   • Dehydration E86.0   • Bilateral pneumonia J18.9   • Sepsis A41.9   • Acute and chronic respiratory failure with hypoxia J96.21   • Chronic diastolic CHF (congestive heart failure) I50.32   • Leg edema R60.0   • Atrial fibrillation with RVR I48.91       Plan:    Continue IV fluids  Resume IV antibiotics as recommended by ID  Bronchoscopy today  Diuretics prn,  2Decho reviwed  Diet: Osmolite  Monitor and correct electrolytes  Monitor mental status  DVT/stress ulcer prophylaxis  Spoke with wife. All questions answered  Labs in am        Loc Bolanos MD  2/3/2017  11:52 AM        Much of this encounter note is an electronic transcription/translation of spoken language to printed text. The electronic translation of spoken language may permit erroneous, or at times, nonsensical words or phrases to be inadvertently transcribed; Although I have reviewed the note for such errors, some may still exist

## 2017-02-03 NOTE — PLAN OF CARE
Problem: Patient Care Overview (Adult)  Goal: Plan of Care Review  Outcome: Ongoing (interventions implemented as appropriate)  The patient has done well through the shift. His labs look unremarkable. His chest x-ray notes that the infiltrates are improving. Hes required quite a bit of pain medication to keep him sedated, I just turned the precedex up to deal with him fighting the vent a bit. Toward the end of the shift his blood pressure and HR began to rise, I treated with pain medication and increased his precedex. A call was put out to Dr. Kelsey group, I am still waiting on a response.        02/03/17 05   Coping/Psychosocial Response Interventions   Plan Of Care Reviewed With patient   Patient Care Overview   Progress progress toward functional goals as expected       Goal: Adult Individualization and Mutuality  Outcome: Ongoing (interventions implemented as appropriate)  Goal: Discharge Needs Assessment  Outcome: Ongoing (interventions implemented as appropriate)    Problem: SAFETY - NON-VIOLENT RESTRAINT  Goal: Remains free of injury from restraints (Non-Violent Restraint)  Outcome: Ongoing (interventions implemented as appropriate)  Goal: Free from restraint(s) (Non-Violent Restraint)  Outcome: Ongoing (interventions implemented as appropriate)    Problem: Mechanical Ventilation, Invasive (Adult)  Goal: Signs and Symptoms of Listed Potential Problems Will be Absent or Manageable (Mechanical Ventilation, Invasive)  Outcome: Ongoing (interventions implemented as appropriate)    Problem: Sepsis (Adult)  Goal: Signs and Symptoms of Listed Potential Problems Will be Absent or Manageable (Sepsis)  Outcome: Ongoing (interventions implemented as appropriate)

## 2017-02-03 NOTE — PLAN OF CARE
Problem: Patient Care Overview (Adult)  Goal: Plan of Care Review  Outcome: Ongoing (interventions implemented as appropriate)  Goal: Adult Individualization and Mutuality  Outcome: Ongoing (interventions implemented as appropriate)  Goal: Discharge Needs Assessment  Outcome: Ongoing (interventions implemented as appropriate)    Problem: Fall Risk (Adult)  Goal: Absence of Falls  Outcome: Ongoing (interventions implemented as appropriate)    Problem: Pneumonia (Adult)  Goal: Signs and Symptoms of Listed Potential Problems Will be Absent or Manageable (Pneumonia)  Outcome: Ongoing (interventions implemented as appropriate)    Problem: Respiratory Insufficiency (Adult)  Goal: Acid/Base Balance  Outcome: Ongoing (interventions implemented as appropriate)  Goal: Effective Ventilation  Outcome: Ongoing (interventions implemented as appropriate)    Problem: SAFETY - NON-VIOLENT RESTRAINT  Goal: Remains free of injury from restraints (Non-Violent Restraint)  Outcome: Ongoing (interventions implemented as appropriate)  Goal: Free from restraint(s) (Non-Violent Restraint)  Outcome: Ongoing (interventions implemented as appropriate)    Problem: Mechanical Ventilation, Invasive (Adult)  Goal: Signs and Symptoms of Listed Potential Problems Will be Absent or Manageable (Mechanical Ventilation, Invasive)  Outcome: Ongoing (interventions implemented as appropriate)    Problem: Sepsis (Adult)  Goal: Signs and Symptoms of Listed Potential Problems Will be Absent or Manageable (Sepsis)  Outcome: Ongoing (interventions implemented as appropriate)

## 2017-02-03 NOTE — PROGRESS NOTES
"[facility] PULMONARY CARE         Dr Shad Smith   LOS: 6 days   Patient Care Team:  Flash Jim MD as PCP - General  Flash Jim MD as PCP - Family Medicine    Chief Complaint:  Admitted on 1/29 with dyspnea. Had bilateral infiltrated on the CXR. ProBNP was elevated and Procal as well. Treated with AB. Gained some weight (85 to 87.5 Kg). Has ANDERS on admission which also worsened.     Interval History:   Clear secretions from the ET tube, moderate in amount.  He had a bowel movement.  No blood in stool.  He is on Precedex drip.  He remains on mechanical ventilator with pressure controlled mode.  His PEEP is 10.    REVIEW OF SYSTEMS:   Not obtained.  Patient is on the ventilator    Ventilator/Non-Invasive Ventilation Settings     Start     Ordered    02/02/17 1212  Ventilator - AC/PC; (18); 60; 90%; 10  Continuous     Comments:  Order per Dr. Stapleton   Question Answer Comment   Vent Mode AC/PC    Breath rate  18   FiO2 60    FiO2 titrate for Sp02% =/> 90%    PEEP 10        02/02/17 1220       01/29/17 0722          Objective   Vital Signs  Temp:  [97.8 °F (36.6 °C)-98.9 °F (37.2 °C)] 98.9 °F (37.2 °C)  Heart Rate:  [] 121  Resp:  [24-31] 27  BP: ()/() 141/78  FiO2 (%):  [50 %-60 %] 50 %    Intake/Output Summary (Last 24 hours) at 02/03/17 0838  Last data filed at 02/03/17 0324   Gross per 24 hour   Intake   2754 ml   Output    785 ml   Net   1969 ml     Flowsheet Rows         First Filed Value    Admission Height  70\" (177.8 cm) Documented at 01/28/2017 2113    Admission Weight  187 lb (84.8 kg) Documented at 01/28/2017 2113          Physical Exam:   General Appearance:    Alert, in no acute distress   Lungs:     mild bilateral crackles.  No wheezing     Heart:    Irregular rhythm and rate.  No murmur    Chest Wall:    No abnormalities observed   Abdomen:     Normal bowel sounds, soft, non-tender, non-distended, no guarding, no rebound tenderness   Neuro:  somnolent.  On Precedex  "   Extremities:   Moves all extremities well, mild pitting edema, no cyanosis, no Redness          Results Review:          Results from last 7 days  Lab Units 02/03/17  0322 02/02/17  0513 02/01/17  1604 02/01/17  0511   SODIUM mmol/L 148* 146*  --  143   POTASSIUM mmol/L 3.8 3.7 3.8 3.5   CHLORIDE mmol/L 115* 113*  --  107   TOTAL CO2 mmol/L 22.3 21.4*  --  20.8*   BUN mg/dL 46* 51*  --  45*   CREATININE mg/dL 1.43* 1.72*  --  1.97*   GLUCOSE mg/dL 168* 140*  --  96   CALCIUM mg/dL 8.5* 8.1*  --  8.5*       Results from last 7 days  Lab Units 02/02/17  1219 01/28/17  2116   TROPONIN T ng/mL 0.091* 0.022       Results from last 7 days  Lab Units 02/03/17  0322 02/02/17  1219 02/02/17  0512   WBC 10*3/mm3 8.64 11.17* 8.59   HEMOGLOBIN g/dL 7.8* 8.6* 7.6*   HEMATOCRIT % 24.8* 27.7* 24.1*   PLATELETS 10*3/mm3 185 206 202       Results from last 7 days  Lab Units 01/28/17  2116   INR  1.30*         @LABNT(bnp)@  I reviewed the patient's new clinical results.  I personally viewed and interpreted the patient's CXR        Medication Review:     albuterol 6 puff Inhalation 4x Daily - RT   enoxaparin 1 mg/kg Subcutaneous Q24H   fentaNYL 1 patch Transdermal Q72H   insulin aspart 1-20 Units Subcutaneous Q4H   iopamidol 100 mL Intravenous Once in imaging   metoprolol 2.5 mg Intravenous Q6H   pantoprazole 40 mg Intravenous Q AM   sennosides-docusate sodium 2 tablet Oral Nightly   sodium chloride 10 mL Intracatheter Q12H         dexmedetomidine 0.2-1.5 mcg/kg/hr Last Rate: 0.8 mcg/kg/hr (02/03/17 0704)   norepinephrine 0.02-0.3 mcg/kg/min Last Rate: Stopped (02/01/17 0615)   Pharmacy to Dose enoxaparin (LOVENOX)     propofol 5-50 mcg/kg/min Last Rate: Stopped (02/02/17 1430)     1/31/17       2/3/17                                                             1/31/17      Assessment/Plan     1) Acute hypoxic and hypercapnic respiratory failure: He has bilateral pulmonary infiltrates suggestive of CHF exacerbation/ARDS (PF  [id number]). Or atypical infection.    - Will perform a bronch to r/o infection   - AB to be started per ID (discussed with Dr. Tracy)   - Evaluate for other etiology of ARDS (infection, pancreatitis, etc,,)   - Continue low tidal volume ventilation. PEEP at 5 to improve venous return    2) Diastolic CHF: I think he's intravascularly euvolemic and possibly a little dry. Agree with cardiology on holding off diuresis.   I performed a bedside ultrasound which showed IVC size of 2.46 collapsing to 1.35 during respiration. This is suggestive of intravascular volume depletion.   - I will start Albumin 25 g Q 6 for 8 doses  - I will give 1 dose of lasix 20 mg only to mobilize fluid from the lungs    3) Bilateral pleural effusion: Mild. No need for thora now    4) ANDERS: 2ary to prior diuresis. Will monitor.    5) A flutter: defer to cardio    6) NSTEMI II: On Lovenox therapeutic. Monitor H&H    7) Macrocytic anemia: chronic. Slow drop, likely due to acute illness. Will later    9) Protein calori malnutrition: Alb 2.4. On tube feeding    8) H/o CAD, s/p CABG        Shad Smith MD  02/03/17  8:38 AM      Time: Critical care 45 min

## 2017-02-03 NOTE — PROGRESS NOTES
"Patient Care Team:  Flash Jim MD as PCP - General  Flash Jim MD as PCP - Family Medicine    Chief Complaint: Follow-up atrial flutter    Interval History:   Patient with ventricular rates of 130 bpm today.  Still intubated.    Objective   Vital Signs  Temp:  [97.8 °F (36.6 °C)-98.9 °F (37.2 °C)] 98.9 °F (37.2 °C)  Heart Rate:  [] 131  Resp:  [24-31] 31  BP: ()/() 157/78  FiO2 (%):  [50 %-60 %] 50 %    Intake/Output Summary (Last 24 hours) at 02/03/17 0813  Last data filed at 02/03/17 0324   Gross per 24 hour   Intake   2754 ml   Output    785 ml   Net   1969 ml     Flowsheet Rows         First Filed Value    Admission Height  70\" (177.8 cm) Documented at 01/28/2017 2113    Admission Weight  187 lb (84.8 kg) Documented at 01/28/2017 2113          General Appearance:   intubated-sedated    Head:    Normocephalic, without obvious abnormality, atraumatic       Neck:   No adenopathy, supple, no thyromegaly, no carotid bruit, no    JVD   Lungs:    rhonchi and rales bilaterally    Heart:   tachycardic  rate, regular rhythm,  No murmur, rub, or gallop   Chest Wall:    No abnormalities observed   Abdomen:     Normal bowel sounds, soft, non-tender, non-distended,            no rebound tenderness   Extremities:   No cyanosis, clubbing, or edema   Pulses:   Pulses palpable and equal bilaterally   Skin:   No bleeding or rash               albuterol 6 puff Inhalation 4x Daily - RT   enoxaparin 1 mg/kg Subcutaneous Q24H   fentaNYL 1 patch Transdermal Q72H   insulin aspart 1-20 Units Subcutaneous Q4H   iopamidol 100 mL Intravenous Once in imaging   metoprolol 2.5 mg Intravenous Q6H   pantoprazole 40 mg Intravenous Q AM   sennosides-docusate sodium 2 tablet Oral Nightly   sodium chloride 10 mL Intracatheter Q12H         dexmedetomidine 0.2-1.5 mcg/kg/hr Last Rate: 0.8 mcg/kg/hr (02/03/17 0704)   norepinephrine 0.02-0.3 mcg/kg/min Last Rate: Stopped (02/01/17 0615)   Pharmacy to Dose enoxaparin (LOVENOX) "     propofol 5-50 mcg/kg/min Last Rate: Stopped (02/02/17 1430)       Results Review:      Results from last 7 days  Lab Units 02/03/17  0322   SODIUM mmol/L 148*   POTASSIUM mmol/L 3.8   CHLORIDE mmol/L 115*   TOTAL CO2 mmol/L 22.3   BUN mg/dL 46*   CREATININE mg/dL 1.43*   GLUCOSE mg/dL 168*   CALCIUM mg/dL 8.5*       Results from last 7 days  Lab Units 02/02/17  1219 01/28/17  2116   TROPONIN T ng/mL 0.091* 0.022       Results from last 7 days  Lab Units 02/03/17  0322   WBC 10*3/mm3 8.64   HEMOGLOBIN g/dL 7.8*   HEMATOCRIT % 24.8*   PLATELETS 10*3/mm3 185       Results from last 7 days  Lab Units 01/28/17  2116   INR  1.30*           Results from last 7 days  Lab Units 02/03/17  0322   MAGNESIUM mg/dL 2.4       I reviewed the patient's new clinical results.  I personally viewed and interpreted the patient's EKG/Telemetry data       Assessment/Plan    Respiratory failure on the vent  PNA - b/l   Sepsis   Atrial flutter - On therapeutic lovenox when able for cva prevention but cannot use long term AC due to severe fall. Has high chads vasc score.  Recent fall with facial fractures and c-spine fracture. Very high risk for long term anticoagulation.   Anemia   ANDERS - worsening  H/o CAD, s/p CABG, LVEF 50% on echo 8/2016  NSTEMI - type 2   CVA 11/2016    EKG this am is not sinus. Add low dose Metoprolol tartrate po as well as the IV  His respiratory failure is a little confusing to me.  His proBNP has decreased from prior.  His BMP looks like he is more towards hypovolemia with his hypernatremia along with elevated BUN and creatinine.  His chest x-ray has bilateral infiltrates and his pro-calcitonin is elevated.    -I would not recommend additional diuresis at this time.  -I will defer to pulmonary however I would consider either CT noncontrast or a combination of that and bronchoscopy as we really aren't offering anything signifcant for him from a pulmonary improvement standpoint  -Monitor HCT, however, if decreased  again tomorrow will move off of the therapeutic dose of Lovenox.  I will not transfuse today

## 2017-02-03 NOTE — CONSULTS
CONSULT NOTE    Infectious Diseases - Rafat Mckinney MD  [facility]       Patient Identification:  Name: Noah Isaac  Age: 81 y.o.  Sex: male  :  1935  MRN: [id number]             Date of Consultation:  2/3/2017       Primary Care Physician: Flash Jim MD                               Requesting Physician: Dr. Bolanos  Reason for Consultation: Recommendation regarding antibiotic therapy for suspected healthcare associated pneumonia    Impression: 81-year-old male with complicated past medical history admitted on 2017 in the background of recent hospitalizations with couple days history of increasing shortness of breath fever fatigue and x-ray showing bilateral infiltrate with hypoxemic respiratory failure requiring mechanical ventilation and introduction of antibiotic therapy from 2017 till 2017 with improvement in leukocytosis temperature curve sence of well-being and ventilatory requirement but now plateaued with antibiotics discontinued because of negative culture and part of the improvement is considered to be due to diuretics besides antibiotic therapy.  This  consisting of triad a fever lung infiltrate with hypoxemic failure and leukocytosis is likely due to:    1- partially treated healthcare associated pneumonia with hypoxemic respiratory failure on ventilator with possible element of aspiration and superimposed acute lung injury  2-possible extubation of underlying congestive heart failure due to stress of pneumonia and hence some improvement on diuretics  3-post recent fall with odontoid fracture and facial bone fracture on .  4-other diagnosis consisting of hypertension hyperlipidemia and coronary artery disease      Recommendations/Discussions: At this juncture his partial recovery in terms of decrease in ventilatory requirement normalization of white blood cell count and improvement in chest x-ray can be attributed to initial  broad-spectrum antibiotics started by pulmonary service for healthcare associated pneumonia with element of aspiration along with concomitant use of diuretics.  A negative culture does not negate the possibility of healthcare associated pneumonia and hence antibiotic treatment should continue for the possible infectious syndrome that could explain his presentation and subsequent course.  We would recommend restarting levaquinand Zosyn and Levaquin and recommend to continue at least 7 days while aggressive supportive care is being directed.  He may need therapeutic bronchoscopy to assist with secretions.  Going into this pathway would take care of or address one confounder i.e.,pneumonia, contributing to his plight.  Supportive care including ventilator management and addressing underlying congestive heart failure and heart disease per cardiology intensivist and internal medicine service.  Thank you Dr. Bolanos for letting me be the part of the patient care please see above impression and recommendation.        History of Present Illness:   81-year-old male admitted on 1/28/2017 from rehabilitation facility with couple days history of progressive shortness of breath fever chills and declining functional capacity.  On the day of admission he was noted to have decrease oxygen saturation of 82% on nonrebreather mask.  Workup did reveal bilateral lung infiltrate worse the left side with renal insufficiency and at that time with progressive 20 was 0.4%.  Patient was seen by pulmonary service and a proper treatment of place on ventilator and started on broad-spectrum antibody therapy for healthcare associated pneumonia.  Subsequent course was showing improvement in terms of fever pattern normalization of white blood cell count and ventilator requirement.  His initial workup, back negative for any cultures resulting in this condition of antibiotics in 48-72 hours.  This resulted in infectious disease consultation about  whether the stoppage of  antibiotics is needed or should he continue antibiotic therapy.  Patient himself while being on ventilator able to interact and claims that he's feeling better.  He denies any pain or discomfort.      Past Medical History:  Past Medical History   Diagnosis Date   • Anemia    • Anxiety disorder    • Arthritis    • Asterixis    • Chronic diastolic CHF (congestive heart failure) 2017   • Coronary artery disease    • Coronary artery disease    • Depression    • Hard of hearing    • History of transfusion    • Hyperlipidemia    • Hypertension    • Pneumonia    • Pulmonary nodule    • S/P tooth extraction 2017   • Stroke    • Ulcerative colitis    • Use of cane as ambulatory aid      Past Surgical History:  Past Surgical History   Procedure Laterality Date   • Coronary artery bypass graft     • Carpal tunnel release     • Rotator cuff repair     • Cardiac surgery     • Cataract extraction     • Cataract extraction w/  intraocular lens implant Bilateral      L eye in 2016, R eye in 2017   • Joint replacement     • Skin biopsy     • Tonsillectomy        Home Meds:  Prescriptions Prior to Admission   Medication Sig Dispense Refill Last Dose   • acetaminophen (TYLENOL) 325 MG tablet Take 2 tablets by mouth Every 4 (Four) Hours As Needed for mild pain (1-3). 60 tablet 0    • amLODIPine (NORVASC) 5 MG tablet TAKE ONE TABLET BY MOUTH DAILY 90 tablet 2    • Calcium Carb-Cholecalciferol 600-800 MG-UNIT tablet Take 1 tablet by mouth daily.   2016   • clonazePAM (KlonoPIN) 0.5 MG tablet Take 1 tablet by mouth At Night As Needed for anxiety. 60 tablet 0    • clopidogrel (PLAVIX) 75 MG tablet Take 1 tablet by mouth daily. 30 tablet 3 2017 at Unknown time   • [] fentaNYL (DURAGESIC) 12 MCG/HR Place 1 patch on the skin Every 72 (Seventy-Two) Hours for 10 days. 3 patch 0    • finasteride (PROSCAR) 5 MG tablet Take 1 tablet by mouth daily. 90 tablet 1 Taking   •  fluocinolone (SYNALAR) 0.01 % external solution APPLY TO THE SCALP SPARINGLY ONCE A DAY  2    • folic acid (FOLVITE) 1 MG tablet Take 1 tablet by mouth  daily 90 tablet 1    • gabapentin (NEURONTIN) 300 MG capsule Take 1 capsule by mouth every 8 (eight) hours. 90 capsule 10 Taking   • isosorbide mononitrate (IMDUR) 30 MG 24 hr tablet Take 1 tablet by mouth  daily 90 tablet 1    • losartan (COZAAR) 100 MG tablet Take 1 tablet by mouth  daily 90 tablet 1    • metoprolol tartrate (LOPRESSOR) 25 MG tablet Take 1 tablet by mouth 2 (Two) Times a Day. 60 tablet 0    • montelukast (SINGULAIR) 10 MG tablet Take 1 tablet by mouth every night. (Patient taking differently: Take 10 mg by mouth daily as needed (seasonal allergies).) 90 tablet 1 Taking   • nitroglycerin (NITROSTAT) 0.4 MG SL tablet Place 0.4 mg under the tongue. Dissolve one tablet under tongue as needed for chest pain   Taking   • ondansetron ODT (ZOFRAN-ODT) 4 MG disintegrating tablet Take 1 tablet by mouth Daily With Breakfast. 60 tablet 0    • pantoprazole (PROTONIX) 40 MG EC tablet Take 1 tablet by mouth 2 (Two) Times a Day. 60 tablet 0    • potassium chloride (K-DUR) 10 MEQ CR tablet Take 1 tablet by mouth two  times daily 180 tablet 1    • sertraline (ZOLOFT) 50 MG tablet TAKE 1 TABLET BY MOUTH DAILY. 90 tablet 1    • simvastatin (ZOCOR) 20 MG tablet Take 1 tablet by mouth  daily 90 tablet 1    • sodium chloride (OCEAN) 0.65 % nasal spray 2 sprays into each nostril every 2 (two) hours as needed for congestion. 1 each 12 Taking   • sulfaSALAzine (AZULFIDINE) 500 MG tablet Take 2 tablets by mouth 3  times a day 540 tablet 1      Current Meds:     Current Facility-Administered Medications:   •  acetaminophen (TYLENOL) suppository 650 mg, 650 mg, Rectal, Q4H PRN, Loc Bolanos MD  •  acetaminophen (TYLENOL) tablet 650 mg, 650 mg, Oral, Q4H PRN, Loc Bolanos MD, 650 mg at 01/30/17 1437  •  albuterol (PROVENTIL HFA;VENTOLIN HFA) inhaler 6 puff, 6 puff,  Inhalation, 4x Daily - RT, Michael Mcqueen MD, 6 puff at 02/03/17 0639  •  clonazePAM (KlonoPIN) tablet 0.5 mg, 0.5 mg, Oral, Nightly PRN, Loc Bolanos MD, 0.5 mg at 01/30/17 2124  •  dexmedetomidine (PRECEDEX) 400 MCG/100ML infusion, 0.2-1.5 mcg/kg/hr, Intravenous, Continuous, Norris Stapleton MD, Last Rate: 26.3 mL/hr at 02/03/17 0900, 1.2 mcg/kg/hr at 02/03/17 0900  •  dextrose (D50W) solution 25 g, 25 g, Intravenous, Q15 Min PRN, Myron Ansari MD, 12.5 g at 02/01/17 1703  •  dextrose (GLUTOSE) oral gel 15 g, 15 g, Oral, Q15 Min PRN, Myron Ansari MD  •  enoxaparin (LOVENOX) syringe 90 mg, 1 mg/kg, Subcutaneous, Q24H, Malina Mckeon MD, 90 mg at 02/02/17 1108  •  fentaNYL (DURAGESIC) 12 MCG/HR 1 patch, 1 patch, Transdermal, Q72H, Loc Bolanos MD, 1 patch at 02/01/17 0923  •  FentaNYL Citrate (PF) (SUBLIMAZE) injection 25 mcg, 25 mcg, Intravenous, Q1H PRN, Myron Ansari MD, 25 mcg at 02/03/17 0610  •  FentaNYL Citrate (PF) (SUBLIMAZE) injection 50 mcg, 50 mcg, Intravenous, Q1H PRN, Myron Ansari MD, 50 mcg at 02/03/17 0742  •  glucagon (human recombinant) (GLUCAGEN DIAGNOSTIC) injection 1 mg, 1 mg, Subcutaneous, Q15 Min PRN, Myron Ansari MD  •  insulin aspart (novoLOG) injection 1-20 Units, 1-20 Units, Subcutaneous, Q4H, Myron Ansari MD, 3 Units at 02/03/17 0339  •  iopamidol (ISOVUE-300) 61 % injection 100 mL, 100 mL, Intravenous, Once in imaging, Jovany Bullard MD, 100 mL at 01/29/17 0343  •  ipratropium-albuterol (DUO-NEB) nebulizer solution 3 mL, 3 mL, Nebulization, Q4H PRN, Loc Bolanos MD, 3 mL at 01/29/17 1053  •  ipratropium-albuterol (DUO-NEB) nebulizer solution 3 mL, 3 mL, Nebulization, Q2H PRN, Myron Ansari MD  •  metoprolol (LOPRESSOR) injection 5 mg, 5 mg, Intravenous, Q6H, Jp Coello MD  •  metoprolol tartrate (LOPRESSOR) tablet 25 mg, 25 mg, Oral, Q8H, Jp Coello MD  •  nitroglycerin (NITROSTAT) SL tablet 0.4 mg, 0.4 mg, Sublingual, Q5 Min  PRN, Loc Bolanos MD  •  norepinephrine (LEVOPHED) 32 mcg/mL (8 mg/250 mL) in sodium chloride 0.9% infusion (premix), 0.02-0.3 mcg/kg/min, Intravenous, Titrated, Myron Ansari MD, Stopped at 02/01/17 0615  •  ondansetron (ZOFRAN) injection 4 mg, 4 mg, Intravenous, Q4H PRN, Loc Bolanos MD  •  oxyCODONE-acetaminophen (PERCOCET) 5-325 MG per tablet 1 tablet, 1 tablet, Oral, Q6H PRN, Loc Bolanos MD, 1 tablet at 01/30/17 1046  •  pantoprazole (PROTONIX) injection 40 mg, 40 mg, Intravenous, Q AM, Glen Heredia MD, 40 mg at 02/03/17 0610  •  Pharmacy to Dose enoxaparin (LOVENOX), , Does not apply, Continuous PRN, Malina Mckeon MD  •  propofol (DIPRIVAN) infusion 10 mg/mL 100 mL, 5-50 mcg/kg/min, Intravenous, Continuous, Michaelaugust Mcqueen MD, Stopped at 02/02/17 1430  •  sennosides-docusate sodium (SENOKOT-S) 8.6-50 MG tablet 2 tablet, 2 tablet, Oral, Nightly, Myron Ansari MD, 2 tablet at 02/01/17 2101  •  sodium chloride (OCEAN) nasal spray 1 spray, 1 spray, Each Nare, PRN, Loc Bolanos MD  •  sodium chloride (OCEAN) nasal spray 2 spray, 2 spray, Nasal, Q2H PRN, Loc Bolanos MD  •  sodium chloride 0.9 % flush 1-10 mL, 1-10 mL, Intravenous, PRN, Myron Ansari MD  •  sodium chloride 0.9 % flush 10 mL, 10 mL, Intravenous, PRN, Jovany Bullard MD  •  sodium chloride 0.9 % flush 10 mL, 10 mL, Intracatheter, Q12H, Michaelaugust Mcqueen MD, 10 mL at 02/03/17 0854  •  sodium chloride 0.9 % flush 10 mL, 10 mL, Intracatheter, PRN, Michael H. Mcqueen, MD  Allergies:  Allergies   Allergen Reactions   • Carbamazepine Other (See Comments)     Extreme nervousness  Hyponatremia    • Lactose Intolerance (Gi)      Social History:   Social History   Substance Use Topics   • Smoking status: Former Smoker     Packs/day: 3.00     Years: 20.00     Types: Cigarettes     Start date: 1949     Quit date: 1982   • Smokeless tobacco: Not on file      Comment: quit 30 40 years ago   • Alcohol use No      Family History:  Family  "History   Problem Relation Age of Onset   • Heart disease Mother    • Heart disease Father    • Stroke Father    • Heart disease Sister    • Cancer Brother      malignant neoplasm          Review of Systems  See history of present illness and past medical history.  Limited because of him being on ventilator but doesn't interact appropriately.      Vitals:   Visit Vitals   • /55   • Pulse 102   • Temp 98.9 °F (37.2 °C)   • Resp 28   • Ht 69.02\" (175.3 cm)   • Wt 193 lb (87.5 kg)   • SpO2 91%   • BMI 28.49 kg/m2     I/O:   Intake/Output Summary (Last 24 hours) at 02/03/17 0944  Last data filed at 02/03/17 0900   Gross per 24 hour   Intake   3348 ml   Output    985 ml   Net   2363 ml     Exam:  General Appearance:    Alert, cooperative, no distress, appears stated age, on ventilator with vent setting noted.     Head:    Normocephalic, without obvious abnormality, atraumatic   Eyes:    PERRL, conjunctiva/corneas clear, EOM's intact, both eyes   Ears:    Normal external ear canals, both ears   Nose:   Nares normal, septum midline, mucosa normal, no drainage    or sinus tenderness   Throat:   Lips, tongue, gums normal; oral mucosa pink and moist   Neck:   Collar in place    Back:     Symmetric, no curvature, ROM normal, no CVA tenderness   Lungs:     Clear to auscultation bilaterally, respirations unlabored   Chest Wall:    No tenderness or deformity    Heart:    Regular rate and rhythm, S1 and S2 normal, no murmur, rub   or gallop   Abdomen:     Soft, non-tender, bowel sounds active all four quadrants,     no masses, no hepatomegaly, no splenomegaly   Extremities:   Extremities normal, atraumatic, no cyanosis or edema   Pulses:   Pulses palpable in all extremities; symmetric all extremities   Skin:   ecchymosis and bruising noted    Neurologic:   CNII-XII intact, motor strength grossly intact, sensation grossly intact to light touch, no focal deficits noted       Data Review:    I reviewed the patient's new " clinical results.    Results from last 7 days  Lab Units 02/03/17  0322 02/02/17  1219 02/02/17  0512 02/01/17  0511 01/31/17  0606 01/30/17  0422 01/29/17  0706   WBC 10*3/mm3 8.64 11.17* 8.59 10.93* 13.83* 13.93* 17.32*   HEMOGLOBIN g/dL 7.8* 8.6* 7.6* 7.4* 8.0* 8.7* 9.9*   PLATELETS 10*3/mm3 185 206 202 230 233 230 305       Results from last 7 days  Lab Units 02/03/17  0322 02/02/17  0513 02/01/17  1604 02/01/17  0511 01/31/17  0606 01/30/17  0422 01/29/17  0706 01/29/17  0514   SODIUM mmol/L 148* 146*  --  143 141 139 137 134*   POTASSIUM mmol/L 3.8 3.7 3.8 3.5 4.0 3.9 4.1 4.0   CHLORIDE mmol/L 115* 113*  --  107 105 102 102 100   TOTAL CO2 mmol/L 22.3 21.4*  --  20.8* 21.0* 20.8* 18.3* 19.8*   BUN mg/dL 46* 51*  --  45* 34* 28* 27* 28*   CREATININE mg/dL 1.43* 1.72*  --  1.97* 1.56* 1.41* 1.25 1.32*   CALCIUM mg/dL 8.5* 8.1*  --  8.5* 8.2* 8.5* 8.8 8.5*   GLUCOSE mg/dL 168* 140*  --  96 120* 106* 136* 108*    radiological data and an culture report reviewed    Assessment:  Principal Problem:    Sepsis  Active Problems:    Anemia    Hyperlipidemia    HTN (hypertension)    Coronary artery disease    ANDERS (acute kidney injury)    Muscle weakness-general    Bilateral pneumonia    Acute and chronic respiratory failure with hypoxia    Chronic diastolic CHF (congestive heart failure)    Leg edema    Atrial fibrillation with RVR      Plan:  See above  Rafat Ybarra MD   2/3/2017  9:44 AM    Much of this encounter note is an electronic transcription/translation of spoken language to printed text. The electronic translation of spoken language may permit erroneous, or at times, nonsensical words or phrases to be inadvertently transcribed; Although I have reviewed the note for such errors, some may still exist

## 2017-02-04 PROBLEM — E87.0 HYPERNATREMIA: Status: ACTIVE | Noted: 2017-01-01

## 2017-02-04 NOTE — PLAN OF CARE
Problem: Patient Care Overview (Adult)  Goal: Plan of Care Review  Outcome: Ongoing (interventions implemented as appropriate)    02/04/17 0626   Coping/Psychosocial Response Interventions   Plan Of Care Reviewed With patient;daughter   Patient Care Overview   Progress progress toward functional goals is gradual   Outcome Evaluation   Outcome Summary/Follow up Plan pt has had increased need for pain medication 50 grazyna fent iv given 7 times pt indicates pain is generalized heart rate afib better controlled with lopressor po and iv however when giving last dose of fent and lopressor iv heart rate dropped to 41 and sats hovered at 87 to 88 percent daughter reports pt normal has low heart rate precedex now at 0.6 mcg/kg/hr has been as high as 1.3 noted to have stage I ulcer on left buttock cream applied WOC consult asked to see tolerating tube feed well scant residual present cervical collare adjusted with daughter assistance fc to bse daughter would pt to be restarted on neurotin will ask report to ask md 725 cc uop this shift        02/04/17 0626   Coping/Psychosocial Response Interventions   Plan Of Care Reviewed With patient;daughter   Patient Care Overview   Progress progress toward functional goals is gradual   Outcome Evaluation   Outcome Summary/Follow up Plan pt has had increased need for pain medication 50 grazyna fent iv given 7 times pt indicates pain is generalized heart rate afib better controlled with lopressor po and iv however when giving last dose of fent and lopressor iv heart rate dropped to 41 and sats hovered at 87 to 88 percent daughter reports pt normal has low heart rate precedex now at 0.6 mcg/kg/hr has been as high as 1.3 noted to have stage I ulcer on left buttock cream applied WOC consult asked to see tolerating tube feed well scant residual present cervical collare adjusted with daughter assistance fc to bse             Problem: Fall Risk (Adult)  Goal: Absence of Falls  Outcome: Ongoing  (interventions implemented as appropriate)    Problem: Pneumonia (Adult)  Goal: Signs and Symptoms of Listed Potential Problems Will be Absent or Manageable (Pneumonia)  Outcome: Ongoing (interventions implemented as appropriate)    Problem: Respiratory Insufficiency (Adult)  Goal: Acid/Base Balance  Outcome: Ongoing (interventions implemented as appropriate)    Problem: SAFETY - NON-VIOLENT RESTRAINT  Goal: Remains free of injury from restraints (Non-Violent Restraint)  Outcome: Ongoing (interventions implemented as appropriate)    Problem: Mechanical Ventilation, Invasive (Adult)  Goal: Signs and Symptoms of Listed Potential Problems Will be Absent or Manageable (Mechanical Ventilation, Invasive)  Outcome: Ongoing (interventions implemented as appropriate)    Problem: Sepsis (Adult)  Goal: Signs and Symptoms of Listed Potential Problems Will be Absent or Manageable (Sepsis)  Outcome: Ongoing (interventions implemented as appropriate)

## 2017-02-04 NOTE — PROGRESS NOTES
Did not have the chance to perform bronch today. Will defer to the intensivist taking over tomorrow.   Amylase and Lipase levels are normal.

## 2017-02-04 NOTE — PROGRESS NOTES
Hospital Follow Up    LOS:  LOS: 7 days   Patient Name: Noah Isaac  Age/Sex: 81 y.o. male  : 1935  MRN: [id number]    Date of Hospital Visit: 17  Length of Stay: 7  Encounter Provider: Miles Sprague MD  Place of Service: [facility] CARDIOLOGY    Subjective:     Chief Complaint: Atrial fibrillation flutter   Interval History: The patient's heart rate is much better.  He appears to be in sinus rhythm this morning.    Objective:     Objective:  Temp:  [97.7 °F (36.5 °C)-99 °F (37.2 °C)] 98.4 °F (36.9 °C)  Heart Rate:  [] 73  Resp:  [26-34] 32  BP: (117-172)/(50-86) 143/65  FiO2 (%):  [50 %] 50 %  Body mass index is 29.74 kg/(m^2).    Intake/Output Summary (Last 24 hours) at 17 0738  Last data filed at 17 0500   Gross per 24 hour   Intake 3588.2 ml   Output   1850 ml   Net 1738.2 ml     Last 3 weights    17  0150 17  1200 17  0309   Weight: 193 lb (87.5 kg) 193 lb (87.5 kg) 201 lb 8 oz (91.4 kg)     Weight change:     Physical Exam:   General Appearance:Remains intubated.    HEENT: Normocephalic.  Neck: Supple. No JVD. No Carotid bruit. No thyromegaly  Lungs: CTAB. Normal respiratory effort and rate.  Heart:: Regular rate and rhythm, normal S1 and S2, no murmurs, gallops or rubs.  Abdomen: Soft, nontender, non-distended. positive bowel sounds  Extremities: Warm, no cyanosis, or clubbing. No edema.     Lab Review:     Results from last 7 days  Lab Units 17  0336 17  0322 17  0513  17  0511   SODIUM mmol/L 148* 148* 146*  --  143   POTASSIUM mmol/L 3.7 3.8 3.7  < > 3.5   CHLORIDE mmol/L 112* 115* 113*  --  107   TOTAL CO2 mmol/L 23.0 22.3 21.4*  --  20.8*   BUN mg/dL 41* 46* 51*  --  45*   CREATININE mg/dL 1.41* 1.43* 1.72*  --  1.97*   GLUCOSE mg/dL 159* 168* 140*  --  96   CALCIUM mg/dL 8.1* 8.5* 8.1*  --  8.5*   AST (SGOT) U/L  --   --  34  --  17   ALT (SGPT) U/L  --   --  21  --  17   < > = values  in this interval not displayed.    Results from last 7 days  Lab Units 02/02/17  1219 01/28/17 2116   TROPONIN T ng/mL 0.091* 0.022       Results from last 7 days  Lab Units 02/04/17  0336 02/03/17  0322   WBC 10*3/mm3 8.18 8.64   HEMOGLOBIN g/dL 7.2* 7.8*   HEMATOCRIT % 22.9* 24.8*   PLATELETS 10*3/mm3 150 185       Results from last 7 days  Lab Units 01/28/17 2116   INR  1.30*       Results from last 7 days  Lab Units 02/03/17  0322   MAGNESIUM mg/dL 2.4           Results from last 7 days  Lab Units 02/02/17  1219 01/28/17 2116   PROBNP pg/mL 5084.0* 50243.0*             I reviewed the patient's new clinical results.          I personally viewed and interpreted the patient's EKG/Telemetry data.  Current Medications:   Scheduled Meds:  albumin human 25 g Intravenous Q6H   albuterol 6 puff Inhalation 4x Daily - RT   enoxaparin 1 mg/kg Subcutaneous Q24H   fentaNYL 1 patch Transdermal Q72H   insulin aspart 1-20 Units Subcutaneous Q4H   iopamidol 100 mL Intravenous Once in imaging   lansoprazole 30 mg Nasogastric QAM   levoFLOXacin 500 mg Intravenous Q24H   metoprolol 5 mg Intravenous Q6H   metoprolol tartrate 25 mg Oral Q8H   piperacillin-tazobactam 3.375 g Intravenous Q8H   sennosides-docusate sodium 2 tablet Oral Nightly   sodium chloride 10 mL Intracatheter Q12H     Continuous Infusions:  dexmedetomidine 0.2-1.5 mcg/kg/hr Last Rate: 0.8 mcg/kg/hr (02/04/17 0558)   norepinephrine 0.02-0.3 mcg/kg/min Last Rate: Stopped (02/01/17 0615)   Pharmacy to Dose enoxaparin (LOVENOX)     propofol 5-50 mcg/kg/min Last Rate: Stopped (02/02/17 1430)   sodium chloride 9 mL/hr Last Rate: 9 mL/hr (02/03/17 1116)       Allergies:  Allergies   Allergen Reactions   • Carbamazepine Other (See Comments)     Extreme nervousness  Hyponatremia    • Lactose Intolerance (Gi)        Assessment & Plan     Principal Problem:    Sepsis  Active Problems:    Anemia    Hyperlipidemia    HTN (hypertension)    Coronary artery disease    ANDERS (acute  kidney injury)    Muscle weakness-general    Bilateral pneumonia    Acute and chronic respiratory failure with hypoxia    Chronic diastolic CHF (congestive heart failure)    Leg edema    Atrial fibrillation with RVR          Plan: Continue present course.  We'll recheck electrocardiogram.  Hemoglobin continues to drift down.  He might need supplemental transfusion.  Consider holding Lovenox at present.    Miles Sprague MD  02/04/17

## 2017-02-04 NOTE — PROCEDURES
Bronchoscopy Procedure Note    Procedure:  1. Bronchoscopy, Diagnostic    Pre-Operative Diagnosis:    Post-Operative Diagnosis: Same    Indication:    Anesthesia: icu medications    Procedure Details: Patient was consented for the procedure with all risk and benefit of the procedure explained in detail.  Patient was given the opportunity to ask questions and all concerns were answered.  The bronchocope was inserted into the main airway via the endotracheal tube. An anatomical survey was done of the main airways and the subsegmental bronchus.  The findings are reported above.  A bronchialalveolar lavage was performed using aliquots of normal saline instilled into the airways then aspirated back. Therapeutic suctioning was performed in the right lower lobe to remove mucous plugs    Findings:    Estimated Blood Loss:  Minimal           Specimens:  bal                Complications:  Mucous plugging in the right lower lobe           Disposition: ICU - intubated and hemodynamically stable.      Patient tolerated the procedure well.    Myron Ansari MD  2/4/2017  4:17 PM

## 2017-02-04 NOTE — PROGRESS NOTES
DAILY PROGRESS NOTE  [facility], [facility]    2017    Patient Identification:  Name: Noah Isaac  Age: 81 y.o.  Sex: male  :  1935  MRN: [id number]           Primary Care Physician: Flash Jim MD    Subjective:    Interval History:    Sedated, on ventilator.  FiO2 now is 50%.  To have bronchoscopy today.  Hemoglobin dropped.  Lovenox dose decreased to prophylactic dose only.            Objective:    Scheduled Meds:    albuterol 6 puff Inhalation 4x Daily - RT   [START ON 2017] enoxaparin 40 mg Subcutaneous Q24H   fentaNYL 1 patch Transdermal Q72H   insulin aspart 1-20 Units Subcutaneous Q4H   iopamidol 100 mL Intravenous Once in imaging   lansoprazole 30 mg Nasogastric QAM   levoFLOXacin 500 mg Intravenous Q24H   metoprolol tartrate 25 mg Oral Q8H   piperacillin-tazobactam 3.375 g Intravenous Q8H   sennosides-docusate sodium 2 tablet Oral Nightly   sodium chloride 10 mL Intracatheter Q12H       Continuous Infusions:    dexmedetomidine 0.2-1.5 mcg/kg/hr Last Rate: 1.2 mcg/kg/hr (17 1328)   Pharmacy to Dose enoxaparin (LOVENOX)     sodium chloride 9 mL/hr Last Rate: 9 mL/hr (17 1116)       PRN Meds:  •  acetaminophen  •  acetaminophen  •  clonazePAM  •  dextrose  •  dextrose  •  FentaNYL Citrate (PF)  •  FentaNYL Citrate (PF)  •  glucagon (human recombinant)  •  ipratropium-albuterol  •  ipratropium-albuterol  •  metoprolol  •  nitroglycerin  •  ondansetron  •  oxyCODONE-acetaminophen  •  Pharmacy to Dose enoxaparin (LOVENOX)  •  sodium chloride  •  sodium chloride  •  sodium chloride  •  sodium chloride  •  sodium chloride    Intake/Output:    Intake/Output Summary (Last 24 hours) at 17 1456  Last data filed at 17 1328   Gross per 24 hour   Intake 3584.2 ml   Output   1525 ml   Net 2059.2 ml         Exam:    tMax 24 hrs: Temp (24hrs), Av.4 °F (36.9 °C), Min:97.5 °F (36.4 °C), Max:99 °F (37.2 °C)    Vitals Ranges:   Temp:  " [97.5 °F (36.4 °C)-99 °F (37.2 °C)] 97.5 °F (36.4 °C)  Heart Rate:  [71-96] 81  Resp:  [25-33] 32  BP: (124-165)/(50-75) 142/66  FiO2 (%):  [50 %] 50 %    Visit Vitals   • /66   • Pulse 81   • Temp 97.5 °F (36.4 °C) (Oral)   • Resp (!) 32   • Ht 69.02\" (175.3 cm)   • Wt 201 lb 8 oz (91.4 kg)   • SpO2 95%   • BMI 29.74 kg/m2       General:  sedated, intubated, on ventilator.  2Tubefeedings.  Aj catheter.   Neck: Supple, symmetrical, trachea midline, no adenopathy;              thyroid:  no enlargement/tenderness/nodules;              no carotid bruit or JVD  Cardiovascular: Normal rate, regular rhythm and intact distal pulses.              Exam reveals no gallop and no friction rub. No murmur heard  Pulmonary:  very diminished breath sounds bilaterally, crackles on the right side.  Rhonchi at both bases.  No wheezing.      Abdominal: Soft. Soft, non-tender, bowel sounds active all four quadrants,     no masses, no hepatomegaly, no splenomegaly.   Extremities: Normal, atraumatic, no cyanosis or edema  Neurological:  sedated, on ventilator.  No gross sensory or motor deficit.    Skin: Skin color, texture, turgor normal, no rashes or lesions      Data Review:      Results from last 7 days  Lab Units 02/04/17  0336 02/03/17 0322 02/02/17  1219   WBC 10*3/mm3 8.18 8.64 11.17*   HEMOGLOBIN g/dL 7.2* 7.8* 8.6*   HEMATOCRIT % 22.9* 24.8* 27.7*   PLATELETS 10*3/mm3 150 185 206         Results from last 7 days  Lab Units 02/04/17  0336 02/03/17  0322 02/02/17  0513  02/01/17  0511 01/31/17  0606   SODIUM mmol/L 148* 148* 146*  --  143 141   POTASSIUM mmol/L 3.7 3.8 3.7  < > 3.5 4.0   CHLORIDE mmol/L 112* 115* 113*  --  107 105   TOTAL CO2 mmol/L 23.0 22.3 21.4*  --  20.8* 21.0*   BUN mg/dL 41* 46* 51*  --  45* 34*   CREATININE mg/dL 1.41* 1.43* 1.72*  --  1.97* 1.56*   CALCIUM mg/dL 8.1* 8.5* 8.1*  --  8.5* 8.2*   BILIRUBIN mg/dL  --   --  0.4  --  0.4 0.4   ALK PHOS U/L  --   --  148*  --  101 82   ALT (SGPT) U/L  " --   --  21  --  17 14   AST (SGOT) U/L  --   --  34  --  17 18   GLUCOSE mg/dL 159* 168* 140*  --  96 120*   < > = values in this interval not displayed.    Results from last 7 days  Lab Units 01/28/17  2116   INR  1.30*         0  Lab Value Date/Time   TROPONINT 0.091 (C) 02/02/2017 1219   TROPONINT 0.022 01/28/2017 2116   TROPONINT <0.010 01/17/2017 1150   TROPONINT <0.010 09/16/2016 1818   TROPONINT <0.010 08/29/2016 1958   TROPONINT <0.010 08/28/2016 2009       Microbiology Results (last 10 days)     Procedure Component Value - Date/Time    Respiratory Panel, PCR [[id number]]  (Normal) Collected:  02/02/17 1219    Lab Status:  Final result Specimen:  Wash from ET Suction Updated:  02/02/17 1447     ADENOVIRUS, PCR Not Detected      Coronavirus 229E Not Detected      Coronavirus HKU1 Not Detected      Coronavirus NL63 Not Detected      Coronavirus OC43 Not Detected      Human Metapneumovirus Not Detected      Human Rhinovirus/Enterovirus Not Detected      Influenza B PCR Not Detected      Parainfluenza Virus 1 Not Detected      Parainfluenza Virus 2 Not Detected      Parainfluenza Virus 3 Not Detected      Parainfluenza Virus 4 Not Detected      Bordetella pertussis pcr Not Detected      Influenza 2009 H1N1 by PCR Not Detected      Chlamydophila pneumoniae PCR Not Detected      Mycoplasma pneumo by PCR Not Detected      Influenza A PCR Not Detected      Influenza A H3 Not Detected      Influenza A H1 Not Detected      RSV, PCR Not Detected     Respiratory Culture [[id number]] Collected:  02/02/17 1219    Lab Status:  Final result Specimen:  Wash from ET Suction Updated:  02/04/17 0705     Respiratory Culture No growth at 2 days      Gram Stain Result Few (2+) WBCs seen              Moderate (3+) Epithelial cells per low power field      No organisms seen     Respiratory Culture [[id number]] Collected:  02/01/17 0578    Lab Status:  Final result Specimen:  Sputum from Cough Updated:  02/03/17 0819     Respiratory  Culture No growth      Gram Stain Result Rare (1+) WBCs seen              Rare (1+) Epithelial cells per low power field      No organisms seen     Blood Culture [[id number]]  (Normal) Collected:  01/28/17 2119    Lab Status:  Final result Specimen:  Blood from Arm, Left Updated:  02/02/17 2201     Blood Culture No growth at 5 days     Blood Culture [[id number]]  (Normal) Collected:  01/28/17 2117    Lab Status:  Final result Specimen:  Blood from Arm, Right Updated:  02/02/17 2201     Blood Culture No growth at 5 days            Imaging Results (last 7 days)     Procedure Component Value Units Date/Time    XR Chest 1 View [[id number]] Collected:  01/29/17 0805     Updated:  01/29/17 0902    Narrative:       PORTABLE CHEST X-RAY     HISTORY: Shortness of breath, pneumonia.     Portable chest x-ray consisting of 2 images is correlated with a chest  x-ray from yesterday evening and other previous images.     FINDINGS: There are sternal wires. Fairly diffuse patchy opacity is  observed throughout the lungs bilaterally, appearing more extensive and  dense today than last night. No pleural effusion or pneumothorax is  present.       Impression:       Interval worsening in the density and extent of patchy  opacity throughout both lungs, favored to represent pneumonia. I called  the findings to Marianne, the nurse caring for the patient, at 7:35 AM.     This report was finalized on 1/29/2017 8:59 AM by Dr. Shahzad Carreon MD.       XR Chest 1 View [[id number]] Collected:  01/28/17 2144     Updated:  01/30/17 0139    Narrative:       X-RAY CHEST 1 VIEW.     HISTORY: Shortness of breath.     COMPARISON: 9/16/2016.     FINDINGS:  Cardiomediastinal silhouette is within normal limits.          Chronic lung changes. There is interval development of bilateral patchy  opacities, left greater than right.               Impression:       Infiltrates are suspected, left greater than right.  Follow-up to resolution is recommended.          This  report was finalized on 1/30/2017 1:36 AM by Dr. Ancelmo Pedraza MD.       CT Head Without Contrast [[id number]] Collected:  01/28/17 2242     Updated:  01/30/17 0140    Narrative:       CT SCAN OF THE BRAIN WITHOUT CONTRAST.     TECHNIQUE: Multiple axial images of the brain were obtained from the  vertex to the base of the brain.     HISTORY:  Fall.     COMPARISON:  1/13/2017.     FINDINGS:   Midline structures are within normal limits, there is no hydrocephalus.   Gray-white matter differentiation is maintained.  Small vessel ischemic  disease and cortical atrophy related changes are stable.     Orbits are within normal limits. Moderately severe mucosal disease of  the paranasal sinuses, there is interval improvement. Mastoid air cells  are well aerated.          Impression:       No acute intracranial pathology.          ----------------------------------------------------------------     CT CERVICAL SPINE WITHOUT CONTRAST.     TECHNIQUE: Routine axial images of the cervical spine with coronal and  sagittal reconstructed images.     HISTORY:  Fall, neck pain.     COMPARISON:  No prior studies for comparison.     FINDINGS:   Vertebral body height is normal, no acute fracture is seen.   Intervertebral discs are maintained. There is an old fracture of the  dens, with calcification of the surrounding soft tissue. Mild loss of  intervertebral disc height throughout the spine most severe at C5-6  through C7-T1, minimal spurring.     Prevertebral soft tissue is unremarkable.         IMPRESSION:   No acute fracture of the cervical spine. Old fracture of the dens is  unchanged.     This report was finalized on 1/30/2017 1:36 AM by Dr. Ancelmo Pedraza MD.       CT Cervical Spine Without Contrast [[id number]] Collected:  01/28/17 2242     Updated:  01/30/17 0140    Narrative:       CT SCAN OF THE BRAIN WITHOUT CONTRAST.     TECHNIQUE: Multiple axial images of the brain were obtained from the  vertex to the base of the brain.      HISTORY:  Fall.     COMPARISON:  1/13/2017.     FINDINGS:   Midline structures are within normal limits, there is no hydrocephalus.   Gray-white matter differentiation is maintained.  Small vessel ischemic  disease and cortical atrophy related changes are stable.     Orbits are within normal limits. Moderately severe mucosal disease of  the paranasal sinuses, there is interval improvement. Mastoid air cells  are well aerated.          Impression:       No acute intracranial pathology.          ----------------------------------------------------------------     CT CERVICAL SPINE WITHOUT CONTRAST.     TECHNIQUE: Routine axial images of the cervical spine with coronal and  sagittal reconstructed images.     HISTORY:  Fall, neck pain.     COMPARISON:  No prior studies for comparison.     FINDINGS:   Vertebral body height is normal, no acute fracture is seen.   Intervertebral discs are maintained. There is an old fracture of the  dens, with calcification of the surrounding soft tissue. Mild loss of  intervertebral disc height throughout the spine most severe at C5-6  through C7-T1, minimal spurring.     Prevertebral soft tissue is unremarkable.         IMPRESSION:   No acute fracture of the cervical spine. Old fracture of the dens is  unchanged.     This report was finalized on 1/30/2017 1:36 AM by Dr. Ancelmo Pedraza MD.       XR Chest 1 View [[id number]] Collected:  01/30/17 0715     Updated:  01/30/17 0719    Narrative:       PORTABLE CHEST 06:11     HISTORY: 81-year-old male follow-up pneumonia     COMPARISON: 01/29/2017     FINDINGS:  1. Mild interval improvement in bilateral pneumonia as previously  described.  2. Recommend continued follow-up to confirm complete resolution.  3. No new abnormality.     This report was finalized on 1/30/2017 7:16 AM by Dr. Navdeep Brown MD.       CT Chest Without Contrast [[id number]] Collected:  01/31/17 1115     Updated:  01/31/17 1137    Narrative:       CT CHEST WITHOUT CONTRAST      HISTORY: Progressive pulmonary infiltrates     TECHNIQUE: Spiral axial CT imaging of the chest was performed at 3 mm  intervals throughout. No intravenous contrast was administered.     COMPARISON: Correlation is made with recent chest radiographs. There is  no previous CT imaging of the chest.     FINDINGS: Heart and great vessels demonstrate largely unremarkable  noncontrasted appearance. 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 Advanced calcified  coronary disease is noted in the native vessels. There are a few shotty  nodes present in the mediastinum. Calcified nodes are present consistent  with granulomatous disease. Endotracheal tube is present terminating  just below the level of sternal notch. Chest wall is unremarkable.  Degenerative changes are present at the shoulders and spine.     Lung windows demonstrate a fairly extensive and diffuse pattern of  groundglass infiltrates with some geographic sparing and air trapping  present. The right middle lobe is relatively spared. Air bronchograms  are present in the areas of greatest consolidation. Groundglass  opacities coalesce to  alveolar infiltrate at the base. Air bronchograms  are present in the areas of greatest consolidation. No complicating  features are identified. There are small bilateral pleural effusions  with some overlying passive atelectasis. No pneumothorax is identified.  Visualized upper abdomen demonstrates an unremarkable noncontrasted  appearance.       Impression:       There are diffuse groundglass and alveolar infiltrates  within both lungs. Air bronchograms are present in the areas of greatest  consolidation. No complicating features are identified. Small bilateral  pleural effusions are noted.     This report was finalized on 1/31/2017 11:34 AM by Dr. Rickey Freedman MD.       XR Chest Post CVA Port [[id number]] Collected:  01/31/17 1324     Updated:  01/31/17 1329    Narrative:       XR CHEST POST CVA PORT-     INDICATIONS: PICC placement      TECHNIQUE: FRONTAL VIEW OF THE CHEST     COMPARISON: 1/31/2017     FINDINGS:      Right PICC extends to the superior vena cava. Endotracheal tube does  not appear significantly changed. Sternotomy wires are present. Heart  size is normal. Aorta is calcified. Diffuse patchy infiltrates in both  lungs persist. No pneumothorax or pleural effusion. Otherwise stable.       Impression:          Right PICC extends to the superior vena cava. Persistent diffuse  bilateral pulmonary infiltrates, continued follow-up advised.     This report was finalized on 1/31/2017 1:26 PM by Dr. Jet Rodgers MD.       XR Chest 1 View [[id number]] Collected:  01/31/17 0149     Updated:  01/31/17 2319    Narrative:       X-RAY CHEST 1 VIEW.     HISTORY: Patient intubated.     COMPARISON: 1/30/2017.     FINDINGS:  Cardiomediastinal silhouette is within normal limits.  Interval  placement of a endotracheal tube in good position.     Extensive bilateral infiltrates, there is slight worsening.               Impression:       Endotracheal tube in good position. Worsening infiltrates.          This report was finalized on 1/31/2017 11:16 PM by Dr. Ancelmo Pedraza MD.       XR Chest 1 View [[id number]] Collected:  02/02/17 1308     Updated:  02/02/17 1313    Narrative:       XR CHEST 1 VW-     HISTORY: Male who is 81 years-old,  respiratory distress     TECHNIQUE: Frontal view of the chest     COMPARISON: 02/02/2017 at 0427 hours     FINDINGS: Stable appearing right PICC, endotracheal tube, NG tube.  Sternotomy wires are noted. Heart, mediastinum and pulmonary vasculature  are unremarkable. Extensive bilateral infiltrates appear similar to  prior exam. No large pleural effusion is seen, there may be minimal  pleural effusions. No pneumothorax. No acute osseous process.       Impression:       No significant change.     This report was finalized on 2/2/2017 1:10 PM by Dr. Jet Rodgers MD.       XR Chest 1 View [[id number]] Collected:   02/02/17 0450     Updated:  02/03/17 0025    Narrative:       X-RAY CHEST 1 VIEW.     HISTORY: Acute respiratory failure.     COMPARISON: 1/31/2017.     FINDINGS:  Cardiomediastinal silhouette is within normal limits.  Lines and tubes  are stable. Lung volumes are low.     Extensive bilateral infiltrates, bilateral small effusions are  suspected.               Impression:       Worsening infiltrates or congestive heart failure please clinically  correlate.          This report was finalized on 2/3/2017 12:22 AM by Dr. Ancelmo Pedraza MD.       XR Chest 1 View [[id number]] Collected:  02/03/17 0440     Updated:  02/03/17 2356    Narrative:       X-RAY CHEST 1 VIEW.     HISTORY: Follow-up pneumonia.     COMPARISON: No prior studies for comparison.     FINDINGS:  Cardiomediastinal silhouette is within normal limits.  Lines and tubes  are stable.     Patchy bilateral lung opacities are present however lung volumes have  improved.               Impression:       1.  Improved lung volumes however patchy bilateral opacities remain.  2.  Follow-up to resolution is recommended.          This report was finalized on 2/3/2017 11:53 PM by Dr. Ancelmo Pedraza MD.               Assessment:      Principal Problem:    Sepsis  Active Problems:    Anemia    ANDERS (acute kidney injury)    Muscle weakness-general    Bilateral pneumonia    Acute and chronic respiratory failure with hypoxia    Atrial fibrillation with RVR    Hypernatremia    Hyperlipidemia    HTN (hypertension)    Coronary artery disease    Chronic diastolic CHF (congestive heart failure)    Leg edema      Patient Active Problem List   Diagnosis Code   • Chronic coronary artery disease I25.10   • Anemia D64.9   • Hyperlipidemia E78.5   • HTN (hypertension) I10   • Trigeminal neuralgia G50.0   • Benign prostatic hyperplasia N40.0   • Ulcerative colitis without complications K51.90   • Health care maintenance Z00.00   • Hypertension I10   • Coronary artery disease I25.10   • ANDERS  (acute kidney injury) N17.9   • Muscle weakness-general M62.81   • Pulmonary nodule, right R91.1   • Pulmonary fibrosis J84.10   • Weakness R53.1   • Asterixis R27.8   • CVA (cerebral infarction) I63.9   • Irritable bowel syndrome with both constipation and diarrhea K58.2   • Odontoid fracture S12.100A   • Multiple facial bone fractures S02.92XA   • Hyponatremia E87.1   • Dehydration E86.0   • Bilateral pneumonia J18.9   • Sepsis A41.9   • Acute and chronic respiratory failure with hypoxia J96.21   • Chronic diastolic CHF (congestive heart failure) I50.32   • Leg edema R60.0   • Atrial fibrillation with RVR I48.91   • Hypernatremia E87.0       Plan:    Continue IV antibiotics as per ID recommendations.  Continue support therapy.  Vent management per ICU team.  Monitor and correct electrolytes  Workup for anemia, agree with decreasing dose of Lovenox to prophylaxis dose.  Will probably benefit from transfusion.  For bronchoscopy today.  Monitor mental status  DVT/stress ulcer prophylaxis  Labs in       Loc Bolanos MD  2/4/2017  2:56 PM        Much of this encounter note is an electronic transcription/translation of spoken language to printed text. The electronic translation of spoken language may permit erroneous, or at times, nonsensical words or phrases to be inadvertently transcribed; Although I have reviewed the note for such errors, some may still exist

## 2017-02-04 NOTE — PROGRESS NOTES
[facility]     Mar/chart reviewed  F/u Acute hypoxemic respiratory failure.  Opens eyes, follows commands  Vital Sign Min/Max for last 24 hours  Temp  Min: 97.5 °F (36.4 °C)  Max: 99 °F (37.2 °C)   BP  Min: 123/63  Max: 165/74   Pulse  Min: 71  Max: 96   Resp  Min: 26  Max: 33   SpO2  Min: 89 %  Max: 100 %   No Data Recorded   Weight  Min: 201 lb 8 oz (91.4 kg)  Max: 201 lb 8 oz (91.4 kg)     Sedated on vent  perrl, eomi, no icterus,  mmm, no jvd, trachea midline, neck supple,  chest decreased bilaterally, + crackles, no wheezes,   rrr,   soft, nt, nd +bs,  no c/c/ e    Labs:  Cr 1.41  Na 148  Bicarb 23  Wbc 8  hgb 7.2  plts 150    A/P:  1. Acute hypoxemic respiratory failure -- likely multifactorial, seems improved back on antibiotics, will plan for bronch today.   2. Pneumonia  3. Diastolic CHF   4. Bilateral pleural effusions  5. cheli  6. Aflutter/fib  7. Anemia -- will change lovenox to prophylactic dose  8. malnutrion    He still has a long way to go, I think his prognosis is poor. Will try and wean peep and fi02 as able.   D/w family at bedside cc 35

## 2017-02-05 NOTE — PROGRESS NOTES
"  Infectious Diseases Progress Note    Rafat Ybarra MD     [facility]  [place]: 8 days  Patient Identification:  Name: Noah Isaac  Age: 81 y.o.  Sex: male  :  1935  MRN: [id number]         Primary Care Physician: Flash Jim MD            Subjective: Claims that he is better by raising his finger in affirmative.  Interval History: The ventilator requiring significant support.     Objective:    Scheduled Meds:  acetylcysteine 2 mL Endotracheal Q12H   albuterol 6 puff Inhalation 4x Daily - RT   enoxaparin 40 mg Subcutaneous Q24H   fentaNYL 1 patch Transdermal Q72H   insulin aspart 1-20 Units Subcutaneous Q4H   iopamidol 100 mL Intravenous Once in imaging   lansoprazole 30 mg Nasogastric QAM   levoFLOXacin 500 mg Intravenous Q24H   metoprolol tartrate 25 mg Oral Q8H   piperacillin-tazobactam 3.375 g Intravenous Q8H   sennosides-docusate sodium 2 tablet Oral Nightly   sodium chloride 10 mL Intracatheter Q12H     Continuous Infusions:  dexmedetomidine 0.2-1.5 mcg/kg/hr Last Rate: 0.8 mcg/kg/hr (17 0820)   sodium chloride 9 mL/hr Last Rate: 9 mL/hr (17 1116)       Vital signs in last 24 hours:  Temp:  [97.5 °F (36.4 °C)-99.1 °F (37.3 °C)] 98.3 °F (36.8 °C)  Heart Rate:  [] 101  Resp:  [25-33] 27  BP: (108-173)/(48-79) 140/64  FiO2 (%):  [50 %-100 %] 100 %    Intake/Output:    Intake/Output Summary (Last 24 hours) at 17 1019  Last data filed at 17 0820   Gross per 24 hour   Intake   3396 ml   Output    850 ml   Net   2546 ml       Exam:  Visit Vitals   • /64   • Pulse 101   • Temp 98.3 °F (36.8 °C) (Oral)   • Resp 27   • Ht 69.02\" (175.3 cm)   • Wt 201 lb 8 oz (91.4 kg)   • SpO2 93%   • BMI 29.74 kg/m2       General Appearance:    On ventilator but interactive and follows commands.                          Head:    Normocephalic, without obvious abnormality, atraumatic                           Eyes:    PERRL, conjunctiva/corneas clear, EOM's intact, both " eyes                         Throat:   Lips, tongue, gums normal; oral mucosa pink and moist                           Neck:   Supple, symmetrical, trachea midline, no JVD                         Lungs:    Clear to auscultation bilaterally, respirations unlabored                 Chest Wall:    No tenderness or deformity                          Heart:    Regular rate and rhythm, S1 and S2 normal, no murmur,no  Rub                                      or gallop                  Abdomen:     Soft, non-tender, bowel sounds active, no masses, no                                                        organomegaly                  Extremities:   Extremities normal, atraumatic, no cyanosis or edema                        Pulses:   Pulses palpable in all extremities                            Skin:   Skin is warm and dry,  no rashes or palpable lesions                  Neurologic:   CNII-XII intact, motor strength grossly intact, sensation grossly                                         intact to light touch, no focal deficits noted       Data Review:    I reviewed the patient's new clinical results.    Results from last 7 days  Lab Units 02/05/17  0304 02/04/17  0336 02/03/17  0322 02/02/17  1219 02/02/17  0512 02/01/17  0511 01/31/17  0606   WBC 10*3/mm3 8.93 8.18 8.64 11.17* 8.59 10.93* 13.83*   HEMOGLOBIN g/dL 7.2* 7.2* 7.8* 8.6* 7.6* 7.4* 8.0*   PLATELETS 10*3/mm3 142 150 185 206 202 230 233       Results from last 7 days  Lab Units 02/05/17  0304 02/04/17  0336 02/03/17  0322 02/02/17  0513 02/01/17  1604 02/01/17  0511 01/31/17  0606 01/30/17  0422   SODIUM mmol/L 150* 148* 148* 146*  --  143 141 139   POTASSIUM mmol/L 3.8 3.7 3.8 3.7 3.8 3.5 4.0 3.9   CHLORIDE mmol/L 110* 112* 115* 113*  --  107 105 102   TOTAL CO2 mmol/L 22.3 23.0 22.3 21.4*  --  20.8* 21.0* 20.8*   BUN mg/dL 39* 41* 46* 51*  --  45* 34* 28*   CREATININE mg/dL 1.18 1.41* 1.43* 1.72*  --  1.97* 1.56* 1.41*   CALCIUM mg/dL 7.8* 8.1* 8.5* 8.1*  --   8.5* 8.2* 8.5*   GLUCOSE mg/dL 149* 159* 168* 140*  --  96 120* 106*         Assessment:  Principal Problem:    Sepsis  Active Problems:    Anemia    Hyperlipidemia    HTN (hypertension)    Coronary artery disease    ANDERS (acute kidney injury)    Muscle weakness-general    Bilateral pneumonia    Acute and chronic respiratory failure with hypoxia    Chronic diastolic CHF (congestive heart failure)    Leg edema    Atrial fibrillation with RVR    Hypernatremia      Plan: Continue present antibiotics for 10 days from 2/3/2017.  Long conversation with patient's family as well as patient's intensivist Dr. Ansari.  It appears on one hand he feels better based on his response but on the other hand his objective parameters are still pointing towards possibility of prolonged ventilator stay given his needs for support.  I agree with intensivist service that at this juncture consideration should be given for trach and PEG so that he continued to have supports without having untoward effect and have eventually a better chance to recover from it.  Obviously is not a guarantee that he would recover but with this pathway he would have better chance.    Rafat Ybarra MD  2/5/2017  10:19 AM    Much of this encounter note is an electronic transcription/translation of spoken language to printed text. The electronic translation of spoken language may permit erroneous, or at times, nonsensical words or phrases to be inadvertently transcribed; Although I have reviewed the note for such errors, some may still exist

## 2017-02-05 NOTE — PROGRESS NOTES
Hospital Follow Up    LOS:  LOS: 8 days   Patient Name: Noah Isaac  Age/Sex: 81 y.o. male  : 1935  MRN: [id number]    Date of Hospital Visit: 17  Length of Stay: 8  Encounter Provider: Miles Sprague MD  Place of Service: [facility] CARDIOLOGY    Subjective:     Chief Complaint: Atrial fibrillation flutter   Interval History: The patient remains intubated and sedated.  He remains in normal sinus rhythm.      Objective:     Objective:  Temp:  [97.5 °F (36.4 °C)-99.1 °F (37.3 °C)] 99.1 °F (37.3 °C)  Heart Rate:  [] 103  Resp:  [25-33] 28  BP: (108-173)/(48-78) 155/75  FiO2 (%):  [50 %-100 %] 100 %  Body mass index is 29.74 kg/(m^2).    Intake/Output Summary (Last 24 hours) at 17 0734  Last data filed at 17 0536   Gross per 24 hour   Intake   3176 ml   Output    575 ml   Net   2601 ml     Last 3 weights    17  0150 17  1200 17  0309   Weight: 193 lb (87.5 kg) 193 lb (87.5 kg) 201 lb 8 oz (91.4 kg)     Weight change:     Physical Exam:   General Appearance: Intubated, sedated  HEENT: Normocephalic.  Neck: Supple. No JVD. No Carotid bruit. No thyromegaly  Lungs: CTAB. Normal respiratory effort and rate.  Heart:: Regular rate and rhythm, normal S1 and S2, no murmurs, gallops or rubs.  Abdomen: Soft, nontender, non-distended. positive bowel sounds  Extremities: Warm, no cyanosis, or clubbing. No edema.     Lab Review:     Results from last 7 days  Lab Units 17  0304 17  0336  17  0513   SODIUM mmol/L 150* 148*  < > 146*   POTASSIUM mmol/L 3.8 3.7  < > 3.7   CHLORIDE mmol/L 110* 112*  < > 113*   TOTAL CO2 mmol/L 22.3 23.0  < > 21.4*   BUN mg/dL 39* 41*  < > 51*   CREATININE mg/dL 1.18 1.41*  < > 1.72*   GLUCOSE mg/dL 149* 159*  < > 140*   CALCIUM mg/dL 7.8* 8.1*  < > 8.1*   AST (SGOT) U/L 31  --   --  34   ALT (SGPT) U/L 22  --   --  21   < > = values in this interval not displayed.    Results from last 7  days  Lab Units 02/02/17  1219   TROPONIN T ng/mL 0.091*       Results from last 7 days  Lab Units 02/05/17  0304 02/04/17  0336   WBC 10*3/mm3 8.93 8.18   HEMOGLOBIN g/dL 7.2* 7.2*   HEMATOCRIT % 23.0* 22.9*   PLATELETS 10*3/mm3 142 150           Results from last 7 days  Lab Units 02/03/17  0322   MAGNESIUM mg/dL 2.4           Results from last 7 days  Lab Units 02/02/17  1219   PROBNP pg/mL 5084.0*             I reviewed the patient's new clinical results.          I personally viewed and interpreted the patient's EKG/Telemetry data.  Current Medications:   Scheduled Meds:  acetylcysteine 2 mL Endotracheal Q12H   albuterol 6 puff Inhalation 4x Daily - RT   enoxaparin 40 mg Subcutaneous Q24H   fentaNYL 1 patch Transdermal Q72H   insulin aspart 1-20 Units Subcutaneous Q4H   iopamidol 100 mL Intravenous Once in imaging   lansoprazole 30 mg Nasogastric QAM   levoFLOXacin 500 mg Intravenous Q24H   metoprolol tartrate 25 mg Oral Q8H   piperacillin-tazobactam 3.375 g Intravenous Q8H   sennosides-docusate sodium 2 tablet Oral Nightly   sodium chloride 10 mL Intracatheter Q12H     Continuous Infusions:  dexmedetomidine 0.2-1.5 mcg/kg/hr Last Rate: 1 mcg/kg/hr (02/05/17 0307)   sodium chloride 9 mL/hr Last Rate: 9 mL/hr (02/03/17 1116)       Allergies:  Allergies   Allergen Reactions   • Carbamazepine Other (See Comments)     Extreme nervousness  Hyponatremia    • Lactose Intolerance (Gi)        Assessment & Plan     Principal Problem:    Sepsis  Active Problems:    Anemia    Hyperlipidemia    HTN (hypertension)    Coronary artery disease    ANDERS (acute kidney injury)    Muscle weakness-general    Bilateral pneumonia    Acute and chronic respiratory failure with hypoxia    Chronic diastolic CHF (congestive heart failure)    Leg edema    Atrial fibrillation with RVR    Hypernatremia          Plan: Continue present course.  Continue supportive care.      Miles Sprague MD  02/05/17

## 2017-02-05 NOTE — PLAN OF CARE
Problem: Patient Care Overview (Adult)  Goal: Plan of Care Review  Outcome: Ongoing (interventions implemented as appropriate)    02/04/17 1900   Coping/Psychosocial Response Interventions   Plan Of Care Reviewed With patient   Outcome Evaluation   Outcome Summary/Follow up Plan With episodes of Bradycardia @ 1800. Precedex held. HR Up to 70's without further intervention. Wife updated on plan of care. Bedside bronchoscopy done by Dr. MARTHA Ansari. Procedure well tolerated. WIll continue to monitor closely.         Problem: Fall Risk (Adult)  Goal: Absence of Falls  Outcome: Ongoing (interventions implemented as appropriate)    Problem: Pneumonia (Adult)  Goal: Signs and Symptoms of Listed Potential Problems Will be Absent or Manageable (Pneumonia)  Outcome: Ongoing (interventions implemented as appropriate)    Problem: Mobility, Physical Impaired (Adult)  Goal: Enhanced Mobility Skills  Outcome: Ongoing (interventions implemented as appropriate)    Problem: Respiratory Insufficiency (Adult)  Goal: Effective Ventilation  Outcome: Ongoing (interventions implemented as appropriate)

## 2017-02-05 NOTE — PROGRESS NOTES
[facility]     Mar/chart reviewed  F/u Acute hypoxemic respiratory failure.  Opens eyes, follows commands    Vital Sign Min/Max for last 24 hours  Temp  Min: 97.5 °F (36.4 °C)  Max: 99.1 °F (37.3 °C)   BP  Min: 108/48  Max: 173/78   Pulse  Min: 49  Max: 103   Resp  Min: 25  Max: 33   SpO2  Min: 86 %  Max: 100 %   No Data Recorded   No Data Recorded     Sedated on vent  perrl, eomi, no icterus,  mmm, no jvd, trachea midline, neck supple,  chest decreased bilaterally, + crackles, no wheezes,   rrr,   soft, nt, nd +bs,  no c/c/ e    Labs:  7.386/45/83  Cr 1.18  Na 150  Bicarb 22.3  Wbc 8.9  hgb 7.2 (7.8)  plts 142  procal 0.29    A/P:  1. Acute hypoxemic respiratory failure -- likely multifactorial, s/p bronch today  2. Pneumonia-- procal trending down  3. Diastolic CHF -- not improving and going to give free water and blood so will give some diuresis  4. Bilateral pleural effusions  5. cheli  6. Aflutter/fib  7. Anemia -- will change lovenox to prophylactic dose; will transfuse one unit  8. malnutrion  9. Hypernatremia -- will add free water  10. ARDS -- continue vent support      Prognosis is pretty poor, I think he may be looking at tracheostomy.  Vent support is higher today, will repeat abg and cxr , will try diruesis.  I spoke with sons at bedside explained he might be looking at tracheostomy    Cc 37

## 2017-02-05 NOTE — PLAN OF CARE
Problem: Patient Care Overview (Adult)  Goal: Plan of Care Review  Outcome: Ongoing (interventions implemented as appropriate)    02/05/17 0643   Coping/Psychosocial Response Interventions   Plan Of Care Reviewed With patient   Patient Care Overview   Progress unable to show any progress toward functional goals   Outcome Evaluation   Outcome Summary/Follow up Plan Pt had to be bumped up to 100% oxygen r/t extreme difficulty oxygenating. Repeat ABGs this am.

## 2017-02-05 NOTE — PLAN OF CARE
Problem: SAFETY - NON-VIOLENT RESTRAINT  Goal: Remains free of injury from restraints (Non-Violent Restraint)  Outcome: Outcome(s) achieved Date Met:  02/04/17  Goal: Free from restraint(s) (Non-Violent Restraint)  Outcome: Outcome(s) achieved Date Met:  02/04/17    Problem: Mechanical Ventilation, Invasive (Adult)  Goal: Signs and Symptoms of Listed Potential Problems Will be Absent or Manageable (Mechanical Ventilation, Invasive)  Outcome: Ongoing (interventions implemented as appropriate)    Problem: Sepsis (Adult)  Goal: Signs and Symptoms of Listed Potential Problems Will be Absent or Manageable (Sepsis)  Outcome: Ongoing (interventions implemented as appropriate)

## 2017-02-05 NOTE — PROGRESS NOTES
DAILY PROGRESS NOTE  [facility], [facility]    2017    Patient Identification:  Name: Noah Isaac  Age: 81 y.o.  Sex: male  :  1935  MRN: [id number]           Primary Care Physician: Flash Jim MD    Subjective:    Interval History:    Patient respiratory status not improving, no need 100% hopefully 2 to keep saturation of 96%.  Still on the ventilator.  I had a long conversation with patient's wife, I asked specifically about CODE STATUS, she wants no CPR if his heart stopped beating.  She does one has to continue with current treatment however as well as any IV medication.  Hemoglobin is 7.2 today    ROS:     No fever, no nausea or vomiting.    Objective:    Scheduled Meds:    acetylcysteine 2 mL Endotracheal Q12H   albuterol 6 puff Inhalation 4x Daily - RT   enoxaparin 40 mg Subcutaneous Q24H   fentaNYL 1 patch Transdermal Q72H   furosemide 40 mg Intravenous Q12H   insulin aspart 1-20 Units Subcutaneous Q4H   iopamidol 100 mL Intravenous Once in imaging   lansoprazole 30 mg Nasogastric QAM   levoFLOXacin 500 mg Intravenous Q24H   metoprolol tartrate 25 mg Oral Q8H   piperacillin-tazobactam 3.375 g Intravenous Q8H   sennosides-docusate sodium 2 tablet Oral Nightly   sodium chloride 10 mL Intracatheter Q12H       Continuous Infusions:    dexmedetomidine 0.2-1.5 mcg/kg/hr Last Rate: 0.8 mcg/kg/hr (17 1312)   sodium chloride 9 mL/hr Last Rate: 9 mL/hr (17 1116)       PRN Meds:  •  acetaminophen  •  acetaminophen  •  albuterol  •  clonazePAM  •  dextrose  •  dextrose  •  FentaNYL Citrate (PF)  •  FentaNYL Citrate (PF)  •  glucagon (human recombinant)  •  ipratropium-albuterol  •  ipratropium-albuterol  •  metoprolol  •  nitroglycerin  •  ondansetron  •  oxyCODONE-acetaminophen  •  sodium chloride  •  sodium chloride  •  sodium chloride  •  sodium chloride  •  sodium chloride    Intake/Output:    Intake/Output Summary (Last 24 hours) at  "17 1414  Last data filed at 17 1125   Gross per 24 hour   Intake   2526 ml   Output    575 ml   Net   1951 ml         Exam:    tMax 24 hrs: Temp (24hrs), Av.2 °F (36.8 °C), Min:97.6 °F (36.4 °C), Max:99.1 °F (37.3 °C)    Vitals Ranges:   Temp:  [97.6 °F (36.4 °C)-99.1 °F (37.3 °C)] 98.2 °F (36.8 °C)  Heart Rate:  [] 101  Resp:  [26-33] 29  BP: (108-173)/(48-79) 146/69  FiO2 (%):  [50 %-100 %] 100 %    Visit Vitals   • /69   • Pulse 101   • Temp 98.2 °F (36.8 °C) (Oral)   • Resp (!) 29   • Ht 69.02\" (175.3 cm)   • Wt 201 lb 8 oz (91.4 kg)   • SpO2 97%   • BMI 29.74 kg/m2       General:  sedated, intubated, on ventilator. Tube feedings. Aj catheter.   Neck: Supple, symmetrical, trachea midline, no adenopathy;   thyroid: no enlargement/tenderness/nodules;   no carotid bruit or JVD  Cardiovascular: Normal rate, regular rhythm and intact distal pulses.  Exam reveals no gallop and no friction rub. No murmur heard  Pulmonary:  very diminished breath sounds bilaterally, crackles on the right side. Rhonchi at both bases. No wheezing.    Abdominal: Soft. Soft, non-tender, bowel sounds active all four quadrants,   no masses, no hepatomegaly, no splenomegaly.   Extremities: Normal, atraumatic, no cyanosis or edema  Neurological:  sedated, on ventilator. No gross sensory or motor deficit.   Skin: Skin color, texture, turgor normal, no rashes or lesions       Data Review:      Results from last 7 days  Lab Units 17  0304 17  0336 17  0322   WBC 10*3/mm3 8.93 8.18 8.64   HEMOGLOBIN g/dL 7.2* 7.2* 7.8*   HEMATOCRIT % 23.0* 22.9* 24.8*   PLATELETS 10*3/mm3 142 150 185         Results from last 7 days  Lab Units 17  0304 17  0336 17  0322 17  0513  17  0511   SODIUM mmol/L 150* 148* 148* 146*  --  143   POTASSIUM mmol/L 3.8 3.7 3.8 3.7  < > 3.5   CHLORIDE mmol/L 110* 112* 115* 113*  --  107   TOTAL CO2 mmol/L 22.3 23.0 22.3 21.4*  --  20.8*   BUN mg/dL 39* " 41* 46* 51*  --  45*   CREATININE mg/dL 1.18 1.41* 1.43* 1.72*  --  1.97*   CALCIUM mg/dL 7.8* 8.1* 8.5* 8.1*  --  8.5*   BILIRUBIN mg/dL 0.3  --   --  0.4  --  0.4   ALK PHOS U/L 127*  --   --  148*  --  101   ALT (SGPT) U/L 22  --   --  21  --  17   AST (SGOT) U/L 31  --   --  34  --  17   GLUCOSE mg/dL 149* 159* 168* 140*  --  96   < > = values in this interval not displayed.          0  Lab Value Date/Time   TROPONINT 0.091 (C) 02/02/2017 1219   TROPONINT 0.022 01/28/2017 2116   TROPONINT <0.010 01/17/2017 1150   TROPONINT <0.010 09/16/2016 1818   TROPONINT <0.010 08/29/2016 1958   TROPONINT <0.010 08/28/2016 2009       Microbiology Results (last 10 days)     Procedure Component Value - Date/Time    AFB Culture [[id number]] Collected:  02/04/17 1639    Lab Status:  Preliminary result Specimen:  Lavage from Lung, Right Lower Lobe Updated:  02/05/17 1303     AFB Stain        No acid fast bacilli seen on concentrated smear    Fungus Smear [[id number]] Collected:  02/04/17 1639    Lab Status:  Final result Specimen:  Lavage from Lung, Right Lower Lobe Updated:  02/05/17 0550     GMS Stain No fungal elements seen     BAL Culture, Quantitative [[id number]]  (Normal) Collected:  02/04/17 1639    Lab Status:  Preliminary result Specimen:  Lavage from Lung, Right Lower Lobe Updated:  02/05/17 0845     Culture No growth at 24 hours      Gram Stain Result Rare (1+) WBCs seen       No organisms seen     Respiratory Panel, PCR [[id number]]  (Normal) Collected:  02/04/17 1639    Lab Status:  Final result Specimen:  Lavage from Lung, Right Lower Lobe Updated:  02/04/17 1844     ADENOVIRUS, PCR Not Detected      Coronavirus 229E Not Detected      Coronavirus HKU1 Not Detected      Coronavirus NL63 Not Detected      Coronavirus OC43 Not Detected      Human Metapneumovirus Not Detected      Human Rhinovirus/Enterovirus Not Detected      Influenza B PCR Not Detected      Parainfluenza Virus 1 Not Detected      Parainfluenza Virus 2 Not  Detected      Parainfluenza Virus 3 Not Detected      Parainfluenza Virus 4 Not Detected      Bordetella pertussis pcr Not Detected      Influenza 2009 H1N1 by PCR Not Detected      Chlamydophila pneumoniae PCR Not Detected      Mycoplasma pneumo by PCR Not Detected      Influenza A PCR Not Detected      Influenza A H3 Not Detected      Influenza A H1 Not Detected      RSV, PCR Not Detected     Respiratory Panel, PCR [[id number]]  (Normal) Collected:  02/02/17 1219    Lab Status:  Final result Specimen:  Wash from ET Suction Updated:  02/02/17 1447     ADENOVIRUS, PCR Not Detected      Coronavirus 229E Not Detected      Coronavirus HKU1 Not Detected      Coronavirus NL63 Not Detected      Coronavirus OC43 Not Detected      Human Metapneumovirus Not Detected      Human Rhinovirus/Enterovirus Not Detected      Influenza B PCR Not Detected      Parainfluenza Virus 1 Not Detected      Parainfluenza Virus 2 Not Detected      Parainfluenza Virus 3 Not Detected      Parainfluenza Virus 4 Not Detected      Bordetella pertussis pcr Not Detected      Influenza 2009 H1N1 by PCR Not Detected      Chlamydophila pneumoniae PCR Not Detected      Mycoplasma pneumo by PCR Not Detected      Influenza A PCR Not Detected      Influenza A H3 Not Detected      Influenza A H1 Not Detected      RSV, PCR Not Detected     Respiratory Culture [[id number]] Collected:  02/02/17 1219    Lab Status:  Final result Specimen:  Wash from ET Suction Updated:  02/04/17 0705     Respiratory Culture No growth at 2 days      Gram Stain Result Few (2+) WBCs seen              Moderate (3+) Epithelial cells per low power field      No organisms seen     Respiratory Culture [[id number]] Collected:  02/01/17 0525    Lab Status:  Final result Specimen:  Sputum from Cough Updated:  02/03/17 0819     Respiratory Culture No growth      Gram Stain Result Rare (1+) WBCs seen              Rare (1+) Epithelial cells per low power field      No organisms seen     Blood  Culture [[id number]]  (Normal) Collected:  01/28/17 2119    Lab Status:  Final result Specimen:  Blood from Arm, Left Updated:  02/02/17 2201     Blood Culture No growth at 5 days     Blood Culture [[id number]]  (Normal) Collected:  01/28/17 2117    Lab Status:  Final result Specimen:  Blood from Arm, Right Updated:  02/02/17 2201     Blood Culture No growth at 5 days            Imaging Results (last 7 days)     Procedure Component Value Units Date/Time    XR Chest 1 View [[id number]] Collected:  01/28/17 2144     Updated:  01/30/17 0139    Narrative:       X-RAY CHEST 1 VIEW.     HISTORY: Shortness of breath.     COMPARISON: 9/16/2016.     FINDINGS:  Cardiomediastinal silhouette is within normal limits.          Chronic lung changes. There is interval development of bilateral patchy  opacities, left greater than right.               Impression:       Infiltrates are suspected, left greater than right.  Follow-up to resolution is recommended.          This report was finalized on 1/30/2017 1:36 AM by Dr. Ancelmo Pedarza MD.       CT Head Without Contrast [[id number]] Collected:  01/28/17 2242     Updated:  01/30/17 0140    Narrative:       CT SCAN OF THE BRAIN WITHOUT CONTRAST.     TECHNIQUE: Multiple axial images of the brain were obtained from the  vertex to the base of the brain.     HISTORY:  Fall.     COMPARISON:  1/13/2017.     FINDINGS:   Midline structures are within normal limits, there is no hydrocephalus.   Gray-white matter differentiation is maintained.  Small vessel ischemic  disease and cortical atrophy related changes are stable.     Orbits are within normal limits. Moderately severe mucosal disease of  the paranasal sinuses, there is interval improvement. Mastoid air cells  are well aerated.          Impression:       No acute intracranial pathology.          ----------------------------------------------------------------     CT CERVICAL SPINE WITHOUT CONTRAST.     TECHNIQUE: Routine axial images of the  cervical spine with coronal and  sagittal reconstructed images.     HISTORY:  Fall, neck pain.     COMPARISON:  No prior studies for comparison.     FINDINGS:   Vertebral body height is normal, no acute fracture is seen.   Intervertebral discs are maintained. There is an old fracture of the  dens, with calcification of the surrounding soft tissue. Mild loss of  intervertebral disc height throughout the spine most severe at C5-6  through C7-T1, minimal spurring.     Prevertebral soft tissue is unremarkable.         IMPRESSION:   No acute fracture of the cervical spine. Old fracture of the dens is  unchanged.     This report was finalized on 1/30/2017 1:36 AM by Dr. Ancelmo Pedraza MD.       CT Cervical Spine Without Contrast [[id number]] Collected:  01/28/17 2242     Updated:  01/30/17 0140    Narrative:       CT SCAN OF THE BRAIN WITHOUT CONTRAST.     TECHNIQUE: Multiple axial images of the brain were obtained from the  vertex to the base of the brain.     HISTORY:  Fall.     COMPARISON:  1/13/2017.     FINDINGS:   Midline structures are within normal limits, there is no hydrocephalus.   Gray-white matter differentiation is maintained.  Small vessel ischemic  disease and cortical atrophy related changes are stable.     Orbits are within normal limits. Moderately severe mucosal disease of  the paranasal sinuses, there is interval improvement. Mastoid air cells  are well aerated.          Impression:       No acute intracranial pathology.          ----------------------------------------------------------------     CT CERVICAL SPINE WITHOUT CONTRAST.     TECHNIQUE: Routine axial images of the cervical spine with coronal and  sagittal reconstructed images.     HISTORY:  Fall, neck pain.     COMPARISON:  No prior studies for comparison.     FINDINGS:   Vertebral body height is normal, no acute fracture is seen.   Intervertebral discs are maintained. There is an old fracture of the  dens, with calcification of the  surrounding soft tissue. Mild loss of  intervertebral disc height throughout the spine most severe at C5-6  through C7-T1, minimal spurring.     Prevertebral soft tissue is unremarkable.         IMPRESSION:   No acute fracture of the cervical spine. Old fracture of the dens is  unchanged.     This report was finalized on 1/30/2017 1:36 AM by Dr. Ancelmo Pedraza MD.       XR Chest 1 View [[id number]] Collected:  01/30/17 0715     Updated:  01/30/17 0719    Narrative:       PORTABLE CHEST 06:11     HISTORY: 81-year-old male follow-up pneumonia     COMPARISON: 01/29/2017     FINDINGS:  1. Mild interval improvement in bilateral pneumonia as previously  described.  2. Recommend continued follow-up to confirm complete resolution.  3. No new abnormality.     This report was finalized on 1/30/2017 7:16 AM by Dr. Navdeep Brown MD.       CT Chest Without Contrast [[id number]] Collected:  01/31/17 1115     Updated:  01/31/17 1137    Narrative:       CT CHEST WITHOUT CONTRAST     HISTORY: Progressive pulmonary infiltrates     TECHNIQUE: Spiral axial CT imaging of the chest was performed at 3 mm  intervals throughout. No intravenous contrast was administered.     COMPARISON: Correlation is made with recent chest radiographs. There is  no previous CT imaging of the chest.     FINDINGS: Heart and great vessels demonstrate largely unremarkable  noncontrasted appearance. 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 Advanced calcified  coronary disease is noted in the native vessels. There are a few shotty  nodes present in the mediastinum. Calcified nodes are present consistent  with granulomatous disease. Endotracheal tube is present terminating  just below the level of sternal notch. Chest wall is unremarkable.  Degenerative changes are present at the shoulders and spine.     Lung windows demonstrate a fairly extensive and diffuse pattern of  groundglass infiltrates with some geographic sparing and air trapping  present. The right middle lobe is  relatively spared. Air bronchograms  are present in the areas of greatest consolidation. Groundglass  opacities coalesce to  alveolar infiltrate at the base. Air bronchograms  are present in the areas of greatest consolidation. No complicating  features are identified. There are small bilateral pleural effusions  with some overlying passive atelectasis. No pneumothorax is identified.  Visualized upper abdomen demonstrates an unremarkable noncontrasted  appearance.       Impression:       There are diffuse groundglass and alveolar infiltrates  within both lungs. Air bronchograms are present in the areas of greatest  consolidation. No complicating features are identified. Small bilateral  pleural effusions are noted.     This report was finalized on 1/31/2017 11:34 AM by Dr. Rickey Freedman MD.       XR Chest Post CVA Port [[id number]] Collected:  01/31/17 1324     Updated:  01/31/17 1329    Narrative:       XR CHEST POST CVA PORT-     INDICATIONS: PICC placement     TECHNIQUE: FRONTAL VIEW OF THE CHEST     COMPARISON: 1/31/2017     FINDINGS:      Right PICC extends to the superior vena cava. Endotracheal tube does  not appear significantly changed. Sternotomy wires are present. Heart  size is normal. Aorta is calcified. Diffuse patchy infiltrates in both  lungs persist. No pneumothorax or pleural effusion. Otherwise stable.       Impression:          Right PICC extends to the superior vena cava. Persistent diffuse  bilateral pulmonary infiltrates, continued follow-up advised.     This report was finalized on 1/31/2017 1:26 PM by Dr. Jet Rodgers MD.       XR Chest 1 View [[id number]] Collected:  01/31/17 0149     Updated:  01/31/17 2319    Narrative:       X-RAY CHEST 1 VIEW.     HISTORY: Patient intubated.     COMPARISON: 1/30/2017.     FINDINGS:  Cardiomediastinal silhouette is within normal limits.  Interval  placement of a endotracheal tube in good position.     Extensive bilateral infiltrates, there is slight  worsening.               Impression:       Endotracheal tube in good position. Worsening infiltrates.          This report was finalized on 1/31/2017 11:16 PM by Dr. Ancelmo Pedraza MD.       XR Chest 1 View [[id number]] Collected:  02/02/17 1308     Updated:  02/02/17 1313    Narrative:       XR CHEST 1 VW-     HISTORY: Male who is 81 years-old,  respiratory distress     TECHNIQUE: Frontal view of the chest     COMPARISON: 02/02/2017 at 0427 hours     FINDINGS: Stable appearing right PICC, endotracheal tube, NG tube.  Sternotomy wires are noted. Heart, mediastinum and pulmonary vasculature  are unremarkable. Extensive bilateral infiltrates appear similar to  prior exam. No large pleural effusion is seen, there may be minimal  pleural effusions. No pneumothorax. No acute osseous process.       Impression:       No significant change.     This report was finalized on 2/2/2017 1:10 PM by Dr. Jet Rodgers MD.       XR Chest 1 View [[id number]] Collected:  02/02/17 0450     Updated:  02/03/17 0025    Narrative:       X-RAY CHEST 1 VIEW.     HISTORY: Acute respiratory failure.     COMPARISON: 1/31/2017.     FINDINGS:  Cardiomediastinal silhouette is within normal limits.  Lines and tubes  are stable. Lung volumes are low.     Extensive bilateral infiltrates, bilateral small effusions are  suspected.               Impression:       Worsening infiltrates or congestive heart failure please clinically  correlate.          This report was finalized on 2/3/2017 12:22 AM by Dr. Ancelmo Pedraza MD.       XR Chest 1 View [[id number]] Collected:  02/03/17 0440     Updated:  02/03/17 2356    Narrative:       X-RAY CHEST 1 VIEW.     HISTORY: Follow-up pneumonia.     COMPARISON: No prior studies for comparison.     FINDINGS:  Cardiomediastinal silhouette is within normal limits.  Lines and tubes  are stable.     Patchy bilateral lung opacities are present however lung volumes have  improved.               Impression:       1.   Improved lung volumes however patchy bilateral opacities remain.  2.  Follow-up to resolution is recommended.          This report was finalized on 2/3/2017 11:53 PM by Dr. Ancelmo Pedraza MD.       XR Chest 1 View [[id number]] Collected:  02/04/17 2320     Updated:  02/04/17 2320    Narrative:       X-RAY CHEST 1 VIEW.     HISTORY: Decreased oxygen saturation.     COMPARISON: 2/3/2017.     FINDINGS:  Cardiomediastinal silhouette is within normal limits. ET tube and NG  tube are stable.     Scattered bilateral patchy opacities, there may be small effusions.              Impression:       Scattered bilateral lung opacities concerning for infiltrates, overall  no significant change.           XR Chest 1 View [[id number]] Collected:  02/05/17 1112     Updated:  02/05/17 1117    Narrative:       XR CHEST 1 VW-     HISTORY: Male who is 81 years-old,  bilateral pneumonia     TECHNIQUE: Frontal view of the chest     COMPARISON: 02/04/2017     FINDINGS: Stable appearing endotracheal tube, NG tube, right PICC.  Sternotomy wires are present. Heart size is normal. Aorta is calcified.  Extensive diffuse bilateral pulmonary infiltrates appear similar to  prior exam. No pneumothorax. Otherwise stable.       Impression:       No significant change.     This report was finalized on 2/5/2017 11:13 AM by Dr. Jet Rodgers MD.               Assessment:      Principal Problem:    Sepsis  Active Problems:    Anemia    ANDERS (acute kidney injury)    Muscle weakness-general    Bilateral pneumonia    Acute and chronic respiratory failure with hypoxia    Atrial fibrillation with RVR    Hypernatremia    Hyperlipidemia    HTN (hypertension)    Coronary artery disease    Chronic diastolic CHF (congestive heart failure)    Leg edema      Patient Active Problem List   Diagnosis Code   • Chronic coronary artery disease I25.10   • Anemia D64.9   • Hyperlipidemia E78.5   • HTN (hypertension) I10   • Trigeminal neuralgia G50.0   • Benign prostatic  hyperplasia N40.0   • Ulcerative colitis without complications K51.90   • Health care maintenance Z00.00   • Hypertension I10   • Coronary artery disease I25.10   • ANDERS (acute kidney injury) N17.9   • Muscle weakness-general M62.81   • Pulmonary nodule, right R91.1   • Pulmonary fibrosis J84.10   • Weakness R53.1   • Asterixis R27.8   • CVA (cerebral infarction) I63.9   • Irritable bowel syndrome with both constipation and diarrhea K58.2   • Odontoid fracture S12.100A   • Multiple facial bone fractures S02.92XA   • Hyponatremia E87.1   • Dehydration E86.0   • Bilateral pneumonia J18.9   • Sepsis A41.9   • Acute and chronic respiratory failure with hypoxia J96.21   • Chronic diastolic CHF (congestive heart failure) I50.32   • Leg edema R60.0   • Atrial fibrillation with RVR I48.91   • Hypernatremia E87.0       Plan:    Continue IV antibiotics as per ID recommendations.  Continue support therapy.  Vent management per ICU team.  Monitor and correct electrolytes  To be transfused today.    Monitor mental status  DVT/stress ulcer prophylaxis  Labs in am    Patient is to be DNR with conditions.         Loc Bolanos MD  2/5/2017  2:14 PM        Much of this encounter note is an electronic transcription/translation of spoken language to printed text. The electronic translation of spoken language may permit erroneous, or at times, nonsensical words or phrases to be inadvertently transcribed; Although I have reviewed the note for such errors, some may still exist

## 2017-02-06 PROBLEM — J80 ARDS (ADULT RESPIRATORY DISTRESS SYNDROME) (HCC): Status: ACTIVE | Noted: 2017-01-01

## 2017-02-06 NOTE — PROGRESS NOTES
"DAILY PROGRESS NOTE  [facility], [facility]      Patient Identification:  Name: Noah Isaac  Age: 81 y.o.  Sex: male  :  1935  MRN: [id number]         Primary Care Physician: Flash Jim MD    Subjective:  Interval History:    Mr. Isaac is sedated, intubated and on the ventilator. He will arouse and follow commands. His son is at his bedside. This is day 4 of levaquin and zosyn. He is receiving enteral feedings per [employer]trak. Pulmonary, Cardiology and ID continue to follow.     Objective:    Scheduled Meds:  albuterol 6 puff Inhalation 4x Daily - RT   enoxaparin 40 mg Subcutaneous Q24H   fentaNYL 1 patch Transdermal Q72H   furosemide 40 mg Intravenous Q12H   insulin aspart 1-20 Units Subcutaneous Q4H   iopamidol 100 mL Intravenous Once in imaging   lansoprazole 30 mg Nasogastric QAM   levoFLOXacin 500 mg Intravenous Q24H   metoprolol tartrate 25 mg Oral Q8H   piperacillin-tazobactam 3.375 g Intravenous Q8H   sennosides-docusate sodium 2 tablet Oral Nightly   sodium chloride 10 mL Intracatheter Q12H     Continuous Infusions:  dexmedetomidine 0.2-1.5 mcg/kg/hr Last Rate: 1.5 mcg/kg/hr (17 0952)   sodium chloride 9 mL/hr Last Rate: 9 mL/hr (17 1116)       Vital signs in last 24 hours:  Temp:  [97.6 °F (36.4 °C)-98.6 °F (37 °C)] 98 °F (36.7 °C)  Heart Rate:  [] 95  Resp:  [25-35] 31  BP: (115-155)/(58-81) 130/65  FiO2 (%):  [60 %-100 %] 60 %    tMax 24 hrs: Temp (24hrs), Av.1 °F (36.7 °C), Min:97.6 °F (36.4 °C), Max:98.6 °F (37 °C)      Intake/Output:    Intake/Output Summary (Last 24 hours) at 17 1004  Last data filed at 17 09   Gross per 24 hour   Intake   3628 ml   Output   4875 ml   Net  -1247 ml       Exam:    Visit Vitals   • /65   • Pulse 95   • Temp 98 °F (36.7 °C)   • Resp (!) 31   • Ht 69.02\" (175.3 cm)   • Wt 201 lb 8 oz (91.4 kg)   • SpO2 94%   • BMI 29.74 kg/m2       General:  sedated, intubated, on " ventilator. Tubefeedings. Aj catheter.   Neck: Supple, symmetrical, trachea midline, no adenopathy;   thyroid: no enlargement/tenderness/nodules;   no carotid bruit or JVD  Cardiovascular: Normal rate, regular rhythm and intact distal pulses.  Exam reveals no gallop and no friction rub. No murmur heard  Pulmonary:  very diminished breath sounds bilaterally, crackles on the right side. Rhonchi at both bases. No wheezing.    Abdominal: Soft. Soft, non-tender, bowel sounds active all four quadrants,   no masses, no hepatomegaly, no splenomegaly.   Extremities: Normal, atraumatic, no cyanosis or edema  Neurological:  sedated, on ventilator. No gross sensory or motor deficit.   Skin: Skin color, texture, turgor normal, no rashes or lesions   .         Data Review:      Results from last 7 days  Lab Units 02/06/17  0255 02/05/17  1713 02/05/17  0304 02/04/17  0336   WBC 10*3/mm3 9.82  --  8.93 8.18   HEMOGLOBIN g/dL 8.5* 8.2* 7.2* 7.2*   HEMATOCRIT % 26.7* 25.6* 23.0* 22.9*   PLATELETS 10*3/mm3 153  --  142 150         Results from last 7 days  Lab Units 02/06/17  0209 02/05/17  0304 02/04/17  0336  02/02/17  0513  02/01/17  0511   SODIUM mmol/L 146* 150* 148*  < > 146*  --  143   POTASSIUM mmol/L 3.4* 3.8 3.7  < > 3.7  < > 3.5   CHLORIDE mmol/L 109* 110* 112*  < > 113*  --  107   TOTAL CO2 mmol/L 25.1 22.3 23.0  < > 21.4*  --  20.8*   BUN mg/dL 40* 39* 41*  < > 51*  --  45*   CREATININE mg/dL 1.30* 1.18 1.41*  < > 1.72*  --  1.97*   CALCIUM mg/dL 7.9* 7.8* 8.1*  < > 8.1*  --  8.5*   BILIRUBIN mg/dL  --  0.3  --   --  0.4  --  0.4   ALK PHOS U/L  --  127*  --   --  148*  --  101   ALT (SGPT) U/L  --  22  --   --  21  --  17   AST (SGOT) U/L  --  31  --   --  34  --  17   GLUCOSE mg/dL 123* 149* 159*  < > 140*  --  96   < > = values in this interval not displayed.          0  Lab Value Date/Time   TROPONINT 0.091 (C) 02/02/2017 1219   TROPONINT 0.022 01/28/2017 2116   TROPONINT <0.010 01/17/2017 1150   TROPONINT <0.010  09/16/2016 1818   TROPONINT <0.010 08/29/2016 1958   TROPONINT <0.010 08/28/2016 2009       Microbiology Results (last 10 days)     Procedure Component Value - Date/Time    AFB Culture [[id number]] Collected:  02/04/17 1639    Lab Status:  Preliminary result Specimen:  Lavage from Lung, Right Lower Lobe Updated:  02/05/17 1303     AFB Stain        No acid fast bacilli seen on concentrated smear    Fungus Smear [[id number]] Collected:  02/04/17 1639    Lab Status:  Final result Specimen:  Lavage from Lung, Right Lower Lobe Updated:  02/05/17 0550     GMS Stain No fungal elements seen     BAL Culture, Quantitative [[id number]]  (Normal) Collected:  02/04/17 1639    Lab Status:  Preliminary result Specimen:  Lavage from Lung, Right Lower Lobe Updated:  02/05/17 0845     Culture No growth at 24 hours      Gram Stain Result Rare (1+) WBCs seen       No organisms seen     Respiratory Panel, PCR [[id number]]  (Normal) Collected:  02/04/17 1639    Lab Status:  Final result Specimen:  Lavage from Lung, Right Lower Lobe Updated:  02/04/17 1844     ADENOVIRUS, PCR Not Detected      Coronavirus 229E Not Detected      Coronavirus HKU1 Not Detected      Coronavirus NL63 Not Detected      Coronavirus OC43 Not Detected      Human Metapneumovirus Not Detected      Human Rhinovirus/Enterovirus Not Detected      Influenza B PCR Not Detected      Parainfluenza Virus 1 Not Detected      Parainfluenza Virus 2 Not Detected      Parainfluenza Virus 3 Not Detected      Parainfluenza Virus 4 Not Detected      Bordetella pertussis pcr Not Detected      Influenza 2009 H1N1 by PCR Not Detected      Chlamydophila pneumoniae PCR Not Detected      Mycoplasma pneumo by PCR Not Detected      Influenza A PCR Not Detected      Influenza A H3 Not Detected      Influenza A H1 Not Detected      RSV, PCR Not Detected     Respiratory Panel, PCR [[id number]]  (Normal) Collected:  02/02/17 1219    Lab Status:  Final result Specimen:  Wash from ET Suction Updated:   02/02/17 1447     ADENOVIRUS, PCR Not Detected      Coronavirus 229E Not Detected      Coronavirus HKU1 Not Detected      Coronavirus NL63 Not Detected      Coronavirus OC43 Not Detected      Human Metapneumovirus Not Detected      Human Rhinovirus/Enterovirus Not Detected      Influenza B PCR Not Detected      Parainfluenza Virus 1 Not Detected      Parainfluenza Virus 2 Not Detected      Parainfluenza Virus 3 Not Detected      Parainfluenza Virus 4 Not Detected      Bordetella pertussis pcr Not Detected      Influenza 2009 H1N1 by PCR Not Detected      Chlamydophila pneumoniae PCR Not Detected      Mycoplasma pneumo by PCR Not Detected      Influenza A PCR Not Detected      Influenza A H3 Not Detected      Influenza A H1 Not Detected      RSV, PCR Not Detected     Respiratory Culture [[id number]] Collected:  02/02/17 1219    Lab Status:  Final result Specimen:  Wash from ET Suction Updated:  02/04/17 0705     Respiratory Culture No growth at 2 days      Gram Stain Result Few (2+) WBCs seen              Moderate (3+) Epithelial cells per low power field      No organisms seen     Respiratory Culture [[id number]] Collected:  02/01/17 0525    Lab Status:  Final result Specimen:  Sputum from Cough Updated:  02/03/17 0819     Respiratory Culture No growth      Gram Stain Result Rare (1+) WBCs seen              Rare (1+) Epithelial cells per low power field      No organisms seen     Blood Culture [[id number]]  (Normal) Collected:  01/28/17 2119    Lab Status:  Final result Specimen:  Blood from Arm, Left Updated:  02/02/17 2201     Blood Culture No growth at 5 days     Blood Culture [[id number]]  (Normal) Collected:  01/28/17 2117    Lab Status:  Final result Specimen:  Blood from Arm, Right Updated:  02/02/17 2201     Blood Culture No growth at 5 days            Imaging Results (last 7 days)     Procedure Component Value Units Date/Time    CT Chest Without Contrast [[id number]] Collected:  01/31/17 1115     Updated:  01/31/17  1137    Narrative:       CT CHEST WITHOUT CONTRAST     HISTORY: Progressive pulmonary infiltrates     TECHNIQUE: Spiral axial CT imaging of the chest was performed at 3 mm  intervals throughout. No intravenous contrast was administered.     COMPARISON: Correlation is made with recent chest radiographs. There is  no previous CT imaging of the chest.     FINDINGS: Heart and great vessels demonstrate largely unremarkable  noncontrasted appearance. 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 Advanced calcified  coronary disease is noted in the native vessels. There are a few shotty  nodes present in the mediastinum. Calcified nodes are present consistent  with granulomatous disease. Endotracheal tube is present terminating  just below the level of sternal notch. Chest wall is unremarkable.  Degenerative changes are present at the shoulders and spine.     Lung windows demonstrate a fairly extensive and diffuse pattern of  groundglass infiltrates with some geographic sparing and air trapping  present. The right middle lobe is relatively spared. Air bronchograms  are present in the areas of greatest consolidation. Groundglass  opacities coalesce to  alveolar infiltrate at the base. Air bronchograms  are present in the areas of greatest consolidation. No complicating  features are identified. There are small bilateral pleural effusions  with some overlying passive atelectasis. No pneumothorax is identified.  Visualized upper abdomen demonstrates an unremarkable noncontrasted  appearance.       Impression:       There are diffuse groundglass and alveolar infiltrates  within both lungs. Air bronchograms are present in the areas of greatest  consolidation. No complicating features are identified. Small bilateral  pleural effusions are noted.     This report was finalized on 1/31/2017 11:34 AM by Dr. Rickey Freedman MD.       XR Chest Post CVA Port [[id number]] Collected:  01/31/17 1324     Updated:  01/31/17 1329    Narrative:       XR CHEST  POST CVA PORT-     INDICATIONS: PICC placement     TECHNIQUE: FRONTAL VIEW OF THE CHEST     COMPARISON: 1/31/2017     FINDINGS:      Right PICC extends to the superior vena cava. Endotracheal tube does  not appear significantly changed. Sternotomy wires are present. Heart  size is normal. Aorta is calcified. Diffuse patchy infiltrates in both  lungs persist. No pneumothorax or pleural effusion. Otherwise stable.       Impression:          Right PICC extends to the superior vena cava. Persistent diffuse  bilateral pulmonary infiltrates, continued follow-up advised.     This report was finalized on 1/31/2017 1:26 PM by Dr. Jet Rodgers MD.       XR Chest 1 View [[id number]] Collected:  01/31/17 0149     Updated:  01/31/17 2319    Narrative:       X-RAY CHEST 1 VIEW.     HISTORY: Patient intubated.     COMPARISON: 1/30/2017.     FINDINGS:  Cardiomediastinal silhouette is within normal limits.  Interval  placement of a endotracheal tube in good position.     Extensive bilateral infiltrates, there is slight worsening.               Impression:       Endotracheal tube in good position. Worsening infiltrates.          This report was finalized on 1/31/2017 11:16 PM by Dr. Ancelmo Pedraza MD.       XR Chest 1 View [[id number]] Collected:  02/02/17 1308     Updated:  02/02/17 1313    Narrative:       XR CHEST 1 VW-     HISTORY: Male who is 81 years-old,  respiratory distress     TECHNIQUE: Frontal view of the chest     COMPARISON: 02/02/2017 at 0427 hours     FINDINGS: Stable appearing right PICC, endotracheal tube, NG tube.  Sternotomy wires are noted. Heart, mediastinum and pulmonary vasculature  are unremarkable. Extensive bilateral infiltrates appear similar to  prior exam. No large pleural effusion is seen, there may be minimal  pleural effusions. No pneumothorax. No acute osseous process.       Impression:       No significant change.     This report was finalized on 2/2/2017 1:10 PM by Dr. Jet Rodgers MD.        XR Chest 1 View [[id number]] Collected:  02/02/17 0450     Updated:  02/03/17 0025    Narrative:       X-RAY CHEST 1 VIEW.     HISTORY: Acute respiratory failure.     COMPARISON: 1/31/2017.     FINDINGS:  Cardiomediastinal silhouette is within normal limits.  Lines and tubes  are stable. Lung volumes are low.     Extensive bilateral infiltrates, bilateral small effusions are  suspected.               Impression:       Worsening infiltrates or congestive heart failure please clinically  correlate.          This report was finalized on 2/3/2017 12:22 AM by Dr. Ancelmo Pedraza MD.       XR Chest 1 View [[id number]] Collected:  02/03/17 0440     Updated:  02/03/17 2356    Narrative:       X-RAY CHEST 1 VIEW.     HISTORY: Follow-up pneumonia.     COMPARISON: No prior studies for comparison.     FINDINGS:  Cardiomediastinal silhouette is within normal limits.  Lines and tubes  are stable.     Patchy bilateral lung opacities are present however lung volumes have  improved.               Impression:       1.  Improved lung volumes however patchy bilateral opacities remain.  2.  Follow-up to resolution is recommended.          This report was finalized on 2/3/2017 11:53 PM by Dr. Ancelmo Pedraza MD.       XR Chest 1 View [[id number]] Collected:  02/05/17 1112     Updated:  02/05/17 1117    Narrative:       XR CHEST 1 VW-     HISTORY: Male who is 81 years-old,  bilateral pneumonia     TECHNIQUE: Frontal view of the chest     COMPARISON: 02/04/2017     FINDINGS: Stable appearing endotracheal tube, NG tube, right PICC.  Sternotomy wires are present. Heart size is normal. Aorta is calcified.  Extensive diffuse bilateral pulmonary infiltrates appear similar to  prior exam. No pneumothorax. Otherwise stable.       Impression:       No significant change.     This report was finalized on 2/5/2017 11:13 AM by Dr. Jet Rodgers MD.       XR Chest 1 View [[id number]] Collected:  02/04/17 2320     Updated:  02/06/17 0038    Narrative:        X-RAY CHEST 1 VIEW.     HISTORY: Decreased oxygen saturation.     COMPARISON: 2/3/2017.     FINDINGS:  Cardiomediastinal silhouette is within normal limits. ET tube and NG  tube are stable.     Scattered bilateral patchy opacities, there may be small effusions.              Impression:       Scattered bilateral lung opacities concerning for infiltrates, overall  no significant change.         This report was finalized on 2/6/2017 12:35 AM by Dr. Ancelmo Pedraza MD.       XR Chest 1 View [[id number]] Collected:  02/06/17 0622     Updated:  02/06/17 0627    Narrative:       EMERGENCY PORTABLE CHEST SINGLE VIEW 06:00     HISTORY: 81-year-old male who attempted to pull out his own endotracheal  tube. Tube check was requested.     COMPARISON: 02/05/2017     FINDINGS:  1. Tip of the endotracheal tube is in satisfactory position 6 cm  proximal to the neeraj at the level of the medial clavicles.  2. Patchy bilateral airspace disease similar to previous study.  3. Feeding tube tip projects at least within the stomach.  4. Right arm PICC terminates over the SVC.     This report was finalized on 2/6/2017 6:23 AM by Dr. Navdeep Brown MD.               Assessment:      Principal Problem:    Sepsis  Active Problems:    Anemia    Hyperlipidemia    HTN (hypertension)    Coronary artery disease    ANDERS (acute kidney injury)    Muscle weakness-general    Bilateral pneumonia    Acute and chronic respiratory failure with hypoxia    Chronic diastolic CHF (congestive heart failure)    Leg edema    Atrial fibrillation with RVR    Hypernatremia    ARDS (adult respiratory distress syndrome)      Patient Active Problem List   Diagnosis Code   • Chronic coronary artery disease I25.10   • Anemia D64.9   • Hyperlipidemia E78.5   • HTN (hypertension) I10   • Trigeminal neuralgia G50.0   • Benign prostatic hyperplasia N40.0   • Ulcerative colitis without complications K51.90   • Health care maintenance Z00.00   • Hypertension I10   • Coronary  artery disease I25.10   • ANDERS (acute kidney injury) N17.9   • Muscle weakness-general M62.81   • Pulmonary nodule, right R91.1   • Pulmonary fibrosis J84.10   • Weakness R53.1   • Asterixis R27.8   • CVA (cerebral infarction) I63.9   • Irritable bowel syndrome with both constipation and diarrhea K58.2   • Odontoid fracture S12.100A   • Multiple facial bone fractures S02.92XA   • Hyponatremia E87.1   • Dehydration E86.0   • Bilateral pneumonia J18.9   • Sepsis A41.9   • Acute and chronic respiratory failure with hypoxia J96.21   • Chronic diastolic CHF (congestive heart failure) I50.32   • Leg edema R60.0   • Atrial fibrillation with RVR I48.91   • Hypernatremia E87.0   • ARDS (adult respiratory distress syndrome) J80       Plan:    Continue support therapy  Continue IV antibiotics  Follow culture results  Diet: enteral feedings  Monitor and correct electrolytes- replace potassium  Ventilator management per pulmonary- discussion of tracheostomy started with family. Long discussion with wife, all questions answered.  DVT/stress ulcer prophylaxis  Labs in am      Loc Bolanos MD  2/6/2017  10:04 AM        Much of this encounter note is an electronic transcription/translation of spoken language to printed text. The electronic translation of spoken language may permit erroneous, or at times, nonsensical words or phrases to be inadvertently transcribed; Although I have reviewed the note for such errors, some may still exist

## 2017-02-06 NOTE — PROGRESS NOTES
"  Infectious Diseases Progress Note    Rafat Ybarra MD     [facility]  Los: 9 days  Patient Identification:  Name: Noah Isaac  Age: 81 y.o.  Sex: male  :  1935  MRN: [id number]         Primary Care Physician: Flash Jim MD            Subjective: Sedated but opens eyes to his name.  According the family member in the room he was interactive once his sedation was removed.  Interval History: The ventilator requiring significant support.     Objective:    Scheduled Meds:    albuterol 6 puff Inhalation 4x Daily - RT   enoxaparin 40 mg Subcutaneous Q24H   fentaNYL 1 patch Transdermal Q72H   furosemide 40 mg Intravenous Q12H   insulin aspart 1-20 Units Subcutaneous Q4H   iopamidol 100 mL Intravenous Once in imaging   lansoprazole 30 mg Nasogastric QAM   levoFLOXacin 500 mg Intravenous Q24H   metoprolol tartrate 25 mg Oral Q8H   piperacillin-tazobactam 3.375 g Intravenous Q8H   sennosides-docusate sodium 2 tablet Oral Nightly   sodium chloride 10 mL Intracatheter Q12H     Continuous Infusions:    dexmedetomidine 0.2-1.5 mcg/kg/hr Last Rate: 1.5 mcg/kg/hr (17 0747)   sodium chloride 9 mL/hr Last Rate: 9 mL/hr (17 1116)       Vital signs in last 24 hours:  Temp:  [97.6 °F (36.4 °C)-98.6 °F (37 °C)] 98 °F (36.7 °C)  Heart Rate:  [] 95  Resp:  [25-35] 31  BP: (115-155)/(58-81) 130/65  FiO2 (%):  [60 %-100 %] 60 %    Intake/Output:    Intake/Output Summary (Last 24 hours) at 17 0852  Last data filed at 17 0632   Gross per 24 hour   Intake   3204 ml   Output   4675 ml   Net  -1471 ml       Exam:  Visit Vitals   • /65   • Pulse 95   • Temp 98 °F (36.7 °C)   • Resp (!) 31   • Ht 69.02\" (175.3 cm)   • Wt 201 lb 8 oz (91.4 kg)   • SpO2 94%   • BMI 29.74 kg/m2       General Appearance:    On ventilator but interactive and follows commands.                          Head:    Normocephalic, without obvious abnormality, atraumatic                           Eyes:    " PERRL, conjunctiva/corneas clear, EOM's intact, both eyes                         Throat:   Lips, tongue, gums normal; oral mucosa pink and moist                           Neck:   Cervical collar in place                         Lungs:    Clear to auscultation bilaterally, respirations unlabored                 Chest Wall:    No tenderness or deformity                          Heart:    Regular rate and rhythm, S1 and S2 normal, no murmur,no  Rub                                      or gallop                  Abdomen:     Soft, non-tender, bowel sounds active, no masses, no                                                        organomegaly                  Extremities:   Extremities normal, atraumatic, no cyanosis or edema                        Pulses:   Pulses palpable in all extremities                            Skin:   Skin is warm and dry,  no rashes or palpable lesions                    Data Review:    I reviewed the patient's new clinical results.    Results from last 7 days  Lab Units 02/06/17  0255 02/05/17  1713 02/05/17  0304 02/04/17  0336 02/03/17  0322 02/02/17  1219 02/02/17  0512 02/01/17  0511   WBC 10*3/mm3 9.82  --  8.93 8.18 8.64 11.17* 8.59 10.93*   HEMOGLOBIN g/dL 8.5* 8.2* 7.2* 7.2* 7.8* 8.6* 7.6* 7.4*   PLATELETS 10*3/mm3 153  --  142 150 185 206 202 230       Results from last 7 days  Lab Units 02/06/17  0209 02/05/17  0304 02/04/17  0336 02/03/17  0322 02/02/17  0513 02/01/17  1604 02/01/17  0511 01/31/17  0606   SODIUM mmol/L 146* 150* 148* 148* 146*  --  143 141   POTASSIUM mmol/L 3.4* 3.8 3.7 3.8 3.7 3.8 3.5 4.0   CHLORIDE mmol/L 109* 110* 112* 115* 113*  --  107 105   TOTAL CO2 mmol/L 25.1 22.3 23.0 22.3 21.4*  --  20.8* 21.0*   BUN mg/dL 40* 39* 41* 46* 51*  --  45* 34*   CREATININE mg/dL 1.30* 1.18 1.41* 1.43* 1.72*  --  1.97* 1.56*   CALCIUM mg/dL 7.9* 7.8* 8.1* 8.5* 8.1*  --  8.5* 8.2*   GLUCOSE mg/dL 123* 149* 159* 168* 140*  --  96 120*     CULTURES are  negative    Assessment:  Principal Problem:    Sepsis  Active Problems:    Anemia    Hyperlipidemia    HTN (hypertension)    Coronary artery disease    ANDERS (acute kidney injury)    Muscle weakness-general    Bilateral pneumonia    Acute and chronic respiratory failure with hypoxia    Chronic diastolic CHF (congestive heart failure)    Leg edema    Atrial fibrillation with RVR    Hypernatremia      Plan: Continue present antibiotics for 10 days from 2/3/2017.  Agree with Dr. Mitchell about need for trach and PEG to allow for prolonged supportive care and give him enough time to recover if possible from this situation.  Keep a close eye on complications of supportive care such as C. difficile colitis  Infection and ventilator associated pneumonia and decubiti.  Long conversation with patient's family as well as patient's intensivist Dr. Ansari.  It appears on one hand he feels better based on his response but on the other hand his objective parameters are still pointing towards possibility of prolonged ventilator stay given his needs for support.  I agree with intensivist service that at this juncture consideration should be given for trach and PEG so that he continued to have supports without having untoward effect and have eventually a better chance to recover from it.  Obviously is not a guarantee that he would recover but with this pathway he would have better chance.    Rafat Ybarra MD  2/6/2017  8:52 AM    Much of this encounter note is an electronic transcription/translation of spoken language to printed text. The electronic translation of spoken language may permit erroneous, or at times, nonsensical words or phrases to be inadvertently transcribed; Although I have reviewed the note for such errors, some may still exist

## 2017-02-06 NOTE — PLAN OF CARE
Problem: Patient Care Overview (Adult)  Goal: Plan of Care Review  Outcome: Ongoing (interventions implemented as appropriate)    02/06/17 0501   Coping/Psychosocial Response Interventions   Plan Of Care Reviewed With patient   Patient Care Overview   Progress unable to show any progress toward functional goals   Outcome Evaluation   Outcome Summary/Follow up Plan Pt slept throughout the night with some difficulty oxygenating. Vent settings able to be decreased to 90%

## 2017-02-06 NOTE — PROGRESS NOTES
[facility]      Mar/chart reviewed  F/u Acute hypoxemic respiratory failure.  Opens eyes, follows commands    Vital Sign Min/Max for last 24 hours  Temp  Min: 97.6 °F (36.4 °C)  Max: 98.6 °F (37 °C)   BP  Min: 115/58  Max: 155/74   Pulse  Min: 87  Max: 106   Resp  Min: 25  Max: 32   SpO2  Min: 89 %  Max: 100 %   No Data Recorded   No Data Recorded     Sedated on vent  perrl, eomi, no icterus,  mmm, no jvd, trachea midline, neck supple,  chest decreased bilaterally, + crackles, no wheezes,   rrr,   soft, nt, nd +bs,  no c/c/ e    Labs:  Wbc 9.82  hgb 8.5  plts 153  Cr 1.3  Na 146  k 3.4  Bicarb 25  Micro: no growth  CXR: diffuse bilateral infiltrates    A/P:  1. Acute hypoxemic respiratory failure -- likely multifactorial, s/p bronch today  2. Pneumonia-- procal trending down  3. Diastolic CHF --continue lasix for now, given ards  4. Bilateral pleural effusions  5. cheli  6. Aflutter/fib -- on prophylactic dose lovenox 2/2 decreasing hgb  7. Anemia -- will change lovenox to prophylactic dose; s/p transfusion of one unit 2/5  8. malnutrion  9. Hypernatremia -- improving  10. ARDS -- continue vent support, will decrease pc some to bring down tidal volumes a little, now on 60% and 12 of peep    D/w RN and RT  He is going to take some time to get better if it is going to resolve.  Discussed trach with son at bedside    Sounds like family would not like trach if that is what winds up being needed.    CC 35

## 2017-02-06 NOTE — PLAN OF CARE
Problem: Patient Care Overview (Adult)  Goal: Plan of Care Review  Outcome: Ongoing (interventions implemented as appropriate)    02/06/17 0621   Coping/Psychosocial Response Interventions   Plan Of Care Reviewed With patient;sibling   Patient Care Overview   Progress unable to show any progress toward functional goals   Outcome Evaluation   Outcome Summary/Follow up Plan Pt tried to pull out ETT at 6 am

## 2017-02-06 NOTE — NURSING NOTE
CWOCN consult for pressure ulcer coccyx. Assessed coccyx- not pressure ulcer noted. There is a bruise on patient's buttock that is irregular in shape and appears to be fading around the edges. The coccyx has some pink, red skin and in gluteal cleft- it blanches and RN reports that patient has had some frequent stools. Barrier ointment is being applied. Patient currently on critical care low air loss mattress and can be repositioned. Staff is applying barrier ointment. Continue current care for skin.

## 2017-02-06 NOTE — PLAN OF CARE
Problem: Patient Care Overview (Adult)  Goal: Plan of Care Review  Outcome: Ongoing (interventions implemented as appropriate)    02/06/17 1805   Coping/Psychosocial Response Interventions   Plan Of Care Reviewed With spouse   Outcome Evaluation   Outcome Summary/Follow up Plan Vitals stable. On 60% FiO2. Sats greater than 92%.Wife updated on plan of care. Will continue to monitor closely         Problem: Fall Risk (Adult)  Goal: Absence of Falls  Outcome: Ongoing (interventions implemented as appropriate)    Problem: Pneumonia (Adult)  Goal: Signs and Symptoms of Listed Potential Problems Will be Absent or Manageable (Pneumonia)  Outcome: Ongoing (interventions implemented as appropriate)    Problem: Mobility, Physical Impaired (Adult)  Goal: Enhanced Mobility Skills  Outcome: Ongoing (interventions implemented as appropriate)  Goal: Enhanced Functionality Ability  Outcome: Ongoing (interventions implemented as appropriate)    Problem: Respiratory Insufficiency (Adult)  Goal: Acid/Base Balance  Outcome: Ongoing (interventions implemented as appropriate)  Goal: Effective Ventilation  Outcome: Ongoing (interventions implemented as appropriate)    Problem: Mechanical Ventilation, Invasive (Adult)  Goal: Signs and Symptoms of Listed Potential Problems Will be Absent or Manageable (Mechanical Ventilation, Invasive)  Outcome: Ongoing (interventions implemented as appropriate)    Problem: Sepsis (Adult)  Goal: Signs and Symptoms of Listed Potential Problems Will be Absent or Manageable (Sepsis)  Outcome: Ongoing (interventions implemented as appropriate)

## 2017-02-06 NOTE — PROGRESS NOTES
"Patient Care Team:  Flash Jim MD as PCP - General  Flash Jim MD as PCP - Family Medicine    Chief Complaint: Follow-up acute diastolic CHF    Interval History:   Continues to diuresis.  Still requiring vent support.  Maintaining sinus    Objective   Vital Signs  Temp:  [97.6 °F (36.4 °C)-98.2 °F (36.8 °C)] 98.2 °F (36.8 °C)  Heart Rate:  [] 101  Resp:  [28-35] 31  BP: (115-155)/(58-81) 149/69  FiO2 (%):  [60 %-100 %] 60 %    Intake/Output Summary (Last 24 hours) at 02/06/17 1614  Last data filed at 02/06/17 1500   Gross per 24 hour   Intake   3178 ml   Output   4550 ml   Net  -1372 ml     Flowsheet Rows         First Filed Value    Admission Height  70\" (177.8 cm) Documented at 01/28/2017 2113    Admission Weight  187 lb (84.8 kg) Documented at 01/28/2017 2113          General Appearance:    Intubated sedated   Head:    Normocephalic, without obvious abnormality, atraumatic       Neck:   No adenopathy, supple, no thyromegaly, no carotid bruit, no    JVD   Lungs:     Rales bilaterally.     Heart:    Normal rate, regular rhythm,  No murmur, rub, or gallop   Chest Wall:    No abnormalities observed   Abdomen:     Normal bowel sounds, soft, non-tender, non-distended,            no rebound tenderness   Extremities:   No cyanosis, clubbing, or edema   Pulses:   Pulses palpable and equal bilaterally   Skin:   No bleeding or rash               albuterol 6 puff Inhalation 4x Daily - RT   enoxaparin 40 mg Subcutaneous Q24H   fentaNYL 1 patch Transdermal Q72H   furosemide 40 mg Intravenous Q12H   insulin aspart 1-20 Units Subcutaneous Q4H   iopamidol 100 mL Intravenous Once in imaging   lansoprazole 30 mg Nasogastric QAM   levoFLOXacin 500 mg Intravenous Q24H   metoprolol tartrate 25 mg Oral Q8H   piperacillin-tazobactam 3.375 g Intravenous Q8H   sennosides-docusate sodium 2 tablet Oral Nightly   sodium chloride 10 mL Intracatheter Q12H         dexmedetomidine 0.2-1.5 mcg/kg/hr Last Rate: 1.2 mcg/kg/hr " (02/06/17 1314)   sodium chloride 9 mL/hr Last Rate: 9 mL/hr (02/03/17 1116)       Results Review:      Results from last 7 days  Lab Units 02/06/17  0209   SODIUM mmol/L 146*   POTASSIUM mmol/L 3.4*   CHLORIDE mmol/L 109*   TOTAL CO2 mmol/L 25.1   BUN mg/dL 40*   CREATININE mg/dL 1.30*   GLUCOSE mg/dL 123*   CALCIUM mg/dL 7.9*       Results from last 7 days  Lab Units 02/02/17  1219   TROPONIN T ng/mL 0.091*       Results from last 7 days  Lab Units 02/06/17  0255   WBC 10*3/mm3 9.82   HEMOGLOBIN g/dL 8.5*   HEMATOCRIT % 26.7*   PLATELETS 10*3/mm3 153               Results from last 7 days  Lab Units 02/03/17  0322   MAGNESIUM mg/dL 2.4           I reviewed the patient's new clinical results.  I personally viewed and interpreted the patient's EKG/Telemetry data       Assessment/Plan   Respiratory failure on the vent  PNA - b/l   Sepsis   Atrial flutter  Recent fall with facial fractures and c-spine fracture. Very high risk for long term anticoagulation.   Anemia   ANDERS - worsening  H/o CAD, s/p CABG, LVEF 50% on echo 8/2016  NSTEMI - type 2   CVA 11/2016  PNA  ARDS    -Continue diuresis.  His urine output is good.  He is also receiving some free water.  Think this is reasonable as long as we are net negative.  -On prophylactic Lovenox only  -Maintaining sinus today.  -Patient's wife and I quickly discussed that if we don't see improvement in the next 48 hours we may want to consider tracheostomy.  He previously per her report and per her wishes as well would not be interested in this.  We will continue, however I'm not optimistic

## 2017-02-07 NOTE — PLAN OF CARE
Problem: Patient Care Overview (Adult)  Goal: Plan of Care Review  Outcome: Ongoing (interventions implemented as appropriate)    02/07/17 0606   Coping/Psychosocial Response Interventions   Plan Of Care Reviewed With patient;elsie   Patient Care Overview   Progress no change   Outcome Evaluation   Outcome Summary/Follow up Plan Pt decreased to 50% FiO2 per RT. Vitals stable. Precedex gtt continued at 1.3, and fentanyl needed frequently for pain. Pt tolerated rotation therapy, had 2 bowel movements last night. Son at bedside, updated on care.        Goal: Adult Individualization and Mutuality  Outcome: Ongoing (interventions implemented as appropriate)  Goal: Discharge Needs Assessment  Outcome: Ongoing (interventions implemented as appropriate)    Problem: Fall Risk (Adult)  Goal: Absence of Falls  Outcome: Ongoing (interventions implemented as appropriate)    Problem: Pneumonia (Adult)  Goal: Signs and Symptoms of Listed Potential Problems Will be Absent or Manageable (Pneumonia)  Outcome: Ongoing (interventions implemented as appropriate)    Problem: Mobility, Physical Impaired (Adult)  Goal: Enhanced Mobility Skills  Outcome: Ongoing (interventions implemented as appropriate)  Goal: Enhanced Functionality Ability  Outcome: Ongoing (interventions implemented as appropriate)    Problem: Respiratory Insufficiency (Adult)  Goal: Acid/Base Balance  Outcome: Ongoing (interventions implemented as appropriate)  Goal: Effective Ventilation  Outcome: Ongoing (interventions implemented as appropriate)    Problem: Mechanical Ventilation, Invasive (Adult)  Goal: Signs and Symptoms of Listed Potential Problems Will be Absent or Manageable (Mechanical Ventilation, Invasive)  Outcome: Ongoing (interventions implemented as appropriate)    Problem: Sepsis (Adult)  Goal: Signs and Symptoms of Listed Potential Problems Will be Absent or Manageable (Sepsis)  Outcome: Ongoing (interventions implemented as appropriate)

## 2017-02-07 NOTE — PROGRESS NOTES
"DAILY PROGRESS NOTE  [facility], [facility]      Patient Identification:  Name: Noah Isaac  Age: 81 y.o.  Sex: male  :  1935  MRN: [id number]         Primary Care Physician: Flash Jim MD    Subjective:  Interval History:  Mr. Isaac is sedated, intubated and on the ventilator this morning, directing his AM care. He shakes head no to  Questions of N/V/D, CP. Pulmonary decreased some of his ventilator parameters this morning and his sats are 95% at present. This is day 5 of levaquin and zosyn. His step- son is at his bedside. Cardiology continues to follow.     Objective:    Scheduled Meds:  albuterol 6 puff Inhalation 4x Daily - RT   enoxaparin 40 mg Subcutaneous Q24H   fentaNYL 1 patch Transdermal Q72H   insulin aspart 1-20 Units Subcutaneous Q4H   iopamidol 100 mL Intravenous Once in imaging   lansoprazole 30 mg Nasogastric QAM   levoFLOXacin 500 mg Intravenous Q24H   metoprolol tartrate 25 mg Oral Q8H   piperacillin-tazobactam 3.375 g Intravenous Q8H   sennosides-docusate sodium 2 tablet Oral Nightly   sodium chloride 10 mL Intracatheter Q12H     Continuous Infusions:  dexmedetomidine 0.2-1.5 mcg/kg/hr Last Rate: 1.2 mcg/kg/hr (17 0909)   sodium chloride 9 mL/hr Last Rate: 9 mL/hr (17 0509)       Vital signs in last 24 hours:  Temp:  [97.6 °F (36.4 °C)-99.4 °F (37.4 °C)] 98.1 °F (36.7 °C)  Heart Rate:  [] 92  Resp:  [20-34] 34  BP: (118-169)/(59-82) 146/75  FiO2 (%):  [50 %-60 %] 50 %    tMax 24 hrs: Temp (24hrs), Av.5 °F (36.9 °C), Min:97.6 °F (36.4 °C), Max:99.4 °F (37.4 °C)      Intake/Output:    Intake/Output Summary (Last 24 hours) at 17 1116  Last data filed at 17 0909   Gross per 24 hour   Intake   3660 ml   Output   5475 ml   Net  -1815 ml       Exam:    Visit Vitals   • /75   • Pulse 92   • Temp 98.1 °F (36.7 °C) (Oral)   • Resp (!) 34   • Ht 69.02\" (175.3 cm)   • Wt 200 lb 6.4 oz (90.9 kg)   • SpO2 " 96%   • BMI 29.58 kg/m2     General: sedated, intubated, on ventilator. Tubefeedings. Aj catheter.   Neck: Supple, symmetrical, trachea midline, no adenopathy;   thyroid: no enlargement/tenderness/nodules;   no carotid bruit or JVD  Cardiovascular: Normal rate, regular rhythm and intact distal pulses.  Exam reveals no gallop and no friction rub. No murmur heard  Pulmonary: very diminished breath sounds bilaterally, crackles on the right side. No wheezing.    Abdominal: Soft. Soft, non-tender, bowel sounds active all four quadrants,   no masses, no hepatomegaly, no splenomegaly.   Extremities: Normal, atraumatic, no cyanosis or edema  Neurological: sedated, on ventilator. No gross sensory or motor deficit.   Skin: Skin color, texture, turgor normal, no rashes or lesions   .         Data Review:      Results from last 7 days  Lab Units 02/07/17  0450 02/06/17  0255 02/05/17  1713 02/05/17  0304   WBC 10*3/mm3 11.77* 9.82  --  8.93   HEMOGLOBIN g/dL 8.9* 8.5* 8.2* 7.2*   HEMATOCRIT % 27.7* 26.7* 25.6* 23.0*   PLATELETS 10*3/mm3 177 153  --  142         Results from last 7 days  Lab Units 02/07/17  0450 02/06/17  1619 02/06/17  0209 02/05/17  0304  02/02/17  0513  02/01/17  0511   SODIUM mmol/L 145  --  146* 150*  < > 146*  --  143   POTASSIUM mmol/L 3.6 4.1 3.4* 3.8  < > 3.7  < > 3.5   CHLORIDE mmol/L 102  --  109* 110*  < > 113*  --  107   TOTAL CO2 mmol/L 29.2*  --  25.1 22.3  < > 21.4*  --  20.8*   BUN mg/dL 40*  --  40* 39*  < > 51*  --  45*   CREATININE mg/dL 1.41*  --  1.30* 1.18  < > 1.72*  --  1.97*   CALCIUM mg/dL 8.5*  --  7.9* 7.8*  < > 8.1*  --  8.5*   BILIRUBIN mg/dL  --   --   --  0.3  --  0.4  --  0.4   ALK PHOS U/L  --   --   --  127*  --  148*  --  101   ALT (SGPT) U/L  --   --   --  22  --  21  --  17   AST (SGOT) U/L  --   --   --  31  --  34  --  17   GLUCOSE mg/dL 149*  --  123* 149*  < > 140*  --  96   < > = values in this interval not displayed.          0  Lab Value Date/Time   TROPONINT  0.091 (C) 02/02/2017 1219   TROPONINT 0.022 01/28/2017 2116   TROPONINT <0.010 01/17/2017 1150   TROPONINT <0.010 09/16/2016 1818   TROPONINT <0.010 08/29/2016 1958   TROPONINT <0.010 08/28/2016 2009       Microbiology Results (last 10 days)     Procedure Component Value - Date/Time    AFB Culture [[id number]] Collected:  02/04/17 1639    Lab Status:  Preliminary result Specimen:  Lavage from Lung, Right Lower Lobe Updated:  02/05/17 1303     AFB Stain        No acid fast bacilli seen on concentrated smear    Fungus Smear [[id number]] Collected:  02/04/17 1639    Lab Status:  Final result Specimen:  Lavage from Lung, Right Lower Lobe Updated:  02/05/17 0550     GMS Stain No fungal elements seen     BAL Culture, Quantitative [[id number]]  (Normal) Collected:  02/04/17 1639    Lab Status:  Final result Specimen:  Lavage from Lung, Right Lower Lobe Updated:  02/06/17 1231     Culture No growth at 2 days      Gram Stain Result Rare (1+) WBCs seen       No organisms seen     Respiratory Panel, PCR [[id number]]  (Normal) Collected:  02/04/17 1639    Lab Status:  Final result Specimen:  Lavage from Lung, Right Lower Lobe Updated:  02/04/17 1844     ADENOVIRUS, PCR Not Detected      Coronavirus 229E Not Detected      Coronavirus HKU1 Not Detected      Coronavirus NL63 Not Detected      Coronavirus OC43 Not Detected      Human Metapneumovirus Not Detected      Human Rhinovirus/Enterovirus Not Detected      Influenza B PCR Not Detected      Parainfluenza Virus 1 Not Detected      Parainfluenza Virus 2 Not Detected      Parainfluenza Virus 3 Not Detected      Parainfluenza Virus 4 Not Detected      Bordetella pertussis pcr Not Detected      Influenza 2009 H1N1 by PCR Not Detected      Chlamydophila pneumoniae PCR Not Detected      Mycoplasma pneumo by PCR Not Detected      Influenza A PCR Not Detected      Influenza A H3 Not Detected      Influenza A H1 Not Detected      RSV, PCR Not Detected     Respiratory Panel, PCR [[id number]]   (Normal) Collected:  02/02/17 1219    Lab Status:  Final result Specimen:  Wash from ET Suction Updated:  02/02/17 1447     ADENOVIRUS, PCR Not Detected      Coronavirus 229E Not Detected      Coronavirus HKU1 Not Detected      Coronavirus NL63 Not Detected      Coronavirus OC43 Not Detected      Human Metapneumovirus Not Detected      Human Rhinovirus/Enterovirus Not Detected      Influenza B PCR Not Detected      Parainfluenza Virus 1 Not Detected      Parainfluenza Virus 2 Not Detected      Parainfluenza Virus 3 Not Detected      Parainfluenza Virus 4 Not Detected      Bordetella pertussis pcr Not Detected      Influenza 2009 H1N1 by PCR Not Detected      Chlamydophila pneumoniae PCR Not Detected      Mycoplasma pneumo by PCR Not Detected      Influenza A PCR Not Detected      Influenza A H3 Not Detected      Influenza A H1 Not Detected      RSV, PCR Not Detected     Respiratory Culture [[id number]] Collected:  02/02/17 1219    Lab Status:  Final result Specimen:  Wash from ET Suction Updated:  02/04/17 0705     Respiratory Culture No growth at 2 days      Gram Stain Result Few (2+) WBCs seen              Moderate (3+) Epithelial cells per low power field      No organisms seen     Respiratory Culture [[id number]] Collected:  02/01/17 0525    Lab Status:  Final result Specimen:  Sputum from Cough Updated:  02/03/17 0819     Respiratory Culture No growth      Gram Stain Result Rare (1+) WBCs seen              Rare (1+) Epithelial cells per low power field      No organisms seen     Blood Culture [[id number]]  (Normal) Collected:  01/28/17 2119    Lab Status:  Final result Specimen:  Blood from Arm, Left Updated:  02/02/17 2201     Blood Culture No growth at 5 days     Blood Culture [[id number]]  (Normal) Collected:  01/28/17 2117    Lab Status:  Final result Specimen:  Blood from Arm, Right Updated:  02/02/17 2201     Blood Culture No growth at 5 days            Imaging Results (last 7 days)     Procedure Component Value  Units Date/Time    CT Chest Without Contrast [[id number]] Collected:  01/31/17 1115     Updated:  01/31/17 1137    Narrative:       CT CHEST WITHOUT CONTRAST     HISTORY: Progressive pulmonary infiltrates     TECHNIQUE: Spiral axial CT imaging of the chest was performed at 3 mm  intervals throughout. No intravenous contrast was administered.     COMPARISON: Correlation is made with recent chest radiographs. There is  no previous CT imaging of the chest.     FINDINGS: Heart and great vessels demonstrate largely unremarkable  noncontrasted appearance. 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 Advanced calcified  coronary disease is noted in the native vessels. There are a few shotty  nodes present in the mediastinum. Calcified nodes are present consistent  with granulomatous disease. Endotracheal tube is present terminating  just below the level of sternal notch. Chest wall is unremarkable.  Degenerative changes are present at the shoulders and spine.     Lung windows demonstrate a fairly extensive and diffuse pattern of  groundglass infiltrates with some geographic sparing and air trapping  present. The right middle lobe is relatively spared. Air bronchograms  are present in the areas of greatest consolidation. Groundglass  opacities coalesce to  alveolar infiltrate at the base. Air bronchograms  are present in the areas of greatest consolidation. No complicating  features are identified. There are small bilateral pleural effusions  with some overlying passive atelectasis. No pneumothorax is identified.  Visualized upper abdomen demonstrates an unremarkable noncontrasted  appearance.       Impression:       There are diffuse groundglass and alveolar infiltrates  within both lungs. Air bronchograms are present in the areas of greatest  consolidation. No complicating features are identified. Small bilateral  pleural effusions are noted.     This report was finalized on 1/31/2017 11:34 AM by Dr. Rickey Freedman MD.       XR Chest Post  CVA Port [[id number]] Collected:  01/31/17 1324     Updated:  01/31/17 1329    Narrative:       XR CHEST POST CVA PORT-     INDICATIONS: PICC placement     TECHNIQUE: FRONTAL VIEW OF THE CHEST     COMPARISON: 1/31/2017     FINDINGS:      Right PICC extends to the superior vena cava. Endotracheal tube does  not appear significantly changed. Sternotomy wires are present. Heart  size is normal. Aorta is calcified. Diffuse patchy infiltrates in both  lungs persist. No pneumothorax or pleural effusion. Otherwise stable.       Impression:          Right PICC extends to the superior vena cava. Persistent diffuse  bilateral pulmonary infiltrates, continued follow-up advised.     This report was finalized on 1/31/2017 1:26 PM by Dr. Jet Rodgers MD.       XR Chest 1 View [[id number]] Collected:  01/31/17 0149     Updated:  01/31/17 2319    Narrative:       X-RAY CHEST 1 VIEW.     HISTORY: Patient intubated.     COMPARISON: 1/30/2017.     FINDINGS:  Cardiomediastinal silhouette is within normal limits.  Interval  placement of a endotracheal tube in good position.     Extensive bilateral infiltrates, there is slight worsening.               Impression:       Endotracheal tube in good position. Worsening infiltrates.          This report was finalized on 1/31/2017 11:16 PM by Dr. Ancelmo Pedraza MD.       XR Chest 1 View [[id number]] Collected:  02/02/17 1308     Updated:  02/02/17 1313    Narrative:       XR CHEST 1 VW-     HISTORY: Male who is 81 years-old,  respiratory distress     TECHNIQUE: Frontal view of the chest     COMPARISON: 02/02/2017 at 0427 hours     FINDINGS: Stable appearing right PICC, endotracheal tube, NG tube.  Sternotomy wires are noted. Heart, mediastinum and pulmonary vasculature  are unremarkable. Extensive bilateral infiltrates appear similar to  prior exam. No large pleural effusion is seen, there may be minimal  pleural effusions. No pneumothorax. No acute osseous process.       Impression:       No  significant change.     This report was finalized on 2/2/2017 1:10 PM by Dr. Jet Rodgers MD.       XR Chest 1 View [[id number]] Collected:  02/02/17 0450     Updated:  02/03/17 0025    Narrative:       X-RAY CHEST 1 VIEW.     HISTORY: Acute respiratory failure.     COMPARISON: 1/31/2017.     FINDINGS:  Cardiomediastinal silhouette is within normal limits.  Lines and tubes  are stable. Lung volumes are low.     Extensive bilateral infiltrates, bilateral small effusions are  suspected.               Impression:       Worsening infiltrates or congestive heart failure please clinically  correlate.          This report was finalized on 2/3/2017 12:22 AM by Dr. Ancelmo Pedraza MD.       XR Chest 1 View [[id number]] Collected:  02/03/17 0440     Updated:  02/03/17 2356    Narrative:       X-RAY CHEST 1 VIEW.     HISTORY: Follow-up pneumonia.     COMPARISON: No prior studies for comparison.     FINDINGS:  Cardiomediastinal silhouette is within normal limits.  Lines and tubes  are stable.     Patchy bilateral lung opacities are present however lung volumes have  improved.               Impression:       1.  Improved lung volumes however patchy bilateral opacities remain.  2.  Follow-up to resolution is recommended.          This report was finalized on 2/3/2017 11:53 PM by Dr. Ancelmo Pedraza MD.       XR Chest 1 View [[id number]] Collected:  02/05/17 1112     Updated:  02/05/17 1117    Narrative:       XR CHEST 1 VW-     HISTORY: Male who is 81 years-old,  bilateral pneumonia     TECHNIQUE: Frontal view of the chest     COMPARISON: 02/04/2017     FINDINGS: Stable appearing endotracheal tube, NG tube, right PICC.  Sternotomy wires are present. Heart size is normal. Aorta is calcified.  Extensive diffuse bilateral pulmonary infiltrates appear similar to  prior exam. No pneumothorax. Otherwise stable.       Impression:       No significant change.     This report was finalized on 2/5/2017 11:13 AM by Dr. Jet HURTADO  MD Vishal.       XR Chest 1 View [[id number]] Collected:  02/04/17 2320     Updated:  02/06/17 0038    Narrative:       X-RAY CHEST 1 VIEW.     HISTORY: Decreased oxygen saturation.     COMPARISON: 2/3/2017.     FINDINGS:  Cardiomediastinal silhouette is within normal limits. ET tube and NG  tube are stable.     Scattered bilateral patchy opacities, there may be small effusions.              Impression:       Scattered bilateral lung opacities concerning for infiltrates, overall  no significant change.         This report was finalized on 2/6/2017 12:35 AM by Dr. Ancelmo Pedraza MD.       XR Chest 1 View [[id number]] Collected:  02/06/17 0622     Updated:  02/06/17 0627    Narrative:       EMERGENCY PORTABLE CHEST SINGLE VIEW 06:00     HISTORY: 81-year-old male who attempted to pull out his own endotracheal  tube. Tube check was requested.     COMPARISON: 02/05/2017     FINDINGS:  1. Tip of the endotracheal tube is in satisfactory position 6 cm  proximal to the neeraj at the level of the medial clavicles.  2. Patchy bilateral airspace disease similar to previous study.  3. Feeding tube tip projects at least within the stomach.  4. Right arm PICC terminates over the SVC.     This report was finalized on 2/6/2017 6:23 AM by Dr. Navdeep Brown MD.       XR Chest 1 View [[id number]] Collected:  02/07/17 1114     Updated:  02/07/17 1114    Narrative:       PORTABLE CHEST     HISTORY: Acute respiratory distress.     COMPARISON: 02/06/2017.     FINDINGS: An endotracheal tube is in place in satisfactory position with  the tip at the thoracic inlet. A PICC line catheter is noted entering  from the right with the tip in the superior vena cava. The heart is  within normal limits in size. Diffuse pulmonary infiltrates are noted,  at least moderate if not moderate to severe with elements of  consolidation. There is improvement at the left lung base and right  upper lobe slightly. The infiltrate at the right lung base  appears  unchanged and infiltrate at the left upper lobe is slightly more  prominent as compared to previous examination. There is prominence of  the pulmonary interstitium in the perihilar regions bilaterally,  slightly more prominent as compared to previous examination as well as  cephalization of pulmonary vasculature which appears slightly more  prominent as compared to previous examination.               Assessment:      Principal Problem:    Sepsis  Active Problems:    Anemia    Hyperlipidemia    HTN (hypertension)    Coronary artery disease    ANDERS (acute kidney injury)    Muscle weakness-general    Bilateral pneumonia    Acute and chronic respiratory failure with hypoxia    Chronic diastolic CHF (congestive heart failure)    Leg edema    Atrial fibrillation with RVR    Hypernatremia    ARDS (adult respiratory distress syndrome)      Patient Active Problem List   Diagnosis Code   • Chronic coronary artery disease I25.10   • Anemia D64.9   • Hyperlipidemia E78.5   • HTN (hypertension) I10   • Trigeminal neuralgia G50.0   • Benign prostatic hyperplasia N40.0   • Ulcerative colitis without complications K51.90   • Health care maintenance Z00.00   • Hypertension I10   • Coronary artery disease I25.10   • ANDERS (acute kidney injury) N17.9   • Muscle weakness-general M62.81   • Pulmonary nodule, right R91.1   • Pulmonary fibrosis J84.10   • Weakness R53.1   • Asterixis R27.8   • CVA (cerebral infarction) I63.9   • Irritable bowel syndrome with both constipation and diarrhea K58.2   • Odontoid fracture S12.100A   • Multiple facial bone fractures S02.92XA   • Hyponatremia E87.1   • Dehydration E86.0   • Bilateral pneumonia J18.9   • Sepsis A41.9   • Acute and chronic respiratory failure with hypoxia J96.21   • Chronic diastolic CHF (congestive heart failure) I50.32   • Leg edema R60.0   • Atrial fibrillation with RVR I48.91   • Hypernatremia E87.0   • ARDS (adult respiratory distress syndrome) J80        Plan:    Continue enteral feedings  Continue IV antibiotics  Follow culture results  Ventilator management per pulmonary team  F/U cardiology recommendations  Monitor and correct electrolytes  Monitor mental status  PT/OT/ST  DVT/stress ulcer prophylaxis  Labs in am      Loc Bolanos MD  2/7/2017  11:16 AM        Much of this encounter note is an electronic transcription/translation of spoken language to printed text. The electronic translation of spoken language may permit erroneous, or at times, nonsensical words or phrases to be inadvertently transcribed; Although I have reviewed the note for such errors, some may still exist

## 2017-02-07 NOTE — PROGRESS NOTES
Daily Progress Note.     [facility] CORONARY CARE    2/7/2017    Patient ID:  Name:  Noah Isaac  MRN:  [id number]  1935  81 y.o.  male            CC/Reason for visit: Respiratory Failure -acute on chronic with hypoxia    Interval History:  Sedated on vent, requirements down slightly.   No acute events    Vitals:  Vitals:    02/07/17 0509 02/07/17 0605 02/07/17 0705 02/07/17 0707   BP:  124/64 130/59    BP Location:       Patient Position:       Pulse:  93 92    Resp:  24     Temp:    97.9 °F (36.6 °C)   TempSrc:    Oral   SpO2:  97% 97%    Weight: 200 lb 6.4 oz (90.9 kg)      Height:         Body mass index is 29.58 kg/(m^2).    Intake/Output Summary (Last 24 hours) at 02/07/17 0737  Last data filed at 02/07/17 0619   Gross per 24 hour   Intake   3724 ml   Output   5200 ml   Net  -1476 ml     WEIGHTS:     Wt Readings from Last 1 Encounters:   02/07/17 0509 200 lb 6.4 oz (90.9 kg)   02/04/17 0309 201 lb 8 oz (91.4 kg)   02/01/17 1200 193 lb (87.5 kg)   01/29/17 0150 193 lb (87.5 kg)   01/28/17 2113 187 lb (84.8 kg)     Vent Mode: PC/AC  FiO2 (%):  [50 %-60 %] 50 %  S RR:  [26] 26  PEEP/CPAP (cm H2O):  [12 cm H20] 12 cm H20  KS SUP:  [0 cm H20] 0 cm H20  MAP (cm H2O):  [17-19] 19    Exam:  GENERAL:  NAD,sedated on vent   HEENT:  EOMI, nonicteric sclera, no JVD seen as pt in c collar  PULMONARY: diffuse rhonchi, mech breath sounds, ET tube in place, dobhoff through oral cavity  CARDIAC:  RRR   ABD: SNTND BS+  EXT: no c/c/e, pulses symmetric 2+ bilaterally  NEURO:  Sedated on vent  PSYCH: sedated  LYMPH: no cervical LAD evaluated due to c collar    Scheduled meds:    albuterol 6 puff Inhalation 4x Daily - RT   enoxaparin 40 mg Subcutaneous Q24H   fentaNYL 1 patch Transdermal Q72H   furosemide 40 mg Oral BID   insulin aspart 1-20 Units Subcutaneous Q4H   iopamidol 100 mL Intravenous Once in imaging   lansoprazole 30 mg Nasogastric QAM   levoFLOXacin 500 mg Intravenous Q24H   metoprolol  tartrate 25 mg Oral Q8H   piperacillin-tazobactam 3.375 g Intravenous Q8H   sennosides-docusate sodium 2 tablet Oral Nightly   sodium chloride 10 mL Intracatheter Q12H     IV meds:                        dexmedetomidine 0.2-1.5 mcg/kg/hr Last Rate: 1.3 mcg/kg/hr (02/07/17 0704)   sodium chloride 9 mL/hr Last Rate: 9 mL/hr (02/07/17 0509)       Data Review:   I reviewed the patient's medications and new clinical results.    Results from last 7 days  Lab Units 02/07/17  0450 02/06/17  0255 02/05/17  1713 02/05/17  0304 02/04/17  0336 02/03/17  0322 02/02/17  1219 02/02/17  0512   WBC 10*3/mm3 11.77* 9.82  --  8.93 8.18 8.64 11.17* 8.59   HEMOGLOBIN g/dL 8.9* 8.5* 8.2* 7.2* 7.2* 7.8* 8.6* 7.6*   PLATELETS 10*3/mm3 177 153  --  142 150 185 206 202     Results from last 7 days  Lab Units 02/07/17  0450 02/06/17  1619 02/06/17  0209 02/05/17  0304 02/04/17  0336 02/03/17  0322 02/02/17  0513  02/01/17  0511   SODIUM mmol/L 145  --  146* 150* 148* 148* 146*  --  143   POTASSIUM mmol/L 3.6 4.1 3.4* 3.8 3.7 3.8 3.7  < > 3.5   CHLORIDE mmol/L 102  --  109* 110* 112* 115* 113*  --  107   TOTAL CO2 mmol/L 29.2*  --  25.1 22.3 23.0 22.3 21.4*  --  20.8*   BUN mg/dL 40*  --  40* 39* 41* 46* 51*  --  45*   CREATININE mg/dL 1.41*  --  1.30* 1.18 1.41* 1.43* 1.72*  --  1.97*   GLUCOSE mg/dL 149*  --  123* 149* 159* 168* 140*  --  96   CALCIUM mg/dL 8.5*  --  7.9* 7.8* 8.1* 8.5* 8.1*  --  8.5*   MAGNESIUM mg/dL  --   --   --   --   --  2.4  --   --   --    < > = values in this interval not displayed.Estimated Creatinine Clearance: 46.6 mL/min (by C-G formula based on Cr of 1.41).    Results from last 7 days  Lab Units 02/02/17  1323   LACTATE mmol/L 1.0     rvp normal  Fungal stain neg to date  BAL culture no organisms seen  AFB negative    IMAGING:  I have reviewed all imaging studies completed since prior note.  Imaging Results (most recent)     Procedure Component Value Units Date/Time    XR Chest 1 View [[id number]] Collected:   01/29/17 0805     Updated:  01/29/17 0902    Narrative:       PORTABLE CHEST X-RAY     HISTORY: Shortness of breath, pneumonia.     Portable chest x-ray consisting of 2 images is correlated with a chest  x-ray from yesterday evening and other previous images.     FINDINGS: There are sternal wires. Fairly diffuse patchy opacity is  observed throughout the lungs bilaterally, appearing more extensive and  dense today than last night. No pleural effusion or pneumothorax is  present.       Impression:       Interval worsening in the density and extent of patchy  opacity throughout both lungs, favored to represent pneumonia. I called  the findings to Marianne, the nurse caring for the patient, at 7:35 AM.     This report was finalized on 1/29/2017 8:59 AM by Dr. Shahzad Carreon MD.       XR Chest 1 View [[id number]] Collected:  01/28/17 2144     Updated:  01/30/17 0139    Narrative:       X-RAY CHEST 1 VIEW.     HISTORY: Shortness of breath.     COMPARISON: 9/16/2016.     FINDINGS:  Cardiomediastinal silhouette is within normal limits.          Chronic lung changes. There is interval development of bilateral patchy  opacities, left greater than right.               Impression:       Infiltrates are suspected, left greater than right.  Follow-up to resolution is recommended.          This report was finalized on 1/30/2017 1:36 AM by Dr. Ancelmo Pedraza MD.       CT Head Without Contrast [[id number]] Collected:  01/28/17 2242     Updated:  01/30/17 0140    Narrative:       CT SCAN OF THE BRAIN WITHOUT CONTRAST.     TECHNIQUE: Multiple axial images of the brain were obtained from the  vertex to the base of the brain.     HISTORY:  Fall.     COMPARISON:  1/13/2017.     FINDINGS:   Midline structures are within normal limits, there is no hydrocephalus.   Gray-white matter differentiation is maintained.  Small vessel ischemic  disease and cortical atrophy related changes are stable.     Orbits are within normal limits. Moderately  severe mucosal disease of  the paranasal sinuses, there is interval improvement. Mastoid air cells  are well aerated.          Impression:       No acute intracranial pathology.          ----------------------------------------------------------------     CT CERVICAL SPINE WITHOUT CONTRAST.     TECHNIQUE: Routine axial images of the cervical spine with coronal and  sagittal reconstructed images.     HISTORY:  Fall, neck pain.     COMPARISON:  No prior studies for comparison.     FINDINGS:   Vertebral body height is normal, no acute fracture is seen.   Intervertebral discs are maintained. There is an old fracture of the  dens, with calcification of the surrounding soft tissue. Mild loss of  intervertebral disc height throughout the spine most severe at C5-6  through C7-T1, minimal spurring.     Prevertebral soft tissue is unremarkable.         IMPRESSION:   No acute fracture of the cervical spine. Old fracture of the dens is  unchanged.     This report was finalized on 1/30/2017 1:36 AM by Dr. Ancelmo Pedraza MD.       CT Cervical Spine Without Contrast [[id number]] Collected:  01/28/17 2242     Updated:  01/30/17 0140    Narrative:       CT SCAN OF THE BRAIN WITHOUT CONTRAST.     TECHNIQUE: Multiple axial images of the brain were obtained from the  vertex to the base of the brain.     HISTORY:  Fall.     COMPARISON:  1/13/2017.     FINDINGS:   Midline structures are within normal limits, there is no hydrocephalus.   Gray-white matter differentiation is maintained.  Small vessel ischemic  disease and cortical atrophy related changes are stable.     Orbits are within normal limits. Moderately severe mucosal disease of  the paranasal sinuses, there is interval improvement. Mastoid air cells  are well aerated.          Impression:       No acute intracranial pathology.          ----------------------------------------------------------------     CT CERVICAL SPINE WITHOUT CONTRAST.     TECHNIQUE: Routine axial images of the  cervical spine with coronal and  sagittal reconstructed images.     HISTORY:  Fall, neck pain.     COMPARISON:  No prior studies for comparison.     FINDINGS:   Vertebral body height is normal, no acute fracture is seen.   Intervertebral discs are maintained. There is an old fracture of the  dens, with calcification of the surrounding soft tissue. Mild loss of  intervertebral disc height throughout the spine most severe at C5-6  through C7-T1, minimal spurring.     Prevertebral soft tissue is unremarkable.         IMPRESSION:   No acute fracture of the cervical spine. Old fracture of the dens is  unchanged.     This report was finalized on 1/30/2017 1:36 AM by Dr. Ancelmo Pedraza MD.       XR Chest 1 View [[id number]] Collected:  01/30/17 0715     Updated:  01/30/17 0719    Narrative:       PORTABLE CHEST 06:11     HISTORY: 81-year-old male follow-up pneumonia     COMPARISON: 01/29/2017     FINDINGS:  1. Mild interval improvement in bilateral pneumonia as previously  described.  2. Recommend continued follow-up to confirm complete resolution.  3. No new abnormality.     This report was finalized on 1/30/2017 7:16 AM by Dr. Navdeep Brown MD.       CT Chest Without Contrast [[id number]] Collected:  01/31/17 1115     Updated:  01/31/17 1137    Narrative:       CT CHEST WITHOUT CONTRAST     HISTORY: Progressive pulmonary infiltrates     TECHNIQUE: Spiral axial CT imaging of the chest was performed at 3 mm  intervals throughout. No intravenous contrast was administered.     COMPARISON: Correlation is made with recent chest radiographs. There is  no previous CT imaging of the chest.     FINDINGS: Heart and great vessels demonstrate largely unremarkable  noncontrasted appearance. 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 Advanced calcified  coronary disease is noted in the native vessels. There are a few shotty  nodes present in the mediastinum. Calcified nodes are present consistent  with granulomatous disease. Endotracheal tube is present  terminating  just below the level of sternal notch. Chest wall is unremarkable.  Degenerative changes are present at the shoulders and spine.     Lung windows demonstrate a fairly extensive and diffuse pattern of  groundglass infiltrates with some geographic sparing and air trapping  present. The right middle lobe is relatively spared. Air bronchograms  are present in the areas of greatest consolidation. Groundglass  opacities coalesce to  alveolar infiltrate at the base. Air bronchograms  are present in the areas of greatest consolidation. No complicating  features are identified. There are small bilateral pleural effusions  with some overlying passive atelectasis. No pneumothorax is identified.  Visualized upper abdomen demonstrates an unremarkable noncontrasted  appearance.       Impression:       There are diffuse groundglass and alveolar infiltrates  within both lungs. Air bronchograms are present in the areas of greatest  consolidation. No complicating features are identified. Small bilateral  pleural effusions are noted.     This report was finalized on 1/31/2017 11:34 AM by Dr. Rickey Freedman MD.       XR Chest Post CVA Port [[id number]] Collected:  01/31/17 1324     Updated:  01/31/17 1329    Narrative:       XR CHEST POST CVA PORT-     INDICATIONS: PICC placement     TECHNIQUE: FRONTAL VIEW OF THE CHEST     COMPARISON: 1/31/2017     FINDINGS:      Right PICC extends to the superior vena cava. Endotracheal tube does  not appear significantly changed. Sternotomy wires are present. Heart  size is normal. Aorta is calcified. Diffuse patchy infiltrates in both  lungs persist. No pneumothorax or pleural effusion. Otherwise stable.       Impression:          Right PICC extends to the superior vena cava. Persistent diffuse  bilateral pulmonary infiltrates, continued follow-up advised.     This report was finalized on 1/31/2017 1:26 PM by Dr. Jet Rodgers MD.       XR Chest 1 View [[id number]] Collected:   01/31/17 0149     Updated:  01/31/17 2319    Narrative:       X-RAY CHEST 1 VIEW.     HISTORY: Patient intubated.     COMPARISON: 1/30/2017.     FINDINGS:  Cardiomediastinal silhouette is within normal limits.  Interval  placement of a endotracheal tube in good position.     Extensive bilateral infiltrates, there is slight worsening.               Impression:       Endotracheal tube in good position. Worsening infiltrates.          This report was finalized on 1/31/2017 11:16 PM by Dr. Ancelmo Pedraza MD.       XR Chest 1 View [[id number]] Collected:  02/02/17 1308     Updated:  02/02/17 1313    Narrative:       XR CHEST 1 VW-     HISTORY: Male who is 81 years-old,  respiratory distress     TECHNIQUE: Frontal view of the chest     COMPARISON: 02/02/2017 at 0427 hours     FINDINGS: Stable appearing right PICC, endotracheal tube, NG tube.  Sternotomy wires are noted. Heart, mediastinum and pulmonary vasculature  are unremarkable. Extensive bilateral infiltrates appear similar to  prior exam. No large pleural effusion is seen, there may be minimal  pleural effusions. No pneumothorax. No acute osseous process.       Impression:       No significant change.     This report was finalized on 2/2/2017 1:10 PM by Dr. Jet Rodgers MD.       XR Chest 1 View [[id number]] Collected:  02/02/17 0450     Updated:  02/03/17 0025    Narrative:       X-RAY CHEST 1 VIEW.     HISTORY: Acute respiratory failure.     COMPARISON: 1/31/2017.     FINDINGS:  Cardiomediastinal silhouette is within normal limits.  Lines and tubes  are stable. Lung volumes are low.     Extensive bilateral infiltrates, bilateral small effusions are  suspected.               Impression:       Worsening infiltrates or congestive heart failure please clinically  correlate.          This report was finalized on 2/3/2017 12:22 AM by Dr. Ancelmo Pedraza MD.       XR Chest 1 View [[id number]] Collected:  02/03/17 0440     Updated:  02/03/17 7416    Narrative:        X-RAY CHEST 1 VIEW.     HISTORY: Follow-up pneumonia.     COMPARISON: No prior studies for comparison.     FINDINGS:  Cardiomediastinal silhouette is within normal limits.  Lines and tubes  are stable.     Patchy bilateral lung opacities are present however lung volumes have  improved.               Impression:       1.  Improved lung volumes however patchy bilateral opacities remain.  2.  Follow-up to resolution is recommended.          This report was finalized on 2/3/2017 11:53 PM by Dr. Ancelmo Pedraza MD.       XR Chest 1 View [[id number]] Collected:  02/05/17 1112     Updated:  02/05/17 1117    Narrative:       XR CHEST 1 VW-     HISTORY: Male who is 81 years-old,  bilateral pneumonia     TECHNIQUE: Frontal view of the chest     COMPARISON: 02/04/2017     FINDINGS: Stable appearing endotracheal tube, NG tube, right PICC.  Sternotomy wires are present. Heart size is normal. Aorta is calcified.  Extensive diffuse bilateral pulmonary infiltrates appear similar to  prior exam. No pneumothorax. Otherwise stable.       Impression:       No significant change.     This report was finalized on 2/5/2017 11:13 AM by Dr. Jet Rodgers MD.       XR Chest 1 View [[id number]] Collected:  02/04/17 2320     Updated:  02/06/17 0038    Narrative:       X-RAY CHEST 1 VIEW.     HISTORY: Decreased oxygen saturation.     COMPARISON: 2/3/2017.     FINDINGS:  Cardiomediastinal silhouette is within normal limits. ET tube and NG  tube are stable.     Scattered bilateral patchy opacities, there may be small effusions.              Impression:       Scattered bilateral lung opacities concerning for infiltrates, overall  no significant change.         This report was finalized on 2/6/2017 12:35 AM by Dr. Ancelmo Pedraza MD.       XR Chest 1 View [[id number]] Collected:  02/06/17 0622     Updated:  02/06/17 0627    Narrative:       EMERGENCY PORTABLE CHEST SINGLE VIEW 06:00     HISTORY: 81-year-old male who attempted to pull out his own  endotracheal  tube. Tube check was requested.     COMPARISON: 02/05/2017     FINDINGS:  1. Tip of the endotracheal tube is in satisfactory position 6 cm  proximal to the neeraj at the level of the medial clavicles.  2. Patchy bilateral airspace disease similar to previous study.  3. Feeding tube tip projects at least within the stomach.  4. Right arm PICC terminates over the SVC.     This report was finalized on 2/6/2017 6:23 AM by Dr. Navdeep Brown MD.                 ASSESSMENT /   PLAN:  Micro: no growth  CXR: diffuse bilateral infiltrates     A/P:  1. Acute hypoxemic respiratory failure -- likely multifactorial will follow cultures from bronchoscopy  2. Pneumonia-- procal trending down  3. Diastolic CHF --will have to hold diuresis today as creatine rising   4. Bilateral pleural effusions  5. cheli  6. Aflutter/fib -- on prophylactic dose lovenox 2/2 decreasing hgb  7. Anemia --  lovenox to prophylactic dose as pt with acute blood loss; s/p transfusion of one unit 2/5 and Hg responded nicely, staying even  8. malnutrion  9. Hypernatremia -- improving  10. ARDS -- continue vent support, will decrease pc some to bring down tidal volumes a little, now on 50% and 12 of peep which i have adjusted down.  We will continue to decrease settings, starting with fio2 through out the day.  Repeat cxr today     D/w step elsie bolaños at bedside (pt has no biologic children.)  Does have wife and 3 step children.    From prior discussions  Discussed trach with son at bedside  Sounds like family would not like trach if that is what winds up being needed.     Total critical care time was 35minutes, excluding any separately billable procedure time.    Hayder Ansari MD    [facility]  02/07/17  7:37 AM

## 2017-02-07 NOTE — PROGRESS NOTES
LOS: 10 days   Patient Care Team:  Flash Jim MD as PCP - General  Flash Jim MD as PCP - Family Medicine    Chief Complaint:   resp failure    Interval History:   Has pain, intubated but awake.      Objective   Vital Signs  Temp:  [97.6 °F (36.4 °C)-99.4 °F (37.4 °C)] 97.9 °F (36.6 °C)  Heart Rate:  [] 92  Resp:  [20-35] 24  BP: (118-169)/(59-82) 130/59  FiO2 (%):  [50 %-60 %] 50 %    Intake/Output Summary (Last 24 hours) at 02/07/17 0729  Last data filed at 02/07/17 0619   Gross per 24 hour   Intake   3724 ml   Output   5200 ml   Net  -1476 ml       Comfortable NAD  PERRL, conjunctiva clear  Neck supple, no JVD or thyromegaly appreciated  S1/S2 RRR, no m/r/g  Lungs rales throughout  Abdomen S/NT/ND (+) BS, no HSM appreciated  Extremities warm, no clubbing, cyanosis, or edema  No visible or palpable skin lesions  A/Ox4, mood and affect appropriate    Results Review:        Results from last 7 days  Lab Units 02/07/17  0450 02/06/17  1619 02/06/17  0209 02/05/17  0304   SODIUM mmol/L 145  --  146* 150*   POTASSIUM mmol/L 3.6 4.1 3.4* 3.8   CHLORIDE mmol/L 102  --  109* 110*   TOTAL CO2 mmol/L 29.2*  --  25.1 22.3   BUN mg/dL 40*  --  40* 39*   CREATININE mg/dL 1.41*  --  1.30* 1.18   GLUCOSE mg/dL 149*  --  123* 149*   CALCIUM mg/dL 8.5*  --  7.9* 7.8*       Results from last 7 days  Lab Units 02/02/17  1219   TROPONIN T ng/mL 0.091*       Results from last 7 days  Lab Units 02/07/17  0450 02/06/17  0255 02/05/17  1713 02/05/17  0304   WBC 10*3/mm3 11.77* 9.82  --  8.93   HEMOGLOBIN g/dL 8.9* 8.5* 8.2* 7.2*   HEMATOCRIT % 27.7* 26.7* 25.6* 23.0*   PLATELETS 10*3/mm3 177 153  --  142               Results from last 7 days  Lab Units 02/03/17  0322   MAGNESIUM mg/dL 2.4           I reviewed the patient's new clinical results.  I personally viewed and interpreted the patient's EKG/Telemetry data        Medication Review:     albuterol 6 puff Inhalation 4x Daily - RT   enoxaparin 40 mg Subcutaneous  Q24H   fentaNYL 1 patch Transdermal Q72H   furosemide 40 mg Intravenous Q12H   insulin aspart 1-20 Units Subcutaneous Q4H   iopamidol 100 mL Intravenous Once in imaging   lansoprazole 30 mg Nasogastric QAM   levoFLOXacin 500 mg Intravenous Q24H   metoprolol tartrate 25 mg Oral Q8H   piperacillin-tazobactam 3.375 g Intravenous Q8H   sennosides-docusate sodium 2 tablet Oral Nightly   sodium chloride 10 mL Intracatheter Q12H         dexmedetomidine 0.2-1.5 mcg/kg/hr Last Rate: 1.3 mcg/kg/hr (02/07/17 0704)   sodium chloride 9 mL/hr Last Rate: 9 mL/hr (02/07/17 0509)       Assessment/Plan     Principal Problem:    Sepsis  Active Problems:    Anemia    Hyperlipidemia    HTN (hypertension)    Coronary artery disease    ANDERS (acute kidney injury)    Muscle weakness-general    Bilateral pneumonia    Acute and chronic respiratory failure with hypoxia    Chronic diastolic CHF (congestive heart failure)    Leg edema    Atrial fibrillation with RVR    Hypernatremia    ARDS (adult respiratory distress syndrome)    Respiratory failure on the vent  PNA - b/l   Sepsis   Atrial flutter  Recent fall with facial fractures and c-spine fracture. Very high risk for long term anticoagulation.   Anemia   ANDERS - worsening  H/o CAD, s/p CABG, LVEF 63% on echo 2/2017  NSTEMI - type 2   CVA 11/2016  ARDS    Urine output is good. May need to decrease lasix at this point.  Will follow, still SR    Malina Mckeon MD  02/07/17  7:29 AM

## 2017-02-07 NOTE — PLAN OF CARE
Problem: Patient Care Overview (Adult)  Goal: Plan of Care Review  Outcome: Ongoing (interventions implemented as appropriate)    02/07/17 1485   Coping/Psychosocial Response Interventions   Plan Of Care Reviewed With patient;spouse   Outcome Evaluation   Outcome Summary/Follow up Plan FiO2 down to 40% with O2 sats greater than 90 %. Vitals remain stable.Tolerating tube feeds and with good urine output. Spouse updated on plan of care. Will continue to monitor closely.         Problem: Fall Risk (Adult)  Goal: Absence of Falls  Outcome: Ongoing (interventions implemented as appropriate)    Problem: Pneumonia (Adult)  Goal: Signs and Symptoms of Listed Potential Problems Will be Absent or Manageable (Pneumonia)  Outcome: Ongoing (interventions implemented as appropriate)    Problem: Mobility, Physical Impaired (Adult)  Goal: Enhanced Mobility Skills  Outcome: Ongoing (interventions implemented as appropriate)  Goal: Enhanced Functionality Ability  Outcome: Ongoing (interventions implemented as appropriate)    Problem: Respiratory Insufficiency (Adult)  Goal: Acid/Base Balance  Outcome: Ongoing (interventions implemented as appropriate)  Goal: Effective Ventilation  Outcome: Ongoing (interventions implemented as appropriate)    Problem: Mechanical Ventilation, Invasive (Adult)  Goal: Signs and Symptoms of Listed Potential Problems Will be Absent or Manageable (Mechanical Ventilation, Invasive)  Outcome: Ongoing (interventions implemented as appropriate)    Problem: Sepsis (Adult)  Goal: Signs and Symptoms of Listed Potential Problems Will be Absent or Manageable (Sepsis)  Outcome: Ongoing (interventions implemented as appropriate)

## 2017-02-08 NOTE — PROGRESS NOTES
"    Patient Name: Noah Isaac  :1935  81 y.o.      Patient Care Team:  Flash Jim MD as PCP - General  Flash Jim MD as PCP - Family Medicine    Chief Complaint: Respiratory failure; Paroxysmal AFlutter/AFib    Interval History: Remains in NSR.  Remains intubated- on vent.   sedate on vent    Objective   Vital Signs  Temp:  [97.4 °F (36.3 °C)-98.8 °F (37.1 °C)] 98.8 °F (37.1 °C)  Heart Rate:  [] 105  Resp:  [28-34] 28  BP: (103-150)/(52-76) 150/68  FiO2 (%):  [40 %-50 %] 50 %    Intake/Output Summary (Last 24 hours) at 17  Last data filed at 17   Gross per 24 hour   Intake   3466 ml   Output   2325 ml   Net   1141 ml     Flowsheet Rows         First Filed Value    Admission Height  70\" (177.8 cm) Documented at 2017    Admission Weight  187 lb (84.8 kg) Documented at 2017          Physical Exam:   General Appearance:    On precedex on vent.    Lungs:     Fine rales at base     Heart:    Regular rhythm and normal rate, normal S1 and S2, no murmurs, gallops or rubs.     Chest Wall:    No abnormalities observed   Abdomen:     Soft, nontender, positive bowel sounds.     Extremities:   no cyanosis, clubbing or edema.  No marked joint deformities.         Results Review:      Results from last 7 days  Lab Units 17  0354   SODIUM mmol/L 144   POTASSIUM mmol/L 4.1   CHLORIDE mmol/L 103   TOTAL CO2 mmol/L 28.7   BUN mg/dL 48*   CREATININE mg/dL 1.50*   GLUCOSE mg/dL 142*   CALCIUM mg/dL 8.5*       Results from last 7 days  Lab Units 17  1219   TROPONIN T ng/mL 0.091*       Results from last 7 days  Lab Units 17  0354   WBC 10*3/mm3 11.75*   HEMOGLOBIN g/dL 9.0*   HEMATOCRIT % 28.2*   PLATELETS 10*3/mm3 196           Results from last 7 days  Lab Units 17  0322   MAGNESIUM mg/dL 2.4                   Medication Review:     albuterol 6 puff Inhalation 4x Daily - RT   enoxaparin 40 mg Subcutaneous Q24H   fentaNYL 1 patch " Transdermal Q72H   insulin aspart 1-20 Units Subcutaneous Q4H   iopamidol 100 mL Intravenous Once in imaging   lansoprazole 30 mg Nasogastric QAM   levoFLOXacin 500 mg Intravenous Q24H   metoprolol tartrate 25 mg Oral Q8H   piperacillin-tazobactam 3.375 g Intravenous Q8H   sennosides-docusate sodium 2 tablet Oral Nightly   sodium chloride 10 mL Intracatheter Q12H          dexmedetomidine 0.2-1.5 mcg/kg/hr Last Rate: 1.2 mcg/kg/hr (02/08/17 0455)   sodium chloride 9 mL/hr Last Rate: 9 mL/hr (02/08/17 0631)       Assessment/Plan   Respiratory failure on the vent  PNA - b/l   Sepsis   Atrial flutter  Recent fall with facial fractures and c-spine fracture. Very high risk for long term anticoagulation.   Anemia   ANDERS - worsening  H/o CAD, s/p CABG, LVEF 63% on echo 2/2017  NSTEMI - type 2   CVA 11/2016  ARDS    Off lasix -prob overdiuresed a bit but may need lasix intermittently.  Stable CV status.  No further a fib. Will see as needed.     Possible trach - spoke with elsie Jesus at bedside    Spoke with Bravo Mckeon MD  [facility]  02/08/17  8:23 AM

## 2017-02-08 NOTE — PLAN OF CARE
Problem: Patient Care Overview (Adult)  Goal: Plan of Care Review  Outcome: Ongoing (interventions implemented as appropriate)    02/08/17 3470   Coping/Psychosocial Response Interventions   Plan Of Care Reviewed With patient;spouse   Patient Care Overview   Progress improving   Outcome Evaluation   Outcome Summary/Follow up Plan Vitals stable, maintaining o2 sats with vent changes. Tolerating tube feeds. Pain meds given frequently, keeping pain under control.        Goal: Adult Individualization and Mutuality  Outcome: Ongoing (interventions implemented as appropriate)  Goal: Discharge Needs Assessment  Outcome: Ongoing (interventions implemented as appropriate)    Problem: Fall Risk (Adult)  Goal: Absence of Falls  Outcome: Ongoing (interventions implemented as appropriate)    Problem: Pneumonia (Adult)  Goal: Signs and Symptoms of Listed Potential Problems Will be Absent or Manageable (Pneumonia)  Outcome: Ongoing (interventions implemented as appropriate)    Problem: Mobility, Physical Impaired (Adult)  Goal: Enhanced Mobility Skills  Outcome: Ongoing (interventions implemented as appropriate)  Goal: Enhanced Functionality Ability  Outcome: Ongoing (interventions implemented as appropriate)    Problem: Respiratory Insufficiency (Adult)  Goal: Acid/Base Balance  Outcome: Ongoing (interventions implemented as appropriate)  Goal: Effective Ventilation  Outcome: Ongoing (interventions implemented as appropriate)    Problem: Mechanical Ventilation, Invasive (Adult)  Goal: Signs and Symptoms of Listed Potential Problems Will be Absent or Manageable (Mechanical Ventilation, Invasive)  Outcome: Ongoing (interventions implemented as appropriate)    Problem: Sepsis (Adult)  Goal: Signs and Symptoms of Listed Potential Problems Will be Absent or Manageable (Sepsis)  Outcome: Ongoing (interventions implemented as appropriate)

## 2017-02-08 NOTE — PROGRESS NOTES
Daily Progress Note.     Noah Isaac  81 y.o.  male  2/8/2017    Brief problem (summary)  This is a 81-year-old gentleman who is well-known to me for many years with a history of previous pneumonia.  He normally follows me in the office also.  He was recently hospitalized from January 13, 1922 with an thyroid fracture after fall and was complicated by acute renal failure and multiple facial fractures.  He was readmitted to the hospital on 29 January from a rehabilitation because of having fever and chills and increasing shortness breath.  He was hypoxic and found to be having bilateral pneumonia.  Later he was intubated on 31 January, and is being treated for pneumonia and ARDS.  He still has a soft collar.  At present he is arousable and follows simple commands.      Today, he is afebrile on the ventilator.  He is arousable.  I met the daughter and the son at the bedside.    PMS/FH/SH: reviewed and unchanged.  Medications:     albuterol 6 puff Inhalation 4x Daily - RT   enoxaparin 40 mg Subcutaneous Q24H   fentaNYL 1 patch Transdermal Q72H   insulin aspart 1-20 Units Subcutaneous Q4H   iopamidol 100 mL Intravenous Once in imaging   lansoprazole 30 mg Nasogastric QAM   levoFLOXacin 500 mg Intravenous Q24H   metoprolol tartrate 25 mg Oral Q8H   piperacillin-tazobactam 3.375 g Intravenous Q8H   sennosides-docusate sodium 2 tablet Oral Nightly   sodium chloride 10 mL Intracatheter Q12H       dexmedetomidine 0.2-1.5 mcg/kg/hr Last Rate: 1.2 mcg/kg/hr (02/08/17 0941)   sodium chloride 9 mL/hr Last Rate: 9 mL/hr (02/08/17 0631)       ROS:  Respiratory ROS: NA on vent    Objective:  Temp:  [97.4 °F (36.3 °C)-98.8 °F (37.1 °C)] 98.8 °F (37.1 °C)  I/O last 3 completed shifts:  In: 5166 [I.V.:1402; Other:1391; NG/GT:2218; IV Piggyback:155]  Out: 4775 [Urine:4775]  I/O this shift:  In: 465 [I.V.:125; Other:120; NG/GT:220]  Out: 215 [Urine:215]    Physical Exam   Constitutional: Vital signs are normal. He appears  well-developed. He is easily aroused. He is sedated and intubated.   HENT:   Head: Normocephalic and atraumatic. Head is without abrasion and without laceration.   Nose: No rhinorrhea or nasal deformity. No epistaxis.   Soft collar present   Eyes: Pupils are equal, round, and reactive to light.   Neck: No JVD present. No rigidity. No tracheal deviation and no edema present. No thyroid mass and no thyromegaly present.   Cardiovascular: Regular rhythm and normal heart sounds.    Pulmonary/Chest: He is intubated. He has decreased breath sounds. He has no wheezes.   Abdominal: Soft. Normal appearance and bowel sounds are normal. He exhibits no ascites. There is no hepatosplenomegaly.   Neurological: He is easily aroused.   Skin: No laceration, no petechiae and no rash noted. No cyanosis. Nails show no clubbing.        CXR:        Results from last 7 days  Lab Units 02/08/17  0354 02/07/17  0450 02/06/17  0255   WBC 10*3/mm3 11.75* 11.77* 9.82   HEMOGLOBIN g/dL 9.0* 8.9* 8.5*   HEMATOCRIT % 28.2* 27.7* 26.7*   PLATELETS 10*3/mm3 196 177 153       Results from last 7 days  Lab Units 02/08/17  0354 02/07/17  1535 02/07/17  0450  02/06/17  0209   SODIUM mmol/L 144  --  145  --  146*   POTASSIUM mmol/L 4.1 4.2 3.6  < > 3.4*   CHLORIDE mmol/L 103  --  102  --  109*   TOTAL CO2 mmol/L 28.7  --  29.2*  --  25.1   BUN mg/dL 48*  --  40*  --  40*   CREATININE mg/dL 1.50*  --  1.41*  --  1.30*   GLUCOSE mg/dL 142*  --  149*  --  123*   CALCIUM mg/dL 8.5*  --  8.5*  --  7.9*   < > = values in this interval not displayed.        Results from last 7 days  Lab Units 02/05/17  1032   PH, ARTERIAL pH units 7.383   PO2 ART mm Hg 67.9*   PCO2, ARTERIAL mm Hg 45.3*   HCO3 ART mmol/L 27.0     Vent Mode: PC/AC  FiO2 (%):  [40 %-50 %] 50 %  S RR:  [26] 26  PEEP/CPAP (cm H2O):  [10 cm H20-12 cm H20] 10 cm H20  MT SUP:  [0 cm H20] 0 cm H20  MAP (cm H2O):  [15-17] 15    ASSESSMENT/ PLAN:  · Hypoxic respiratory failure continue ventilatory  support  · Bilateral pneumonia.  Continue antibiotics  · ARDS  · Acute renal failure  · Sepsis  · Non-ST type 2  · Recent spine fracture, high risk for long-term anticoagulation  · Atrial flutter/fib      The long discussion with the children (stepson and stepdaughter).  Will discuss again the long-term prognosis and treatment plan in the morning       CC 40 min  Danny Hernandez MD,[facility]  Pulmonary, Critical Care and Sleep Medicine.  [facility]    2/8/2017    EMR Dragon/Transcription disclaimer:   Much of this encounter note is an electronic transcription/translation of spoken language to printed text. The electronic translation of spoken language may permit erroneous, or at times, nonsensical words or phrases to be inadvertently transcribed; Although I have reviewed the note for such errors, some may still exist.

## 2017-02-08 NOTE — PROGRESS NOTES
"DAILY PROGRESS NOTE  [facility], [facility]      Patient Identification:  Name: Noah Isaac  Age: 81 y.o.  Sex: male  :  1935  MRN: [id number]         Primary Care Physician: Flash Jim MD    Subjective:  Interval History:    Mr. Isaac is sedated, intubated and on ventilator this morning. He does arouse and follows commands. His daughter is at the bedside. This is day 6 of levaquin and zosyn. He has had no CP, N/V/D. His FiO2 DID need to be increased through the night. He continues with enteral feedings.     Objective:    Scheduled Meds:  albuterol 6 puff Inhalation 4x Daily - RT   enoxaparin 40 mg Subcutaneous Q24H   fentaNYL 1 patch Transdermal Q72H   insulin aspart 1-20 Units Subcutaneous Q4H   iopamidol 100 mL Intravenous Once in imaging   lansoprazole 30 mg Nasogastric QAM   levoFLOXacin 500 mg Intravenous Q24H   metoprolol tartrate 25 mg Oral Q8H   piperacillin-tazobactam 3.375 g Intravenous Q8H   sennosides-docusate sodium 2 tablet Oral Nightly   sodium chloride 10 mL Intracatheter Q12H     Continuous Infusions:  dexmedetomidine 0.2-1.5 mcg/kg/hr Last Rate: 1.2 mcg/kg/hr (17 0941)   sodium chloride 9 mL/hr Last Rate: 9 mL/hr (17 0631)       Vital signs in last 24 hours:  Temp:  [97.4 °F (36.3 °C)-98.8 °F (37.1 °C)] 97.5 °F (36.4 °C)  Heart Rate:  [] 90  Resp:  [28-32] 28  BP: (103-150)/(52-89) 137/62  FiO2 (%):  [40 %-50 %] 50 %    tMax 24 hrs: Temp (24hrs), Av.9 °F (36.6 °C), Min:97.4 °F (36.3 °C), Max:98.8 °F (37.1 °C)      Intake/Output:    Intake/Output Summary (Last 24 hours) at 17 1120  Last data filed at 17 0945   Gross per 24 hour   Intake   3213 ml   Output   1965 ml   Net   1248 ml       Exam:    Visit Vitals   • /62   • Pulse 90   • Temp 97.5 °F (36.4 °C) (Oral)   • Resp 28   • Ht 69.02\" (175.3 cm)   • Wt 198 lb 6.6 oz (90 kg)   • SpO2 97%   • BMI 29.29 kg/m2       General: sedated, intubated, " on ventilator. Tubefeedings. Aj catheter.   Neck: Supple, symmetrical, trachea midline, no adenopathy;   thyroid: no enlargement/tenderness/nodules;   no carotid bruit or JVD  Cardiovascular: Normal rate, regular rhythm and intact distal pulses.  Exam reveals no gallop and no friction rub. No murmur heard  Pulmonary: very diminished breath sounds bilaterally, crackles on the right side. No wheezing.    Abdominal: Soft. Soft, non-tender, bowel sounds active all four quadrants,   no masses, no hepatomegaly, no splenomegaly.   Extremities: Normal, atraumatic, no cyanosis or edema  Neurological: sedated, on ventilator. No gross sensory or motor deficit.   Skin: Skin color, texture, turgor normal, no rashes or lesions      Data Review:      Results from last 7 days  Lab Units 02/08/17  0354 02/07/17  0450 02/06/17  0255   WBC 10*3/mm3 11.75* 11.77* 9.82   HEMOGLOBIN g/dL 9.0* 8.9* 8.5*   HEMATOCRIT % 28.2* 27.7* 26.7*   PLATELETS 10*3/mm3 196 177 153         Results from last 7 days  Lab Units 02/08/17  0354 02/07/17  1535 02/07/17  0450  02/06/17  0209 02/05/17  0304  02/02/17  0513   SODIUM mmol/L 144  --  145  --  146* 150*  < > 146*   POTASSIUM mmol/L 4.1 4.2 3.6  < > 3.4* 3.8  < > 3.7   CHLORIDE mmol/L 103  --  102  --  109* 110*  < > 113*   TOTAL CO2 mmol/L 28.7  --  29.2*  --  25.1 22.3  < > 21.4*   BUN mg/dL 48*  --  40*  --  40* 39*  < > 51*   CREATININE mg/dL 1.50*  --  1.41*  --  1.30* 1.18  < > 1.72*   CALCIUM mg/dL 8.5*  --  8.5*  --  7.9* 7.8*  < > 8.1*   BILIRUBIN mg/dL  --   --   --   --   --  0.3  --  0.4   ALK PHOS U/L  --   --   --   --   --  127*  --  148*   ALT (SGPT) U/L  --   --   --   --   --  22  --  21   AST (SGOT) U/L  --   --   --   --   --  31  --  34   GLUCOSE mg/dL 142*  --  149*  --  123* 149*  < > 140*   < > = values in this interval not displayed.          0  Lab Value Date/Time   TROPONINT 0.091 (C) 02/02/2017 1219   TROPONINT 0.022 01/28/2017 2116   TROPONINT <0.010 01/17/2017 1150    TROPONINT <0.010 09/16/2016 1818   TROPONINT <0.010 08/29/2016 1958   TROPONINT <0.010 08/28/2016 2009       Microbiology Results (last 10 days)     Procedure Component Value - Date/Time    AFB Culture [[id number]] Collected:  02/04/17 1639    Lab Status:  Preliminary result Specimen:  Lavage from Lung, Right Lower Lobe Updated:  02/05/17 1303     AFB Stain        No acid fast bacilli seen on concentrated smear    Fungus Smear [[id number]] Collected:  02/04/17 1639    Lab Status:  Final result Specimen:  Lavage from Lung, Right Lower Lobe Updated:  02/05/17 0550     GMS Stain No fungal elements seen     BAL Culture, Quantitative [[id number]]  (Normal) Collected:  02/04/17 1639    Lab Status:  Final result Specimen:  Lavage from Lung, Right Lower Lobe Updated:  02/06/17 1231     Culture No growth at 2 days      Gram Stain Result Rare (1+) WBCs seen       No organisms seen     Respiratory Panel, PCR [[id number]]  (Normal) Collected:  02/04/17 1639    Lab Status:  Final result Specimen:  Lavage from Lung, Right Lower Lobe Updated:  02/04/17 1844     ADENOVIRUS, PCR Not Detected      Coronavirus 229E Not Detected      Coronavirus HKU1 Not Detected      Coronavirus NL63 Not Detected      Coronavirus OC43 Not Detected      Human Metapneumovirus Not Detected      Human Rhinovirus/Enterovirus Not Detected      Influenza B PCR Not Detected      Parainfluenza Virus 1 Not Detected      Parainfluenza Virus 2 Not Detected      Parainfluenza Virus 3 Not Detected      Parainfluenza Virus 4 Not Detected      Bordetella pertussis pcr Not Detected      Influenza 2009 H1N1 by PCR Not Detected      Chlamydophila pneumoniae PCR Not Detected      Mycoplasma pneumo by PCR Not Detected      Influenza A PCR Not Detected      Influenza A H3 Not Detected      Influenza A H1 Not Detected      RSV, PCR Not Detected     Respiratory Panel, PCR [[id number]]  (Normal) Collected:  02/02/17 1219    Lab Status:  Final result Specimen:  Wash from ET Suction  Updated:  02/02/17 1447     ADENOVIRUS, PCR Not Detected      Coronavirus 229E Not Detected      Coronavirus HKU1 Not Detected      Coronavirus NL63 Not Detected      Coronavirus OC43 Not Detected      Human Metapneumovirus Not Detected      Human Rhinovirus/Enterovirus Not Detected      Influenza B PCR Not Detected      Parainfluenza Virus 1 Not Detected      Parainfluenza Virus 2 Not Detected      Parainfluenza Virus 3 Not Detected      Parainfluenza Virus 4 Not Detected      Bordetella pertussis pcr Not Detected      Influenza 2009 H1N1 by PCR Not Detected      Chlamydophila pneumoniae PCR Not Detected      Mycoplasma pneumo by PCR Not Detected      Influenza A PCR Not Detected      Influenza A H3 Not Detected      Influenza A H1 Not Detected      RSV, PCR Not Detected     Respiratory Culture [[id number]] Collected:  02/02/17 1219    Lab Status:  Final result Specimen:  Wash from ET Suction Updated:  02/04/17 0705     Respiratory Culture No growth at 2 days      Gram Stain Result Few (2+) WBCs seen              Moderate (3+) Epithelial cells per low power field      No organisms seen     Respiratory Culture [[id number]] Collected:  02/01/17 0525    Lab Status:  Final result Specimen:  Sputum from Cough Updated:  02/03/17 0819     Respiratory Culture No growth      Gram Stain Result Rare (1+) WBCs seen              Rare (1+) Epithelial cells per low power field      No organisms seen            Imaging Results (last 7 days)     Procedure Component Value Units Date/Time    XR Chest 1 View [[id number]] Collected:  02/02/17 1308     Updated:  02/02/17 1313    Narrative:       XR CHEST 1 VW-     HISTORY: Male who is 81 years-old,  respiratory distress     TECHNIQUE: Frontal view of the chest     COMPARISON: 02/02/2017 at 0427 hours     FINDINGS: Stable appearing right PICC, endotracheal tube, NG tube.  Sternotomy wires are noted. Heart, mediastinum and pulmonary vasculature  are unremarkable. Extensive bilateral infiltrates  appear similar to  prior exam. No large pleural effusion is seen, there may be minimal  pleural effusions. No pneumothorax. No acute osseous process.       Impression:       No significant change.     This report was finalized on 2/2/2017 1:10 PM by Dr. Jet Rodgers MD.       XR Chest 1 View [[id number]] Collected:  02/02/17 0450     Updated:  02/03/17 0025    Narrative:       X-RAY CHEST 1 VIEW.     HISTORY: Acute respiratory failure.     COMPARISON: 1/31/2017.     FINDINGS:  Cardiomediastinal silhouette is within normal limits.  Lines and tubes  are stable. Lung volumes are low.     Extensive bilateral infiltrates, bilateral small effusions are  suspected.               Impression:       Worsening infiltrates or congestive heart failure please clinically  correlate.          This report was finalized on 2/3/2017 12:22 AM by Dr. Ancelmo Pedraza MD.       XR Chest 1 View [[id number]] Collected:  02/03/17 0440     Updated:  02/03/17 2356    Narrative:       X-RAY CHEST 1 VIEW.     HISTORY: Follow-up pneumonia.     COMPARISON: No prior studies for comparison.     FINDINGS:  Cardiomediastinal silhouette is within normal limits.  Lines and tubes  are stable.     Patchy bilateral lung opacities are present however lung volumes have  improved.               Impression:       1.  Improved lung volumes however patchy bilateral opacities remain.  2.  Follow-up to resolution is recommended.          This report was finalized on 2/3/2017 11:53 PM by Dr. Ancelmo Pedraza MD.       XR Chest 1 View [[id number]] Collected:  02/05/17 1112     Updated:  02/05/17 1117    Narrative:       XR CHEST 1 VW-     HISTORY: Male who is 81 years-old,  bilateral pneumonia     TECHNIQUE: Frontal view of the chest     COMPARISON: 02/04/2017     FINDINGS: Stable appearing endotracheal tube, NG tube, right PICC.  Sternotomy wires are present. Heart size is normal. Aorta is calcified.  Extensive diffuse bilateral pulmonary infiltrates appear similar  to  prior exam. No pneumothorax. Otherwise stable.       Impression:       No significant change.     This report was finalized on 2/5/2017 11:13 AM by Dr. Jet Rodgers MD.       XR Chest 1 View [[id number]] Collected:  02/04/17 2320     Updated:  02/06/17 0038    Narrative:       X-RAY CHEST 1 VIEW.     HISTORY: Decreased oxygen saturation.     COMPARISON: 2/3/2017.     FINDINGS:  Cardiomediastinal silhouette is within normal limits. ET tube and NG  tube are stable.     Scattered bilateral patchy opacities, there may be small effusions.              Impression:       Scattered bilateral lung opacities concerning for infiltrates, overall  no significant change.         This report was finalized on 2/6/2017 12:35 AM by Dr. Ancelmo Pedraza MD.       XR Chest 1 View [[id number]] Collected:  02/06/17 0622     Updated:  02/06/17 0627    Narrative:       EMERGENCY PORTABLE CHEST SINGLE VIEW 06:00     HISTORY: 81-year-old male who attempted to pull out his own endotracheal  tube. Tube check was requested.     COMPARISON: 02/05/2017     FINDINGS:  1. Tip of the endotracheal tube is in satisfactory position 6 cm  proximal to the neeraj at the level of the medial clavicles.  2. Patchy bilateral airspace disease similar to previous study.  3. Feeding tube tip projects at least within the stomach.  4. Right arm PICC terminates over the SVC.     This report was finalized on 2/6/2017 6:23 AM by Dr. Navdeep Brown MD.       XR Chest 1 View [[id number]] Collected:  02/07/17 1114     Updated:  02/07/17 1114    Narrative:       PORTABLE CHEST     HISTORY: Acute respiratory distress.     COMPARISON: 02/06/2017.     FINDINGS: An endotracheal tube is in place in satisfactory position with  the tip at the thoracic inlet. A PICC line catheter is noted entering  from the right with the tip in the superior vena cava. The heart is  within normal limits in size. Diffuse pulmonary infiltrates are noted,  at least moderate if not moderate to  severe with elements of  consolidation. There is improvement at the left lung base and right  upper lobe slightly. The infiltrate at the right lung base appears  unchanged and infiltrate at the left upper lobe is slightly more  prominent as compared to previous examination. There is prominence of  the pulmonary interstitium in the perihilar regions bilaterally,  slightly more prominent as compared to previous examination as well as  cephalization of pulmonary vasculature which appears slightly more  prominent as compared to previous examination.               Assessment:      Principal Problem:    Sepsis  Active Problems:    Anemia    Hyperlipidemia    HTN (hypertension)    Coronary artery disease    ANDERS (acute kidney injury)    Muscle weakness-general    Bilateral pneumonia    Acute and chronic respiratory failure with hypoxia    Chronic diastolic CHF (congestive heart failure)    Leg edema    Atrial fibrillation with RVR    Hypernatremia    ARDS (adult respiratory distress syndrome)      Patient Active Problem List   Diagnosis Code   • Chronic coronary artery disease I25.10   • Anemia D64.9   • Hyperlipidemia E78.5   • HTN (hypertension) I10   • Trigeminal neuralgia G50.0   • Benign prostatic hyperplasia N40.0   • Ulcerative colitis without complications K51.90   • Health care maintenance Z00.00   • Hypertension I10   • Coronary artery disease I25.10   • ANDERS (acute kidney injury) N17.9   • Muscle weakness-general M62.81   • Pulmonary nodule, right R91.1   • Pulmonary fibrosis J84.10   • Weakness R53.1   • Asterixis R27.8   • CVA (cerebral infarction) I63.9   • Irritable bowel syndrome with both constipation and diarrhea K58.2   • Odontoid fracture S12.100A   • Multiple facial bone fractures S02.92XA   • Hyponatremia E87.1   • Dehydration E86.0   • Bilateral pneumonia J18.9   • Sepsis A41.9   • Acute and chronic respiratory failure with hypoxia J96.21   • Chronic diastolic CHF (congestive heart failure) I50.32   •  Leg edema R60.0   • Atrial fibrillation with RVR I48.91   • Hypernatremia E87.0   • ARDS (adult respiratory distress syndrome) J80       Plan:    Continue TF's  Continue IV antibiotics  Pulmonary to manage ventilator  Diet: TF's, free water  Monitor and correct electrolytes  Monitor mental status  DVT/stress ulcer prophylaxis  Labs in am    Loc Bolanos MD  2/8/2017  11:20 AM        Much of this encounter note is an electronic transcription/translation of spoken language to printed text. The electronic translation of spoken language may permit erroneous, or at times, nonsensical words or phrases to be inadvertently transcribed; Although I have reviewed the note for such errors, some may still exist

## 2017-02-08 NOTE — PROGRESS NOTES
"  Infectious Diseases Progress Note    Rafat Ybarra MD     [facility]  Los: 10 days  Patient Identification:  Name: Noah Isaac  Age: 81 y.o.  Sex: male  :  1935  MRN: [id number]         Primary Care Physician: Flash Jim MD            Subjective: Sedated but opens eyes to his name.  According the family member in the room he was interactive once his sedation was removed.  Interval History: The ventilator requiring significant support.     Objective:    Scheduled Meds:    albuterol 6 puff Inhalation 4x Daily - RT   enoxaparin 40 mg Subcutaneous Q24H   fentaNYL 1 patch Transdermal Q72H   insulin aspart 1-20 Units Subcutaneous Q4H   iopamidol 100 mL Intravenous Once in imaging   lansoprazole 30 mg Nasogastric QAM   levoFLOXacin 500 mg Intravenous Q24H   metoprolol tartrate 25 mg Oral Q8H   piperacillin-tazobactam 3.375 g Intravenous Q8H   sennosides-docusate sodium 2 tablet Oral Nightly   sodium chloride 10 mL Intracatheter Q12H     Continuous Infusions:    dexmedetomidine 0.2-1.5 mcg/kg/hr Last Rate: 1.2 mcg/kg/hr (17)   sodium chloride 9 mL/hr Last Rate: 9 mL/hr (17 0509)       Vital signs in last 24 hours:  Temp:  [97.8 °F (36.6 °C)-99.4 °F (37.4 °C)] 97.8 °F (36.6 °C)  Heart Rate:  [] 99  Resp:  [24-34] 30  BP: (107-167)/(59-80) 113/63  FiO2 (%):  [40 %-60 %] 40 %    Intake/Output:    Intake/Output Summary (Last 24 hours) at 177  Last data filed at 17 2200   Gross per 24 hour   Intake   3343 ml   Output   4375 ml   Net  -1032 ml       Exam:  Visit Vitals   • /63   • Pulse 99   • Temp 97.8 °F (36.6 °C) (Oral)   • Resp (!) 30   • Ht 69.02\" (175.3 cm)   • Wt 200 lb 6.4 oz (90.9 kg)   • SpO2 93%   • BMI 29.58 kg/m2       General Appearance:    On ventilator but interactive and follows commands.                          Head:    Normocephalic, without obvious abnormality, atraumatic                           Eyes:    PERRL, " conjunctiva/corneas clear, EOM's intact, both eyes                         Throat:   Lips, tongue, gums normal; oral mucosa pink and moist                           Neck:   Cervical collar in place                         Lungs:    Clear to auscultation bilaterally, respirations unlabored                 Chest Wall:    No tenderness or deformity                          Heart:    Regular rate and rhythm, S1 and S2 normal, no murmur,no  Rub                                      or gallop                  Abdomen:     Soft, non-tender, bowel sounds active, no masses, no                                                        organomegaly                  Extremities:   Extremities normal, atraumatic, no cyanosis or edema                        Pulses:   Pulses palpable in all extremities                            Skin:   Skin is warm and dry,  no rashes or palpable lesions                    Data Review:    I reviewed the patient's new clinical results.    Results from last 7 days  Lab Units 02/07/17  0450 02/06/17  0255 02/05/17  1713 02/05/17  0304 02/04/17  0336 02/03/17  0322 02/02/17  1219 02/02/17  0512   WBC 10*3/mm3 11.77* 9.82  --  8.93 8.18 8.64 11.17* 8.59   HEMOGLOBIN g/dL 8.9* 8.5* 8.2* 7.2* 7.2* 7.8* 8.6* 7.6*   PLATELETS 10*3/mm3 177 153  --  142 150 185 206 202       Results from last 7 days  Lab Units 02/07/17  1535 02/07/17  0450 02/06/17  1619 02/06/17  0209 02/05/17  0304 02/04/17  0336 02/03/17  0322 02/02/17  0513  02/01/17  0511   SODIUM mmol/L  --  145  --  146* 150* 148* 148* 146*  --  143   POTASSIUM mmol/L 4.2 3.6 4.1 3.4* 3.8 3.7 3.8 3.7  < > 3.5   CHLORIDE mmol/L  --  102  --  109* 110* 112* 115* 113*  --  107   TOTAL CO2 mmol/L  --  29.2*  --  25.1 22.3 23.0 22.3 21.4*  --  20.8*   BUN mg/dL  --  40*  --  40* 39* 41* 46* 51*  --  45*   CREATININE mg/dL  --  1.41*  --  1.30* 1.18 1.41* 1.43* 1.72*  --  1.97*   CALCIUM mg/dL  --  8.5*  --  7.9* 7.8* 8.1* 8.5* 8.1*  --  8.5*   GLUCOSE mg/dL   --  149*  --  123* 149* 159* 168* 140*  --  96   < > = values in this interval not displayed.  CULTURES are negative    Assessment:  Principal Problem:    Sepsis  Active Problems:    Anemia    Hyperlipidemia    HTN (hypertension)    Coronary artery disease    ANDERS (acute kidney injury)    Muscle weakness-general    Bilateral pneumonia    Acute and chronic respiratory failure with hypoxia    Chronic diastolic CHF (congestive heart failure)    Leg edema    Atrial fibrillation with RVR    Hypernatremia    ARDS (adult respiratory distress syndrome)      Plan: Continue present antibiotics for 10 days from 2/3/2017.  Agree with Dr. Mitchell about need for trach and PEG to allow for prolonged supportive care and give him enough time to recover if possible from this situation.  Keep a close eye on complications of supportive care such as C. difficile colitis  Infection and ventilator associated pneumonia and decubiti.  Long conversation with patient's family as well as patient's intensivist Dr. Ansari.  It appears on one hand he feels better based on his response but on the other hand his objective parameters are still pointing towards possibility of prolonged ventilator stay given his needs for support.  I agree with intensivist service that at this juncture consideration should be given for trach and PEG so that he continued to have supports without having untoward effect and have eventually a better chance to recover from it.  Obviously is not a guarantee that he would recover but with this pathway he would have better chance.    Rafat Ybarra MD  2/7/2017  10:57 PM    Much of this encounter note is an electronic transcription/translation of spoken language to printed text. The electronic translation of spoken language may permit erroneous, or at times, nonsensical words or phrases to be inadvertently transcribed; Although I have reviewed the note for such errors, some may still exist

## 2017-02-08 NOTE — PROGRESS NOTES
"  Infectious Diseases Progress Note    Rafat Ybarra MD     [facility]  Los: 11 days  Patient Identification:  Name: Noah Isaac  Age: 81 y.o.  Sex: male  :  1935  MRN: [id number]         Primary Care Physician: Flash Jim MD            Subjective: On ventilator but interactive.  Interval History: Showing some signs of improvement FiO2 was decreased to 50% in the last 24 hours.     Objective:    Scheduled Meds:  albuterol 6 puff Inhalation 4x Daily - RT   enoxaparin 40 mg Subcutaneous Q24H   fentaNYL 1 patch Transdermal Q72H   insulin aspart 1-20 Units Subcutaneous Q4H   iopamidol 100 mL Intravenous Once in imaging   lansoprazole 30 mg Nasogastric QAM   levoFLOXacin 500 mg Intravenous Q24H   metoprolol tartrate 25 mg Oral Q8H   piperacillin-tazobactam 3.375 g Intravenous Q8H   sennosides-docusate sodium 2 tablet Oral Nightly   sodium chloride 10 mL Intracatheter Q12H     Continuous Infusions:  dexmedetomidine 0.2-1.5 mcg/kg/hr Last Rate: 1.2 mcg/kg/hr (17 0941)   sodium chloride 9 mL/hr Last Rate: 9 mL/hr (17 0631)       Vital signs in last 24 hours:  Temp:  [97.4 °F (36.3 °C)-98.8 °F (37.1 °C)] 98.8 °F (37.1 °C)  Heart Rate:  [] 90  Resp:  [28-32] 28  BP: (103-150)/(52-89) 144/89  FiO2 (%):  [40 %-50 %] 50 %    Intake/Output:    Intake/Output Summary (Last 24 hours) at 17 1033  Last data filed at 17 0945   Gross per 24 hour   Intake   3213 ml   Output   1965 ml   Net   1248 ml       Exam:  Visit Vitals   • /89   • Pulse 90   • Temp 98.8 °F (37.1 °C) (Oral)   • Resp 28   • Ht 69.02\" (175.3 cm)   • Wt 198 lb 6.6 oz (90 kg)   • SpO2 96%   • BMI 29.29 kg/m2       General Appearance:    Alert, cooperative, no distress, on ventilator with vent setting noted.                          Head:    Normocephalic, without obvious abnormality, atraumatic                           Eyes:    PERRL, conjunctiva/corneas clear, EOM's intact, both eyes                "          Throat:   Lips, tongue, gums normal; oral mucosa pink and moist                           Neck:   Hard cervical collar in place                         Lungs:    Clear to auscultation bilaterally, respirations unlabored                 Chest Wall:    No tenderness or deformity                          Heart:    Regular rate and rhythm, S1 and S2 normal, no murmur,no  Rub                                      or gallop                  Abdomen:     Soft, non-tender, bowel sounds active, no masses, no                                                        organomegaly                  Extremities:   Extremities normal, atraumatic, no cyanosis or edema                        Pulses:   Pulses palpable in all extremities                            Skin:   Skin is warm and dry,  no rashes or palpable lesions                     Data Review:    I reviewed the patient's new clinical results.    Results from last 7 days  Lab Units 02/08/17  0354 02/07/17  0450 02/06/17  0255 02/05/17  1713 02/05/17  0304 02/04/17  0336 02/03/17  0322 02/02/17  1219   WBC 10*3/mm3 11.75* 11.77* 9.82  --  8.93 8.18 8.64 11.17*   HEMOGLOBIN g/dL 9.0* 8.9* 8.5* 8.2* 7.2* 7.2* 7.8* 8.6*   PLATELETS 10*3/mm3 196 177 153  --  142 150 185 206       Results from last 7 days  Lab Units 02/08/17  0354 02/07/17  1535 02/07/17  0450 02/06/17  1619 02/06/17  0209 02/05/17  0304 02/04/17  0336 02/03/17  0322 02/02/17  0513   SODIUM mmol/L 144  --  145  --  146* 150* 148* 148* 146*   POTASSIUM mmol/L 4.1 4.2 3.6 4.1 3.4* 3.8 3.7 3.8 3.7   CHLORIDE mmol/L 103  --  102  --  109* 110* 112* 115* 113*   TOTAL CO2 mmol/L 28.7  --  29.2*  --  25.1 22.3 23.0 22.3 21.4*   BUN mg/dL 48*  --  40*  --  40* 39* 41* 46* 51*   CREATININE mg/dL 1.50*  --  1.41*  --  1.30* 1.18 1.41* 1.43* 1.72*   CALCIUM mg/dL 8.5*  --  8.5*  --  7.9* 7.8* 8.1* 8.5* 8.1*   GLUCOSE mg/dL 142*  --  149*  --  123* 149* 159* 168* 140*         Assessment:  Principal Problem:     Sepsis  Active Problems:    Anemia    Hyperlipidemia    HTN (hypertension)    Coronary artery disease    ANDERS (acute kidney injury)    Muscle weakness-general    Bilateral pneumonia    Acute and chronic respiratory failure with hypoxia    Chronic diastolic CHF (congestive heart failure)    Leg edema    Atrial fibrillation with RVR    Hypernatremia    ARDS (adult respiratory distress syndrome)      Plan:  Continue Levaquin and Zosyn through 12/13/2017.  Supportive care per intensivist's and internal medicine service.  Keep a close eye on complications of supportive care such as line infection with resistant pathogens, superimposed ventilator associated pneumonia, urinary tract infection and C. difficile colitis.    Rafat Ybarra MD  2/8/2017  10:33 AM    Much of this encounter note is an electronic transcription/translation of spoken language to printed text. The electronic translation of spoken language may permit erroneous, or at times, nonsensical words or phrases to be inadvertently transcribed; Although I have reviewed the note for such errors, some may still exist

## 2017-02-09 NOTE — PLAN OF CARE
Problem: Patient Care Overview (Adult)  Goal: Plan of Care Review  Outcome: Ongoing (interventions implemented as appropriate)    02/09/17 0622   Coping/Psychosocial Response Interventions   Plan Of Care Reviewed With patient   Patient Care Overview   Progress improving   Outcome Evaluation   Outcome Summary/Follow up Plan MAINTAINING O2 SATS WITH VENT CHANGES; VITALS STABLE; PAIN MEDS GIVEN FREQUENTLY

## 2017-02-09 NOTE — PROGRESS NOTES
Daily Progress Note.     Noah Isaac  81 y.o.  male  2/9/2017    Brief problem (summary)  This is a 81-year-old gentleman who is well-known to me for many years with a history of previous pneumonia.  He normally follows me in the office also.  He was recently hospitalized from January 13, 1922 with an odontoid  fracture after fall and was complicated by acute renal failure and multiple facial fractures.  He was readmitted to the hospital on 29 January from a rehabilitation because of having fever and chills and increasing shortness breath.  He was hypoxic and found to be having bilateral pneumonia.  Later he was intubated on 31 January, and is being treated for pneumonia and ARDS.  He still has a soft collar.  At present he is arousable and follows simple commands.      Today, he is afebrile on the ventilator.  He is arousable. He is not getting any better. The CXR shows persistent bilateral infiltrates and ARDS. He is on PEEP at 10 and FiO2 at 50%     PMS/FH/SH: reviewed and unchanged.  Medications:     albuterol 6 puff Inhalation 4x Daily - RT   enoxaparin 40 mg Subcutaneous Q24H   fentaNYL 1 patch Transdermal Q72H   insulin aspart 1-20 Units Subcutaneous Q4H   iopamidol 100 mL Intravenous Once in imaging   lansoprazole 30 mg Nasogastric QAM   levoFLOXacin 500 mg Intravenous Q24H   metoprolol tartrate 25 mg Oral Q8H   piperacillin-tazobactam 3.375 g Intravenous Q8H   sennosides-docusate sodium 2 tablet Oral Nightly   sodium chloride 10 mL Intracatheter Q12H       dexmedetomidine 0.2-1.5 mcg/kg/hr Last Rate: 1.5 mcg/kg/hr (02/09/17 1215)   sodium chloride 9 mL/hr Last Rate: 9 mL/hr (02/08/17 0631)       ROS:  Respiratory ROS: NA on vent    Objective:  Temp:  [97.6 °F (36.4 °C)-99.2 °F (37.3 °C)] 99.2 °F (37.3 °C)  I/O last 3 completed shifts:  In: 5131.4 [I.V.:1368.4; Other:1270; NG/GT:2362; IV Piggyback:131]  Out: 1835 [Urine:1835]  I/O this shift:  In: 651 [Other:200; NG/GT:451]  Out: 675  [Urine:675]    Physical Exam   Constitutional: Vital signs are normal. He appears well-developed. He is easily aroused. He is sedated and intubated.   HENT:   Head: Normocephalic and atraumatic. Head is without abrasion and without laceration.   Nose: No rhinorrhea or nasal deformity. No epistaxis.   Soft collar present   Eyes: Pupils are equal, round, and reactive to light.   Neck: No JVD present. No rigidity. No tracheal deviation and no edema present. No thyroid mass and no thyromegaly present.   Cardiovascular: Regular rhythm and normal heart sounds.    Pulmonary/Chest: He is intubated. He has decreased breath sounds. He has no wheezes.   Abdominal: Soft. Normal appearance and bowel sounds are normal. He exhibits no ascites. There is no hepatosplenomegaly.   Neurological: He is easily aroused.   Skin: No laceration, no petechiae and no rash noted. No cyanosis. Nails show no clubbing.        CXR:      Results from last 7 days  Lab Units 02/09/17  0209 02/08/17  0354 02/07/17  0450   WBC 10*3/mm3 9.77 11.75* 11.77*   HEMOGLOBIN g/dL 8.4* 9.0* 8.9*   HEMATOCRIT % 27.3* 28.2* 27.7*   PLATELETS 10*3/mm3 211 196 177       Results from last 7 days  Lab Units 02/09/17  0209 02/08/17  0354 02/07/17  1535 02/07/17  0450   SODIUM mmol/L 143 144  --  145   POTASSIUM mmol/L 3.9 4.1 4.2 3.6   CHLORIDE mmol/L 101 103  --  102   TOTAL CO2 mmol/L 31.3* 28.7  --  29.2*   BUN mg/dL 44* 48*  --  40*   CREATININE mg/dL 1.23 1.50*  --  1.41*   GLUCOSE mg/dL 133* 142*  --  149*   CALCIUM mg/dL 8.3* 8.5*  --  8.5*           Results from last 7 days  Lab Units 02/09/17  0340   PH, ARTERIAL pH units 7.394   PO2 ART mm Hg 67.6*   PCO2, ARTERIAL mm Hg 55.0*   HCO3 ART mmol/L 33.6*     Vent Mode: PC/AC  FiO2 (%):  [50 %] 50 %  S RR:  [26] 26  AR SUP:  [0 cm H20] 0 cm H20  MAP (cm H2O):  [16-17] 16   PEEP 10    ASSESSMENT/ PLAN:  · Hypoxic respiratory failure continue ventilatory support  · Bilateral pneumonia.  Continue antibiotics  · ARDS,  still requiring high peep and Fio2  · Acute renal failure  · Sepsis  · Non-ST type 2  · Cerviacal C1 spine fracture, high risk for long-term anticoagulation  · Atrial flutter/fib      Overall, patient has not shown any improvement and patient doesn't want trach and peg, I has long discussion with patient and wife along with children. They all want comfort care.   PCP was informed        Plan, withdraw care and comfort  care    CC 35 min  Danny Hernandez MD,[facility]  Pulmonary, Critical Care and Sleep Medicine.  [facility]    2/9/2017    EMR Dragon/Transcription disclaimer:   Much of this encounter note is an electronic transcription/translation of spoken language to printed text. The electronic translation of spoken language may permit erroneous, or at times, nonsensical words or phrases to be inadvertently transcribed; Although I have reviewed the note for such errors, some may still exist.

## 2017-02-09 NOTE — PROGRESS NOTES
"DAILY PROGRESS NOTE  [facility], [facility]      Patient Identification:  Name: Noah Isaac  Age: 81 y.o.  Sex: male  :  1935  MRN: [id number]         Primary Care Physician: Flash Jim MD    Subjective:  Interval History:  Mr. Isaac is sedated, intubated and on ventilator this morning. Follows commands. Continues with enteral feedings. This is day 7 of levaquin and zosyn.     Objective:    Scheduled Meds:  albuterol 6 puff Inhalation 4x Daily - RT   enoxaparin 40 mg Subcutaneous Q24H   fentaNYL 1 patch Transdermal Q72H   insulin aspart 1-20 Units Subcutaneous Q4H   iopamidol 100 mL Intravenous Once in imaging   lansoprazole 30 mg Nasogastric QAM   levoFLOXacin 500 mg Intravenous Q24H   metoprolol tartrate 25 mg Oral Q8H   piperacillin-tazobactam 3.375 g Intravenous Q8H   sennosides-docusate sodium 2 tablet Oral Nightly   sodium chloride 10 mL Intracatheter Q12H     Continuous Infusions:  dexmedetomidine 0.2-1.5 mcg/kg/hr Last Rate: 1.2 mcg/kg/hr (17 0903)   sodium chloride 9 mL/hr Last Rate: 9 mL/hr (17 0631)       Vital signs in last 24 hours:  Temp:  [97.5 °F (36.4 °C)-98.5 °F (36.9 °C)] 97.6 °F (36.4 °C)  Heart Rate:  [] 94  Resp:  [26-31] 31  BP: (114-157)/(52-76) 144/65  FiO2 (%):  [40 %-50 %] 50 %    tMax 24 hrs: Temp (24hrs), Av °F (36.7 °C), Min:97.5 °F (36.4 °C), Max:98.5 °F (36.9 °C)      Intake/Output:    Intake/Output Summary (Last 24 hours) at 17 1042  Last data filed at 17 0900   Gross per 24 hour   Intake 3021.4 ml   Output   1320 ml   Net 1701.4 ml       Exam:    Visit Vitals   • /65   • Pulse 94   • Temp 97.6 °F (36.4 °C) (Oral)   • Resp (!) 31   • Ht 69.02\" (175.3 cm)   • Wt 200 lb 6.4 oz (90.9 kg)   • SpO2 95%   • BMI 29.58 kg/m2       General: sedated, intubated, on ventilator. Tubefeedings. Aj catheter.   Neck: Supple, symmetrical, trachea midline, no adenopathy;   thyroid: no " enlargement/tenderness/nodules;   no carotid bruit or JVD  Cardiovascular: Normal rate, regular rhythm and intact distal pulses.  Exam reveals no gallop and no friction rub. No murmur heard  Pulmonary: very diminished breath sounds bilaterally, crackles on the right side. No wheezing.    Abdominal: Soft. Soft, non-tender, bowel sounds active all four quadrants,   no masses, no hepatomegaly, no splenomegaly.   Extremities: Normal, atraumatic, no cyanosis or edema  Neurological: sedated, on ventilator. No gross sensory or motor deficit.   Skin: Skin color, texture, turgor normal, no rashes or lesions      Data Review:      Results from last 7 days  Lab Units 02/09/17  0209 02/08/17  0354 02/07/17  0450   WBC 10*3/mm3 9.77 11.75* 11.77*   HEMOGLOBIN g/dL 8.4* 9.0* 8.9*   HEMATOCRIT % 27.3* 28.2* 27.7*   PLATELETS 10*3/mm3 211 196 177         Results from last 7 days  Lab Units 02/09/17  0209 02/08/17  0354 02/07/17  1535 02/07/17  0450  02/05/17  0304   SODIUM mmol/L 143 144  --  145  < > 150*   POTASSIUM mmol/L 3.9 4.1 4.2 3.6  < > 3.8   CHLORIDE mmol/L 101 103  --  102  < > 110*   TOTAL CO2 mmol/L 31.3* 28.7  --  29.2*  < > 22.3   BUN mg/dL 44* 48*  --  40*  < > 39*   CREATININE mg/dL 1.23 1.50*  --  1.41*  < > 1.18   CALCIUM mg/dL 8.3* 8.5*  --  8.5*  < > 7.8*   BILIRUBIN mg/dL  --   --   --   --   --  0.3   ALK PHOS U/L  --   --   --   --   --  127*   ALT (SGPT) U/L  --   --   --   --   --  22   AST (SGOT) U/L  --   --   --   --   --  31   GLUCOSE mg/dL 133* 142*  --  149*  < > 149*   < > = values in this interval not displayed.          0  Lab Value Date/Time   TROPONINT 0.091 (C) 02/02/2017 1219   TROPONINT 0.022 01/28/2017 2116   TROPONINT <0.010 01/17/2017 1150   TROPONINT <0.010 09/16/2016 1818   TROPONINT <0.010 08/29/2016 1958   TROPONINT <0.010 08/28/2016 2009       Microbiology Results (last 10 days)     Procedure Component Value - Date/Time    AFB Culture [[id number]] Collected:  02/04/17 1639    Lab  Status:  Preliminary result Specimen:  Lavage from Lung, Right Lower Lobe Updated:  02/05/17 1303     AFB Stain        No acid fast bacilli seen on concentrated smear    Fungus Smear [[id number]] Collected:  02/04/17 1639    Lab Status:  Final result Specimen:  Lavage from Lung, Right Lower Lobe Updated:  02/05/17 0550     GMS Stain No fungal elements seen     BAL Culture, Quantitative [[id number]]  (Normal) Collected:  02/04/17 1639    Lab Status:  Final result Specimen:  Lavage from Lung, Right Lower Lobe Updated:  02/06/17 1231     Culture No growth at 2 days      Gram Stain Result Rare (1+) WBCs seen       No organisms seen     Respiratory Panel, PCR [[id number]]  (Normal) Collected:  02/04/17 1639    Lab Status:  Final result Specimen:  Lavage from Lung, Right Lower Lobe Updated:  02/04/17 1844     ADENOVIRUS, PCR Not Detected      Coronavirus 229E Not Detected      Coronavirus HKU1 Not Detected      Coronavirus NL63 Not Detected      Coronavirus OC43 Not Detected      Human Metapneumovirus Not Detected      Human Rhinovirus/Enterovirus Not Detected      Influenza B PCR Not Detected      Parainfluenza Virus 1 Not Detected      Parainfluenza Virus 2 Not Detected      Parainfluenza Virus 3 Not Detected      Parainfluenza Virus 4 Not Detected      Bordetella pertussis pcr Not Detected      Influenza 2009 H1N1 by PCR Not Detected      Chlamydophila pneumoniae PCR Not Detected      Mycoplasma pneumo by PCR Not Detected      Influenza A PCR Not Detected      Influenza A H3 Not Detected      Influenza A H1 Not Detected      RSV, PCR Not Detected     Respiratory Panel, PCR [[id number]]  (Normal) Collected:  02/02/17 1219    Lab Status:  Final result Specimen:  Wash from ET Suction Updated:  02/02/17 1447     ADENOVIRUS, PCR Not Detected      Coronavirus 229E Not Detected      Coronavirus HKU1 Not Detected      Coronavirus NL63 Not Detected      Coronavirus OC43 Not Detected      Human Metapneumovirus Not Detected      Human  Rhinovirus/Enterovirus Not Detected      Influenza B PCR Not Detected      Parainfluenza Virus 1 Not Detected      Parainfluenza Virus 2 Not Detected      Parainfluenza Virus 3 Not Detected      Parainfluenza Virus 4 Not Detected      Bordetella pertussis pcr Not Detected      Influenza 2009 H1N1 by PCR Not Detected      Chlamydophila pneumoniae PCR Not Detected      Mycoplasma pneumo by PCR Not Detected      Influenza A PCR Not Detected      Influenza A H3 Not Detected      Influenza A H1 Not Detected      RSV, PCR Not Detected     Respiratory Culture [[id number]] Collected:  02/02/17 1219    Lab Status:  Final result Specimen:  Wash from ET Suction Updated:  02/04/17 0705     Respiratory Culture No growth at 2 days      Gram Stain Result Few (2+) WBCs seen              Moderate (3+) Epithelial cells per low power field      No organisms seen     Respiratory Culture [[id number]] Collected:  02/01/17 0525    Lab Status:  Final result Specimen:  Sputum from Cough Updated:  02/03/17 0819     Respiratory Culture No growth      Gram Stain Result Rare (1+) WBCs seen              Rare (1+) Epithelial cells per low power field      No organisms seen            Imaging Results (last 7 days)     Procedure Component Value Units Date/Time    XR Chest 1 View [[id number]] Collected:  02/02/17 1308     Updated:  02/02/17 1313    Narrative:       XR CHEST 1 VW-     HISTORY: Male who is 81 years-old,  respiratory distress     TECHNIQUE: Frontal view of the chest     COMPARISON: 02/02/2017 at 0427 hours     FINDINGS: Stable appearing right PICC, endotracheal tube, NG tube.  Sternotomy wires are noted. Heart, mediastinum and pulmonary vasculature  are unremarkable. Extensive bilateral infiltrates appear similar to  prior exam. No large pleural effusion is seen, there may be minimal  pleural effusions. No pneumothorax. No acute osseous process.       Impression:       No significant change.     This report was finalized on 2/2/2017 1:10 PM  by Dr. Jet Rodgers MD.       XR Chest 1 View [[id number]] Collected:  02/02/17 0450     Updated:  02/03/17 0025    Narrative:       X-RAY CHEST 1 VIEW.     HISTORY: Acute respiratory failure.     COMPARISON: 1/31/2017.     FINDINGS:  Cardiomediastinal silhouette is within normal limits.  Lines and tubes  are stable. Lung volumes are low.     Extensive bilateral infiltrates, bilateral small effusions are  suspected.               Impression:       Worsening infiltrates or congestive heart failure please clinically  correlate.          This report was finalized on 2/3/2017 12:22 AM by Dr. Ancelmo Pedraza MD.       XR Chest 1 View [[id number]] Collected:  02/03/17 0440     Updated:  02/03/17 3956    Narrative:       X-RAY CHEST 1 VIEW.     HISTORY: Follow-up pneumonia.     COMPARISON: No prior studies for comparison.     FINDINGS:  Cardiomediastinal silhouette is within normal limits.  Lines and tubes  are stable.     Patchy bilateral lung opacities are present however lung volumes have  improved.               Impression:       1.  Improved lung volumes however patchy bilateral opacities remain.  2.  Follow-up to resolution is recommended.          This report was finalized on 2/3/2017 11:53 PM by Dr. Ancelmo Pedraza MD.       XR Chest 1 View [[id number]] Collected:  02/05/17 1112     Updated:  02/05/17 1117    Narrative:       XR CHEST 1 VW-     HISTORY: Male who is 81 years-old,  bilateral pneumonia     TECHNIQUE: Frontal view of the chest     COMPARISON: 02/04/2017     FINDINGS: Stable appearing endotracheal tube, NG tube, right PICC.  Sternotomy wires are present. Heart size is normal. Aorta is calcified.  Extensive diffuse bilateral pulmonary infiltrates appear similar to  prior exam. No pneumothorax. Otherwise stable.       Impression:       No significant change.     This report was finalized on 2/5/2017 11:13 AM by Dr. Jet Rodgers MD.       XR Chest 1 View [[id number]] Collected:  02/04/17 2321      Updated:  02/06/17 0038    Narrative:       X-RAY CHEST 1 VIEW.     HISTORY: Decreased oxygen saturation.     COMPARISON: 2/3/2017.     FINDINGS:  Cardiomediastinal silhouette is within normal limits. ET tube and NG  tube are stable.     Scattered bilateral patchy opacities, there may be small effusions.              Impression:       Scattered bilateral lung opacities concerning for infiltrates, overall  no significant change.         This report was finalized on 2/6/2017 12:35 AM by Dr. Ancelmo Pedraza MD.       XR Chest 1 View [[id number]] Collected:  02/06/17 0622     Updated:  02/06/17 0627    Narrative:       EMERGENCY PORTABLE CHEST SINGLE VIEW 06:00     HISTORY: 81-year-old male who attempted to pull out his own endotracheal  tube. Tube check was requested.     COMPARISON: 02/05/2017     FINDINGS:  1. Tip of the endotracheal tube is in satisfactory position 6 cm  proximal to the neeraj at the level of the medial clavicles.  2. Patchy bilateral airspace disease similar to previous study.  3. Feeding tube tip projects at least within the stomach.  4. Right arm PICC terminates over the SVC.     This report was finalized on 2/6/2017 6:23 AM by Dr. Navdeep Brown MD.       XR Chest 1 View [[id number]] Collected:  02/07/17 1114     Updated:  02/08/17 1602    Narrative:       PORTABLE CHEST     HISTORY: Acute respiratory distress.     COMPARISON: 02/06/2017.     FINDINGS: An endotracheal tube is in place in satisfactory position with  the tip at the thoracic inlet. A PICC line catheter is noted entering  from the right with the tip in the superior vena cava. The heart is  within normal limits in size. Diffuse pulmonary infiltrates are noted,  at least moderate if not moderate to severe with elements of  consolidation. There is improvement at the left lung base and right  upper lobe slightly. The infiltrate at the right lung base appears  unchanged and infiltrate at the left upper lobe is slightly more  prominent as  compared to previous examination. There is prominence of  the pulmonary interstitium in the perihilar regions bilaterally,  slightly more prominent as compared to previous examination as well as  cephalization of pulmonary vasculature which appears slightly more  prominent as compared to previous examination.     This report was finalized on 2/8/2017 3:59 PM by Dr. Genaro Malagon MD.       XR Chest 1 View [[id number]] Collected:  02/09/17 0557     Updated:  02/09/17 0601    Narrative:       PORTABLE CHEST SINGLE VIEW 03:55     HISTORY: 81-year-old male with acute respiratory distress on ventilator  with significant bilateral airspace disease, follow-up     COMPARISON: 02/07/2017 at 08:43     FINDINGS:  1. Extensive interstitial infiltrates persist perhaps slightly worse at  the right base laterally and left midlung laterally and inferiorly.  2. Endotracheal tube remains in satisfactory position.  3. Feeding tube tip projects off the film at least within the descending  duodenum.  4. No new abnormality     This report was finalized on 2/9/2017 5:58 AM by Dr. Navdeep Brown MD.               Assessment:      Principal Problem:    Sepsis  Active Problems:    Anemia    Hyperlipidemia    HTN (hypertension)    Coronary artery disease    ANDERS (acute kidney injury)    Muscle weakness-general    Bilateral pneumonia    Acute and chronic respiratory failure with hypoxia    Chronic diastolic CHF (congestive heart failure)    Leg edema    Atrial fibrillation with RVR    Hypernatremia    ARDS (adult respiratory distress syndrome)      Patient Active Problem List   Diagnosis Code   • Chronic coronary artery disease I25.10   • Anemia D64.9   • Hyperlipidemia E78.5   • HTN (hypertension) I10   • Trigeminal neuralgia G50.0   • Benign prostatic hyperplasia N40.0   • Ulcerative colitis without complications K51.90   • Health care maintenance Z00.00   • Hypertension I10   • Coronary artery disease I25.10   • ANDERS (acute kidney injury) N17.9    • Muscle weakness-general M62.81   • Pulmonary nodule, right R91.1   • Pulmonary fibrosis J84.10   • Weakness R53.1   • Asterixis R27.8   • CVA (cerebral infarction) I63.9   • Irritable bowel syndrome with both constipation and diarrhea K58.2   • Odontoid fracture S12.100A   • Multiple facial bone fractures S02.92XA   • Hyponatremia E87.1   • Dehydration E86.0   • Bilateral pneumonia J18.9   • Sepsis A41.9   • Acute and chronic respiratory failure with hypoxia J96.21   • Chronic diastolic CHF (congestive heart failure) I50.32   • Leg edema R60.0   • Atrial fibrillation with RVR I48.91   • Hypernatremia E87.0   • ARDS (adult respiratory distress syndrome) J80       Plan:    Continue TF's  Continue IV antibiotics  Follow culture results  Ventilation management per ICU  Diet: TF's, free water  Monitor and correct electrolytes  Monitor mental status  PT/OT/ST  DVT/stress ulcer prophylaxis  Labs in am      Loc Bolanos MD  2/9/2017  10:42 AM        Much of this encounter note is an electronic transcription/translation of spoken language to printed text. The electronic translation of spoken language may permit erroneous, or at times, nonsensical words or phrases to be inadvertently transcribed; Although I have reviewed the note for such errors, some may still exist

## 2017-02-09 NOTE — PLAN OF CARE
Problem: Patient Care Overview (Adult)  Goal: Plan of Care Review  Outcome: Unable to achieve outcome(s) by discharge Date Met:  02/09/17 02/09/17 8014   Outcome Evaluation   Outcome Summary/Follow up Plan Palliative rders given by Dr. Hernandez. Terminally extubated. Family members @ bedside. Reassurance given.         Problem: Fall Risk (Adult)  Goal: Absence of Falls  Outcome: Unable to achieve outcome(s) by discharge Date Met:  02/09/17    Problem: Pneumonia (Adult)  Goal: Signs and Symptoms of Listed Potential Problems Will be Absent or Manageable (Pneumonia)  Outcome: Unable to achieve outcome(s) by discharge Date Met:  02/09/17    Problem: Mobility, Physical Impaired (Adult)  Goal: Enhanced Mobility Skills  Outcome: Unable to achieve outcome(s) by discharge Date Met:  02/09/17  Goal: Enhanced Functionality Ability  Outcome: Unable to achieve outcome(s) by discharge Date Met:  02/09/17    Problem: Respiratory Insufficiency (Adult)  Goal: Acid/Base Balance  Outcome: Unable to achieve outcome(s) by discharge Date Met:  02/09/17  Goal: Effective Ventilation  Outcome: Unable to achieve outcome(s) by discharge Date Met:  02/09/17    Problem: Mechanical Ventilation, Invasive (Adult)  Goal: Signs and Symptoms of Listed Potential Problems Will be Absent or Manageable (Mechanical Ventilation, Invasive)  Outcome: Unable to achieve outcome(s) by discharge Date Met:  02/09/17

## 2017-02-09 NOTE — PROGRESS NOTES
"  Infectious Diseases Progress Note    Rafat Ybarra MD     [facility]  [place]: 12 days  Patient Identification:  Name: Noah Isaac  Age: 81 y.o.  Sex: male  :  1935  MRN: [id number]         Primary Care Physician: Flash Jim MD            Subjective: Awake interactive appropriate still on ventilator  Interval History: History status stable.  Plan to have a meeting with the family members and intensivist about the future care plan including the role of trach and PEG.     Objective:    Scheduled Meds:    albuterol 6 puff Inhalation 4x Daily - RT   enoxaparin 40 mg Subcutaneous Q24H   fentaNYL 1 patch Transdermal Q72H   insulin aspart 1-20 Units Subcutaneous Q4H   iopamidol 100 mL Intravenous Once in imaging   lansoprazole 30 mg Nasogastric QAM   levoFLOXacin 500 mg Intravenous Q24H   metoprolol tartrate 25 mg Oral Q8H   piperacillin-tazobactam 3.375 g Intravenous Q8H   sennosides-docusate sodium 2 tablet Oral Nightly   sodium chloride 10 mL Intracatheter Q12H     Continuous Infusions:    dexmedetomidine 0.2-1.5 mcg/kg/hr Last Rate: 1.2 mcg/kg/hr (17 0903)   sodium chloride 9 mL/hr Last Rate: 9 mL/hr (17 0631)       Vital signs in last 24 hours:  Temp:  [97.5 °F (36.4 °C)-98.5 °F (36.9 °C)] 97.6 °F (36.4 °C)  Heart Rate:  [] 94  Resp:  [26-31] 31  BP: (114-157)/(52-76) 144/65  FiO2 (%):  [40 %-50 %] 50 %    Intake/Output:    Intake/Output Summary (Last 24 hours) at 17 1049  Last data filed at 17 0900   Gross per 24 hour   Intake 3021.4 ml   Output   1320 ml   Net 1701.4 ml       Exam:  Visit Vitals   • /65   • Pulse 94   • Temp 97.6 °F (36.4 °C) (Oral)   • Resp (!) 31   • Ht 69.02\" (175.3 cm)   • Wt 200 lb 6.4 oz (90.9 kg)   • SpO2 95%   • BMI 29.58 kg/m2       General Appearance:    Alert, cooperative, no distress, on ventilator with vent setting noted.                          Head:    Normocephalic, without obvious abnormality, atraumatic           "                 Eyes:    PERRL, conjunctiva/corneas clear, EOM's intact, both eyes                         Throat:   Lips, tongue, gums normal; oral mucosa pink and moist                           Neck:   Hard cervical collar in place                         Lungs:    Clear to auscultation bilaterally, respirations unlabored                 Chest Wall:    No tenderness or deformity                          Heart:    Regular rate and rhythm, S1 and S2 normal, no murmur,no  Rub                                      or gallop                  Abdomen:     Soft, non-tender, bowel sounds active, no masses, no                                                        organomegaly                  Extremities:   Extremities normal, atraumatic, no cyanosis or edema                        Pulses:   Pulses palpable in all extremities                            Skin:   Skin is warm and dry,  no rashes or palpable lesions                     Data Review:    I reviewed the patient's new clinical results.    Results from last 7 days  Lab Units 02/09/17  0209 02/08/17  0354 02/07/17  0450 02/06/17  0255 02/05/17  1713 02/05/17  0304 02/04/17  0336 02/03/17  0322   WBC 10*3/mm3 9.77 11.75* 11.77* 9.82  --  8.93 8.18 8.64   HEMOGLOBIN g/dL 8.4* 9.0* 8.9* 8.5* 8.2* 7.2* 7.2* 7.8*   PLATELETS 10*3/mm3 211 196 177 153  --  142 150 185       Results from last 7 days  Lab Units 02/09/17  0209 02/08/17  0354 02/07/17  1535 02/07/17  0450 02/06/17  1619 02/06/17  0209 02/05/17  0304 02/04/17  0336 02/03/17  0322   SODIUM mmol/L 143 144  --  145  --  146* 150* 148* 148*   POTASSIUM mmol/L 3.9 4.1 4.2 3.6 4.1 3.4* 3.8 3.7 3.8   CHLORIDE mmol/L 101 103  --  102  --  109* 110* 112* 115*   TOTAL CO2 mmol/L 31.3* 28.7  --  29.2*  --  25.1 22.3 23.0 22.3   BUN mg/dL 44* 48*  --  40*  --  40* 39* 41* 46*   CREATININE mg/dL 1.23 1.50*  --  1.41*  --  1.30* 1.18 1.41* 1.43*   CALCIUM mg/dL 8.3* 8.5*  --  8.5*  --  7.9* 7.8* 8.1* 8.5*   GLUCOSE mg/dL  133* 142*  --  149*  --  123* 149* 159* 168*     CULTURES ARE NEGATIVE SO FAR    Assessment:  Principal Problem:    Sepsis  Active Problems:    Anemia    Hyperlipidemia    HTN (hypertension)    Coronary artery disease    ANDERS (acute kidney injury)    Muscle weakness-general    Bilateral pneumonia    Acute and chronic respiratory failure with hypoxia    Chronic diastolic CHF (congestive heart failure)    Leg edema    Atrial fibrillation with RVR    Hypernatremia    ARDS (adult respiratory distress syndrome)      Plan:  Continue Levaquin and Zosyn through 12/13/2017.  Supportive care per intensivist's and internal medicine service.  Keep a close eye on complications of supportive care such as line infection with resistant pathogens, superimposed ventilator associated pneumonia, urinary tract infection and C. difficile colitis.  Discussed with family members  Rafat Ybarra MD  2/9/2017  10:49 AM    Much of this encounter note is an electronic transcription/translation of spoken language to printed text. The electronic translation of spoken language may permit erroneous, or at times, nonsensical words or phrases to be inadvertently transcribed; Although I have reviewed the note for such errors, some may still exist

## 2017-02-10 NOTE — NURSING NOTE
Spoke to Ivone Kaur with [facility] to inform her of patient's expiration. Case Number [id number]    Raine Keene RN

## 2017-02-20 LAB — FUNGUS WND CULT: ABNORMAL

## 2017-03-18 LAB
CHYMOTRYP AB SER-ACNC: NORMAL
NIGHT BLUE STAIN TISS: NORMAL

## 2021-11-05 NOTE — PROGRESS NOTES
"  Infectious Diseases Progress Note    Rafat Ybarra MD     [facility]  [place]: 7 days  Patient Identification:  Name: Noah Isaac  Age: 81 y.o.  Sex: male  :  1935  MRN: [id number]         Primary Care Physician: Flash Jim MD            Subjective: Ventilator parameters are about the same some more increase in oxygen requirement  Interval History: See consultation note     Objective:    Scheduled Meds:  acetylcysteine 2 mL Endotracheal Q12H   albuterol 6 puff Inhalation 4x Daily - RT   [START ON 2017] enoxaparin 40 mg Subcutaneous Q24H   fentaNYL 1 patch Transdermal Q72H   insulin aspart 1-20 Units Subcutaneous Q4H   iopamidol 100 mL Intravenous Once in imaging   lansoprazole 30 mg Nasogastric QAM   levoFLOXacin 500 mg Intravenous Q24H   metoprolol tartrate 25 mg Oral Q8H   piperacillin-tazobactam 3.375 g Intravenous Q8H   sennosides-docusate sodium 2 tablet Oral Nightly   sodium chloride 10 mL Intracatheter Q12H     Continuous Infusions:  dexmedetomidine 0.2-1.5 mcg/kg/hr Last Rate: 1 mcg/kg/hr (17 2306)   sodium chloride 9 mL/hr Last Rate: 9 mL/hr (17 1116)       Vital signs in last 24 hours:  Temp:  [97.5 °F (36.4 °C)-98.4 °F (36.9 °C)] 97.6 °F (36.4 °C)  Heart Rate:  [49-85] 82  Resp:  [25-33] 26  BP: (108-173)/(48-78) 126/63  FiO2 (%):  [50 %] 50 %    Intake/Output:    Intake/Output Summary (Last 24 hours) at 17 2313  Last data filed at 17 1658   Gross per 24 hour   Intake 3315.2 ml   Output   1300 ml   Net 2015.2 ml       Exam:  Visit Vitals   • /63   • Pulse 82   • Temp 97.6 °F (36.4 °C) (Oral)   • Resp 26   • Ht 69.02\" (175.3 cm)   • Wt 201 lb 8 oz (91.4 kg)   • SpO2 92%   • BMI 29.74 kg/m2       General Appearance:    On ventilator sedated but responds appropriately                          Head:    Normocephalic, without obvious abnormality, atraumatic                           Eyes:    PERRL, conjunctiva/corneas clear, EOM's intact, both " eyes                         Throat:   Lips, tongue, gums normal; oral mucosa pink and moist                           Neck:   Supple, symmetrical, trachea midline, no JVD                         Lungs:    Clear to auscultation bilaterally, respirations unlabored                 Chest Wall:    No tenderness or deformity                          Heart:    Regular rate and rhythm, S1 and S2 normal, no murmur,no  Rub                                      or gallop                  Abdomen:     Soft, non-tender, bowel sounds active, no masses, no                                                        organomegaly                  Extremities:   Extremities normal, atraumatic, no cyanosis or edema                        Pulses:   Pulses palpable in all extremities                            Skin:   Skin is warm and dry,  no rashes or palpable lesions                  Neurologic:   CNII-XII intact, motor strength grossly intact, sensation grossly                                         intact to light touch, no focal deficits noted       Data Review:    I reviewed the patient's new clinical results.    Results from last 7 days  Lab Units 02/04/17  0336 02/03/17  0322 02/02/17  1219 02/02/17  0512 02/01/17  0511 01/31/17  0606 01/30/17  0422   WBC 10*3/mm3 8.18 8.64 11.17* 8.59 10.93* 13.83* 13.93*   HEMOGLOBIN g/dL 7.2* 7.8* 8.6* 7.6* 7.4* 8.0* 8.7*   PLATELETS 10*3/mm3 150 185 206 202 230 233 230       Results from last 7 days  Lab Units 02/04/17  0336 02/03/17  0322 02/02/17  0513 02/01/17  1604 02/01/17  0511 01/31/17  0606 01/30/17  0422 01/29/17  0706   SODIUM mmol/L 148* 148* 146*  --  143 141 139 137   POTASSIUM mmol/L 3.7 3.8 3.7 3.8 3.5 4.0 3.9 4.1   CHLORIDE mmol/L 112* 115* 113*  --  107 105 102 102   TOTAL CO2 mmol/L 23.0 22.3 21.4*  --  20.8* 21.0* 20.8* 18.3*   BUN mg/dL 41* 46* 51*  --  45* 34* 28* 27*   CREATININE mg/dL 1.41* 1.43* 1.72*  --  1.97* 1.56* 1.41* 1.25   CALCIUM mg/dL 8.1* 8.5* 8.1*  --  8.5*  8.2* 8.5* 8.8   GLUCOSE mg/dL 159* 168* 140*  --  96 120* 106* 136*         Assessment:  Principal Problem:    Sepsis  Active Problems:    Anemia    Hyperlipidemia    HTN (hypertension)    Coronary artery disease    ANDERS (acute kidney injury)    Muscle weakness-general    Bilateral pneumonia    Acute and chronic respiratory failure with hypoxia    Chronic diastolic CHF (congestive heart failure)    Leg edema    Atrial fibrillation with RVR    Hypernatremia      Plan:  Continue present antibiotics with aggressive respiratory and ventilatory support.  Discussed with family    Rafat Ybarra MD  2/4/2017  11:13 PM    Much of this encounter note is an electronic transcription/translation of spoken language to printed text. The electronic translation of spoken language may permit erroneous, or at times, nonsensical words or phrases to be inadvertently transcribed; Although I have reviewed the note for such errors, some may still exist     (0) Performs both tasks correctly

## (undated) DEVICE — SINGLE USE BIOPSY VALVE MAJ-210: Brand: SINGLE USE BIOPSY VALVE (STERILE)

## (undated) DEVICE — TUBING, SUCTION, 1/4" X 10', STRAIGHT: Brand: MEDLINE

## (undated) DEVICE — SINGLE USE SUCTION VALVE MAJ-209: Brand: SINGLE USE SUCTION VALVE (STERILE)

## (undated) DEVICE — ADAPT SWVL FIBROPTIC BRONCH

## (undated) DEVICE — TRAP,MUCUS SPECIMEN, 80CC: Brand: MEDLINE

## (undated) DEVICE — BITEBLOCK OMNI BLOC